# Patient Record
Sex: MALE | Race: WHITE | NOT HISPANIC OR LATINO | Employment: OTHER | URBAN - METROPOLITAN AREA
[De-identification: names, ages, dates, MRNs, and addresses within clinical notes are randomized per-mention and may not be internally consistent; named-entity substitution may affect disease eponyms.]

---

## 2017-01-27 ENCOUNTER — ALLSCRIPTS OFFICE VISIT (OUTPATIENT)
Dept: OTHER | Facility: OTHER | Age: 72
End: 2017-01-27

## 2017-02-22 ENCOUNTER — ALLSCRIPTS OFFICE VISIT (OUTPATIENT)
Dept: OTHER | Facility: OTHER | Age: 72
End: 2017-02-22

## 2017-03-02 ENCOUNTER — HOSPITAL ENCOUNTER (OUTPATIENT)
Dept: RADIOLOGY | Facility: CLINIC | Age: 72
Discharge: HOME | End: 2017-03-02
Payer: MEDICARE

## 2017-03-02 ENCOUNTER — ALLSCRIPTS OFFICE VISIT (OUTPATIENT)
Dept: OTHER | Facility: OTHER | Age: 72
End: 2017-03-02

## 2017-03-02 DIAGNOSIS — M25.562 PAIN IN LEFT KNEE: ICD-10-CM

## 2017-03-02 DIAGNOSIS — M17.10 OSTEOARTHRITIS OF KNEE: ICD-10-CM

## 2017-03-02 PROCEDURE — 73564 X-RAY EXAM KNEE 4 OR MORE: CPT

## 2017-03-02 PROCEDURE — 73560 X-RAY EXAM OF KNEE 1 OR 2: CPT

## 2017-03-06 ENCOUNTER — APPOINTMENT (OUTPATIENT)
Dept: PHYSICAL THERAPY | Facility: CLINIC | Age: 72
End: 2017-03-06
Payer: MEDICARE

## 2017-03-06 DIAGNOSIS — M17.10 OSTEOARTHRITIS OF KNEE: ICD-10-CM

## 2017-03-06 PROCEDURE — G8979 MOBILITY GOAL STATUS: HCPCS

## 2017-03-06 PROCEDURE — G8978 MOBILITY CURRENT STATUS: HCPCS

## 2017-03-06 PROCEDURE — 97014 ELECTRIC STIMULATION THERAPY: CPT

## 2017-03-06 PROCEDURE — 97162 PT EVAL MOD COMPLEX 30 MIN: CPT

## 2017-03-07 ENCOUNTER — GENERIC CONVERSION - ENCOUNTER (OUTPATIENT)
Dept: OTHER | Facility: OTHER | Age: 72
End: 2017-03-07

## 2017-03-08 ENCOUNTER — APPOINTMENT (OUTPATIENT)
Dept: PHYSICAL THERAPY | Facility: CLINIC | Age: 72
End: 2017-03-08
Payer: MEDICARE

## 2017-03-08 PROCEDURE — 97150 GROUP THERAPEUTIC PROCEDURES: CPT

## 2017-03-08 PROCEDURE — 97140 MANUAL THERAPY 1/> REGIONS: CPT

## 2017-03-08 PROCEDURE — 97014 ELECTRIC STIMULATION THERAPY: CPT

## 2017-03-13 ENCOUNTER — APPOINTMENT (OUTPATIENT)
Dept: PHYSICAL THERAPY | Facility: CLINIC | Age: 72
End: 2017-03-13
Payer: MEDICARE

## 2017-03-13 PROCEDURE — 97110 THERAPEUTIC EXERCISES: CPT

## 2017-03-13 PROCEDURE — 97140 MANUAL THERAPY 1/> REGIONS: CPT

## 2017-03-13 PROCEDURE — 97014 ELECTRIC STIMULATION THERAPY: CPT

## 2017-03-15 ENCOUNTER — APPOINTMENT (OUTPATIENT)
Dept: PHYSICAL THERAPY | Facility: CLINIC | Age: 72
End: 2017-03-15
Payer: MEDICARE

## 2017-03-20 ENCOUNTER — APPOINTMENT (OUTPATIENT)
Dept: PHYSICAL THERAPY | Facility: CLINIC | Age: 72
End: 2017-03-20
Payer: MEDICARE

## 2017-03-20 PROCEDURE — 97014 ELECTRIC STIMULATION THERAPY: CPT

## 2017-03-20 PROCEDURE — 97140 MANUAL THERAPY 1/> REGIONS: CPT

## 2017-03-20 PROCEDURE — 97110 THERAPEUTIC EXERCISES: CPT

## 2017-03-22 ENCOUNTER — APPOINTMENT (OUTPATIENT)
Dept: PHYSICAL THERAPY | Facility: CLINIC | Age: 72
End: 2017-03-22
Payer: MEDICARE

## 2017-03-22 PROCEDURE — 97140 MANUAL THERAPY 1/> REGIONS: CPT

## 2017-03-22 PROCEDURE — 97110 THERAPEUTIC EXERCISES: CPT

## 2017-03-22 PROCEDURE — 97014 ELECTRIC STIMULATION THERAPY: CPT

## 2017-03-23 ENCOUNTER — APPOINTMENT (OUTPATIENT)
Dept: PHYSICAL THERAPY | Facility: CLINIC | Age: 72
End: 2017-03-23
Payer: MEDICARE

## 2017-03-23 PROCEDURE — 97140 MANUAL THERAPY 1/> REGIONS: CPT

## 2017-03-23 PROCEDURE — 97110 THERAPEUTIC EXERCISES: CPT

## 2017-03-23 PROCEDURE — 97014 ELECTRIC STIMULATION THERAPY: CPT

## 2017-03-27 ENCOUNTER — APPOINTMENT (OUTPATIENT)
Dept: PHYSICAL THERAPY | Facility: CLINIC | Age: 72
End: 2017-03-27
Payer: MEDICARE

## 2017-03-27 PROCEDURE — 97014 ELECTRIC STIMULATION THERAPY: CPT

## 2017-03-27 PROCEDURE — 97110 THERAPEUTIC EXERCISES: CPT

## 2017-03-27 PROCEDURE — 97140 MANUAL THERAPY 1/> REGIONS: CPT

## 2017-03-30 ENCOUNTER — APPOINTMENT (OUTPATIENT)
Dept: PHYSICAL THERAPY | Facility: CLINIC | Age: 72
End: 2017-03-30
Payer: MEDICARE

## 2017-03-30 PROCEDURE — 97140 MANUAL THERAPY 1/> REGIONS: CPT

## 2017-03-30 PROCEDURE — 97014 ELECTRIC STIMULATION THERAPY: CPT

## 2017-03-30 PROCEDURE — 97110 THERAPEUTIC EXERCISES: CPT

## 2017-04-03 ENCOUNTER — APPOINTMENT (OUTPATIENT)
Dept: PHYSICAL THERAPY | Facility: CLINIC | Age: 72
End: 2017-04-03
Payer: MEDICARE

## 2017-04-03 PROCEDURE — 97014 ELECTRIC STIMULATION THERAPY: CPT

## 2017-04-03 PROCEDURE — 97110 THERAPEUTIC EXERCISES: CPT

## 2017-04-03 PROCEDURE — 97140 MANUAL THERAPY 1/> REGIONS: CPT

## 2017-04-06 ENCOUNTER — APPOINTMENT (OUTPATIENT)
Dept: PHYSICAL THERAPY | Facility: CLINIC | Age: 72
End: 2017-04-06
Payer: MEDICARE

## 2017-04-06 PROCEDURE — 97110 THERAPEUTIC EXERCISES: CPT

## 2017-04-06 PROCEDURE — 97014 ELECTRIC STIMULATION THERAPY: CPT

## 2017-04-06 PROCEDURE — G8978 MOBILITY CURRENT STATUS: HCPCS

## 2017-04-06 PROCEDURE — G8979 MOBILITY GOAL STATUS: HCPCS

## 2017-04-06 PROCEDURE — 97140 MANUAL THERAPY 1/> REGIONS: CPT

## 2017-04-07 ENCOUNTER — GENERIC CONVERSION - ENCOUNTER (OUTPATIENT)
Dept: OTHER | Facility: OTHER | Age: 72
End: 2017-04-07

## 2017-04-10 ENCOUNTER — APPOINTMENT (OUTPATIENT)
Dept: PHYSICAL THERAPY | Facility: CLINIC | Age: 72
End: 2017-04-10
Payer: MEDICARE

## 2017-04-10 PROCEDURE — 97014 ELECTRIC STIMULATION THERAPY: CPT

## 2017-04-10 PROCEDURE — 97110 THERAPEUTIC EXERCISES: CPT

## 2017-04-10 PROCEDURE — 97140 MANUAL THERAPY 1/> REGIONS: CPT

## 2017-04-12 ENCOUNTER — GENERIC CONVERSION - ENCOUNTER (OUTPATIENT)
Dept: OTHER | Facility: OTHER | Age: 72
End: 2017-04-12

## 2017-04-13 ENCOUNTER — APPOINTMENT (OUTPATIENT)
Dept: PHYSICAL THERAPY | Facility: CLINIC | Age: 72
End: 2017-04-13
Payer: MEDICARE

## 2017-04-13 PROCEDURE — 97110 THERAPEUTIC EXERCISES: CPT

## 2017-04-13 PROCEDURE — 97014 ELECTRIC STIMULATION THERAPY: CPT

## 2017-04-13 PROCEDURE — 97140 MANUAL THERAPY 1/> REGIONS: CPT

## 2017-04-24 ENCOUNTER — ALLSCRIPTS OFFICE VISIT (OUTPATIENT)
Dept: OTHER | Facility: OTHER | Age: 72
End: 2017-04-24

## 2017-05-24 LAB
A/G RATIO (HISTORICAL): 1.3 (CALC) (ref 1–2.5)
ALBUMIN SERPL BCP-MCNC: 4.1 G/DL (ref 3.6–5.1)
ALP SERPL-CCNC: 56 U/L (ref 40–115)
ALT SERPL W P-5'-P-CCNC: 18 U/L (ref 9–46)
AST SERPL W P-5'-P-CCNC: 17 U/L (ref 10–35)
BILIRUB SERPL-MCNC: 0.9 MG/DL (ref 0.2–1.2)
BUN SERPL-MCNC: 21 MG/DL (ref 7–25)
BUN/CREA RATIO (HISTORICAL): ABNORMAL (CALC) (ref 6–22)
CALCIUM (ADJUSTED FOR ALBUMIN) (HISTORICAL): 9.2 MG/DL (CALC) (ref 8.6–10.2)
CALCIUM SERPL-MCNC: 9 MG/DL (ref 8.6–10.3)
CHLORIDE SERPL-SCNC: 98 MMOL/L (ref 98–110)
CHOLEST SERPL-MCNC: 186 MG/DL (ref 125–200)
CHOLEST/HDLC SERPL: 5.2 (CALC)
CO2 SERPL-SCNC: 36 MMOL/L (ref 20–31)
CREAT SERPL-MCNC: 0.94 MG/DL (ref 0.7–1.18)
EGFR AFRICAN AMERICAN (HISTORICAL): 94 ML/MIN/1.73M2
EGFR-AMERICAN CALC (HISTORICAL): 81 ML/MIN/1.73M2
GAMMA GLOBULIN (HISTORICAL): 3.2 G/DL (CALC) (ref 1.9–3.7)
GLUCOSE (HISTORICAL): 85 MG/DL (ref 65–99)
HDLC SERPL-MCNC: 36 MG/DL
LDL CHOLESTEROL (HISTORICAL): 119 MG/DL (CALC)
NON-HDL-CHOL (CHOL-HDL) (HISTORICAL): 150 MG/DL (CALC)
POTASSIUM SERPL-SCNC: 4.2 MMOL/L (ref 3.5–5.3)
PROSTATE SPECIFIC ANTIGEN FREE (HISTORICAL): 1.1 NG/ML
PROSTATE SPECIFIC ANTIGEN PERCENT FREE (HISTORICAL): 23 % (CALC)
PROSTATE SPECIFIC ANTIGEN TOTAL (HISTORICAL): 4.7 NG/ML
SODIUM SERPL-SCNC: 140 MMOL/L (ref 135–146)
TOTAL PROTEIN (HISTORICAL): 7.3 G/DL (ref 6.1–8.1)
TRIGL SERPL-MCNC: 153 MG/DL

## 2017-06-08 ENCOUNTER — ALLSCRIPTS OFFICE VISIT (OUTPATIENT)
Dept: OTHER | Facility: OTHER | Age: 72
End: 2017-06-08

## 2017-06-20 ENCOUNTER — ALLSCRIPTS OFFICE VISIT (OUTPATIENT)
Dept: OTHER | Facility: OTHER | Age: 72
End: 2017-06-20

## 2017-06-26 ENCOUNTER — ALLSCRIPTS OFFICE VISIT (OUTPATIENT)
Dept: OTHER | Facility: OTHER | Age: 72
End: 2017-06-26

## 2017-06-29 ENCOUNTER — ALLSCRIPTS OFFICE VISIT (OUTPATIENT)
Dept: OTHER | Facility: OTHER | Age: 72
End: 2017-06-29

## 2017-07-10 DIAGNOSIS — J18.1 LOBAR PNEUMONIA (HCC): ICD-10-CM

## 2017-07-10 DIAGNOSIS — D72.829 ELEVATED WHITE BLOOD CELL COUNT: ICD-10-CM

## 2017-09-27 ENCOUNTER — LAB CONVERSION - ENCOUNTER (OUTPATIENT)
Dept: OTHER | Facility: OTHER | Age: 72
End: 2017-09-27

## 2017-09-27 LAB
A/G RATIO (HISTORICAL): 1.3 (CALC) (ref 1–2.5)
ALBUMIN SERPL BCP-MCNC: 3.9 G/DL (ref 3.6–5.1)
ALP SERPL-CCNC: 61 U/L (ref 40–115)
ALT SERPL W P-5'-P-CCNC: 15 U/L (ref 9–46)
AST SERPL W P-5'-P-CCNC: 20 U/L (ref 10–35)
BASOPHILS # BLD AUTO: 0.9 %
BASOPHILS # BLD AUTO: 86 CELLS/UL (ref 0–200)
BILIRUB SERPL-MCNC: 0.6 MG/DL (ref 0.2–1.2)
BUN SERPL-MCNC: 20 MG/DL (ref 7–25)
BUN/CREA RATIO (HISTORICAL): ABNORMAL (CALC) (ref 6–22)
CALCIUM SERPL-MCNC: 9.1 MG/DL (ref 8.6–10.3)
CHLORIDE SERPL-SCNC: 98 MMOL/L (ref 98–110)
CHOLEST SERPL-MCNC: 172 MG/DL
CHOLEST/HDLC SERPL: 5.4 (CALC)
CO2 SERPL-SCNC: 33 MMOL/L (ref 20–31)
CREAT SERPL-MCNC: 0.87 MG/DL (ref 0.7–1.18)
DEPRECATED RDW RBC AUTO: 13.2 % (ref 11–15)
EGFR AFRICAN AMERICAN (HISTORICAL): 100 ML/MIN/1.73M2
EGFR-AMERICAN CALC (HISTORICAL): 86 ML/MIN/1.73M2
EOSINOPHIL # BLD AUTO: 1.5 %
EOSINOPHIL # BLD AUTO: 144 CELLS/UL (ref 15–500)
GAMMA GLOBULIN (HISTORICAL): 3 G/DL (CALC) (ref 1.9–3.7)
GLUCOSE (HISTORICAL): 89 MG/DL (ref 65–99)
HCT VFR BLD AUTO: 47.6 % (ref 38.5–50)
HDLC SERPL-MCNC: 32 MG/DL
HGB BLD-MCNC: 15.7 G/DL (ref 13.2–17.1)
LDL CHOLESTEROL (HISTORICAL): 112 MG/DL (CALC)
LYMPHOCYTES # BLD AUTO: 2160 CELLS/UL (ref 850–3900)
LYMPHOCYTES # BLD AUTO: 22.5 %
MCH RBC QN AUTO: 32.1 PG (ref 27–33)
MCHC RBC AUTO-ENTMCNC: 33 G/DL (ref 32–36)
MCV RBC AUTO: 97.3 FL (ref 80–100)
MONOCYTES # BLD AUTO: 960 CELLS/UL (ref 200–950)
MONOCYTES (HISTORICAL): 10 %
NEUTROPHILS # BLD AUTO: 6250 CELLS/UL (ref 1500–7800)
NEUTROPHILS # BLD AUTO: 65.1 %
NON-HDL-CHOL (CHOL-HDL) (HISTORICAL): 140 MG/DL (CALC)
PLATELET # BLD AUTO: 270 THOUSAND/UL (ref 140–400)
PMV BLD AUTO: 11.3 FL (ref 7.5–12.5)
POTASSIUM SERPL-SCNC: 4.3 MMOL/L (ref 3.5–5.3)
RBC # BLD AUTO: 4.89 MILLION/UL (ref 4.2–5.8)
SODIUM SERPL-SCNC: 141 MMOL/L (ref 135–146)
TOTAL PROTEIN (HISTORICAL): 6.9 G/DL (ref 6.1–8.1)
TRIGL SERPL-MCNC: 157 MG/DL
TSH SERPL DL<=0.05 MIU/L-ACNC: 1.47 MIU/L (ref 0.4–4.5)
WBC # BLD AUTO: 9.6 THOUSAND/UL (ref 3.8–10.8)

## 2017-09-29 ENCOUNTER — GENERIC CONVERSION - ENCOUNTER (OUTPATIENT)
Dept: OTHER | Facility: OTHER | Age: 72
End: 2017-09-29

## 2017-10-30 ENCOUNTER — ALLSCRIPTS OFFICE VISIT (OUTPATIENT)
Dept: OTHER | Facility: OTHER | Age: 72
End: 2017-10-30

## 2017-10-31 NOTE — PROGRESS NOTES
Assessment  1  COPD, moderate (496) (J44 9)   2  Benign essential hypertension (401 1) (I10)   3  Anxiety (300 00) (F41 9)   4  Mixed hyperlipidemia (272 2) (E78 2)   5  CAD (coronary artery disease) (414 00) (I25 10)   6  Need for vaccination (V05 9) (Z23)    Plan  Anxiety    · PARoxetine HCl - 20 MG Oral Tablet (Paxil); TAKE 1 TABLET DAILY  Arthritis    · Meloxicam 7 5 MG Oral Tablet (Mobic); TAKE 1 TABLET TWICE DAILY WITH  FOOD  Atrial fibrillation, COPD, moderate, Elevated PSA, Mixed hyperlipidemia    · (1) PSA, DIAGNOSTIC (FOLLOW-UP); Status:Hold For - Exact Date; Requested for:After  [id number]; Atrial fibrillation, COPD, moderate, Mixed hyperlipidemia    · (1) CBC/PLT/DIFF; Status:Hold For - Exact Date; Requested for:After [id number];    · (1) COMPREHENSIVE METABOLIC PANEL; Status:Hold For - Exact Date; Requested  for:After [id number];    · (1) LIPID PANEL FASTING W DIRECT LDL REFLEX; Status:Hold For - Exact Date; Requested for:After [id number]; Benign essential hypertension    · Triamterene-HCTZ 37 5-25 MG Oral Capsule; TAKE 1 CAPSULE DAILY  CAD (coronary artery disease)    · Furosemide 20 MG Oral Tablet (Lasix); Take 2 tablets by mouth  daily  Esophagitis, reflux    · Famotidine 40 MG Oral Tablet; Take 1 tablet by mouth  twice a day  Mixed hyperlipidemia    · Pravastatin Sodium 80 MG Oral Tablet; TAKE 1 TABLET DAILY  Need for vaccination    · Fluzone High-Dose 0 5 ML Intramuscular Suspension Prefilled Syringe;  INJECT 0 5  ML Intramuscular; To Be Done: 59ZPF1279    Discussion/Summary    --COPD: stable on current meds: BREO and perforomist  cont prn O1edjqg well on paxil 20chol 172/112  doing well on pravastatin 80 mg qd  shot todaymos, FBW prior  Possible side effects of new medications were reviewed with the patient/guardian today  The treatment plan was reviewed with the patient/guardian   The patient/guardian understands and agrees with the treatment plan      Chief Complaint  Patient presents for 6 month follow up and blood work review  Patient is here today for follow up of chronic conditions described in HPI  History of Present Illness  recheck chronic medical probs  overall feels well  doing well on inhalers, breo and perforomist doing well on anxiety med, paxil  no longer uses cpap  doing well on pravastatin for chol   Review of Systems    Constitutional: No fever or chills, feels well, no tiredness, no recent weight gain or weight loss  Cardiovascular: No complaints of slow heart rate, no fast heart rate, no chest pain, no palpitations, no leg claudication, no lower extremity  Respiratory: No complaints of shortness of breath, no wheezing, no cough, no SOB on exertion, no orthopnea or PND  Gastrointestinal: No complaints of abdominal pain, no constipation, no nausea or vomiting, no diarrhea or bloody stools  Genitourinary: No complaints of dysuria, no incontinence, no hesitancy, no nocturia, no genital lesion, no testicular pain  Active Problems  1  Anxiety (300 00) (F41 9)   2  Arthritis (716 90) (M19 90)   3  Atrial fibrillation (427 31) (I48 91)   4  Benign essential hypertension (401 1) (I10)   5  CAD (coronary artery disease) (414 00) (I25 10)   6  Chronic pain of right elbow (192 77,595 30) (M25 521,G89 29)   7  COPD, moderate (496) (J44 9)   8  Diarrhea (787 91) (R19 7)   9  Elevated PSA (790 93) (R97 20)   10  Esophagitis, reflux (530 11) (K21 0)   11  Extrinsic asthma (493 00) (J45 909)   12  Gait disturbance (781 2) (R26 9)   13  Hypoxia (799 02) (R09 02)   14  Left knee pain (719 46) (M25 562)   15  Leukocytosis (288 60) (D72 829)   16  Localized osteoarthritis of right knee (715 36) (M17 11)   17  Lymphedema (457 1) (I89 0)   18  Mixed hyperlipidemia (272 2) (E78 2)   19  Obstructive sleep apnea (327 23) (G47 33)   20  Venous insufficiency (459 81) (I87 2)   21  Vitamin D deficiency (268 9) (E55 9)    Past Medical History  1   History of Abrasion of face, initial encounter (910 0) (S00 81XA)   2  History of Acute bronchitis (466 0) (J20 9)   3  Acute upper respiratory infection (465 9) (J06 9)   4  History of Cellulitis (682 9) (L03 90)   5  History of Cellulitis (682 9) (L03 90)   6  History of Cellulitis (682 9) (L03 90)   7  History of Cellulitis of extremity (L03 119)   8  History of Contusion of face (920) (S00 83XA)   9  History of Depression screening (V79 0) (Z13 89)   10  History of Flu vaccine need (V04 81) (Z23)   11  History of acute bacterial sinusitis (V12 69) (Z87 09)   12  History of acute bronchitis (V12 69) (Z87 09)   13  History of chronic bronchitis (V12 69) (Z87 09)   14  History of chronic obstructive lung disease (V12 69) (Z87 09)   15  History of edema (V13 89) (Z87 898)   16  History of influenza (V12 09) (Z87 09)   17  History of leukocytosis (V12 3) (Z86 2)   18  History of methicillin resistant Staphylococcus aureus infection (V12 04) (Z86 14)   19  History of pneumonia (V12 61) (Z87 01)   20  History of Knee pain (719 46) (M25 569)   21  History of LLL pneumonia (486) (J18 1)   22  History of Need for prophylactic vaccination and inoculation against influenza (V04 81)    (Z23)   23  History of Need for vaccination with 13-polyvalent pneumococcal conjugate vaccine    (V03 82) (Z23)   24  History of Sepsis due to Escherichia coli (155 28,055 62) (A41 51)    The active problems and past medical history were reviewed and updated today  Surgical History  1  History of Foot Surgery Left   2  History of Surgery Of The Spleen    Family History  Mother    1  Family history of CAD (coronary artery disease)  Father    2  Family history of Type 2 diabetes mellitus    Social History   · Former smoker (B62 84) (B34 263)   · No alcohol use  The social history was reviewed and updated today  The social history was reviewed and is unchanged  Current Meds   1  Aspir-81 81 MG Oral Tablet Delayed Release; TAKE 1 TABLET DAILY;    Therapy: (Recorded:15Mar2014) to Recorded   2  Breo Ellipta 100-25 MCG/INH Inhalation Aerosol Powder Breath Activated; INHALE 1   PUFFS Daily rinse mouth after using; Therapy: 97GRR2259 to (Evaluate:06New5113); Last Rx:29Jun2017 Ordered   3  Dicyclomine HCl - 20 MG Oral Tablet; TAKE 1 TABLET EVERY 6 HOURS AS NEEDED; Therapy: 50KTU6995 to (Last Drea Fabiana)  Requested for: 20Jun2017; Status: ACTIVE   - Transmit to Pharmacy - Awaiting Verification Ordered   4  Famotidine 40 MG Oral Tablet; Take 1 tablet by mouth  twice a day; Therapy: 99PAK8691 to (Evaluate:52Nyp6548)  Requested for: 43Zvm6382; Last   Rx:10Sep2017 Ordered   5  Furosemide 20 MG Oral Tablet; Take 2 tablets by mouth  daily; Therapy: 71PVQ7462 to (GOEQKYLE:83EMA8451)  Requested for: 97EZK5567; Last   Rx:27Jan2017 Ordered   6  Furosemide 20 MG Oral Tablet; TAKE 2 TABLETS DAILY; Therapy: 11TJS3877 to (Brooklyn Reason)  Requested for: 65RKL0593; Last   Rx:85Wpw0001 Ordered   7  Glucosamine 500 MG Oral Capsule; Therapy: 61Kqz9293 to Recorded   8  Meloxicam 7 5 MG Oral Tablet; TAKE 1 TABLET TWICE DAILY WITH FOOD; Therapy: 40LUC3047 to (RCRRZBMB:34VYF9698)  Requested for: 80QIH0773; Last   Rx:27Jan2017 Ordered   9  MiraLax Oral Powder; Therapy: 22Smj6854 to Recorded   10  Nebulizer Air Tube/Plugs Miscellaneous; USE AS DIRECTED; Therapy: 63DDP9493 to (Last Rx:30Jun2017) Ordered   11  Nebulizer/Adult Mask KIT; USE AS DIRECTED; Therapy: 23HPQ8294 to (Last Rx:30Jun2017) Ordered   12  PARoxetine HCl - 20 MG Oral Tablet; TAKE 1 TABLET DAILY; Therapy: 32LAC7997 to (YIJVOKZB:79XER7741)  Requested for: 24JVT9435; Last    Rx:27Jan2017 Ordered   13  Perforomist 20 MCG/2ML Inhalation Nebulization Solution; USE 1 VIAL IN NEBULIZER    EVERY 12 HOURS; Therapy: 07TSY0804 to (Brooklyn Reason)  Requested for: 90DNR9846; Last    Rx:88Anu1260 Ordered   14  Pravastatin Sodium 80 MG Oral Tablet; TAKE 1 TABLET DAILY;     Therapy: 81TNT7963 to (YPCWZYNT:50FPH6670)  Requested for: 79QHD6734; Last    FB:88ESS8845 Ordered   15  Triamterene-HCTZ 37 5-25 MG Oral Capsule; TAKE 1 CAPSULE DAILY; Therapy: 45Sek8031 to [phone])  Requested for: 40ROM7053; Last    Rx:85Ize2019 Ordered    The medication list was reviewed and updated today  Allergies  1  No Known Drug Allergies    Vitals  Vital Signs    Recorded: 67MTD3860 02:27PM   Temperature 98 4 F, Tympanic   Heart Rate 83   Pulse Quality Normal   Respiration Quality Normal   Respiration 16   Systolic 781, LUE, Sitting   Diastolic 80, LUE, Sitting   Weight 268 lb 9 oz   BMI Calculated 42 06   BSA Calculated 2 29   Height Unobtainable No   O2 Saturation 92   Pain Scale 0     Physical Exam    Constitutional   General appearance: No acute distress, well appearing and well nourished  Pulmonary   Respiratory effort: No increased work of breathing or signs of respiratory distress  Auscultation of lungs: Clear to auscultation, equal breath sounds bilaterally, no wheezes, no rales, no rhonci  Cardiovascular   Palpation of heart: Normal PMI, no thrills  Auscultation of heart: Normal rate and rhythm, normal S1 and S2, without murmurs  Examination of extremities for edema and/or varicosities: Normal     Carotid pulses: Normal     Lymphatic   Palpation of lymph nodes in neck: No lymphadenopathy  Psychiatric   Orientation to person, place and time: Normal     Mood and affect: Normal          Future Appointments    Date/Time Provider Specialty Site   12/18/2017 09:15 AM Alok Li DO Orthopedic Surgery [address]     Signatures   Electronically signed by :  Je Abdalla DO; Oct 30 2017  2:56PM EST                       (Author)

## 2017-11-08 ENCOUNTER — GENERIC CONVERSION - ENCOUNTER (OUTPATIENT)
Dept: OTHER | Facility: OTHER | Age: 72
End: 2017-11-08

## 2017-12-18 ENCOUNTER — GENERIC CONVERSION - ENCOUNTER (OUTPATIENT)
Dept: OTHER | Facility: OTHER | Age: 72
End: 2017-12-18

## 2017-12-19 ENCOUNTER — ALLSCRIPTS OFFICE VISIT (OUTPATIENT)
Dept: OTHER | Facility: OTHER | Age: 72
End: 2017-12-19

## 2017-12-20 NOTE — PROGRESS NOTES
Assessment  1  Acute bronchitis (466 0) (J20 9)    Plan  Acute bronchitis    · LevoFLOXacin 500 MG Oral Tablet; Take 1 tablet daily    Discussion/Summary    --Bronchitis: rx levaquin 500 qd x 10 days  drink plenty of fluids  call further probs  Possible side effects of new medications were reviewed with the patient/guardian today  The treatment plan was reviewed with the patient/guardian  The patient/guardian understands and agrees with the treatment plan      Chief Complaint  1  Cough   2  Sore Throat  pt complains of a sore throat/fever/cough      History of Present Illness  HPI: 2 day hx of cough, congestion, fever  Review of Systems   Constitutional: as noted in HPI   ENT: as noted in HPI  Cardiovascular: no complaints of slow or fast heart rate, no chest pain, no palpitations, no leg claudication or lower extremity edema  Respiratory: as noted in HPI  Active Problems  1  Anxiety (300 00) (F41 9)   2  Arthritis (716 90) (M19 90)   3  Atrial fibrillation (427 31) (I48 91)   4  Benign essential hypertension (401 1) (I10)   5  CAD (coronary artery disease) (414 00) (I25 10)   6  Chronic pain of right elbow (679 96,618 69) (M25 521,G89 29)   7  COPD, moderate (496) (J44 9)   8  Diarrhea (787 91) (R19 7)   9  Elevated PSA (790 93) (R97 20)   10  Esophagitis, reflux (530 11) (K21 0)   11  Extrinsic asthma (493 00) (J45 909)   12  Gait disturbance (781 2) (R26 9)   13  Hypoxia (799 02) (R09 02)   14  Left knee pain (719 46) (M25 562)   15  Leukocytosis (288 60) (D72 829)   16  Localized osteoarthritis of right knee (715 36) (M17 11)   17  Lymphedema (457 1) (I89 0)   18  Mixed hyperlipidemia (272 2) (E78 2)   19  Need for vaccination (V05 9) (Z23)   20  Obstructive sleep apnea (327 23) (G47 33)   21  Venous insufficiency (459 81) (I87 2)   22  Vitamin D deficiency (268 9) (E55 9)    Past Medical History  1  History of Abrasion of face, initial encounter (910 0) (S00 81XA)   2   History of Acute bronchitis (466 0) (J20 9)   3  Acute upper respiratory infection (465 9) (J06 9)   4  History of Cellulitis (682 9) (L03 90)   5  History of Cellulitis (682 9) (L03 90)   6  History of Cellulitis (682 9) (L03 90)   7  History of Cellulitis of extremity (L03 119)   8  History of Contusion of face (920) (S00 83XA)   9  History of Depression screening (V79 0) (Z13 89)   10  History of Flu vaccine need (V04 81) (Z23)   11  History of acute bacterial sinusitis (V12 69) (Z87 09)   12  History of acute bronchitis (V12 69) (Z87 09)   13  History of chronic bronchitis (V12 69) (Z87 09)   14  History of chronic obstructive lung disease (V12 69) (Z87 09)   15  History of edema (V13 89) (Z87 898)   16  History of influenza (V12 09) (Z87 09)   17  History of leukocytosis (V12 3) (Z86 2)   18  History of methicillin resistant Staphylococcus aureus infection (V12 04) (Z86 14)   19  History of pneumonia (V12 61) (Z87 01)   20  History of Knee pain (719 46) (M25 569)   21  History of LLL pneumonia (486) (J18 1)   22  History of Need for prophylactic vaccination and inoculation against influenza (V04 81)  (Z23)   23  History of Need for vaccination with 13-polyvalent pneumococcal conjugate vaccine  (V03 82) (Z23)   24  History of Sepsis due to Escherichia coli (038 42,995 91) (A41 51)  Active Problems And Past Medical History Reviewed: The active problems and past medical history were reviewed and updated today  Family History  Mother    1  Family history of CAD (coronary artery disease)  Father    2  Family history of Type 2 diabetes mellitus    Social History   · Former smoker (Q64 00) (T75 022)   · No alcohol use  The social history was reviewed and updated today  The social history was reviewed and is unchanged  Surgical History  1  History of Foot Surgery Left   2  History of Surgery Of The Spleen    Current Meds   1  Aspir-81 81 MG Oral Tablet Delayed Release; TAKE 1 TABLET DAILY;  Therapy: (Recorded:15Mar2014) to Recorded 2  Breo Ellipta 100-25 MCG/INH Inhalation Aerosol Powder Breath Activated; INHALE 1 PUFFS Daily rinse mouth after using; Therapy: 50VMU3239 to (Evaluate:93Kgv6855); Last Rx:29Jun2017 Ordered   3  Dicyclomine HCl - 20 MG Oral Tablet; TAKE 1 TABLET EVERY 6 HOURS AS NEEDED; Therapy: 80RTG2687 to (Last Steven Precise)  Requested for: 20Jun2017; Status: ACTIVE - Transmit to Pharmacy - Awaiting Verification Ordered   4  Famotidine 40 MG Oral Tablet; Take 1 tablet by mouth  twice a day; Therapy: 90VKY9832 to (Evaluate:18Mar2018)  Requested for: 43JQH2568; Last Rx:77Tkf8420 Ordered   5  Furosemide 20 MG Oral Tablet; Take 2 tablets by mouth  daily; Therapy: 60GOZ4146 to (0480 66 01 75)  Requested for: 41ZIB5888; Last Rx:30Oct2017 Ordered   6  Furosemide 20 MG Oral Tablet; TAKE 2 TABLETS DAILY; Therapy: 71DYE9048 to (Natali Adams)  Requested for: 05HLU7104; Last Rx:77Qpn5590 Ordered   7  Glucosamine 500 MG Oral Capsule; Therapy: 85Vkf4682 to Recorded   8  Meloxicam 7 5 MG Oral Tablet; TAKE 1 TABLET TWICE DAILY WITH FOOD; Therapy: 45TUM1570 to (0480 66 01 75)  Requested for: 46GLY5352; Last Rx:30Oct2017 Ordered   9  MiraLax Oral Powder; Therapy: 94Xgl7811 to Recorded   10  Nebulizer Air Tube/Plugs Miscellaneous; USE AS DIRECTED; Therapy: 52BCN0983 to (Last Rx:30Jun2017) Ordered   11  Nebulizer/Adult Mask KIT; USE AS DIRECTED; Therapy: 71BDT6589 to (Last Rx:30Jun2017) Ordered   12  PARoxetine HCl - 20 MG Oral Tablet; TAKE 1 TABLET DAILY; Therapy: 03ZRI7954 to (0480 66 01 75)  Requested for: 75XSV1894; Last  Rx:30Oct2017 Ordered   13  Perforomist 20 MCG/2ML Inhalation Nebulization Solution; USE 1 VIAL IN NEBULIZER  EVERY 12 HOURS; Therapy: 86DYA3412 to (Mohsen Pickett)  Requested for: 48AZR6767; Last  Rx:89Ult6418 Ordered   14  Pravastatin Sodium 80 MG Oral Tablet; TAKE 1 TABLET DAILY; Therapy: 28VGK0291 to (0480 66 01 75)  Requested for: 57NLD5072; Last  Rx:30Oct2017 Ordered   15  Triamterene-HCTZ 37 5-25 MG Oral Capsule; TAKE 1 CAPSULE DAILY; Therapy: 19Kff1643 to 026 835 27 54)  Requested for: 97YEB7176; Last  Rx:30Oct2017 Ordered    The medication list was reviewed and updated today  Allergies  1  No Known Drug Allergies    Vitals   Recorded: 50JQF4533 06:01PM   Temperature 99 9 F, Tympanic   Heart Rate 87   Pulse Quality Normal   Respiration Quality Normal   Respiration 17   Systolic 365, LUE, Sitting   Diastolic 60, LUE, Sitting   Height 5 ft 8 3 in   Weight 275 lb 8 oz   BMI Calculated 41 52   BSA Calculated 2 35   O2 Saturation 99   Pain Scale 0     Physical Exam   Constitutional  General appearance: No acute distress, well appearing and well nourished  Pulmonary  Respiratory effort: No increased work of breathing or signs of respiratory distress  Auscultation of lungs: Clear to auscultation, equal breath sounds bilaterally, no wheezes, no rales, no rhonci  Future Appointments    Date/Time Provider Specialty Site   03/19/2018 09:15 AM Tierney Crouch DO Orthopedic Surgery [facility] [address] [place] [address] [address]     Signatures   Electronically signed by :  Gianluca Abreu DO; Dec 19 2017  6:21PM EST                       (Author)

## 2017-12-24 ENCOUNTER — TRANSCRIBE ORDERS (OUTPATIENT)
Dept: URGENT CARE | Facility: MEDICAL CENTER | Age: 72
End: 2017-12-24

## 2017-12-24 ENCOUNTER — GENERIC CONVERSION - ENCOUNTER (OUTPATIENT)
Dept: OTHER | Facility: OTHER | Age: 72
End: 2017-12-24

## 2017-12-24 ENCOUNTER — OFFICE VISIT (OUTPATIENT)
Dept: URGENT CARE | Facility: MEDICAL CENTER | Age: 72
End: 2017-12-24
Payer: MEDICARE

## 2017-12-24 ENCOUNTER — APPOINTMENT (OUTPATIENT)
Dept: LAB | Facility: HOSPITAL | Age: 72
End: 2017-12-24
Payer: MEDICARE

## 2017-12-24 ENCOUNTER — APPOINTMENT (OUTPATIENT)
Dept: RADIOLOGY | Facility: MEDICAL CENTER | Age: 72
End: 2017-12-24
Payer: MEDICARE

## 2017-12-24 DIAGNOSIS — R06.02 SOB (SHORTNESS OF BREATH): ICD-10-CM

## 2017-12-24 DIAGNOSIS — J02.9 ACUTE PHARYNGITIS: ICD-10-CM

## 2017-12-24 DIAGNOSIS — R06.02 SOB (SHORTNESS OF BREATH): Primary | ICD-10-CM

## 2017-12-24 LAB — S PYO AG THROAT QL: NEGATIVE

## 2017-12-24 PROCEDURE — 87070 CULTURE OTHR SPECIMN AEROBIC: CPT

## 2017-12-24 PROCEDURE — G0463 HOSPITAL OUTPT CLINIC VISIT: HCPCS

## 2017-12-24 PROCEDURE — 99213 OFFICE O/P EST LOW 20 MIN: CPT

## 2017-12-24 PROCEDURE — 71020 HB CHEST X-RAY 2VW FRONTAL&LATL: CPT

## 2017-12-24 PROCEDURE — 87430 STREP A AG IA: CPT

## 2017-12-25 NOTE — PROGRESS NOTES
Plan   Sore throat    · (1) THROAT CULTURE (CULTURE, UPPER RESPIRATORY); Status:Active -    Retrospective By Protocol Authorization; Requested for:[id number];    · Rapid StrepA- POC; Source:Throat; Status:Resulted - Requires Verification,Retrospective    By Protocol Authorization;   Done: [id number] 04:30PM    Discussion/Summary   Discussion Summary:    Pulse oximetry on room air is 86% with a heart rate of 88  Chest x-ray reveals fluffy infiltrates bilaterally  Findings are consistent with bilateral pneumonia  However, patient is not in respiratory distress  Patient referred to [facility] emergency room for further evaluation  Patient transported by private vehicle  Medication Side Effects Reviewed: Possible side effects of new medications were reviewed with the patient/guardian today  Understands and agrees with treatment plan: The treatment plan was reviewed with the patient/guardian  The patient/guardian understands and agrees with the treatment plan    Counseling Documentation With Imm: The patient was counseled regarding  Chief Complaint   1  Cough   2  Sore Throat  Chief Complaint Free Text Note Form: Wife states pt with cough and sore throat  Seen by PCP 12/19, placed on levoquin  Wife states pt not improved  Instructed by PCP on call today to go to urgent care  Pt very poor historian  Wife unsure if pt has fever   History of Present Illness   HPI: Patient complaining of cough and shortness of breath for the past week  Recently seen by primary care provider 5 days ago on placed on Levaquin  However, symptoms do not seem to be improving  Patient's wife called the office to talk to primary care provider who directed the patient to come to urgent care to be assessed  He is having some difficulty breathing current using albuterol inhaler twice a day with minimal improvement  Not currently using oxygen as instructed      Hospital Based Practices Required Assessment:      Pain Assessment      the patient states they have pain  The pain is located in the throat  Review of Systems   Focused-Male:      Constitutional: no fever or chills, feels well, no tiredness, no recent weight loss or weight gain  Cardiovascular: no complaints of slow or fast heart rate, no chest pain, no palpitations, no leg claudication or lower extremity edema  Respiratory: shortness of breath-- and-- cough  Active Problems   1  Acute bronchitis (466 0) (J20 9)   2  Anxiety (300 00) (F41 9)   3  Arthritis (716 90) (M19 90)   4  Atrial fibrillation (427 31) (I48 91)   5  Benign essential hypertension (401 1) (I10)   6  CAD (coronary artery disease) (414 00) (I25 10)   7  Chronic pain of right elbow (316 69,843 82) (M25 521,G89 29)   8  COPD, moderate (496) (J44 9)   9  Diarrhea (787 91) (R19 7)   10  Elevated PSA (790 93) (R97 20)   11  Esophagitis, reflux (530 11) (K21 0)   12  Extrinsic asthma (493 00) (J45 909)   13  Gait disturbance (781 2) (R26 9)   14  Hypoxia (799 02) (R09 02)   15  Left knee pain (719 46) (M25 562)   16  Leukocytosis (288 60) (D72 829)   17  Localized osteoarthritis of right knee (715 36) (M17 11)   18  Lymphedema (457 1) (I89 0)   19  Mixed hyperlipidemia (272 2) (E78 2)   20  Need for vaccination (V05 9) (Z23)   21  Obstructive sleep apnea (327 23) (G47 33)   22  Venous insufficiency (459 81) (I87 2)   23  Vitamin D deficiency (268 9) (E55 9)    Past Medical History   1  History of Abrasion of face, initial encounter (910 0) (S00 81XA)   2  History of Acute bronchitis (466 0) (J20 9)   3  Acute upper respiratory infection (465 9) (J06 9)   4  History of Cellulitis (682 9) (L03 90)   5  History of Cellulitis (682 9) (L03 90)   6  History of Cellulitis (682 9) (L03 90)   7  History of Cellulitis of extremity (L03 119)   8  History of Contusion of face (920) (S00 83XA)   9  History of Depression screening (V79 0) (Z13 89)   10  History of Flu vaccine need (V04 81) (Z23)   11  History of acute bacterial sinusitis (V12 69) (Z87 09)   12  History of acute bronchitis (V12 69) (Z87 09)   13  History of chronic bronchitis (V12 69) (Z87 09)   14  History of chronic obstructive lung disease (V12 69) (Z87 09)   15  History of edema (V13 89) (Z87 898)   16  History of influenza (V12 09) (Z87 09)   17  History of leukocytosis (V12 3) (Z86 2)   18  History of methicillin resistant Staphylococcus aureus infection (V12 04) (Z86 14)   19  History of pneumonia (V12 61) (Z87 01)   20  History of Knee pain (719 46) (M25 569)   21  History of LLL pneumonia (486) (J18 1)   22  History of Need for prophylactic vaccination and inoculation against influenza (V04 81)      (Z23)   23  History of Need for vaccination with 13-polyvalent pneumococcal conjugate vaccine      (V03 82) (Z23)   24  History of Sepsis due to Escherichia coli (038 42,310 91) (A41 51)  Active Problems And Past Medical History Reviewed: The active problems and past medical history were reviewed and updated today  Family History   Mother    1  Family history of CAD (coronary artery disease)  Father    2  Family history of Type 2 diabetes mellitus    Social History    · Former smoker (K60 60) (U33 177)   · No alcohol use    Surgical History   1  History of Foot Surgery Left   2  History of Surgery Of The Spleen    Current Meds    1  Aspir-81 81 MG Oral Tablet Delayed Release; TAKE 1 TABLET DAILY; Therapy: (Recorded:15Mar2014) to Recorded   2  Breo Ellipta 100-25 MCG/INH Inhalation Aerosol Powder Breath Activated; INHALE 1     PUFFS Daily rinse mouth after using; Therapy: 60ZAB2192 to (Evaluate:33Uug0317); Last Rx:29Jun2017 Ordered   3  Dicyclomine HCl - 20 MG Oral Tablet; TAKE 1 TABLET EVERY 6 HOURS AS NEEDED; Therapy: 93FSU1875 to (Last Damian Binet)  Requested for: 20Jun2017; Status: ACTIVE     - Transmit to Pharmacy - Awaiting Verification Ordered   4  Famotidine 40 MG Oral Tablet;  Take 1 tablet by mouth  twice a day; Therapy: 92NZN2627 to (Evaluate:18Mar2018)  Requested for: 21BWZ2447; Last     Rx:31Qbf8384 Ordered   5  Furosemide 20 MG Oral Tablet; Take 2 tablets by mouth  daily; Therapy: 78VHM8939 to (96 492821)  Requested for: 83FUA0115; Last     Rx:30Oct2017 Ordered   6  Furosemide 20 MG Oral Tablet; TAKE 2 TABLETS DAILY; Therapy: 69PED2414 to (Whitney Garcia)  Requested for: 91PAX7684; Last     Rx:64Tdi6186 Ordered   7  Glucosamine 500 MG Oral Capsule; Therapy: 70Fle2559 to Recorded   8  LevoFLOXacin 500 MG Oral Tablet; Take 1 tablet daily; Therapy: 80ECE1147 to (Last Rx:52Onx9002)  Requested for: 17EKM4648; Status:     ACTIVE - Transmit to [facility] Ordered   9  Meloxicam 7 5 MG Oral Tablet; TAKE 1 TABLET TWICE DAILY WITH FOOD; Therapy: 87JHS5743 to (96 734908)  Requested for: 10KLN3744; Last     Rx:30Oct2017 Ordered   10  MiraLax Oral Powder; Therapy: 46Xzq8736 to Recorded   11  Nebulizer Air Tube/Plugs Miscellaneous; USE AS DIRECTED; Therapy: 54HJY8453 to (Last Rx:30Jun2017) Ordered   12  Nebulizer/Adult Mask KIT; USE AS DIRECTED; Therapy: 56MDJ1489 to (Last Rx:30Jun2017) Ordered   13  PARoxetine HCl - 20 MG Oral Tablet; TAKE 1 TABLET DAILY; Therapy: 95CGO6001 to (96 266874)  Requested for: 98LMN6567; Last      Rx:30Oct2017 Ordered   14  Perforomist 20 MCG/2ML Inhalation Nebulization Solution; USE 1 VIAL IN NEBULIZER      EVERY 12 HOURS; Therapy: 11ASB4660 to ([facility])  Requested for: 91XML5070; Last      Rx:26Mcy4061 Ordered   15  Pravastatin Sodium 80 MG Oral Tablet; TAKE 1 TABLET DAILY; Therapy: 47JMB6259 to (96 808263)  Requested for: 94FCU1846; Last      Rx:30Oct2017 Ordered   16  Triamterene-HCTZ 37 5-25 MG Oral Capsule; TAKE 1 CAPSULE DAILY; Therapy: 78Kla4778 to 96 551869)  Requested for: 40XRO1799; Last      Rx:30Oct2017 Ordered  Medication List Reviewed:     The medication list was reviewed and updated today  Allergies   1  No Known Drug Allergies    Vitals   Signs   Recorded: 52Xxe8216 04:21PM   Temperature: 97 4 F  Heart Rate: 88  Respiration: 20  Systolic: 798  Diastolic: 80  O2 Saturation: 92    Physical Exam        Constitutional      General appearance: Abnormal  -- In mild respiratory distress  Pulmonary      Respiratory effort: Abnormal  -- Tachypneic  Auscultation of lungs: Abnormal  -- Decreased air entry with mild expiratory wheeze  Cardiovascular      Auscultation of heart: Normal rate and rhythm, normal S1 and S2, without murmurs  Examination of extremities for edema and/or varicosities: Normal        Results/Data   Rapid StrepA- POC 42VOJ8332 04:30PM [place]      Test Name Result Flag Reference   Rapid Strep Negative                    Diagnostic Studies Reviewed: I personally reviewed the films/images/results in the office today  My interpretation follows  X-ray Review Chest x-ray reveals bilateral lower infiltrate        Future Appointments      Date/Time Provider Specialty Site   03/19/2018 09:15 AM Patricia Howard DO Orthopedic Surgery [facility] [address] [place] [address]     Signatures    Electronically signed by : HERMAN Reynoso ; Dec 24 2017  4:49PM EST                       (Author)

## 2017-12-26 LAB — BACTERIA THROAT CULT: NORMAL

## 2018-01-09 NOTE — RESULT NOTES
Verified Results  * XR CHEST PA & LATERAL 96FEQ7347 04:47PM Brandt Manuel Order Number: [id number]     Test Name Result Flag Reference   XR CHEST PA & LATERAL (Report)     CHEST      INDICATION: Pneumonia  Follow-up exam      COMPARISON: December 27, 2014     VIEWS: Frontal and lateral projections; 2 images     FINDINGS:        The heart mediastinum are stable  Heart remains mildly enlarged  There is linear density within the left lung base which may reflect atelectasis or scarring  Right lung is clear  Pulmonary vascularity unremarkable  No evidence of pleural effusion  Diffuse degenerative changes are seen within the thoracic spine  There is mild loss of vertebral body height seen at the T6-T9 vertebral bodies similar to prior study         IMPRESSION:     Linear density right lung base may reflect atelectasis or scarring       Workstation performed: [id number]     Signed by:   Franklyn Mcmillan MD   11/3/16

## 2018-01-12 VITALS
WEIGHT: 270.25 LBS | SYSTOLIC BLOOD PRESSURE: 112 MMHG | HEART RATE: 97 BPM | BODY MASS INDEX: 42.42 KG/M2 | DIASTOLIC BLOOD PRESSURE: 71 MMHG | HEIGHT: 67 IN

## 2018-01-12 VITALS
WEIGHT: 268.44 LBS | RESPIRATION RATE: 17 BRPM | SYSTOLIC BLOOD PRESSURE: 130 MMHG | BODY MASS INDEX: 42.13 KG/M2 | HEIGHT: 67 IN | OXYGEN SATURATION: 98 % | TEMPERATURE: 98.9 F | HEART RATE: 88 BPM | DIASTOLIC BLOOD PRESSURE: 70 MMHG

## 2018-01-12 NOTE — RESULT NOTES
Verified Results  * XR ELBOW 3+ VIEW RIGHT 97Jpv1465 04:19PM Gilberto Pena Order Number: [id number]     Test Name Result Flag Reference   XR ELBOW 3+ VW RIGHT (Report)     RIGHT ELBOW     INDICATION: Right elbow pain and swelling status post fall 2 months ago  COMPARISON: None     VIEWS: 4; 4 images     FINDINGS:     There is no acute fracture or dislocation  There is no joint effusion  There are mild degenerative changes throughout the elbow  No lytic or blastic lesions are seen  There is prominent soft tissue swelling in the posterior elbow overlying the olecranon, suggestive of bursitis  IMPRESSION:     No acute osseous abnormality  Posterior soft tissue swelling suggestive of olecranon bursitis         Workstation performed: [id number]     Signed by:   Elian Albright MD   9/19/16       Plan  Cellulitis    · Clindamycin HCl - 300 MG Oral Capsule; TAKE 1 CAPSULE 3 times daily

## 2018-01-13 VITALS
HEIGHT: 67 IN | SYSTOLIC BLOOD PRESSURE: 129 MMHG | WEIGHT: 266 LBS | BODY MASS INDEX: 41.75 KG/M2 | HEART RATE: 86 BPM | DIASTOLIC BLOOD PRESSURE: 83 MMHG

## 2018-01-13 VITALS
TEMPERATURE: 98.4 F | DIASTOLIC BLOOD PRESSURE: 80 MMHG | OXYGEN SATURATION: 92 % | BODY MASS INDEX: 42.06 KG/M2 | RESPIRATION RATE: 16 BRPM | SYSTOLIC BLOOD PRESSURE: 124 MMHG | HEART RATE: 83 BPM | WEIGHT: 268.56 LBS

## 2018-01-13 VITALS
HEART RATE: 94 BPM | WEIGHT: 270 LBS | DIASTOLIC BLOOD PRESSURE: 60 MMHG | SYSTOLIC BLOOD PRESSURE: 104 MMHG | TEMPERATURE: 98.4 F | OXYGEN SATURATION: 92 % | BODY MASS INDEX: 42.38 KG/M2 | HEIGHT: 67 IN

## 2018-01-14 VITALS
TEMPERATURE: 98.7 F | HEIGHT: 67 IN | BODY MASS INDEX: 41.99 KG/M2 | SYSTOLIC BLOOD PRESSURE: 110 MMHG | OXYGEN SATURATION: 93 % | WEIGHT: 267.5 LBS | HEART RATE: 85 BPM | RESPIRATION RATE: 16 BRPM | DIASTOLIC BLOOD PRESSURE: 60 MMHG

## 2018-01-14 VITALS
SYSTOLIC BLOOD PRESSURE: 112 MMHG | WEIGHT: 266.06 LBS | TEMPERATURE: 96.9 F | RESPIRATION RATE: 16 BRPM | HEART RATE: 88 BPM | DIASTOLIC BLOOD PRESSURE: 82 MMHG | OXYGEN SATURATION: 88 % | HEIGHT: 68 IN | BODY MASS INDEX: 40.32 KG/M2

## 2018-01-14 VITALS
DIASTOLIC BLOOD PRESSURE: 70 MMHG | SYSTOLIC BLOOD PRESSURE: 114 MMHG | HEIGHT: 67 IN | WEIGHT: 271.13 LBS | BODY MASS INDEX: 42.55 KG/M2 | HEART RATE: 91 BPM

## 2018-01-15 VITALS
HEIGHT: 68 IN | OXYGEN SATURATION: 92 % | HEART RATE: 92 BPM | WEIGHT: 268.06 LBS | BODY MASS INDEX: 40.63 KG/M2 | RESPIRATION RATE: 18 BRPM | TEMPERATURE: 97.6 F | SYSTOLIC BLOOD PRESSURE: 110 MMHG | DIASTOLIC BLOOD PRESSURE: 80 MMHG

## 2018-01-16 NOTE — RESULT NOTES
Verified Results  (Q) CBC (H/H, RBC, INDICES, WBC, PLT) 19Apr2016 09:43AM Rollene Doing     Test Name Result Flag Reference   WHITE BLOOD CELL COUNT 9 6 Thousand/uL  3 8-10 8   RED BLOOD CELL COUNT 4 74 Million/uL  4 20-5 80   HEMOGLOBIN 14 9 g/dL  13 2-17 1   HEMATOCRIT 47 5 %  38 5-50 0    1 fL H 80 0-100 0   MCH 31 4 pg  27 0-33 0   MCHC 31 4 g/dL L 32 0-36 0   RDW 14 8 %  11 0-15 0   PLATELET COUNT 986 Thousand/uL  140-400   MPV 9 8 fL  7 5-11 5     (Q) COMPREHENSIVE METABOLIC PNL W/ADJUSTED CALCIUM 19Apr2016 09:43AM Rollene Doing     Test Name Result Flag Reference   GLUCOSE 93 mg/dL  65-99   Fasting reference interval   UREA NITROGEN (BUN) 15 mg/dL  7-25   CREATININE 0 83 mg/dL  0 70-1 18   For patients >52years of age, the reference limit  for Creatinine is approximately 13% higher for people  identified as -American  eGFR NON-AFR  AMERICAN 89 mL/min/1 73m2  > OR = 60   eGFR AFRICAN AMERICAN 103 mL/min/1 73m2  > OR = 60   BUN/CREATININE RATIO   8-47   NOT APPLICABLE (calc)   SODIUM 140 mmol/L  135-146   POTASSIUM 4 1 mmol/L  3 5-5 3   CHLORIDE 98 mmol/L     CARBON DIOXIDE 35 mmol/L H 19-30   CALCIUM 8 8 mg/dL  8 6-10 3   CALCIUM (ADJUSTED FOR$ALBUMIN) 9 1 mg/dL (calc)  8 6-10 2   PROTEIN, TOTAL 6 8 g/dL  6 1-8 1   ALBUMIN 4 0 g/dL  3 6-5 1   GLOBULIN 2 8 g/dL (calc)  1 9-3 7   ALBUMIN/GLOBULIN RATIO 1 4 (calc)  1 0-2 5   BILIRUBIN, TOTAL 0 5 mg/dL  0 2-1 2   ALKALINE PHOSPHATASE 46 U/L     AST 15 U/L  10-35   ALT 15 U/L  9-46     (Q) LIPID PANEL WITH DIRECT LDL 19Apr2016 09:43AM Rollene Doing     Test Name Result Flag Reference   CHOLESTEROL, TOTAL 159 mg/dL  125-200   HDL CHOLESTEROL 33 mg/dL L > OR = 40   TRIGLICERIDES 707 mg/dL  <150   DIRECT  mg/dL  <130   Desirable range <100 mg/dL for patients with CHD or  diabetes and <70 mg/dL for diabetic patients with  known heart disease     CHOL/HDLC RATIO 4 8 (calc)  < OR = 5 0   NON HDL CHOLESTEROL 126 mg/dL (calc)     Target for non-HDL cholesterol is 30 mg/dL higher than   LDL cholesterol target  (Q) PSA, TOTAL WITH REFLEX TO PSA, FREE 19Apr2016 09:43AM Casper Noble   REPORT COMMENT:  FASTING:YES     Test Name Result Flag Reference   TOTAL PSA 3 7 ng/mL  < OR = 4 0   This test was performed using the Silvestre Mosca  immunoassay method  Values obtained with other assay  methods cannot be used interchangeably  PSA levels,  regardless of value, should not be interpreted as  absolute evidence of the presence or absence of disease

## 2018-01-18 NOTE — MISCELLANEOUS
Assessment    1  Arthritis (716 90) (M19 90)   2  Benign essential hypertension (401 1) (I10)   3  History of Cellulitis (682 9) (L03 90)   4  COPD, moderate (496) (J44 9)   5  Mixed hyperlipidemia (272 2) (E78 2)   6  Sepsis due to Escherichia coli (038 42,995 91) (A41 51)   7  Cellulitis (682 9) (L03 90)   8  LLL pneumonia (486) (J18 9)    Plan  Anxiety    · PARoxetine HCl - 20 MG Oral Tablet (Paxil); TAKE 1 TABLET DAILY   Rx By: Maria Antonia Veras; Dispense: 90 Days ; #:90 Tablet; Refill: 3; For: Anxiety; KAI = N; Verified Transmission to [address] [place] [address]; Last Updated By: System, [employer]; 10/20/2016 1:56:00 PM  Arthritis    · Meloxicam 7 5 MG Oral Tablet (Mobic); TAKE 1 TABLET TWICE DAILY WITH  FOOD   Rx By: Maria Antonia Veras; Dispense: 90 Days ; #:180 Tablet; Refill: 3; For: Arthritis; KAI = N; Verified Transmission to [address] [place] [address]; Last Updated By: System, [employer]; 10/20/2016 1:55:59 PM  Arthritis, Benign essential hypertension, PMH: Cellulitis, COPD, moderate, Mixed  hyperlipidemia    · (Q) CBC (H/H, RBC, INDICES, WBC, PLT); Status:Active; Requested AIZ:[id number];    Perform:[facility]; QYV:[id number]; Ordered; For:Arthritis, Benign essential hypertension, PMH: Cellulitis, COPD, moderate, Mixed hyperlipidemia; Ordered By:Pravin Lemon;   · (Q) COMPREHENSIVE METABOLIC PNL W/ADJUSTED CALCIUM; Status:Active; Requested VSX:[id number];    Perform:[facility]; NAD:[id number]; Ordered; For:Arthritis, Benign essential hypertension, PMH: Cellulitis, COPD, moderate, Mixed hyperlipidemia; Ordered By:Pravin Lemon;   · (Q) LIPID PANEL WITH REFLEX TO DIRECT LDL; Status:Active; Requested  JDD:[id number];    Perform:[facility]; YONATAN:86GNF6048; Ordered; For:Arthritis, Benign essential hypertension, PMH: Cellulitis, COPD, moderate, Mixed hyperlipidemia; Ordered By:Pravin Lemon;   · (Q) TSH, 3RD GENERATION W/REFLEX TO FT4; Status:Active; Requested  GQT:88VLG7561;    Perform:[facility]; HRV:35GZE3277; Ordered;   For:Arthritis, Benign essential hypertension, PMH: Cellulitis, COPD, moderate, Mixed hyperlipidemia; Ordered By:Pravin Lemon;  Benign essential hypertension    · Triamterene-HCTZ 37 5-25 MG Oral Capsule; TAKE 1 CAPSULE DAILY   Rx By: Emmy Mart; Dispense: 90 Days ; #:90 Capsule; Refill: 3; For: Benign essential hypertension; KAI = Y; Verified Transmission to [address]; Last Updated By: System, [employer]; 10/20/2016 1:56:10 PM  Coronary artery disease    · Furosemide 20 MG Oral Tablet (Lasix); Take 2 tablets by mouth  daily   Rx By: Emmy Mart; Dispense: 90 Days ; #:180 Tablet; Refill: 3; For: Coronary artery disease; KAI = N; Verified Transmission to [address]; Last Updated By: System, [employer]Script[employer]; 10/20/2016 1:56:02 PM  Esophagitis, reflux    · Famotidine 40 MG Oral Tablet; Take 1 tablet by mouth  twice a day   Rx By: Emmy Mart; Dispense: 90 Days ; #:180 Tablet; Refill: 3; For: Esophagitis, reflux; KAI = N; Verified Transmission to [address]; Last Updated By: System, [employer]; 10/20/2016 1:56:01 PM  Health Maintenance    · Fluzone High-Dose 0 5 ML Intramuscular Suspension Prefilled Syringe;  INJECT 0 5  ML Intramuscular; To Be Done: 30NXJ4556   For: Health Maintenance; Ordered By:Zuly Lemon; Effective Date:2016  LLL pneumonia    · * XR CHEST PA & LATERAL; Status:Active; Requested for:2016;    Perform:[employer] [facility]; [id number];QMLLIPS; For:LLL pneumonia; Ordered By:Zuly Lemon;  Mixed hyperlipidemia    · Pravastatin Sodium 80 MG Oral Tablet; TAKE 1 TABLET DAILY   Rx By: Emmy Mart; Dispense: 90 Days ; #:90 Tablet; Refill: 3; For: Mixed hyperlipidemia; KAI = N; Verified Transmission to [address]; Last Updated By: System, [employer]; 10/20/2016 1:55:58 PM    Discussion/Summary  Discussion Summary:    This is a transition of care visit for patient who was admitted to [facility] from  to  due to sepsis caused by left lower lobe pneumonia and peripheral cellulitis  On October 9, patient fell in his home after feeling weak and dizzy  Upon arrival to emergency room, his white blood cell count was 23 3 thousand  Initial chest x-ray was negative, but CAT scan of the lungs confirmed presence of left lower lobe pneumonia  Venous duplex scan was negative  Patient was stabilized and given intravenous antibiotics  Patient was discharged on oral Levaquin  He is currently feeling much better  Denies any chest pain shortness of breath  Or fevers  Upon examination, patient's pulse oximetry is 91% on room air  His lungs are clear  Right elbow and left lower extremity still mildly erythematous, but no warmth or tenderness present  Patient has appointment with podiatrist (Dr Myra Matos) tomorrow  Recommend repeating chest x-ray in 2 weeks to check for resolution of pneumonia  Continue home oxygen prn  Continue inhalers, clubbing Breo  Call with x-ray results  Flu shot today  4 months (AWV and f/u), fasting blood work prior  History of Present Illness  TCM Communication [facility]: The patient is being contacted for follow-up after hospitalization and EDISON visit scheduled for 10/20/2016 with Dr Raymond Gar  He was hospitalized at [facility]  The date of admission: 10/09/2016, date of discharge: 10/13/2016  Diagnosis: 1  Sepsis secondary to left lobe pneumonia 2  COPD without exacerbation 3  Leukocytosis  He was discharged to home  Medications were not reviewed today  He scheduled a follow up appointment  The patient is currently asymptomatic  Referrals Needed:  None  Communication performed and completed by Con Thomason   HPI: This is a transition of care visit for patient who was admitted to [facility] from October 9 to October 13 due to sepsis caused by left lower lobe pneumonia and peripheral cellulitis  On October 9, patient fell in his home after feeling weak and dizzy   Upon arrival to emergency room, his white blood cell count was 23 3 thousand  Initial chest x-ray was negative, but CAT scan of the lungs confirmed presence of left lower lobe pneumonia  Venous duplex scan was negative  Patient was stabilized and given intravenous antibiotics  Patient was discharged on oral Levaquin  He is currently feeling much better  Denies any chest pain shortness of breath  Or fevers      Review of Systems  Complete-Male:   Constitutional: No fever or chills, feels well, no tiredness, no recent weight gain or weight loss  Cardiovascular: No complaints of slow heart rate, no fast heart rate, no chest pain, no palpitations, no leg claudication, no lower extremity  Respiratory: No complaints of shortness of breath, no wheezing, no cough, no SOB on exertion, no orthopnea or PND  Gastrointestinal: No complaints of abdominal pain, no constipation, no nausea or vomiting, no diarrhea or bloody stools  Genitourinary: No complaints of dysuria, no incontinence, no hesitancy, no nocturia, no genital lesion, no testicular pain  Active Problems    1  Anxiety (300 00) (F41 9)   2  Arthritis (716 90) (M19 90)   3  Atrial fibrillation (427 31) (I48 91)   4  Benign essential hypertension (401 1) (I10)   5  Chronic pain of right elbow (405 76,248 46) (M25 521,G89 29)   6  COPD, moderate (496) (J44 9)   7  Coronary artery disease (414 00) (I25 10)   8  Dyspnea (786 09) (R06 00)   9  Esophagitis, reflux (530 11) (K21 0)   10  Extrinsic asthma (493 00) (J45 909)   11  Gait disturbance (781 2) (R26 9)   12  Hypoxia (799 02) (R09 02)   13  Lymphedema (457 1) (I89 0)   14  Mixed hyperlipidemia (272 2) (E78 2)   15  Obstructive lung disease (generalized) (496) (J44 9)   16  Obstructive sleep apnea (327 23) (G47 33)   17  Pneumonia (486) (J18 9)   18  Venous insufficiency (459 81) (I87 2)   19  Vitamin D deficiency (268 9) (E55 9)    Past Medical History    1  History of Acute bronchitis (466 0) (J20 9)   2   Acute upper respiratory infection (465 9) (J06 9)   3  History of Cellulitis (682 9) (L03 90)   4  History of Cellulitis (682 9) (L03 90)   5  History of Cellulitis of extremity (L03 119)   6  History of acute bronchitis (V12 69) (Z87 09)   7  History of chronic bronchitis (V12 69) (Z87 09)   8  History of chronic obstructive lung disease (V12 69) (Z87 09)   9  History of edema (V13 89) (Z87 898)   10  History of influenza (V12 09) (Z87 09)   11  History of leukocytosis (V12 3) (Z86 2)   12  History of methicillin resistant Staphylococcus aureus infection (V12 04) (Z86 14)   13  History of Knee pain (719 46) (M25 569)   14  History of Need for prophylactic vaccination and inoculation against influenza (V04 81)    (Z23)   15  History of Need for vaccination with 13-polyvalent pneumococcal conjugate vaccine    (V03 82) (Z23)    Surgical History  Surgical History Reviewed: The surgical history was reviewed and updated today  Family History  Mother    1  Family history of CAD (coronary artery disease)  Father    2  Family history of Type 2 diabetes mellitus    Social History    · Former smoker (O99 47) (F49 194)   · No alcohol use  Social History Reviewed: The social history was reviewed and updated today  The social history was reviewed and is unchanged  Current Meds   1  Amoxicillin-Pot Clavulanate ER 1000-62 5 MG Oral Tablet Extended Release 12 Hour;   TAKE 2 TABLETS EVERY 12 HOURS; Therapy: 10IYD6557 to (Evaluate:29Yde7637); Last Rx:67Yhj5531 Ordered   2  Aspir-81 81 MG Oral Tablet Delayed Release; TAKE 1 TABLET DAILY; Therapy: (Recorded:15Mar2014) to Recorded   3  Breo Ellipta 100-25 MCG/INH Inhalation Aerosol Powder Breath Activated; INHALE 1   PUFFS Daily rinse mouth after using; Therapy: 38RGW3338 to (Evaluate:19Oct2015); Last Rx:66Ank3945 Ordered   4  Cephalexin 500 MG Oral Capsule; 2 po bid10; Therapy: 39BRJ7177 to (Last Rx:34Rcw8619) Ordered   5   Cephalexin 500 MG Oral Capsule; TAKE 1 CAPSULE 3 TIMES DAILY UNTIL GONE;   Therapy: 51KVG4821 to (Evaluate:93Tpo1442)  Requested for: 09Bgm5938; Last   Rx:84Cml4154 Ordered   6  Clindamycin HCl - 300 MG Oral Capsule; TAKE 1 CAPSULE 3 times daily; Therapy: 90UZD0798 to (Evaluate:03Hta2050)  Requested for: 99Ylk0893; Last   Rx:87Xqp0458; Status: ACTIVE - Transmit to Pharmacy - Awaiting Verification   Ordered   7  Famotidine 40 MG Oral Tablet; Take 1 tablet by mouth  twice a day; Therapy: 88ABI7197 to (Dario Dove)  Requested for: 97ZTW6789; Last   Rx:50Xvv6909 Ordered   8  Furosemide 20 MG Oral Tablet; Take 2 tablets by mouth  daily; Therapy: 25XFG4259 to (Evaluate:12Ehp8218)  Requested for: 73LNK2698; Last   Rx:06Jan2016 Ordered   9  Furosemide 20 MG Oral Tablet; TAKE 2 TABLETS DAILY; Therapy: 19DPC7639 to (Samina Aaron)  Requested for: 40XLW3854; Last   Rx:09May2016 Ordered   10  Meloxicam 7 5 MG Oral Tablet; TAKE 1 TABLET TWICE DAILY WITH FOOD; Therapy: 90YGD4965 to (Dario Dove)  Requested for: 26ANF9041; Last    Rx:56Vsx7592 Ordered   11  PARoxetine HCl - 20 MG Oral Tablet; TAKE 1 TABLET DAILY; Therapy: 66FEK7576 to (Dario Dove)  Requested for: 13LEC2420; Last    Rx:20Tnq5301 Ordered   12  Perforomist 20 MCG/2ML Inhalation Nebulization Solution; USE ONE VIAL VIA    NEBULIZER EVERY 12 HOURS; Therapy: 10VYO4071 to (Evaluate:14Enc9328)  Requested for: 81WXD7943; Last    Rx:91Npu1238 Ordered   13  Pravastatin Sodium 80 MG Oral Tablet; TAKE 1 TABLET DAILY; Therapy: 41GPS4268 to (Dario Dove)  Requested for: 20ZCU4466; Last    Rx:35Ttd6267 Ordered   14  Triamterene-HCTZ 37 5-25 MG Oral Capsule; TAKE 1 CAPSULE DAILY; Therapy: 75Vdf8127 to (Dario Dove)  Requested for: 87LZT3320; Last    RZ:40JHT7353 Ordered    Allergies    1   No Known Drug Allergies    Vitals  Signs   Recorded: 07LBU0549 15:19KF   Systolic: 941  Diastolic: 62  Heart Rate: 84  Respiration: 16  Temperature: 97 1 F, Tympanic  O2 Saturation: 91  Height: 5 ft 9 in  Weight: 263 lb 3 04 oz  BMI Calculated: 38 87  BSA Calculated: 2 32    Physical Exam    Constitutional   General appearance: No acute distress, well appearing and well nourished  Pulmonary   Respiratory effort: No increased work of breathing or signs of respiratory distress  Auscultation of lungs: Clear to auscultation, equal breath sounds bilaterally, no wheezes, no rales, no rhonci  Cardiovascular   Palpation of heart: Normal PMI, no thrills  Auscultation of heart: Normal rate and rhythm, normal S1 and S2, without murmurs  Examination of extremities for edema and/or varicosities: Normal     Carotid pulses: Normal     Lymphatic   Palpation of lymph nodes in neck: No lymphadenopathy  Psychiatric   Orientation to person, place and time: Normal     Mood and affect: Normal          Signatures   Electronically signed by :  Luis German DO; Oct 20 2016  2:08PM EST                       (Author)

## 2018-01-22 VITALS
HEART RATE: 83 BPM | BODY MASS INDEX: 41.91 KG/M2 | DIASTOLIC BLOOD PRESSURE: 81 MMHG | HEIGHT: 67 IN | WEIGHT: 267 LBS | SYSTOLIC BLOOD PRESSURE: 119 MMHG

## 2018-01-23 VITALS
DIASTOLIC BLOOD PRESSURE: 60 MMHG | HEIGHT: 68 IN | SYSTOLIC BLOOD PRESSURE: 110 MMHG | WEIGHT: 275.5 LBS | HEART RATE: 87 BPM | TEMPERATURE: 99.9 F | BODY MASS INDEX: 41.75 KG/M2 | RESPIRATION RATE: 17 BRPM | OXYGEN SATURATION: 99 %

## 2018-01-23 VITALS
HEART RATE: 88 BPM | RESPIRATION RATE: 20 BRPM | TEMPERATURE: 97.4 F | SYSTOLIC BLOOD PRESSURE: 138 MMHG | OXYGEN SATURATION: 92 % | DIASTOLIC BLOOD PRESSURE: 80 MMHG

## 2018-01-23 NOTE — MISCELLANEOUS
Message  Message Free Text Note Form: I spoke with pt's wife Pedro Stark on 12/24/17  Donal Horne was seen by Dr Harika Evans on 12/19/17 for bronchitis and prescribed Levaquin  She stated that he is afebrile but that he is "just not feeling good and he doesn't know where he is at right " I asked her to clarify and it appears the pt is a bit confused and disoriented/mental status change  She was wondering if it could have to do with the oral abx he was prescribed  I rec  that she take him to urgent care for prompt eval at [facility] and she was agreeable  Signatures   Electronically signed by :  Iwona Cottrell, [facility]; Jan 4 2018 10:18AM EST                       (Author)

## 2018-01-23 NOTE — MISCELLANEOUS
Assessment   1  Cellulitis (682 9) (L03 90)  2  Pneumonia (5) (J18 9)    Plan  Benign essential hypertension, COPD, moderate, Mixed hyperlipidemia, Obstructive  sleep apnea, Pneumonia, Vitamin D deficiency    · (1) CBC/PLT/DIFF; Status:Active; Requested for:11Pgn5128;   Perform:[facility]; Due:16Uql1193;Ordered; For:Benign essential hypertension, COPD,   moderate, Mixed hyperlipidemia, Obstructive sleep apnea, Pneumonia,   Vitamin D deficiency; Ordered By:Pravin Lemon;   · (1) COMPREHENSIVE METABOLIC PANEL; Status:Active; Requested for:73Gde9823;   Perform:[facility]; Due:16Flt0780;Ordered; For:Benign essential hypertension, COPD,   moderate, Mixed hyperlipidemia, Obstructive sleep apnea, Pneumonia,   Vitamin D deficiency; Ordered By:Pravin Lemon;   · (1) LIPID PANEL, FASTING; Status:Active; Requested for:27Ire5136;   Perform:[facility]; Due:52Rzf0767;Ordered; For:Benign essential hypertension, COPD,   moderate, Mixed hyperlipidemia, Obstructive sleep apnea, Pneumonia,   Vitamin D deficiency; Ordered By:Pravin Lemon;   · (1) TSH WITH FT4 REFLEX; Status:Active; Requested for:07Igm8079;   Perform:[facility]; Due:69Tej1058;Ordered; For:Benign essential hypertension, COPD,   moderate, Mixed hyperlipidemia, Obstructive sleep apnea, Pneumonia,   Vitamin D deficiency; Ordered By:Henrique Lemon Doctor;  Extrinsic asthma    · Renew: Perforomist 20 MCG/2ML Inhalation Nebulization Solution; USE ONE VIAL VIA  NEBULIZER EVERY 12 HOURS  Rx By: Elzbieta Pelayo; Dispense: 30 Days ; #:1 X 2 ML Plas Cont (60 Plas Conts); Refill:   10; For: Extrinsic asthma; KAI = N; Print Rx    Discussion/Summary  Discussion Summary:   EDISON visit from recent hospitalization to [facility] 4/29-5/1 for LLE cellulits and pneumonia  hospital records reviewed  pt currently feels well  no further leg pain or SOB/cough  --Pneumonia: finished w/ abx  lungs clear     --LLE cellulitis: resolved  --COPD: stable on current meds (breo and perforomist) and prn O2  (at 2 liters/min)  --Liipids: chol 159/108  doing well on pravastatin 80  --Anxiety: doing well on paxil 20 mg qd    4 mos, f/u and AWV, FBW prior  Medication SE Review and Pt Understands Tx: Possible side effects of new medications were reviewed with the patient/guardian today  The treatment plan was reviewed with the patient/guardian  The patient/guardian understands and agrees with the treatment plan      History of Present Illness  TCM Communication [facility]: The patient is being contacted for follow-up after hospitalization and EDISON visit scheduled for 05/09/2016 with Dr Merline Roller  He was hospitalized at [facility]  The date of admission: 04/29/20161 , date of discharge: 05/01/2016  Diagnosis:  1 1  Cellulitis 2  Pneumonia2  1   He was discharged to home  Medications were not reviewed today  He scheduled a follow up appointment  The patient is currently asymptomatic  Referrals Needed:  None  Communication performed and completed by Ana Paula Mayberry   HPI: EDISON visit from recent hospitalization to [facility] 4/29-5/1 for LLE cellulits and pneumonia  pt currently feels well  no complaints  no side effects w/ current meds  no interval events  1 Amended By: Toshia Gillis; May 10 2016 9:06 AM EST   2 Amended By: Toshia Gillis; May 10 2016 9:06 AM EST    Review of Systems  Complete-Male:   Constitutional: No fever or chills, feels well, no tiredness, no recent weight gain or weight loss  Cardiovascular: No complaints of slow heart rate, no fast heart rate, no chest pain, no palpitations, no leg claudication, no lower extremity  Respiratory: No complaints of shortness of breath, no wheezing, no cough, no SOB on exertion, no orthopnea or PND  Gastrointestinal: No complaints of abdominal pain, no constipation, no nausea or vomiting, no diarrhea or bloody stools  Genitourinary: No complaints of dysuria, no incontinence, no hesitancy, no nocturia, no genital lesion, no testicular pain  Active Problems   1   Anxiety (300 00) (F41 9)  2  Arthritis (716 90) (M19 90)  3  Atrial fibrillation (427 31) (I48 91)  4  Benign essential hypertension (401 1) (I10)  5  Chronic obstructive airway disease (496) (J44 9)  6  COPD, moderate (496) (J44 9)  7  Coronary artery disease (414 00) (I25 10)  8  Esophagitis, reflux (530 11) (K21 0)  9  Extrinsic asthma (493 00) (J45 909)  10  Gait disturbance (781 2) (R26 9)  11  Hypoxia (799 02) (R09 02)  12  Lymphedema (457 1) (I89 0)  13  Mixed hyperlipidemia (272 2) (E78 2)  14  Need for prophylactic vaccination and inoculation against influenza (V04 81) (Z23)  15  Obstructive lung disease (generalized) (496) (J44 9)  16  Obstructive sleep apnea (327 23) (G47 33)  17  Venous insufficiency (459 81) (I87 2)  18  Vitamin D deficiency (268 9) (E55 9)    Past Medical History   1  History of Acute bronchitis (466 0) (J20 9)  2  Acute upper respiratory infection (465 9) (J06 9)  3  History of Cellulitis of extremity (L03 119)  4  History of acute bronchitis (V12 69) (Z87 09)  5  History of chronic bronchitis (V12 69) (Z87 09)  6  History of edema (V13 89) (Z87 898)  7  History of influenza (V12 09) (Z87 09)  8  History of leukocytosis (V12 3) (Z86 2)  9  History of methicillin resistant Staphylococcus aureus infection (V12 04) (Z86 14)  10  History of Knee pain (719 46) (M25 569)  11  History of Need for vaccination with 13-polyvalent pneumococcal conjugate vaccine    (V03 82) (Z23)    Surgical History  Surgical History Reviewed: The surgical history was reviewed and updated today  Family History  Mother   1  Family history of CAD (coronary artery disease)  Father   2  Family history of Type 2 diabetes mellitus  Family History Reviewed: The family history was reviewed and updated today  Social History    · Former smoker (M04 88) (U94 505)   · No alcohol use  Social History Reviewed: The social history was reviewed and updated today  The social history was reviewed and is unchanged        Current Meds  1  Aspir-81 81 MG Oral Tablet Delayed Release; TAKE 1 TABLET DAILY; Therapy: (Recorded:15Mar2014) to Recorded  2  Breo Ellipta 100-25 MCG/INH Inhalation Aerosol Powder Breath Activated; INHALE 1   PUFFS Daily rinse mouth after using; Therapy: 32HVD6219 to (Evaluate:19Oct2015); Last Rx:91Glb9219 Ordered  3  Famotidine 40 MG Oral Tablet; Take 1 tablet by mouth  twice a day; Therapy: 41QIL4051 to (Evaluate:42Xwb9006)  Requested for: 19SIQ8121; Last   Rx:06Jan2016 Ordered  4  Famotidine 40 MG Oral Tablet; TAKE 1 TABLET TWICE DAILY; Therapy: 85KUF9164 to (Evaluate:23Kbc1824)  Requested for: 69EJB1800; Last   Rx:06Jan2016 Ordered  5  Furosemide 20 MG Oral Tablet; Take 2 tablets by mouth  daily; Therapy: 93QQD5865 to (Evaluate:07Wqi5834)  Requested for: 88LIY2946; Last   Rx:06Jan2016 Ordered  6  Furosemide 20 MG Oral Tablet; TAKE 2 TABLETS DAILY; Therapy: 64NLA9246 to (Evaluate:57Exs7043)  Requested for: 73BMD3972; Last   Rx:06Jan2016 Ordered  7  Meloxicam 7 5 MG Oral Tablet; TAKE 1 TABLET TWICE DAILY WITH FOOD; Therapy: 62WID1080 to (Evaluate:20Mar2016)  Requested for: 69QQI5795; Last   Rx:26Mar2015 Ordered  8  PARoxetine HCl - 20 MG Oral Tablet; TAKE 1 TABLET DAILY; Therapy: 50FDJ2126 to (Evaluate:20Mar2016)  Requested for: 85ROK9162; Last   Rx:26Mar2015 Ordered  9  Perforomist 20 MCG/2ML Inhalation Nebulization Solution; USE ONE VIAL VIA   NEBULIZER EVERY 12 HOURS; Therapy: 65IHF9757 to (Evaluate:70Fyd0658)  Requested for: 04Jgt8214; Last   Rx:29Qwu8231 Ordered  10  Pravastatin Sodium 80 MG Oral Tablet; TAKE 1 TABLET DAILY; Therapy: 94SWY1697 to (Evaluate:20Mar2016)  Requested for: 55FQS2219; Last    Rx:26Mar2015 Ordered  11  Triamterene-HCTZ 37 5-25 MG Oral Capsule; TAKE 1 CAPSULE DAILY; Therapy: 92Vud9664 to (Evaluate:20Mar2016)  Requested for: 93KSM5003; Last    Rx:26Mar2015 Ordered  Medication List Reviewed: The medication list was reviewed and updated today  Allergies   1   No Known Drug Allergies    Vitals  Signs [Data Includes: Current Encounter]   Recorded: 56IUL0719 01:07PM   Temperature: 98 6 F, Tympanic  Heart Rate: 89  Pulse Quality: Norm  Respiration: 17  Respiration Quality: Norm  Systolic: 723, LUE, Sitting  Diastolic: 84, LUE, Sitting  Height: 5 ft 9 in  Weight: 277 lb 4 00 oz  BMI Calculated: 40 94  BSA Calculated: 2 37  O2 Saturation: 100    Physical Exam    Constitutional   General appearance: No acute distress, well appearing and well nourished  Pulmonary   Respiratory effort: No increased work of breathing or signs of respiratory distress  Auscultation of lungs: Clear to auscultation, equal breath sounds bilaterally, no wheezes, no rales, no rhonci  Cardiovascular   Palpation of heart: Normal PMI, no thrills  Auscultation of heart: Normal rate and rhythm, normal S1 and S2, without murmurs  Examination of extremities for edema and/or varicosities: Normal     Carotid pulses: Normal     Lymphatic   Palpation of lymph nodes in neck: No lymphadenopathy  Psychiatric   Orientation to person, place and time: Normal     Mood and affect: Normal          Signatures   Electronically signed by : Maggie Le DO; May  9 2016  1:41PM EST                       (Author)    Electronically signed by :  Maggie Le DO; May 10 2016  8:32PM EST                       (Author)

## 2018-01-24 ENCOUNTER — TELEPHONE (OUTPATIENT)
Dept: FAMILY MEDICINE CLINIC | Facility: CLINIC | Age: 73
End: 2018-01-24

## 2018-01-24 VITALS
WEIGHT: 273.5 LBS | HEART RATE: 93 BPM | DIASTOLIC BLOOD PRESSURE: 76 MMHG | HEIGHT: 67 IN | BODY MASS INDEX: 42.93 KG/M2 | SYSTOLIC BLOOD PRESSURE: 115 MMHG

## 2018-01-30 NOTE — TELEPHONE ENCOUNTER
A user error has taken place: encounter opened in error, closed for administrative reasons, charting done on wrong patient and has been corrected

## 2018-01-31 PROBLEM — R97.20 ELEVATED PSA: Status: ACTIVE | Noted: 2017-10-28

## 2018-01-31 PROBLEM — R06.00 DYSPNEA: Status: ACTIVE | Noted: 2017-12-24

## 2018-01-31 PROBLEM — D72.829 LEUKOCYTOSIS: Status: ACTIVE | Noted: 2017-06-20

## 2018-01-31 PROBLEM — M17.11 LOCALIZED OSTEOARTHRITIS OF RIGHT KNEE: Status: ACTIVE | Noted: 2017-03-02

## 2018-02-03 ENCOUNTER — OFFICE VISIT (OUTPATIENT)
Dept: FAMILY MEDICINE CLINIC | Facility: CLINIC | Age: 73
End: 2018-02-03
Payer: MEDICARE

## 2018-02-03 VITALS
TEMPERATURE: 97.3 F | SYSTOLIC BLOOD PRESSURE: 118 MMHG | WEIGHT: 265.7 LBS | OXYGEN SATURATION: 90 % | DIASTOLIC BLOOD PRESSURE: 78 MMHG | HEART RATE: 100 BPM | BODY MASS INDEX: 40.05 KG/M2

## 2018-02-03 DIAGNOSIS — R09.02 HYPOXIA: ICD-10-CM

## 2018-02-03 DIAGNOSIS — J44.9 COPD, MODERATE (HCC): ICD-10-CM

## 2018-02-03 DIAGNOSIS — J96.01 ACUTE RESPIRATORY FAILURE WITH HYPOXIA (HCC): ICD-10-CM

## 2018-02-03 DIAGNOSIS — J18.9 COMMUNITY ACQUIRED PNEUMONIA OF LEFT LOWER LOBE OF LUNG: Primary | ICD-10-CM

## 2018-02-03 DIAGNOSIS — G47.33 OBSTRUCTIVE SLEEP APNEA: ICD-10-CM

## 2018-02-03 PROCEDURE — 99496 TRANSJ CARE MGMT HIGH F2F 7D: CPT | Performed by: FAMILY MEDICINE

## 2018-02-03 RX ORDER — POLYETHYLENE GLYCOL 3350 17 G/17G
POWDER, FOR SOLUTION ORAL
COMMUNITY
Start: 2017-02-22 | End: 2018-04-09

## 2018-02-03 RX ORDER — DICYCLOMINE HCL 20 MG
1 TABLET ORAL EVERY 6 HOURS PRN
COMMUNITY
Start: 2017-06-20 | End: 2018-03-08

## 2018-02-03 RX ORDER — FAMOTIDINE 40 MG/1
1 TABLET, FILM COATED ORAL 2 TIMES DAILY
COMMUNITY
Start: 2016-01-06 | End: 2018-03-07 | Stop reason: SDUPTHER

## 2018-02-03 RX ORDER — MELOXICAM 7.5 MG/1
1 TABLET ORAL 2 TIMES DAILY
COMMUNITY
Start: 2012-03-09 | End: 2018-09-28 | Stop reason: SDUPTHER

## 2018-02-03 RX ORDER — PRAVASTATIN SODIUM 80 MG/1
1 TABLET ORAL DAILY
COMMUNITY
Start: 2014-05-07 | End: 2018-09-28 | Stop reason: SDUPTHER

## 2018-02-03 RX ORDER — GLUCOSAMINE SULFATE 500 MG
500 CAPSULE ORAL DAILY
Status: ON HOLD | COMMUNITY
Start: 2017-02-22 | End: 2021-11-22 | Stop reason: CLARIF

## 2018-02-03 RX ORDER — PAROXETINE HYDROCHLORIDE 20 MG/1
1 TABLET, FILM COATED ORAL DAILY
COMMUNITY
Start: 2012-03-09 | End: 2018-09-28 | Stop reason: SDUPTHER

## 2018-02-03 RX ORDER — LEVOFLOXACIN 500 MG/1
1 TABLET, FILM COATED ORAL DAILY
COMMUNITY
Start: 2017-12-19 | End: 2018-03-29

## 2018-02-03 RX ORDER — FLUTICASONE FUROATE AND VILANTEROL 100; 25 UG/1; UG/1
1 POWDER RESPIRATORY (INHALATION) DAILY
COMMUNITY
Start: 2014-03-15 | End: 2019-10-28

## 2018-02-03 RX ORDER — FORMOTEROL FUMARATE 20 UG/2ML
SOLUTION RESPIRATORY (INHALATION) EVERY 12 HOURS
COMMUNITY
Start: 2014-03-03 | End: 2018-03-08

## 2018-02-03 RX ORDER — FUROSEMIDE 20 MG/1
2 TABLET ORAL DAILY
COMMUNITY
Start: 2014-01-21 | End: 2018-09-28 | Stop reason: SDUPTHER

## 2018-02-03 RX ORDER — TRIAMTERENE AND HYDROCHLOROTHIAZIDE 37.5; 25 MG/1; MG/1
1 CAPSULE ORAL DAILY
COMMUNITY
Start: 2010-12-14 | End: 2018-09-28 | Stop reason: SDUPTHER

## 2018-02-03 RX ORDER — NEBULIZER ACCESSORIES
EACH MISCELLANEOUS
COMMUNITY
Start: 2017-06-30 | End: 2021-12-20

## 2018-02-03 RX ORDER — ASPIRIN 81 MG/1
1 TABLET ORAL DAILY
Status: ON HOLD | COMMUNITY
End: 2021-11-22 | Stop reason: CLARIF

## 2018-02-03 NOTE — PROGRESS NOTES
Assessment/Plan:    Community acquired pneumonia of left lower lobe of lung [facility])  Patient presents as a transition of care visit and from recent admission to [facility] for left lower lobe pneumonia, respiratory failure, and abdominal pain  Patient had extensive workup during admission including CT scans of chest and abdomen  No identifiable reason was found for his right upper quadrant abdominal pain, which has since resolved  Left lower lobe pneumonia improved on antibiotics  Hypoxia improved with oxygen support  Patient was discharged in stable condition  Patient currently denies any shortness of breath or abdominal pain  His pulse ox today on room air is 90  His examination is essentially negative  I strongly encouraged Charm Lefort to bring his oxygen with him whenever he leaves the house  He has a history of rapidly desaturating with exertion  I reviewed his [employer] oxygen ordered today with him  They will mail it back to receive new equipment  All medications reviewed today  Recommend recheck in 4 months with labs  Diagnoses and all orders for this visit:    Community acquired pneumonia of left lower lobe of lung ([address] )    Acute respiratory failure with hypoxia ([address] )    Obstructive sleep apnea    Hypoxia    COPD, moderate (HCC)          Subjective:      Patient ID: Eddi Arauz is a 67 y o  male  Patient presents as a transition of care visit and from recent admission to [facility] for left lower lobe pneumonia, respiratory failure, and abdominal pain  Patient had extensive workup during admission including CT scans of chest and abdomen  No identifiable reason was found for his right upper quadrant abdominal pain, which has since resolved  Left lower lobe pneumonia improved on antibiotics  Hypoxia improved with oxygen support  Patient was discharged in stable condition          The following portions of the patient's history were reviewed and updated as appropriate: allergies, current medications, past family history, past medical history, past social history, past surgical history and problem list     Review of Systems   Respiratory: Negative  Cardiovascular: Negative  Gastrointestinal: Negative  Genitourinary: Negative  Objective:     Physical Exam   Cardiovascular: Normal rate, regular rhythm, normal heart sounds and intact distal pulses  Carotids: no bruits  Ext: no edema   Pulmonary/Chest: Effort normal  No respiratory distress  He has no wheezes  He has no rales  Psychiatric: He has a normal mood and affect   His behavior is normal  Thought content normal

## 2018-02-03 NOTE — ASSESSMENT & PLAN NOTE
Patient presents as a transition of care visit and from recent admission to [facility] for left lower lobe pneumonia, respiratory failure, and abdominal pain  Patient had extensive workup during admission including CT scans of chest and abdomen  No identifiable reason was found for his right upper quadrant abdominal pain, which has since resolved  Left lower lobe pneumonia improved on antibiotics  Hypoxia improved with oxygen support  Patient was discharged in stable condition  Patient currently denies any shortness of breath or abdominal pain  His pulse ox today on room air is 90  His examination is essentially negative  I strongly encouraged Paul Juanpablo to bring his oxygen with him whenever he leaves the house  He has a history of rapidly desaturating with exertion  I reviewed his [employer] oxygen ordered today with him  They will mail it back to receive new equipment  All medications reviewed today  Recommend recheck in 4 months with labs

## 2018-03-05 ENCOUNTER — APPOINTMENT (OUTPATIENT)
Dept: RADIOLOGY | Facility: MEDICAL CENTER | Age: 73
End: 2018-03-05
Payer: MEDICARE

## 2018-03-05 ENCOUNTER — TRANSCRIBE ORDERS (OUTPATIENT)
Dept: FAMILY MEDICINE CLINIC | Facility: CLINIC | Age: 73
End: 2018-03-05

## 2018-03-05 ENCOUNTER — TRANSCRIBE ORDERS (OUTPATIENT)
Dept: ADMINISTRATIVE | Facility: HOSPITAL | Age: 73
End: 2018-03-05

## 2018-03-05 ENCOUNTER — TELEPHONE (OUTPATIENT)
Dept: FAMILY MEDICINE CLINIC | Facility: CLINIC | Age: 73
End: 2018-03-05
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
## 2018-03-05 DIAGNOSIS — M79.641 RIGHT HAND PAIN: ICD-10-CM

## 2018-03-05 PROCEDURE — 73130 X-RAY EXAM OF HAND: CPT

## 2018-03-05 NOTE — TELEPHONE ENCOUNTER
Received call from [address] at [address] end pt was there to get xray of hand no rders placed spoke with dr Audie Lama oked to put orders in for right hand xray    Sixto Pereira notified

## 2018-03-05 NOTE — TELEPHONE ENCOUNTER
Sixto Pereira called from [facility] stating that [place]unch fell on wednesday And he hurt his R/hand , she said that is swollen an painful to the touch, she would like to know if you can put orders in for XR or if he needs to coming for a appointment, please advice  Please notified  Sixto Pereira at [phone] to let her know doctor Raymond carrillo

## 2018-03-07 DIAGNOSIS — K21.9 GASTROESOPHAGEAL REFLUX DISEASE WITHOUT ESOPHAGITIS: Primary | ICD-10-CM

## 2018-03-07 RX ORDER — FAMOTIDINE 40 MG/1
TABLET, FILM COATED ORAL
Qty: 180 TABLET | Refills: 3 | Status: SHIPPED | OUTPATIENT
Start: 2018-03-07 | End: 2018-03-08 | Stop reason: SDUPTHER

## 2018-03-08 ENCOUNTER — OFFICE VISIT (OUTPATIENT)
Dept: FAMILY MEDICINE CLINIC | Facility: CLINIC | Age: 73
End: 2018-03-08
Payer: MEDICARE

## 2018-03-08 VITALS
HEIGHT: 68 IN | TEMPERATURE: 99 F | WEIGHT: 270.6 LBS | DIASTOLIC BLOOD PRESSURE: 88 MMHG | BODY MASS INDEX: 41.01 KG/M2 | HEART RATE: 100 BPM | SYSTOLIC BLOOD PRESSURE: 122 MMHG | RESPIRATION RATE: 16 BRPM | OXYGEN SATURATION: 90 %

## 2018-03-08 DIAGNOSIS — S60.221A CONTUSION OF RIGHT HAND, INITIAL ENCOUNTER: Primary | ICD-10-CM

## 2018-03-08 DIAGNOSIS — K21.9 GASTROESOPHAGEAL REFLUX DISEASE WITHOUT ESOPHAGITIS: ICD-10-CM

## 2018-03-08 DIAGNOSIS — J18.9 COMMUNITY ACQUIRED PNEUMONIA OF LEFT LOWER LOBE OF LUNG: ICD-10-CM

## 2018-03-08 DIAGNOSIS — J44.9 COPD, MODERATE (HCC): ICD-10-CM

## 2018-03-08 PROCEDURE — 99214 OFFICE O/P EST MOD 30 MIN: CPT | Performed by: FAMILY MEDICINE

## 2018-03-08 RX ORDER — MELOXICAM 7.5 MG/1
7.5 TABLET ORAL
COMMUNITY
End: 2018-03-08 | Stop reason: ALTCHOICE

## 2018-03-08 RX ORDER — FAMOTIDINE 40 MG/1
40 TABLET, FILM COATED ORAL 2 TIMES DAILY
Qty: 180 TABLET | Refills: 0 | Status: SHIPPED | OUTPATIENT
Start: 2018-03-08 | End: 2019-02-26 | Stop reason: SDUPTHER

## 2018-03-08 NOTE — ASSESSMENT & PLAN NOTE
Pt fell on 2/22 (he tripped on a pine cone) landing on his R hand  xrays on 3/5 were neg for fx  Upon exam today, he exhibits mildly reduced ROM w/ mild swelling  I advised him to cont OT and sponge ball exercises

## 2018-03-08 NOTE — ASSESSMENT & PLAN NOTE
S/p recent hospitalization  Currently doing well  I advised Maxime Velarde to use his O2, more, especially when leaving the house

## 2018-03-08 NOTE — PROGRESS NOTES
Assessment/Plan:    Contusion of right hand  Pt fell on 2/22 (he tripped on a pine cone) landing on his R hand  xrays on 3/5 were neg for fx  Upon exam today, he exhibits mildly reduced ROM w/ mild swelling  I advised him to cont OT and sponge ball exercises  Community acquired pneumonia of left lower lobe of lung ([address] )  S/p recent hospitalization  Currently doing well  I advised Paul Geronimo to use his O2, more, especially when leaving the house  COPD, moderate ([address] )  Doing well on BREO and perforomist  Cont home O2  Diagnoses and all orders for this visit:    Contusion of right hand, initial encounter    Gastroesophageal reflux disease without esophagitis  -     famotidine (PEPCID) 40 MG tablet; Take 1 tablet (40 mg total) by mouth 2 (two) times a day    Community acquired pneumonia of left lower lobe of lung (HCC)    COPD, moderate ([address] )    Other orders  -     Discontinue: meloxicam (MOBIC) 7 5 mg tablet; Take 7 5 mg by mouth      recheck 4 mos (most likely will need labs)    Subjective:      Patient ID: Isma George is a 67 y o  male  Pt fell on 2/22, tripping on a pine cone and landed on his right hand  Pt also was admitted recently due to pneumonia  Doing well on inhalers  The following portions of the patient's history were reviewed and updated as appropriate: allergies, current medications, past family history, past medical history, past social history, past surgical history and problem list     Review of Systems   Respiratory: Negative  Cardiovascular: Negative  Gastrointestinal: Negative  Genitourinary: Negative            Objective:      /88 (BP Location: Left arm, Patient Position: Sitting, Cuff Size: Standard)   Pulse 100   Temp 99 °F (37 2 °C) (Tympanic)   Resp 16   Ht 5' 8" (1 727 m)   Wt 123 kg (270 lb 9 6 oz)   SpO2 90%   BMI 41 14 kg/m²          Physical Exam   Musculoskeletal:   R hand pain/swelling

## 2018-03-19 ENCOUNTER — OFFICE VISIT (OUTPATIENT)
Dept: OBGYN CLINIC | Facility: MEDICAL CENTER | Age: 73
End: 2018-03-19
Payer: MEDICARE

## 2018-03-19 ENCOUNTER — APPOINTMENT (OUTPATIENT)
Dept: RADIOLOGY | Facility: CLINIC | Age: 73
End: 2018-03-19
Payer: MEDICARE

## 2018-03-19 VITALS
SYSTOLIC BLOOD PRESSURE: 138 MMHG | HEART RATE: 71 BPM | WEIGHT: 275 LBS | HEIGHT: 72 IN | BODY MASS INDEX: 37.25 KG/M2 | DIASTOLIC BLOOD PRESSURE: 82 MMHG

## 2018-03-19 DIAGNOSIS — M17.11 PRIMARY OSTEOARTHRITIS OF RIGHT KNEE: Primary | ICD-10-CM

## 2018-03-19 DIAGNOSIS — M17.11 PRIMARY OSTEOARTHRITIS OF RIGHT KNEE: ICD-10-CM

## 2018-03-19 PROCEDURE — 73564 X-RAY EXAM KNEE 4 OR MORE: CPT

## 2018-03-19 PROCEDURE — 99213 OFFICE O/P EST LOW 20 MIN: CPT | Performed by: ORTHOPAEDIC SURGERY

## 2018-03-19 PROCEDURE — 20610 DRAIN/INJ JOINT/BURSA W/O US: CPT | Performed by: ORTHOPAEDIC SURGERY

## 2018-03-19 RX ORDER — LIDOCAINE HYDROCHLORIDE 10 MG/ML
3 INJECTION, SOLUTION INFILTRATION; PERINEURAL
Status: COMPLETED | OUTPATIENT
Start: 2018-03-19 | End: 2018-03-19

## 2018-03-19 RX ORDER — METHYLPREDNISOLONE ACETATE 40 MG/ML
2 INJECTION, SUSPENSION INTRA-ARTICULAR; INTRALESIONAL; INTRAMUSCULAR; SOFT TISSUE
Status: COMPLETED | OUTPATIENT
Start: 2018-03-19 | End: 2018-03-19

## 2018-03-19 RX ADMIN — LIDOCAINE HYDROCHLORIDE 3 ML: 10 INJECTION, SOLUTION INFILTRATION; PERINEURAL at 09:49

## 2018-03-19 RX ADMIN — METHYLPREDNISOLONE ACETATE 2 ML: 40 INJECTION, SUSPENSION INTRA-ARTICULAR; INTRALESIONAL; INTRAMUSCULAR; SOFT TISSUE at 09:49

## 2018-03-19 NOTE — PROGRESS NOTES
Assessment/Plan:  1  Primary osteoarthritis of right knee      Orders Placed This Encounter   Procedures    Large joint arthrocentesis    XR knee 4+ vw right injury     Continue home exercises  Tylenol prn pain  Knee brace for comfort  Received steroid injection today  Patient knows to ice and avoid strenuous activity for 1-2 days if needed  Return in about 3 months (around 6/19/2018)  for repeat steroid injection if needed      Subjective   Chief Complaint:   Chief Complaint   Patient presents with    Right Knee - Follow-up       Spike Rivas is a 67 y o  male who presents for follow up for follow-up right knee osteoarthritis  Since his last visit he had a fall  He fell forward onto his knee about a week and a half ago  He has been able to weight bear on his R leg  He has been doing physical therapy at home and is now doing home exercises  He feels that his pain at this time is back at baseline  He has intermittent pain  He also notes some intermittent instability  He has a knee brace and wears a for comfort at times  He has not been taking Tylenol  Review of Systems  ROS:    See HPI for musculoskeletal review     All other systems reviewed are negative     History:  Past Medical History:   Diagnosis Date    Chronic bronchitis (HCC)     RESOLVED: 3/26/15    Chronic obstructive lung disease (HCC)     RESOLVED: 9/7/16    Edema     RESOLVED: 9/21/15    Leukocytosis     RESOLVED: 9/21/15    LLL pneumonia (HCC)     RESOLVED: 6/20/17    Methicillin resistant Staphylococcus aureus infection     RESOLVED: 9/21/15    Pneumonia     RESOLVED: 6/20/17     Past Surgical History:   Procedure Laterality Date    FOOT SURGERY Left     SPLENECTOMY, TOTAL      SURGERY OF THE SPLEEN     Social History   History   Alcohol Use No     History   Drug Use No     History   Smoking Status    Former Smoker   Smokeless Tobacco    Never Used     Family History:   Family History   Problem Relation Age of Onset    Coronary artery disease Mother     Diabetes type II Father      MELLITUS       Meds/Allergies     (Not in a hospital admission)  No Known Allergies       Objective     /82   Pulse 71   Ht 6' (1 829 m)   Wt 125 kg (275 lb)   BMI 37 30 kg/m²      PE:  AAOx 3  WDWN  Hearing intact, no drainage from eyes  no audible wheezing  no abdominal distension  LE compartments soft, skin intact    Ortho Exam:  right Knee:   No erythema  no swelling  no effusion  no warmth  Small superficial abrasion over anteromedial aspect of knee  AROM: full  Stable to varus/valgus stress    Imaging Studies: I have personally reviewed pertinent films in PACS  XR R knee:  Severe DJD, no acute osseous abnormality    Large joint arthrocentesis  Date/Time: 3/19/2018 9:49 AM  Consent given by: patient  Site marked: site marked  Timeout: Immediately prior to procedure a time out was called to verify the correct patient, procedure, equipment, support staff and site/side marked as required   Supporting Documentation  Indications: pain   Procedure Details  Location: knee - R knee  Preparation: Patient was prepped and draped in the usual sterile fashion  Needle size: 22 G  Ultrasound guidance: no  Approach: anterolateral  Medications administered: 3 mL lidocaine 1 %; 2 mL methylPREDNISolone acetate 40 mg/mL    Patient tolerance: patient tolerated the procedure well with no immediate complications  Dressing:  Sterile dressing applied

## 2018-03-29 ENCOUNTER — OFFICE VISIT (OUTPATIENT)
Dept: FAMILY MEDICINE CLINIC | Facility: CLINIC | Age: 73
End: 2018-03-29
Payer: MEDICARE

## 2018-03-29 ENCOUNTER — APPOINTMENT (OUTPATIENT)
Dept: RADIOLOGY | Facility: MEDICAL CENTER | Age: 73
End: 2018-03-29
Payer: MEDICARE

## 2018-03-29 VITALS
BODY MASS INDEX: 36.98 KG/M2 | DIASTOLIC BLOOD PRESSURE: 88 MMHG | HEART RATE: 114 BPM | TEMPERATURE: 98.9 F | OXYGEN SATURATION: 90 % | WEIGHT: 272.7 LBS | SYSTOLIC BLOOD PRESSURE: 130 MMHG

## 2018-03-29 DIAGNOSIS — J40 BRONCHITIS: Primary | ICD-10-CM

## 2018-03-29 DIAGNOSIS — J40 BRONCHITIS: ICD-10-CM

## 2018-03-29 PROCEDURE — 99213 OFFICE O/P EST LOW 20 MIN: CPT | Performed by: FAMILY MEDICINE

## 2018-03-29 PROCEDURE — 71046 X-RAY EXAM CHEST 2 VIEWS: CPT

## 2018-03-29 RX ORDER — LEVOFLOXACIN 500 MG/1
500 TABLET, FILM COATED ORAL EVERY 24 HOURS
Qty: 10 TABLET | Refills: 0 | Status: SHIPPED | OUTPATIENT
Start: 2018-03-29 | End: 2018-04-08

## 2018-03-29 NOTE — PROGRESS NOTES
Assessment/Plan:    No problem-specific Assessment & Plan notes found for this encounter  Diagnoses and all orders for this visit:    Bronchitis  Comments:  rx levaquin 500 qd x 10 days  will also check stat CXR  if infiltrate, will admit  Orders:  -     XR chest pa & lateral; Future  -     levofloxacin (LEVAQUIN) 500 mg tablet; Take 1 tablet (500 mg total) by mouth every 24 hours for 10 days      ADDENDUM:  CHEST X-RAY RESULTS SHOWED LEFT LOWER LOBE INFILTRATE  WILL SEND TO EMERGENCY ROOM (PATIENT WANTS TO GO TO [place] [id number])    Subjective:   Chief Complaint   Patient presents with    URI      Patient ID: Cadence Malcolm is a 67 y o  male  Pt has been having increase cough x a few days  tmax 100  The following portions of the patient's history were reviewed and updated as appropriate: allergies, current medications, past family history, past medical history, past social history, past surgical history and problem list     Review of Systems   Constitutional: Negative for chills and fever  HENT: Positive for congestion and postnasal drip  Respiratory: Positive for cough  Negative for shortness of breath  Cardiovascular: Negative  Objective:      /88 (BP Location: Left arm, Patient Position: Sitting)   Pulse (!) 114   Temp 98 9 °F (37 2 °C) (Tympanic)   Wt 124 kg (272 lb 11 2 oz)   SpO2 90%   BMI 36 98 kg/m²          Physical Exam   Constitutional: He appears well-developed and well-nourished  HENT:   Turbinates inflamed   Neck: Normal range of motion  Neck supple     Pulmonary/Chest: Effort normal and breath sounds normal

## 2018-04-02 ENCOUNTER — TELEPHONE (OUTPATIENT)
Dept: FAMILY MEDICINE CLINIC | Facility: CLINIC | Age: 73
End: 2018-04-02

## 2018-04-06 ENCOUNTER — TRANSITIONAL CARE MANAGEMENT (OUTPATIENT)
Dept: FAMILY MEDICINE CLINIC | Facility: CLINIC | Age: 73
End: 2018-04-06

## 2018-04-09 ENCOUNTER — OFFICE VISIT (OUTPATIENT)
Dept: FAMILY MEDICINE CLINIC | Facility: CLINIC | Age: 73
End: 2018-04-09
Payer: MEDICARE

## 2018-04-09 VITALS
RESPIRATION RATE: 16 BRPM | HEIGHT: 72 IN | DIASTOLIC BLOOD PRESSURE: 70 MMHG | HEART RATE: 91 BPM | OXYGEN SATURATION: 92 % | BODY MASS INDEX: 36.89 KG/M2 | WEIGHT: 272.38 LBS | TEMPERATURE: 97.5 F | SYSTOLIC BLOOD PRESSURE: 126 MMHG

## 2018-04-09 DIAGNOSIS — R13.19 OTHER DYSPHAGIA: ICD-10-CM

## 2018-04-09 DIAGNOSIS — J44.9 COPD, MODERATE (HCC): ICD-10-CM

## 2018-04-09 DIAGNOSIS — R13.10 DYSPHAGIA, UNSPECIFIED TYPE: ICD-10-CM

## 2018-04-09 DIAGNOSIS — J18.9 COMMUNITY ACQUIRED PNEUMONIA OF LEFT LOWER LOBE OF LUNG: Primary | ICD-10-CM

## 2018-04-09 PROCEDURE — 99495 TRANSJ CARE MGMT MOD F2F 14D: CPT | Performed by: FAMILY MEDICINE

## 2018-04-09 NOTE — ASSESSMENT & PLAN NOTE
This is a transition of care visit for patient who was admitted to [facility] from March 29th to March 31st due to left lower lobe pneumonia  Patient also had leukocytosis of 16 9  Patient was started on IV Solu-Medrol and IV Levaquin  Blood cultures were negative  Repeat chest x-ray was negative  Patient was discharged on Levaquin for 5 days  He was instructed to follow up with PCP  Overall he is feeling much better  He does occasionally admits to problems swallowing food or liquids  Examination today showed pulse ox of 92% on room air  Lung fields were essentially clear     Will check esophagram due to recent dysphagia    Will call with results

## 2018-04-09 NOTE — ASSESSMENT & PLAN NOTE
Pt having some issues w/ swallowing lately  Also, may have some relationship w/ recurrent pneumonia  Will send for barium swallow

## 2018-04-09 NOTE — PROGRESS NOTES
Assessment/Plan:    Other dysphagia  Pt having some issues w/ swallowing lately  Also, may have some relationship w/ recurrent pneumonia  Will send for barium swallow  Community acquired pneumonia of left lower lobe of lung [facility])  This is a transition of care visit for patient who was admitted to [facility] from March 29th to March 31st due to left lower lobe pneumonia  Patient also had leukocytosis of 16 9  Patient was started on IV Solu-Medrol and IV Levaquin  Blood cultures were negative  Repeat chest x-ray was negative  Patient was discharged on Levaquin for 5 days  He was instructed to follow up with PCP  Overall he is feeling much better  He does occasionally admits to problems swallowing food or liquids  Examination today showed pulse ox of 92% on room air  Lung fields were essentially clear     Will check esophagram due to recent dysphagia  Will call with results    COPD, moderate ([address] )  Patient 92% on room air today patient was advised to always carry his oxygen with him  Diagnoses and all orders for this visit:    Community acquired pneumonia of left lower lobe of lung ([address] )    COPD, moderate ([address] )    Dysphagia, unspecified type  -     FL barium swallow; Future    Other dysphagia      recheck 3 months  Patient may need labs at that time     Subjective:   Date and time hospital follow up call was made:  4/2/2018  3:03 PM  Hospital care reviewed:  Records reviewed  Patient was hopsitalized at:  [facility]  Date of admission:  3/28/18  Date of discharge:  3/31/18  Diagnosis:  Pneumonia due to infectious organism, unspecified laterality, unspecified part of lung (Primary Dx); Exercise hypoxemia;Essential hypertension;Mixed hyperlipidemia;Paroxysmal atrial fibrillation ([address] ); Coronary artery disease involving native heart with angina pectoris, unspecified vessel or lesion type [facility])  Were the patients medicaitons reviewed and updated:  Yes  Current symptoms: None  Post hospital issues:  None  Should patient be enrolled in anticoag monitoring?:  No  Scheduled for follow up?:  Yes  Did you obtain your prescribed medications:  Yes  Do you need help managing your perscriptions or medications:  No  Is transportation to your appointments needed:  No  I have advised the patient to call PCP with any new or worsening symptoms (please type in name along with any credentials):  Clay Dobbs        Patient ID: Melany Kim is a 67 y o  male  This is a transition of care visit for patient who was admitted to [facility] from March 29th to March 31st due to left lower lobe pneumonia  Patient also had leukocytosis of 16 9  Patient was started on IV Solu-Medrol and IV Levaquin  Blood cultures were negative  Repeat chest x-ray was negative  Patient was discharged on Levaquin for 5 days  He was instructed to follow up with PCP  Overall he is feeling much better  He does occasionally admits to problems swallowing food or liquids  The following portions of the patient's history were reviewed and updated as appropriate: allergies, current medications, past family history, past medical history, past social history, past surgical history and problem list     Review of Systems   Respiratory: Negative  Cardiovascular: Negative  Gastrointestinal: Negative  Genitourinary: Negative  Objective:      /70 (BP Location: Left arm, Patient Position: Sitting, Cuff Size: Adult)   Pulse 91   Temp 97 5 °F (36 4 °C) (Tympanic)   Resp 16   Ht 6' (1 829 m)   Wt 124 kg (272 lb 6 oz)   SpO2 92%   BMI 36 94 kg/m²          Physical Exam   Cardiovascular: Normal rate, regular rhythm, normal heart sounds and intact distal pulses  Carotids: no bruits  Ext: no edema   Pulmonary/Chest: Effort normal  No respiratory distress  He has no wheezes  He has no rales  Psychiatric: He has a normal mood and affect   His behavior is normal  Thought content normal

## 2018-04-11 ENCOUNTER — OFFICE VISIT (OUTPATIENT)
Dept: FAMILY MEDICINE CLINIC | Facility: CLINIC | Age: 73
End: 2018-04-11
Payer: COMMERCIAL

## 2018-04-11 VITALS
HEART RATE: 74 BPM | RESPIRATION RATE: 15 BRPM | DIASTOLIC BLOOD PRESSURE: 76 MMHG | WEIGHT: 268.44 LBS | SYSTOLIC BLOOD PRESSURE: 128 MMHG | OXYGEN SATURATION: 97 % | HEIGHT: 72 IN | TEMPERATURE: 97.6 F | BODY MASS INDEX: 36.36 KG/M2

## 2018-04-11 DIAGNOSIS — S29.019A ACUTE THORACIC MYOFASCIAL STRAIN, INITIAL ENCOUNTER: Primary | ICD-10-CM

## 2018-04-11 PROCEDURE — 99213 OFFICE O/P EST LOW 20 MIN: CPT | Performed by: FAMILY MEDICINE
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
## 2018-04-16 ENCOUNTER — TELEPHONE (OUTPATIENT)
Dept: FAMILY MEDICINE CLINIC | Facility: CLINIC | Age: 73
End: 2018-04-16

## 2018-04-16 NOTE — TELEPHONE ENCOUNTER
Noé Burger from [id number]  [phone] ext [phone] needs a new scripts for Oxygen concentrator and portable oxygen concentrator

## 2018-04-19 ENCOUNTER — TELEPHONE (OUTPATIENT)
Dept: FAMILY MEDICINE CLINIC | Facility: CLINIC | Age: 73
End: 2018-04-19

## 2018-04-19 NOTE — TELEPHONE ENCOUNTER
Tracey Ley called from [facility] called to inform us that she is going to be faxing orders that need to be signed by Aym Wang I informed Tracey Ley that Amy Wang is out of the office today and tomorrow and he will return on Monday 4/23/18  Once fax comes over from the back line fax the orders will be put out for Dr Lemon

## 2018-05-07 ENCOUNTER — TRANSCRIBE ORDERS (OUTPATIENT)
Dept: FAMILY MEDICINE CLINIC | Facility: CLINIC | Age: 73
End: 2018-05-07

## 2018-05-07 ENCOUNTER — HOSPITAL ENCOUNTER (OUTPATIENT)
Dept: RADIOLOGY | Facility: HOSPITAL | Age: 73
Discharge: HOME | End: 2018-05-07
Payer: MEDICARE

## 2018-05-07 DIAGNOSIS — R13.19 ESOPHAGEAL DYSPHAGIA: Primary | ICD-10-CM

## 2018-05-07 DIAGNOSIS — R13.10 DYSPHAGIA, UNSPECIFIED TYPE: ICD-10-CM

## 2018-05-07 PROCEDURE — 74220 X-RAY XM ESOPHAGUS 1CNTRST: CPT

## 2018-05-08 ENCOUNTER — TELEPHONE (OUTPATIENT)
Dept: FAMILY MEDICINE CLINIC | Facility: CLINIC | Age: 73
End: 2018-05-08

## 2018-05-08 NOTE — TELEPHONE ENCOUNTER
Please reiterated patient that speech pathology is different than physical therapy  If he continues to refuse this evaluation, he is putting himself at high risk for aspiration/aspiration pneumonia/significant morbid  If he will not see this specialist, please have him follow up for appointment to discuss further    Thank you

## 2018-05-08 NOTE — TELEPHONE ENCOUNTER
Patient's wife called back and spoke with Stephani Rosado  Patient refuses to go to any therapies since he once had a physical therapist make fun of the way he walks and he talks     ===View-only below this line===    ----- Message -----  From: America Carter  Sent: 5/8/2018   1:55 PM  To: [address] with details of message below  I asked him to call the office to verify he received my message and is okay with going to speech therapy on Friday morning     ----- Message -----  From: Sanford [address] DO  Sent: 5/7/2018   5:20 PM  To: America Carter    Can you please call patient, informed him that his recent barium swallow was inconclusive however the radiologist believes that his symptoms are most likely secondary to poor swallowing reflex  It is recommended that he have a evaluation by speech pathology  Thank yo  ----- Message -----  From: America Carter  Sent: 5/7/2018  10:36 AM  To: Sanford Way, DO    Do patient know the results of his test? He is scheduled for Friday at 8:00am with speech  I was not sure if I should let him know about the appointment?    ----- Message -----  From: Sanford Way DO  Sent: 5/7/2018   9:34 AM  To: Scott Marquez with Radiology, patient was scheduled for a barium swallow study however he did have an aspiration event  He was sent home however recommendations per Radiology were for further evaluation by speech pathology  Please help patient is scheduled this evaluation    Thank you

## 2018-05-17 NOTE — TELEPHONE ENCOUNTER
Finally spoke with Michael Shields again in regards to Hussain's test results  He adamantly declines seeing a speech therapist  He will consider scheduling an appointment in the near future to discuss this with Dr Nguyen Currie

## 2018-05-21 ENCOUNTER — TELEPHONE (OUTPATIENT)
Dept: FAMILY MEDICINE CLINIC | Facility: CLINIC | Age: 73
End: 2018-05-21

## 2018-05-21 NOTE — TELEPHONE ENCOUNTER
Brianda Brown from [employer] faxed over some forms that need to be filled out so they can file insurance for the patient I placed them in your folder  Thanks if you have any questions you can call Brianda Brown at [phone]  There is 5 pages

## 2018-05-24 ENCOUNTER — TELEPHONE (OUTPATIENT)
Dept: FAMILY MEDICINE CLINIC | Facility: CLINIC | Age: 73
End: 2018-05-24

## 2018-05-24 NOTE — TELEPHONE ENCOUNTER
Dameon from [facility] called left msg on front line that pt is jyajay discharged from home care next week sometime phone cut out any questions pls call her at the [facility]

## 2018-05-26 DIAGNOSIS — J45.909 EXTRINSIC ASTHMA, UNSPECIFIED ASTHMA SEVERITY, UNSPECIFIED WHETHER COMPLICATED, UNSPECIFIED WHETHER PERSISTENT: Primary | ICD-10-CM

## 2018-05-26 RX ORDER — BUDESONIDE AND FORMOTEROL FUMARATE DIHYDRATE 80; 4.5 UG/1; UG/1
2 AEROSOL RESPIRATORY (INHALATION) 2 TIMES DAILY
Qty: 1 INHALER | Refills: 3 | Status: SHIPPED | OUTPATIENT
Start: 2018-05-26 | End: 2018-05-26 | Stop reason: SDUPTHER

## 2018-05-26 RX ORDER — BUDESONIDE AND FORMOTEROL FUMARATE DIHYDRATE 80; 4.5 UG/1; UG/1
2 AEROSOL RESPIRATORY (INHALATION) 2 TIMES DAILY
Qty: 1 INHALER | Refills: 0 | Status: SHIPPED | OUTPATIENT
Start: 2018-05-26 | End: 2018-08-23 | Stop reason: ALTCHOICE

## 2018-05-30 DIAGNOSIS — J44.9 CHRONIC OBSTRUCTIVE PULMONARY DISEASE, UNSPECIFIED COPD TYPE (HCC): Primary | ICD-10-CM

## 2018-05-30 RX ORDER — FORMOTEROL FUMARATE 20 UG/2ML
20 SOLUTION RESPIRATORY (INHALATION)
Qty: 360 ML | Refills: 2 | Status: SHIPPED | OUTPATIENT
Start: 2018-05-30 | End: 2018-08-23 | Stop reason: ALTCHOICE

## 2018-06-01 DIAGNOSIS — J44.9 CHRONIC OBSTRUCTIVE PULMONARY DISEASE, UNSPECIFIED COPD TYPE (HCC): ICD-10-CM

## 2018-06-01 RX ORDER — FORMOTEROL FUMARATE 20 UG/2ML
20 SOLUTION RESPIRATORY (INHALATION)
Qty: 360 ML | Refills: 0 | Status: CANCELLED | OUTPATIENT
Start: 2018-06-01

## 2018-06-12 ENCOUNTER — TRANSITIONAL CARE MANAGEMENT (OUTPATIENT)
Dept: FAMILY MEDICINE CLINIC | Facility: CLINIC | Age: 73
End: 2018-06-12

## 2018-06-13 RX ORDER — GUAIFENESIN 600 MG
600 TABLET, EXTENDED RELEASE 12 HR ORAL
COMMUNITY
Start: 2018-06-11 | End: 2018-06-18

## 2018-06-19 ENCOUNTER — OFFICE VISIT (OUTPATIENT)
Dept: FAMILY MEDICINE CLINIC | Facility: CLINIC | Age: 73
End: 2018-06-19
Payer: MEDICARE

## 2018-06-19 VITALS
TEMPERATURE: 98.5 F | HEART RATE: 69 BPM | HEIGHT: 72 IN | SYSTOLIC BLOOD PRESSURE: 128 MMHG | OXYGEN SATURATION: 92 % | DIASTOLIC BLOOD PRESSURE: 80 MMHG | WEIGHT: 272 LBS | RESPIRATION RATE: 18 BRPM | BODY MASS INDEX: 36.84 KG/M2

## 2018-06-19 DIAGNOSIS — R73.9 ELEVATED BLOOD SUGAR: ICD-10-CM

## 2018-06-19 DIAGNOSIS — T17.908A ASPIRATION INTO RESPIRATORY TRACT, INITIAL ENCOUNTER: ICD-10-CM

## 2018-06-19 DIAGNOSIS — H25.9 AGE-RELATED CATARACT OF BOTH EYES, UNSPECIFIED AGE-RELATED CATARACT TYPE: ICD-10-CM

## 2018-06-19 DIAGNOSIS — E78.2 MIXED HYPERLIPIDEMIA: ICD-10-CM

## 2018-06-19 DIAGNOSIS — F41.9 ANXIETY: ICD-10-CM

## 2018-06-19 DIAGNOSIS — J44.9 COPD, MODERATE (HCC): Primary | ICD-10-CM

## 2018-06-19 PROCEDURE — 99495 TRANSJ CARE MGMT MOD F2F 14D: CPT | Performed by: FAMILY MEDICINE

## 2018-06-19 NOTE — ASSESSMENT & PLAN NOTE
Stable on current meds; Breo Ellipta , Perforomist, and O2  Resting pulse ox today was 92 % on RA  After exercise, pulse ox dropped to 86%  After 3 liters O2 was administered, pulse ox returned to 95%

## 2018-06-19 NOTE — LETTER
June 19, 2018     Sean [address]     Patient: Anthony Maldonado   YOB: 1945   Date of Visit: 6/19/2018       Dear Dr Fermin Herring: Thank you for referring Nelida Monae to me for evaluation  Below are my notes for this consultation  If you have questions, please do not hesitate to call me  I look forward to following your patient along with you  Sincerely,        Nathalia Dan DO        CC: No Recipients  Nathalia Dan DO  6/19/2018  3:58 PM  Sign at close encounter  Assessment/Plan:    Anxiety   Stable on Paxil 20 mg daily    Mixed hyperlipidemia   Doing well on pravastatin 80 mg daily    Aspiration into respiratory tract   This is a transition of care management visit for patient was admitted to [place] [address] from June 7th to June 11th due to fever  CT scan of the chest did not reveal any pneumonia  Remainder of workup was essentially negative  Patient was diagnosed with aspiration and advised to eat thickened liquids  And all we seated in a 90 degree angle to avoid aspiration  He is currently feeling better  Doing well on all current medications without side effects      COPD, moderate (HCC)  Stable on current meds; Breo Ellipta , Perforomist, and O2  Resting pulse ox today was 92 % on RA  After exercise, pulse ox dropped to 86%  After 3 liters O2 was administered, pulse ox returned to 95%  Age-related cataract of both eyes   PATIENT IS MEDICALLY CLEARED FOR UPCOMING CATARACT SURGERIES ( 1ST ON July 17TH, 2ND ON July 31ST ) BY DR DICK CROWE  Diagnoses and all orders for this visit:    COPD, moderate ([address] )  -     CBC and differential; Future    Aspiration into respiratory tract, initial encounter    Mixed hyperlipidemia  -     Comprehensive metabolic panel; Future  -     Lipid Panel with Direct LDL reflex; Future    Anxiety  -     TSH, 3rd generation with Free T4 reflex;  Future    Age-related cataract of both eyes, unspecified age-related cataract type    Elevated blood sugar  -     Hemoglobin A1C; Future       RECHECK REGULAR APPOINTMENT IN AUGUST, FASTING BLOOD WORK AT [facility]    Subjective:   Date and time hospital follow up call was made:  6/12/2018  2:44 PM  Hospital care reviewed:  Records reviewed  Patient was hopsitalized at:  [facility]  Date of admission:  6/7/18  Date of discharge:  6/11/18  Diagnosis:  Fever, unspecified fever cause   Disposition:  Home  Were the patients medicaitons reviewed and updated:  Yes  Current symptoms:  None  Post hospital issues:  None  Should patient be enrolled in anticoag monitoring?:  No  Scheduled for follow up?:  Yes  Did you obtain your prescribed medications:  Yes  Do you need help managing your perscriptions or medications:  No  Is transportation to your appointments needed:  No  I have advised the patient to call PCP with any new or worsening symptoms (please type in name along with any credentials):  Terry Baldwin MA   Comments:  Pt has an appointment scheduled for a TCM in our office  on 6/19/2018          Patient ID: Riley Early is a 68 y o  male  This is a transition of care management visit for patient was admitted to [facility] from June 7th to June 11th due to fever  CT scan of the chest did not reveal any pneumonia  Remainder of workup was essentially negative  Patient was diagnosed with aspiration and advised to eat thickened liquids  And all we seated in a 90 degree angle to avoid aspiration  He is currently feeling better  Doing well on all current medications without side effects        The following portions of the patient's history were reviewed and updated as appropriate: allergies, current medications, past family history, past medical history, past social history, past surgical history and problem list     Review of Systems   Respiratory: Negative  Cardiovascular: Negative      Gastrointestinal: Negative  Genitourinary: Negative  Objective:      /80 (BP Location: Left arm, Patient Position: Sitting)   Pulse 69   Temp 98 5 °F (36 9 °C) (Tympanic)   Resp 18   Ht 6' (1 829 m)   Wt 123 kg (272 lb)   SpO2 92%   BMI 36 89 kg/m²           Physical Exam   Cardiovascular: Normal rate, regular rhythm, normal heart sounds and intact distal pulses  Carotids: no bruits  Ext: no edema   Pulmonary/Chest: Effort normal  No respiratory distress  He has no wheezes  He has no rales  Psychiatric: He has a normal mood and affect   His behavior is normal  Thought content normal

## 2018-06-19 NOTE — ASSESSMENT & PLAN NOTE
PATIENT IS MEDICALLY CLEARED FOR UPCOMING CATARACT SURGERIES ( 1ST ON July 17TH, 2ND ON July 31ST ) BY DR DICK CROWE

## 2018-06-19 NOTE — PROGRESS NOTES
Assessment/Plan:    Anxiety   Stable on Paxil 20 mg daily    Mixed hyperlipidemia   Doing well on pravastatin 80 mg daily    Aspiration into respiratory tract   This is a transition of care management visit for patient was admitted to [facility] from June 7th to June 11th due to fever  CT scan of the chest did not reveal any pneumonia  Remainder of workup was essentially negative  Patient was diagnosed with aspiration and advised to eat thickened liquids  And all we seated in a 90 degree angle to avoid aspiration  He is currently feeling better  Doing well on all current medications without side effects      COPD, moderate (HCC)  Stable on current meds; Alan Ramirez , Perforomist, and O2  Resting pulse ox today was 92 % on RA  After exercise, pulse ox dropped to 86%  After 3 liters O2 was administered, pulse ox returned to 95%  Age-related cataract of both eyes   PATIENT IS MEDICALLY CLEARED FOR UPCOMING CATARACT SURGERIES ( 1ST ON July 17TH, 2ND ON July 31ST ) BY DR DICK CROWE  Diagnoses and all orders for this visit:    COPD, moderate ([address] )  -     CBC and differential; Future    Aspiration into respiratory tract, initial encounter    Mixed hyperlipidemia  -     Comprehensive metabolic panel; Future  -     Lipid Panel with Direct LDL reflex; Future    Anxiety  -     TSH, 3rd generation with Free T4 reflex;  Future    Age-related cataract of both eyes, unspecified age-related cataract type    Elevated blood sugar  -     Hemoglobin A1C; Future       RECHECK REGULAR APPOINTMENT IN AUGUST, FASTING BLOOD WORK AT [facility]    Subjective:   Date and time hospital follow up call was made:  6/12/2018  2:44 PM  Hospital care reviewed:  Records reviewed  Patient was hopsitalized at:  [facility]  Date of admission:  6/7/18  Date of discharge:  6/11/18  Diagnosis:  Fever, unspecified fever cause   Disposition:  Home  Were the patients medicaitons reviewed and updated:  Yes  Current symptoms:  None  Post hospital issues:  None  Should patient be enrolled in anticoag monitoring?:  No  Scheduled for follow up?:  Yes  Did you obtain your prescribed medications:  Yes  Do you need help managing your perscriptions or medications:  No  Is transportation to your appointments needed:  No  I have advised the patient to call PCP with any new or worsening symptoms (please type in name along with any credentials):  Gabbi Davies MA   Comments:  Pt has an appointment scheduled for a TCM in our office  on 6/19/2018          Patient ID: Kyree Rivera is a 68 y o  male  This is a transition of care management visit for patient was admitted to [facility] from June 7th to June 11th due to fever  CT scan of the chest did not reveal any pneumonia  Remainder of workup was essentially negative  Patient was diagnosed with aspiration and advised to eat thickened liquids  And all we seated in a 90 degree angle to avoid aspiration  He is currently feeling better  Doing well on all current medications without side effects        The following portions of the patient's history were reviewed and updated as appropriate: allergies, current medications, past family history, past medical history, past social history, past surgical history and problem list     Review of Systems   Respiratory: Negative  Cardiovascular: Negative  Gastrointestinal: Negative  Genitourinary: Negative  Objective:      /80 (BP Location: Left arm, Patient Position: Sitting)   Pulse 69   Temp 98 5 °F (36 9 °C) (Tympanic)   Resp 18   Ht 6' (1 829 m)   Wt 123 kg (272 lb)   SpO2 92%   BMI 36 89 kg/m²          Physical Exam   Cardiovascular: Normal rate, regular rhythm, normal heart sounds and intact distal pulses  Carotids: no bruits  Ext: no edema   Pulmonary/Chest: Effort normal  No respiratory distress  He has no wheezes  He has no rales  Psychiatric: He has a normal mood and affect   His behavior is normal  Thought content normal

## 2018-06-20 ENCOUNTER — TELEPHONE (OUTPATIENT)
Dept: FAMILY MEDICINE CLINIC | Facility: CLINIC | Age: 73
End: 2018-06-20

## 2018-06-20 ENCOUNTER — OFFICE VISIT (OUTPATIENT)
Dept: OBGYN CLINIC | Facility: MEDICAL CENTER | Age: 73
End: 2018-06-20
Payer: MEDICARE

## 2018-06-20 VITALS
DIASTOLIC BLOOD PRESSURE: 71 MMHG | HEIGHT: 72 IN | BODY MASS INDEX: 36.73 KG/M2 | HEART RATE: 108 BPM | SYSTOLIC BLOOD PRESSURE: 109 MMHG | WEIGHT: 271.17 LBS

## 2018-06-20 DIAGNOSIS — M17.11 PRIMARY OSTEOARTHRITIS OF RIGHT KNEE: Primary | ICD-10-CM

## 2018-06-20 PROCEDURE — 20610 DRAIN/INJ JOINT/BURSA W/O US: CPT | Performed by: PHYSICIAN ASSISTANT

## 2018-06-20 PROCEDURE — 99213 OFFICE O/P EST LOW 20 MIN: CPT | Performed by: ORTHOPAEDIC SURGERY

## 2018-06-20 RX ORDER — METHYLPREDNISOLONE ACETATE 40 MG/ML
2 INJECTION, SUSPENSION INTRA-ARTICULAR; INTRALESIONAL; INTRAMUSCULAR; SOFT TISSUE
Status: COMPLETED | OUTPATIENT
Start: 2018-06-20 | End: 2018-06-20

## 2018-06-20 RX ORDER — BUPIVACAINE HYDROCHLORIDE 2.5 MG/ML
2 INJECTION, SOLUTION INFILTRATION; PERINEURAL
Status: COMPLETED | OUTPATIENT
Start: 2018-06-20 | End: 2018-06-20

## 2018-06-20 RX ADMIN — METHYLPREDNISOLONE ACETATE 2 ML: 40 INJECTION, SUSPENSION INTRA-ARTICULAR; INTRALESIONAL; INTRAMUSCULAR; SOFT TISSUE at 10:15

## 2018-06-20 RX ADMIN — BUPIVACAINE HYDROCHLORIDE 2 ML: 2.5 INJECTION, SOLUTION INFILTRATION; PERINEURAL at 10:15

## 2018-06-20 NOTE — TELEPHONE ENCOUNTER
I called and spoke to pt's spouse Juju Seymour  Pt is scheduled for a pre op clearance  on 7/9/18 for both eyes 1st eye is 7/17 and 2nd is 7/31/2018

## 2018-06-20 NOTE — TELEPHONE ENCOUNTER
Please call patient to schedule an appointment  [facility] will not except his clearance form from Dr Martinez Done  He needs an appointment scheduled after 7/1/2018

## 2018-06-20 NOTE — PROGRESS NOTES
Assessment/Plan:  1  Primary osteoarthritis of right knee      Orders Placed This Encounter   Procedures    Large joint arthrocentesis     -patient received a right knee steroid injection today  He should avoid strenuous activities rest and ice his knee for 1-2 days  -he will continue with home exercises  -continue with Tylenol as needed for pain control  -continue with the knee brace as needed for comfort  Return in about 3 months (around 9/20/2018)  When eligible for another steroid injection      Subjective   Chief Complaint:   Chief Complaint   Patient presents with    Right Knee - Follow-up       Lyn Duckworth is a 68 y o  male who presents for follow up for the right knee pain today  He had a right knee steroid injection on 3/19/2018  He states it helped about a month  He is having pain in his lateral knee  His knee pain does not radiate  His knee does feel unstable at times  He does walk with a walker at baseline  He does have a knee brace which seems to help  He has been doing home exercises for his knee  He currently is not taking anything for pain  Review of Systems  ROS:    See HPI for musculoskeletal review     All other systems reviewed are negative     History:  Past Medical History:   Diagnosis Date    Chronic bronchitis (HCC)     RESOLVED: 3/26/15    Chronic obstructive lung disease (HCC)     RESOLVED: 9/7/16    Edema     RESOLVED: 9/21/15    Leukocytosis     RESOLVED: 9/21/15    LLL pneumonia (HCC)     RESOLVED: 6/20/17    Methicillin resistant Staphylococcus aureus infection     RESOLVED: 9/21/15    Pneumonia     RESOLVED: 6/20/17     Past Surgical History:   Procedure Laterality Date    FOOT SURGERY Left     SPLENECTOMY, TOTAL      SURGERY OF THE SPLEEN     Social History   History   Alcohol Use No     History   Drug Use No     History   Smoking Status    Former Smoker   Smokeless Tobacco    Never Used     Family History:   Family History   Problem Relation Age of Onset  Coronary artery disease Mother     Diabetes type II Father         MELLITUS       Meds/Allergies     (Not in a hospital admission)  No Known Allergies       Objective     /71   Pulse (!) 108   Ht 6' 0 01" (1 829 m)   Wt 123 kg (271 lb 2 7 oz)   BMI 36 77 kg/m²      PE:  AAOx 3  WDWN  Hearing intact, no drainage from eyes  no audible wheezing  no abdominal distension  LE compartments soft, skin intact    Ortho Exam:  right Knee:   No erythema  no swelling  no effusion  no warmth  Small scab on lateral knee, no erythema   AROM: full  Stable to varus/valgus stress  Large joint arthrocentesis  Date/Time: 6/20/2018 10:15 AM  Consent given by: patient  Site marked: site marked  Timeout: Immediately prior to procedure a time out was called to verify the correct patient, procedure, equipment, support staff and site/side marked as required   Supporting Documentation  Indications: pain   Procedure Details  Location: knee - R knee  Needle size: 22 G  Approach: anterolateral  Medications administered: 2 mL methylPREDNISolone acetate 40 mg/mL; 2 mL bupivacaine 0 25 %    Patient tolerance: patient tolerated the procedure well with no immediate complications  Dressing:  Sterile dressing applied

## 2018-07-06 PROBLEM — H26.9 CATARACT OF BOTH EYES: Status: ACTIVE | Noted: 2018-07-06

## 2018-07-06 PROBLEM — F41.8 DEPRESSION WITH ANXIETY: Status: ACTIVE | Noted: 2018-07-06

## 2018-07-09 ENCOUNTER — OFFICE VISIT (OUTPATIENT)
Dept: FAMILY MEDICINE CLINIC | Facility: CLINIC | Age: 73
End: 2018-07-09
Payer: MEDICARE

## 2018-07-09 VITALS
DIASTOLIC BLOOD PRESSURE: 80 MMHG | SYSTOLIC BLOOD PRESSURE: 120 MMHG | WEIGHT: 267.3 LBS | HEIGHT: 72 IN | OXYGEN SATURATION: 91 % | BODY MASS INDEX: 36.21 KG/M2 | TEMPERATURE: 97 F | RESPIRATION RATE: 18 BRPM | HEART RATE: 59 BPM

## 2018-07-09 DIAGNOSIS — J44.9 COPD, MODERATE (HCC): ICD-10-CM

## 2018-07-09 DIAGNOSIS — Z01.818 PREOP EXAMINATION: Primary | ICD-10-CM

## 2018-07-09 DIAGNOSIS — I48.0 PAROXYSMAL A-FIB (HCC): ICD-10-CM

## 2018-07-09 DIAGNOSIS — F41.9 ANXIETY: ICD-10-CM

## 2018-07-09 DIAGNOSIS — F41.8 DEPRESSION WITH ANXIETY: ICD-10-CM

## 2018-07-09 DIAGNOSIS — H26.9 CATARACT OF BOTH EYES, UNSPECIFIED CATARACT TYPE: ICD-10-CM

## 2018-07-09 DIAGNOSIS — I10 BENIGN ESSENTIAL HYPERTENSION: ICD-10-CM

## 2018-07-09 DIAGNOSIS — E78.2 MIXED HYPERLIPIDEMIA: ICD-10-CM

## 2018-07-09 PROCEDURE — 99213 OFFICE O/P EST LOW 20 MIN: CPT | Performed by: FAMILY MEDICINE

## 2018-07-09 NOTE — PROGRESS NOTES
Assessment/Plan:    Cataract of both eyes  Patient presents for preop clearance for upcoming bilateral cataracts surgeries ( July 17 and July 31st) by Dr Adiel Bermudez  No complaints today  Patient is medically cleared for for upcoming surgeries       Diagnoses and all orders for this visit:    Preop examination    Cataract of both eyes, unspecified cataract type    COPD, moderate ([address] )    Anxiety    Mixed hyperlipidemia    Depression with anxiety    Benign essential hypertension    Paroxysmal A-fib ([address] )          Subjective:      Patient ID: Lis Hopkins is a 68 y o  male  Patient presents for preop clearance for upcoming bilateral cataracts surgeries ( July 17 and July 31st) by Dr Adiel Bermudez  No complaints today        The following portions of the patient's history were reviewed and updated as appropriate: allergies, current medications, past family history, past medical history, past social history, past surgical history and problem list     Review of Systems   Respiratory: Negative  Cardiovascular: Negative  Gastrointestinal: Negative  Genitourinary: Negative  Objective:      /80 (BP Location: Left arm, Patient Position: Sitting, Cuff Size: Large)   Pulse 59   Temp (!) 97 °F (36 1 °C) (Tympanic)   Resp 18   Ht 6' (1 829 m)   Wt 121 kg (267 lb 4 8 oz)   SpO2 91%   BMI 36 25 kg/m²          Physical Exam   Cardiovascular: Normal rate, regular rhythm, normal heart sounds and intact distal pulses  Carotids: no bruits  Ext: no edema   Pulmonary/Chest: Effort normal  No respiratory distress  He has no wheezes  He has no rales  Psychiatric: He has a normal mood and affect   His behavior is normal  Thought content normal

## 2018-07-09 NOTE — ASSESSMENT & PLAN NOTE
Patient presents for preop clearance for upcoming bilateral cataracts surgeries ( July 17 and July 31st) by Dr Ana Meredith  No complaints today    Patient is medically cleared for for upcoming surgeries

## 2018-08-09 ENCOUNTER — TELEPHONE (OUTPATIENT)
Dept: OBGYN CLINIC | Facility: HOSPITAL | Age: 73
End: 2018-08-09

## 2018-08-09 NOTE — TELEPHONE ENCOUNTER
Breonna Noriega, patient's wife is calling   [phone]  Monalisa won't be home tomorrow so could we call her back on Monday  Patient's wife is calling stating that the patient keeps telling her that his right knee is giving out  Breonna Noriega doesn't know if the patient is saying this for attention but she is getting tired of it  She's very tired of hearing him complain  Breonna Noriega is stating that patient claims that he cannot even walk from the handicap spot to the place where the scooter carts are located at the grocery store  Breonna Noriega wants to know if patient should be seen or is there nothing we can do for him until it's time for another injection  Please advise

## 2018-08-20 NOTE — TELEPHONE ENCOUNTER
Called and left a v/m for pt's wife, Jurgen Reddy so that we can discuss bringing  back in if need be       # [phone]

## 2018-08-20 NOTE — TELEPHONE ENCOUNTER
Wife called requesting to talk to helena I advised helena called to reschedule patient for sooner appnt I offered sooner appnt but she prefers to talk to helena she will call back in the morning on 8/21/2018

## 2018-08-21 NOTE — TELEPHONE ENCOUNTER
Just FYI: task can be doned    I called pt back and spoke with Silviochelsi Janel, pt's wife  She has agreed to bring pt back sooner on 9/7/18  I attempted to offer a much sooner appt than this of course, but pt has many appts and procedures to be done the remaining month in August  Pt's wife was content with 9/7/18 appt to discuss pt's instability issues  Will keep 9/24/18 appt for now as well       # [phone]

## 2018-08-23 ENCOUNTER — OFFICE VISIT (OUTPATIENT)
Dept: FAMILY MEDICINE CLINIC | Facility: CLINIC | Age: 73
End: 2018-08-23
Payer: MEDICARE

## 2018-08-23 VITALS
BODY MASS INDEX: 36.54 KG/M2 | SYSTOLIC BLOOD PRESSURE: 144 MMHG | OXYGEN SATURATION: 90 % | HEART RATE: 84 BPM | TEMPERATURE: 98.7 F | DIASTOLIC BLOOD PRESSURE: 80 MMHG | WEIGHT: 269.4 LBS

## 2018-08-23 DIAGNOSIS — J44.9 COPD, MODERATE (HCC): ICD-10-CM

## 2018-08-23 DIAGNOSIS — I48.0 PAROXYSMAL A-FIB (HCC): ICD-10-CM

## 2018-08-23 DIAGNOSIS — T17.908A ASPIRATION INTO RESPIRATORY TRACT, INITIAL ENCOUNTER: Primary | ICD-10-CM

## 2018-08-23 DIAGNOSIS — E78.2 MIXED HYPERLIPIDEMIA: ICD-10-CM

## 2018-08-23 DIAGNOSIS — F41.9 ANXIETY: ICD-10-CM

## 2018-08-23 DIAGNOSIS — M19.90 ARTHRITIS: ICD-10-CM

## 2018-08-23 PROCEDURE — 99214 OFFICE O/P EST MOD 30 MIN: CPT | Performed by: FAMILY MEDICINE

## 2018-08-23 RX ORDER — KETOROLAC TROMETHAMINE 5 MG/ML
SOLUTION OPHTHALMIC
COMMUNITY
Start: 2018-07-23 | End: 2019-01-15 | Stop reason: ALTCHOICE

## 2018-08-23 NOTE — ASSESSMENT & PLAN NOTE
Stable on performist, and O2  I again advised Chitra Amaya to bring his O2 with him when he travels

## 2018-08-23 NOTE — PROGRESS NOTES
Assessment/Plan:    Arthritis  Still w/ L shoulder and R knee pain  Continue regular f/u w/ ortho (dr Blayne Ingram)  Anxiety  Doing well on paxil 20 mg qd  Aspiration into respiratory tract  No recent problems w/ aspiration  He has been taking precautions, including making sure he is fully upright while eating  COPD, moderate ([address] )  Stable on performist, and O2  I again advised Jez Sawyer to bring his O2 with him when he travels  Mixed hyperlipidemia  Chol 169/100  Doing reasonably well on pravastatin 80 qd  Paroxysmal A-fib (HCC)  Stable, rate controlled, and on asa  Continue regular f/u w/ cardiologist (dr Christiano Landrum)  Diagnoses and all orders for this visit:    Aspiration into respiratory tract, initial encounter    Anxiety    Mixed hyperlipidemia    COPD, moderate (HCC)    Arthritis    Paroxysmal A-fib (HCC)      3 MOS, awv, LABS Q 6 (at [facility])    Subjective:      Patient ID: Lis Hopkins is a 68 y o  male  Recheck chronic medical probs  Overall doing well, still using home o2  Doing well on reg meds  Pravastatin for chol, paxil for anxiety  Stable on nebs  Pt had labs on 7/24        The following portions of the patient's history were reviewed and updated as appropriate: allergies, current medications, past family history, past medical history, past social history, past surgical history and problem list     Review of Systems   Respiratory: Negative  Cardiovascular: Negative  Gastrointestinal: Negative  Genitourinary: Negative  Objective:      /80 (BP Location: Left arm, Patient Position: Sitting)   Pulse 84   Temp 98 7 °F (37 1 °C) (Tympanic)   Wt 122 kg (269 lb 6 4 oz)   SpO2 90%   BMI 36 54 kg/m²          Physical Exam   Cardiovascular: Normal rate, regular rhythm, normal heart sounds and intact distal pulses  Carotids: no bruits  Ext: no edema   Pulmonary/Chest: Effort normal  No respiratory distress  He has no wheezes  He has no rales     Psychiatric: He has a normal mood and affect   His behavior is normal  Thought content normal

## 2018-08-23 NOTE — ASSESSMENT & PLAN NOTE
No recent problems w/ aspiration  He has been taking precautions, including making sure he is fully upright while eating

## 2018-09-07 ENCOUNTER — APPOINTMENT (OUTPATIENT)
Dept: RADIOLOGY | Facility: CLINIC | Age: 73
End: 2018-09-07
Payer: MEDICARE

## 2018-09-07 ENCOUNTER — TELEPHONE (OUTPATIENT)
Dept: OBGYN CLINIC | Facility: HOSPITAL | Age: 73
End: 2018-09-07

## 2018-09-07 ENCOUNTER — OFFICE VISIT (OUTPATIENT)
Dept: OBGYN CLINIC | Facility: MEDICAL CENTER | Age: 73
End: 2018-09-07
Payer: MEDICARE

## 2018-09-07 VITALS
WEIGHT: 273 LBS | HEART RATE: 82 BPM | DIASTOLIC BLOOD PRESSURE: 72 MMHG | SYSTOLIC BLOOD PRESSURE: 106 MMHG | HEIGHT: 73 IN | BODY MASS INDEX: 36.18 KG/M2

## 2018-09-07 DIAGNOSIS — M17.12 PRIMARY OSTEOARTHRITIS OF LEFT KNEE: ICD-10-CM

## 2018-09-07 DIAGNOSIS — M25.562 LEFT KNEE PAIN, UNSPECIFIED CHRONICITY: ICD-10-CM

## 2018-09-07 DIAGNOSIS — M17.11 LOCALIZED OSTEOARTHRITIS OF RIGHT KNEE: Primary | ICD-10-CM

## 2018-09-07 DIAGNOSIS — M25.561 RIGHT KNEE PAIN, UNSPECIFIED CHRONICITY: Primary | ICD-10-CM

## 2018-09-07 DIAGNOSIS — M25.561 RIGHT KNEE PAIN, UNSPECIFIED CHRONICITY: ICD-10-CM

## 2018-09-07 PROCEDURE — 99213 OFFICE O/P EST LOW 20 MIN: CPT | Performed by: PHYSICIAN ASSISTANT

## 2018-09-07 PROCEDURE — 20610 DRAIN/INJ JOINT/BURSA W/O US: CPT | Performed by: PHYSICIAN ASSISTANT

## 2018-09-07 PROCEDURE — 73564 X-RAY EXAM KNEE 4 OR MORE: CPT

## 2018-09-07 RX ORDER — BUPIVACAINE HYDROCHLORIDE 2.5 MG/ML
2 INJECTION, SOLUTION INFILTRATION; PERINEURAL
Status: COMPLETED | OUTPATIENT
Start: 2018-09-07 | End: 2018-09-07

## 2018-09-07 RX ORDER — METHYLPREDNISOLONE ACETATE 40 MG/ML
2 INJECTION, SUSPENSION INTRA-ARTICULAR; INTRALESIONAL; INTRAMUSCULAR; SOFT TISSUE
Status: COMPLETED | OUTPATIENT
Start: 2018-09-07 | End: 2018-09-07

## 2018-09-07 RX ADMIN — METHYLPREDNISOLONE ACETATE 2 ML: 40 INJECTION, SUSPENSION INTRA-ARTICULAR; INTRALESIONAL; INTRAMUSCULAR; SOFT TISSUE at 09:40

## 2018-09-07 RX ADMIN — BUPIVACAINE HYDROCHLORIDE 2 ML: 2.5 INJECTION, SOLUTION INFILTRATION; PERINEURAL at 09:40

## 2018-09-07 NOTE — PROGRESS NOTES
Assessment/Plan:  1  Right knee pain, unspecified chronicity    2  Left knee pain, unspecified chronicity      Orders Placed This Encounter   Procedures    Large joint arthrocentesis    XR knee 4+ vw left injury    XR knee 4+ vw right injury    Ambulatory Referral to  [facility]     -patient received a right knee steroid injection today  He should avoid strenuous activities rest and ice the knee for 1-2 days   -continue with a walker and cane as needed  -continue with Tylenol as needed for pain  -patient will start home physical therapy  Return in about 3 months (around 12/7/2018)  Subjective   Chief Complaint:   Chief Complaint   Patient presents with    Right Knee - Follow-up       Lis Hopkins is a 68 y o  male who presents for follow up for bilateral knee pain today  he has been having frequent falls  His last fall was 1 week ago  He states he has no  increased knee pain since the fall  His knees are very unstable to him  He currently has no pain in his left knee  He only has pain in his right knee  The pain is all over the anterior knee  He has been doing home exercises occasionally  He does take Tylenol which helps  He walks with a walker and started using a cane in the house  The cane has helped him with his falls  Review of Systems  ROS:    See HPI for musculoskeletal review     All other systems reviewed are negative     History:  Past Medical History:   Diagnosis Date    Chronic bronchitis (HCC)     RESOLVED: 3/26/15    Chronic obstructive lung disease ([address] )     RESOLVED: 9/7/16    Edema     RESOLVED: 9/21/15    Leukocytosis     RESOLVED: 9/21/15    LLL pneumonia ([address] )     RESOLVED: 6/20/17    Methicillin resistant Staphylococcus aureus infection     RESOLVED: 9/21/15    Pneumonia     RESOLVED: 6/20/17     Past Surgical History:   Procedure Laterality Date    FOOT SURGERY Left     SPLENECTOMY, TOTAL      SURGERY OF THE SPLEEN     Social History   History   Alcohol Use No     History   Drug Use No     History   Smoking Status    Former Smoker   Smokeless Tobacco    Never Used     Family History:   Family History   Problem Relation Age of Onset    Coronary artery disease Mother     Diabetes type II Father         MELLITUS       Meds/Allergies     (Not in a hospital admission)  No Known Allergies       Objective     /72   Pulse 82   Ht 6' 1" (1 854 m)   Wt 124 kg (273 lb)   BMI 36 02 kg/m²      PE:  AAOx 3  WDWN  Hearing intact, no drainage from eyes  no audible wheezing  no abdominal distension  LE compartments soft, skin intact    Ortho Exam:  right Knee:   No erythema  no swelling  no effusion  no warmth  AROM: 5- 105  Stable to varus/valgus stress  Large joint arthrocentesis  Date/Time: 9/7/2018 9:40 AM  Consent given by: patient  Site marked: site marked  Timeout: Immediately prior to procedure a time out was called to verify the correct patient, procedure, equipment, support staff and site/side marked as required   Supporting Documentation  Indications: pain   Procedure Details  Location: knee - R knee  Needle size: 22 G  Approach: anterolateral  Medications administered: 2 mL methylPREDNISolone acetate 40 mg/mL; 2 mL bupivacaine 0 25 %    Patient tolerance: patient tolerated the procedure well with no immediate complications  Dressing:  Sterile dressing applied      Imaging Studies: I have personally reviewed pertinent films in PACS  X-rays bilateral knees- severe DJD

## 2018-09-07 NOTE — TELEPHONE ENCOUNTER
[address] [facility] called with fax# [phone] you requested, for RX PT/OT

## 2018-09-07 NOTE — TELEPHONE ENCOUNTER
Carina Montero, called stating they need the referral to say home health, PT or OT, and that the patient is having repetitive falls

## 2018-09-27 ENCOUNTER — TELEPHONE (OUTPATIENT)
Dept: OBGYN CLINIC | Facility: HOSPITAL | Age: 73
End: 2018-09-27

## 2018-09-27 NOTE — TELEPHONE ENCOUNTER
[facility]  C/B # [phone]  Dr Miya Maddox wanted to inform that the patient was in the hospital for possible pneumonia and sepsis form 9/21/18 - 9/27/18 and wanted a verbal if she should continue with therapy tomorrow  She asks if you can please reach out to them    Thanks

## 2018-09-28 DIAGNOSIS — E78.2 MIXED HYPERLIPIDEMIA: ICD-10-CM

## 2018-09-28 DIAGNOSIS — I10 ESSENTIAL HYPERTENSION: ICD-10-CM

## 2018-09-28 DIAGNOSIS — M19.90 ARTHRITIS: ICD-10-CM

## 2018-09-28 DIAGNOSIS — R60.0 LOCALIZED EDEMA: ICD-10-CM

## 2018-09-28 DIAGNOSIS — F32.A DEPRESSION, UNSPECIFIED DEPRESSION TYPE: Primary | ICD-10-CM

## 2018-09-28 NOTE — TELEPHONE ENCOUNTER
I called and left a message for Rachna Lockhart with 1 of her coworkers  I let her know it is okay to resume home physical therapy

## 2018-09-30 RX ORDER — TRIAMTERENE AND HYDROCHLOROTHIAZIDE 37.5; 25 MG/1; MG/1
1 CAPSULE ORAL DAILY
Qty: 90 CAPSULE | Refills: 1 | Status: SHIPPED | OUTPATIENT
Start: 2018-09-30 | End: 2019-02-26 | Stop reason: SDUPTHER

## 2018-09-30 RX ORDER — MELOXICAM 7.5 MG/1
TABLET ORAL
Qty: 180 TABLET | Refills: 1 | Status: SHIPPED | OUTPATIENT
Start: 2018-09-30 | End: 2019-02-26 | Stop reason: SDUPTHER

## 2018-09-30 RX ORDER — PRAVASTATIN SODIUM 80 MG/1
TABLET ORAL DAILY
Qty: 90 TABLET | Refills: 1 | Status: SHIPPED | OUTPATIENT
Start: 2018-09-30 | End: 2019-01-15 | Stop reason: ALTCHOICE

## 2018-09-30 RX ORDER — FUROSEMIDE 20 MG/1
TABLET ORAL DAILY
Qty: 180 TABLET | Refills: 1 | Status: SHIPPED | OUTPATIENT
Start: 2018-09-30 | End: 2019-02-26 | Stop reason: SDUPTHER

## 2018-09-30 RX ORDER — PAROXETINE HYDROCHLORIDE 20 MG/1
TABLET, FILM COATED ORAL DAILY
Qty: 90 TABLET | Refills: 1 | Status: SHIPPED | OUTPATIENT
Start: 2018-09-30 | End: 2019-02-26 | Stop reason: SDUPTHER

## 2018-10-02 RX ORDER — ATORVASTATIN CALCIUM 80 MG/1
80 TABLET, FILM COATED ORAL DAILY
COMMUNITY
Start: 2018-09-27 | End: 2019-05-23

## 2018-10-04 ENCOUNTER — OFFICE VISIT (OUTPATIENT)
Dept: FAMILY MEDICINE CLINIC | Facility: CLINIC | Age: 73
End: 2018-10-04
Payer: MEDICARE

## 2018-10-04 ENCOUNTER — TRANSITIONAL CARE MANAGEMENT (OUTPATIENT)
Dept: FAMILY MEDICINE CLINIC | Facility: CLINIC | Age: 73
End: 2018-10-04

## 2018-10-04 VITALS
DIASTOLIC BLOOD PRESSURE: 86 MMHG | WEIGHT: 262.3 LBS | HEART RATE: 101 BPM | OXYGEN SATURATION: 90 % | BODY MASS INDEX: 34.61 KG/M2 | TEMPERATURE: 98.4 F | SYSTOLIC BLOOD PRESSURE: 124 MMHG

## 2018-10-04 DIAGNOSIS — Z23 NEED FOR VACCINATION: ICD-10-CM

## 2018-10-04 DIAGNOSIS — J69.0 ASPIRATION PNEUMONIA OF LEFT LOWER LOBE, UNSPECIFIED ASPIRATION PNEUMONIA TYPE (HCC): Primary | ICD-10-CM

## 2018-10-04 DIAGNOSIS — L89.159 PRESSURE INJURY OF SKIN OF SACRAL REGION, UNSPECIFIED INJURY STAGE: ICD-10-CM

## 2018-10-04 DIAGNOSIS — E78.2 MIXED HYPERLIPIDEMIA: ICD-10-CM

## 2018-10-04 PROBLEM — J18.9 COMMUNITY ACQUIRED PNEUMONIA OF LEFT LOWER LOBE OF LUNG: Status: RESOLVED | Noted: 2018-02-03 | Resolved: 2018-10-04

## 2018-10-04 PROCEDURE — 99496 TRANSJ CARE MGMT HIGH F2F 7D: CPT | Performed by: FAMILY MEDICINE

## 2018-10-04 PROCEDURE — 90662 IIV NO PRSV INCREASED AG IM: CPT | Performed by: FAMILY MEDICINE

## 2018-10-04 PROCEDURE — G0008 ADMIN INFLUENZA VIRUS VAC: HCPCS | Performed by: FAMILY MEDICINE

## 2018-10-04 NOTE — PROGRESS NOTES
Assessment/Plan:    Aspiration pneumonia of left lower lobe [facility])  This is a transition of care management visit for patient was admitted to [facility] from September 21st to September 27th  With chronic hypoxic/hypercapnic respiratory failure  This is determined to be secondary to severe emphysema  Patient has been on chronic O2  He presented to the emergency department with a fever of 102, atrial fibrillation, and left lower lobe opacity noted on chest x-ray  He was started on intravenous antibiotics  During course of examination, patient was also found to have a wound on his left buttock and sacrum  Wound Care was consulted and did not find any deep involvement  On September 27th, patient was discharged home on antibiotics for diagnosis of aspiration pneumonia  Speech therapy was  Consulted and patient was advised to drink only thickened liquids to  Try to prevent further episodes of aspiration  Pt is currently much improved  Cough has resolved  Lungs are clear today  Pulse ox is 90% on RA today  I reinforced that Jasson Resendiz follow aspiration precautions  Pressure injury of skin of sacral region  During admission,  Patient was found to have superficial pressure sore on left gluteal region  This was evaluated by wound care and determined to be superficial    He will be following up with them as outpatient  I advised him to make continue all positional changes as well as using pillows to help redistribute weight  Mixed hyperlipidemia   During hospitalization, pravastatin was changed to atorvastatin 80 mg daily  Patient would like to finish his current supply of pravastatin 81st before switching over to atorvastatin    I asked  Them to clear this with her cardiologist, Dr Evan Maradiaga       Diagnoses and all orders for this visit:    Aspiration pneumonia of left lower lobe, unspecified aspiration pneumonia type ([address] )    Pressure injury of skin of sacral region, unspecified injury stage    Mixed hyperlipidemia    Need for vaccination  -     influenza vaccine, [id number], high-dose, PF 0 5 mL, for patients 65 yr+ (FLUZONE HIGH-DOSE)         Keep next regularly scheduled appt ( 3 months,awv,  Labs Q 6 [facility])  Flu shot today      Subjective:   Date and time hospital follow up call was made:  6/12/2018  2:44 PM  Hospital care reviewed:  Records reviewed  Patient [facility] hopsitalized at:  [facility]  Date of admission:  9/21/18  Date of discharge:  9/27/18  Diagnosis:  Sepsis, due to unspecified ; Pneumonia of left lower lobe due to infectious organism [facility]); Dehydration  Disposition:  Home health services  Were the patients medicaitons reviewed and updated:  Yes  Current symptoms:  None  Post hospital issues:  None  Should patient be enrolled in anticoag monitoring?:  No  Scheduled for follow up?:  Yes  Patients specialists:  Other (comment)  Other specialists Name:  [address] [place] [address] Drive healing team  Referrals needed:  None   Did you obtain your prescribed medications:  Yes  Do you need help managing your perscriptions or medications:  No  Is transportation to your appointments needed:  No  I have advised the patient to call PCP with any new or worsening symptoms (please type in name along with any credentials):  Vadim Alvarado MA   Comments:  Pt is scheduled to see Juliano Ramírez today 10/04/2018        Patient ID: Andrés Whyte is a 68 y o  male  This is a transition of care management visit for patient was admitted to [facility] from September 21st to September 27th  With chronic hypoxic/hypercapnic respiratory failure  This is determined to be secondary to severe emphysema  Patient has been on chronic O2  He presented to the emergency department with a fever of 102, atrial fibrillation, and left lower lobe opacity noted on chest x-ray  He was started on intravenous antibiotics  During course of examination, patient was also found to have a wound on his left buttock and sacrum  Wound Care was consulted and did not find any deep involvement  On September 27th, patient was discharged home on antibiotics for diagnosis of aspiration pneumonia  Speech therapy was  Consulted and patient was advised to drink only thickened liquids to  Try to prevent further episodes of aspiration        The following portions of the patient's history were reviewed and updated as appropriate: allergies, current medications, past family history, past medical history, past social history, past surgical history and problem list     Review of Systems   Respiratory: Positive for shortness of breath  Cardiovascular: Negative  Gastrointestinal: Negative  Genitourinary: Negative  Objective:      /86 (BP Location: Left arm, Patient Position: Sitting)   Pulse 101   Temp 98 4 °F (36 9 °C) (Tympanic)   Wt 119 kg (262 lb 4 8 oz)   SpO2 90%   BMI 34 61 kg/m²          Physical Exam   Cardiovascular: Normal rate, regular rhythm, normal heart sounds and intact distal pulses  Carotids: no bruits  Ext: no edema   Pulmonary/Chest: Effort normal  No respiratory distress  He has no wheezes  He has no rales  Psychiatric: He has a normal mood and affect   His behavior is normal  Thought content normal

## 2018-10-04 NOTE — ASSESSMENT & PLAN NOTE
During admission,  Patient was found to have superficial pressure sore on left gluteal region  This was evaluated by wound care and determined to be superficial    He will be following up with them as outpatient  I advised him to make continue all positional changes as well as using pillows to help redistribute weight

## 2018-10-04 NOTE — ASSESSMENT & PLAN NOTE
This is a transition of care management visit for patient was admitted to [facility] from September 21st to September 27th  With chronic hypoxic/hypercapnic respiratory failure  This is determined to be secondary to severe emphysema  Patient has been on chronic O2  He presented to the emergency department with a fever of 102, atrial fibrillation, and left lower lobe opacity noted on chest x-ray  He was started on intravenous antibiotics  During course of examination, patient was also found to have a wound on his left buttock and sacrum  Wound Care was consulted and did not find any deep involvement  On September 27th, patient was discharged home on antibiotics for diagnosis of aspiration pneumonia  Speech therapy was  Consulted and patient was advised to drink only thickened liquids to  Try to prevent further episodes of aspiration  Pt is currently much improved  Cough has resolved  Lungs are clear today  Pulse ox is 90% on RA today  I reinforced that Bruna Rinne follow aspiration precautions

## 2018-10-04 NOTE — ASSESSMENT & PLAN NOTE
During hospitalization, pravastatin was changed to atorvastatin 80 mg daily  Patient would like to finish his current supply of pravastatin 81st before switching over to atorvastatin    I asked  Them to clear this with her cardiologist, Dr Yousuf Tom

## 2018-11-26 ENCOUNTER — OFFICE VISIT (OUTPATIENT)
Dept: FAMILY MEDICINE CLINIC | Facility: CLINIC | Age: 73
End: 2018-11-26
Payer: MEDICARE

## 2018-11-26 VITALS
WEIGHT: 262 LBS | SYSTOLIC BLOOD PRESSURE: 128 MMHG | HEART RATE: 61 BPM | RESPIRATION RATE: 17 BRPM | TEMPERATURE: 98.2 F | BODY MASS INDEX: 34.72 KG/M2 | DIASTOLIC BLOOD PRESSURE: 82 MMHG | HEIGHT: 73 IN | OXYGEN SATURATION: 84 %

## 2018-11-26 DIAGNOSIS — Z23 NEED FOR VACCINATION: ICD-10-CM

## 2018-11-26 DIAGNOSIS — F41.9 ANXIETY: ICD-10-CM

## 2018-11-26 DIAGNOSIS — Z13.29 SCREENING FOR THYROID DISORDER: ICD-10-CM

## 2018-11-26 DIAGNOSIS — I48.0 PAROXYSMAL A-FIB (HCC): ICD-10-CM

## 2018-11-26 DIAGNOSIS — L03.115 CELLULITIS OF RIGHT LOWER EXTREMITY: ICD-10-CM

## 2018-11-26 DIAGNOSIS — E78.2 MIXED HYPERLIPIDEMIA: ICD-10-CM

## 2018-11-26 DIAGNOSIS — J44.9 COPD, MODERATE (HCC): ICD-10-CM

## 2018-11-26 DIAGNOSIS — Z00.00 ENCOUNTER FOR MEDICARE ANNUAL WELLNESS EXAM: Primary | ICD-10-CM

## 2018-11-26 DIAGNOSIS — M19.90 ARTHRITIS: ICD-10-CM

## 2018-11-26 PROCEDURE — G0439 PPPS, SUBSEQ VISIT: HCPCS | Performed by: FAMILY MEDICINE

## 2018-11-26 PROCEDURE — 99214 OFFICE O/P EST MOD 30 MIN: CPT | Performed by: FAMILY MEDICINE

## 2018-11-26 RX ORDER — CEPHALEXIN 500 MG/1
500 CAPSULE ORAL EVERY 8 HOURS SCHEDULED
Qty: 30 CAPSULE | Refills: 0 | Status: SHIPPED | OUTPATIENT
Start: 2018-11-26 | End: 2018-12-06

## 2018-11-26 NOTE — PROGRESS NOTES
Assessment and Plan:  Problem List Items Addressed This Visit     Anxiety    Arthritis    Paroxysmal A-fib ([address] )    COPD, moderate ([address] )    Mixed hyperlipidemia      Other Visit Diagnoses     Encounter for Medicare annual wellness exam    -  Primary    Need for vaccination        Screening for thyroid disorder            Health Maintenance Due   Topic Date Due    Hepatitis C Screening  1945      MEDICARE WELLNESS VISIT TODAY  PATIENT HAS A LIVING WILL AND HEALTHCARE POWER OF   DEPRESSION SCREEN WAS NEGATIVE  FALL RISK WAS POSITIVE  THIS WAS DRESSED DURING HIS OFFICE VISIT NOTE  PATIENT IS CURRENT WITH AGE APPROPRIATE VACCINATIONS RX GIVEN  [address]     CURRENT WITH COLONOSCOPY AND PSA SCREENING    HPI:  Patient Active Problem List   Diagnosis    Anxiety    Arthritis    Paroxysmal A-fib ([address] )    Benign essential hypertension    CAD (coronary artery disease)    COPD, moderate (HCC)    Dyspnea    Elevated PSA    Esophagitis, reflux    Extrinsic asthma    Gait disturbance    Hypoxia    Leukocytosis    Localized osteoarthritis of right knee    Lymphedema    Mixed hyperlipidemia    Obstructive sleep apnea    Venous insufficiency    Contusion of right hand    Other dysphagia    Aspiration into respiratory tract    Age-related cataract of both eyes    Cataract of both eyes    Depression with anxiety    Aspiration pneumonia of left lower lobe (HCC)    Pressure injury of skin of sacral region     Past Medical History:   Diagnosis Date    Chronic bronchitis ([address] )     RESOLVED: 3/26/15    Chronic obstructive lung disease ([address] )     RESOLVED: 9/7/16    Edema     RESOLVED: 9/21/15    Leukocytosis     RESOLVED: 9/21/15    LLL pneumonia ([address] )     RESOLVED: 6/20/17    Methicillin resistant Staphylococcus aureus infection     RESOLVED: 9/21/15    Pneumonia     RESOLVED: 6/20/17     Past Surgical History:   Procedure Laterality Date    FOOT SURGERY Left     SPLENECTOMY, TOTAL SURGERY OF THE SPLEEN     Family History   Problem Relation Age of Onset    Coronary artery disease Mother     Diabetes type II Father         MELLITUS     History   Smoking Status    Former Smoker   Smokeless Tobacco    Never Used     History   Alcohol Use No      History   Drug Use No         Current Outpatient Prescriptions   Medication Sig Dispense Refill    aspirin (ASPIR-81) 81 mg EC tablet Take 1 tablet by mouth daily      atorvastatin (LIPITOR) 80 mg tablet Take 80 mg by mouth      famotidine (PEPCID) 40 MG tablet Take 1 tablet (40 mg total) by mouth 2 (two) times a day 180 tablet 0    fluticasone furoate-vilanterol (BREO ELLIPTA) Inhale Daily      furosemide (LASIX) 20 mg tablet TAKE 2 TABLETS BY MOUTH  DAILY 180 tablet 1    glucosamine 500 MG CAPS capsule Take 500 mg by mouth daily        ketorolac (ACULAR) 0 5 % ophthalmic solution       meloxicam (MOBIC) 7 5 mg tablet TAKE 1 TABLET BY MOUTH  TWICE A DAY WITH FOOD 180 tablet 1    menthol-zinc oxide (CALMOSEPTINE) 0 44-20 6 % OINT Apply topically      PARoxetine (PAXIL) 20 mg tablet TAKE 1 TABLET BY MOUTH  DAILY 90 tablet 1    pravastatin (PRAVACHOL) 80 mg tablet TAKE 1 TABLET BY MOUTH  DAILY 90 tablet 1    Respiratory Therapy Supplies (NEBULIZER AIR TUBE/PLUGS) MISC by Does not apply route      Respiratory Therapy Supplies (NEBULIZER/ADULT MASK) KIT by Does not apply route      triamterene-hydrochlorothiazide (DYAZIDE) 37 5-25 mg per capsule TAKE 1 CAPSULE BY MOUTH  DAILY 90 capsule 1     No current facility-administered medications for this visit        No Known Allergies  Immunization History   Administered Date(s) Administered    H1N1, All Formulations 04/02/2010    Influenza 10/08/2015, 10/12/2016, 10/20/2016, 10/30/2017, 11/01/2017    Influenza Split High Dose Preservative Free IM 10/30/2014, 10/08/2015, 10/12/2016, 10/20/2016, 10/30/2017    Influenza TIV (IM) 10/16/2013    Influenza, high dose seasonal 0 5 mL 10/04/2018    Pneumococcal Conjugate 13-Valent 03/26/2015    Pneumococcal Polysaccharide PPV23 10/16/2013    Tdap 02/18/2017       Patient Care Team:  Manoj Olea DO as PCP - General  Zhanna Tamez DO    Medicare Screening Tests and Risk Assessments:  Colletta Morgans is here for his Subsequent Wellness visit  Health Risk Assessment:  Patient rates overall health as good  Patient feels that their physical health rating is Same  Eyesight was rated as Same  Hearing was rated as Same  Patient feels that their emotional and mental health rating is Slightly better  Pain experienced by patient in the last 7 days has been Some  Patient's pain rating has been 6/10  Patient states that he has experienced no weight loss or gain in last 6 months  Emotional/Mental Health:  Patient has been feeling nervous/anxious  PHQ-9 Depression Screening:    Frequency of the following problems over the past two weeks:      1  Little interest or pleasure in doing things: 0 - not at all      2  Feeling down, depressed, or hopeless: 0 - not at all  PHQ-2 Score: 0          Broken Bones/Falls: Fall Risk Assessment:    In the past year, patient has experienced: History of falling in past year     Number of falls: greater than 6  Patient feels unsteady standing  Patient is not taking medication that can cause feelings of lightheadedness or tiredness  Patient often has no need to rush to the toilet  Chronic conditions that may contribute to falls arthritis  Bladder/Bowel:  Patient has leaked urine accidently in the last six months  Patient reports no loss of bowel control  Immunizations:  Patient has had a flu vaccination within the last year  Patient has not received a pneumonia shot  Patient has not received a shingles shot  Patient has not received tetanus/diphtheria shot  Home Safety:  Patient does not have trouble with stairs inside or outside of their home     Patient currently reports that there are no safety hazards present in home, working smoke alarms, working carbon monoxide detectors  Preventative Screenings:   prostate cancer screen performed, colon cancer screen completed, cholesterol screen completed, glaucoma eye exam completed,     Nutrition:  Current diet: Regular and Frequent junk food with servings of the following:    Medications:  Patient is currently taking over-the-counter supplements  Patient is able to manage medications  Lifestyle Choices:  Patient reports no tobacco use  Patient has smoked or used tobacco in the past   Patient has stopped his tobacco use  Tobacco use quit date: 01/01/1998  Patient reports no alcohol use  Patient does not drive a vehicle  Patient wears seat belt  Current level of exercise of physical activity described by patient as: very little about 10 min daily   Activities of Daily Living:  Can get out of bed by his or her self, able to dress self, able to make own meals, able to do own shopping, able to bathe self, can do own laundry/housekeeping, can manage own money, pay bills and track expenses    Previous Hospitalizations:  Hospitalization or ED visit in past 12 months  Number of hospitalizations within the last year: more than 4        Advanced Directives:  Patient has decided on a power of   Patient has spoken to designated power of   Patient has completed advanced directive          Preventative Screening/Counseling:      Cardiovascular:      General: Risks and Benefits Discussed          Diabetes:      General: Risks and Benefits Discussed          Colorectal Cancer:      General: Risks and Benefits Discussed          Prostate Cancer:      General: Risks and Benefits Discussed          Osteoporosis:      General: Risks and Benefits Discussed          AAA:      General: Risks and Benefits Discussed          Glaucoma:      General: Risks and Benefits Discussed          HIV:      General: Risks and Benefits Discussed          Hepatitis C: General: Risks and Benefits Discussed        Advanced Directives:   Patient has living will for healthcare, Information on ACP and/or AD provided  End of life assessment reviewed with patient  Immunizations:      Influenza: Risks & Benefits Discussed and Influenza UTD This Year      Pneumococcal: Risks & Benefits Discussed and Lifetime Vaccine Completed      Shingrix: Risks & Benefits Discussed      TD: Risks & Benefits Discussed      Other Preventative Counseling (Non-Medicare):  Nutrition Counseling          No exam data present    Physical Exam:  Review of Systems   Gastrointestinal: Negative for bowel incontinence  Musculoskeletal: Positive for arthritis  Psychiatric/Behavioral: The patient is not nervous/anxious  Vitals:    11/26/18 1312   BP: 128/82   BP Location: Left arm   Patient Position: Sitting   Cuff Size: Large   Pulse: 61   Resp: 17   Temp: 98 2 °F (36 8 °C)   TempSrc: Tympanic   SpO2: (!) 84%   Weight: 119 kg (262 lb)   Height: 6' 1" (1 854 m)   Body mass index is 34 57 kg/m²      Physical Exam

## 2018-11-26 NOTE — PROGRESS NOTES
Assessment/Plan:    Anxiety  Doing well on paxil 20 mg qd    Arthritis  Still w/ ongoing probs w/ knees  Continue f/u w/ ortho as directed (dr Tirso Aragon)  Cellulitis of right lower extremity  S/p fall 1 week ago  Discussed local wound care  Will give rx keflex 500 tid x 10 days  Recheck 1 week  COPD, moderate ([address] )  Stable on perforomist and 02 prn  Mixed hyperlipidemia  Doing well on pravastatin 80    Paroxysmal A-fib (HCC)  Stable, rate controlled, on asa  Cont f/u w/ cardio (dr Shey Longo)       Diagnoses and all orders for this visit:    Encounter for Medicare annual wellness exam    COPD, moderate ([address] )    Mixed hyperlipidemia  -     CBC and differential; Future  -     Comprehensive metabolic panel; Future  -     Lipid Panel with Direct LDL reflex; Future    Paroxysmal A-fib (HCC)    Anxiety    Arthritis    Need for vaccination  -     Zoster Vac Recomb Adjuvanted ([address]) 50 MCG/0 5ML SUSR; Inject 1 Syringe into a muscle once for 1 dose    Screening for thyroid disorder  -     TSH, 3rd generation with Free T4 reflex; Future    Cellulitis of right lower extremity  -     cephalexin (KEFLEX) 500 mg capsule; Take 1 capsule (500 mg total) by mouth every 8 (eight) hours for 10 days      pt had flu shot  rx shingrix    Wound recheck 1 week  3 mos, q6 labs prior (at [facility])    Subjective:      Patient ID: Thurnell Runner is a 68 y o  male  PATIENT PRESENTS FOR RECHECK OF CHRONIC MEDICAL PROBLEMS TODAY  OVERALL DOING WELL  WIFE STATES HE HAS BEEN FALLING RECENTLY, INCLUDING A FALL LAST WEEK  PT CONTUSED HIS R CALF  DOING WELL ON PAXIL FOR ANXIETY  DOING WELL ON INHALER AND O2  DOING WELL ON PRAVASTATIN FOR HYPERLIPIDEMIA  The following portions of the patient's history were reviewed and updated as appropriate: allergies, current medications, past family history, past medical history, past social history, past surgical history and problem list     Review of Systems   Respiratory: Negative  Cardiovascular: Negative  Gastrointestinal: Negative  Genitourinary: Negative  Musculoskeletal: Positive for arthralgias  Objective:      /82 (BP Location: Left arm, Patient Position: Sitting, Cuff Size: Large)   Pulse 61   Temp 98 2 °F (36 8 °C) (Tympanic)   Resp 17   Ht 6' 1" (1 854 m)   Wt 119 kg (262 lb)   SpO2 (!) 84%   BMI 34 57 kg/m²          Physical Exam   Cardiovascular: Normal rate, regular rhythm, normal heart sounds and intact distal pulses  Carotids: no bruits  Ext: no edema   Pulmonary/Chest: Effort normal  No respiratory distress  He has no wheezes  He has no rales  Skin: There is erythema  Superficial abrasion/laceration R ant calf  W/ surrounding erythema  Psychiatric: He has a normal mood and affect   His behavior is normal  Thought content normal

## 2018-11-26 NOTE — ASSESSMENT & PLAN NOTE
S/p fall 1 week ago  Discussed local wound care  Will give rx keflex 500 tid x 10 days  Recheck 1 week

## 2018-12-05 ENCOUNTER — OFFICE VISIT (OUTPATIENT)
Dept: FAMILY MEDICINE CLINIC | Facility: CLINIC | Age: 73
End: 2018-12-05
Payer: MEDICARE

## 2018-12-05 VITALS
WEIGHT: 277.1 LBS | HEART RATE: 82 BPM | TEMPERATURE: 97.8 F | RESPIRATION RATE: 16 BRPM | BODY MASS INDEX: 36.72 KG/M2 | HEIGHT: 73 IN | DIASTOLIC BLOOD PRESSURE: 70 MMHG | SYSTOLIC BLOOD PRESSURE: 122 MMHG

## 2018-12-05 DIAGNOSIS — L03.115 CELLULITIS OF RIGHT LOWER EXTREMITY: Primary | ICD-10-CM

## 2018-12-05 PROCEDURE — 99213 OFFICE O/P EST LOW 20 MIN: CPT | Performed by: FAMILY MEDICINE

## 2018-12-05 NOTE — ASSESSMENT & PLAN NOTE
Overall, improving after 10 day course of Keflex  Recommend continue w/ compressiion stockings and elevation of LE   Recheck 1 month

## 2018-12-05 NOTE — PROGRESS NOTES
Assessment/Plan:    Cellulitis of right lower extremity  Overall, improving after 10 day course of Keflex  Recommend continue w/ compressiion stockings and elevation of LE  Recheck 1 month       Diagnoses and all orders for this visit:    Cellulitis of right lower extremity      recheck leg in 1 month  If stable, then will return to q 3 mos (labs every other)    Subjective:      Patient ID: Spike Rivas is a 68 y o  male  HPI    The following portions of the patient's history were reviewed and updated as appropriate: allergies, current medications, past family history, past medical history, past social history, past surgical history and problem list     Review of Systems   Respiratory: Negative  Cardiovascular: Negative  Gastrointestinal: Negative  Genitourinary: Negative  Objective:      /70 (BP Location: Left arm, Patient Position: Sitting, Cuff Size: Standard)   Pulse 82   Temp 97 8 °F (36 6 °C) (Tympanic)   Resp 16   Ht 6' 1" (1 854 m)   Wt 126 kg (277 lb 1 6 oz)   BMI 36 56 kg/m²          Physical Exam   Skin:   RLE: mild edema/erythema  Non tender  No calf tenderness

## 2019-01-05 ENCOUNTER — OFFICE VISIT (OUTPATIENT)
Dept: FAMILY MEDICINE CLINIC | Facility: CLINIC | Age: 74
End: 2019-01-05
Payer: MEDICARE

## 2019-01-05 VITALS
HEART RATE: 66 BPM | WEIGHT: 271 LBS | HEIGHT: 73 IN | SYSTOLIC BLOOD PRESSURE: 110 MMHG | RESPIRATION RATE: 18 BRPM | BODY MASS INDEX: 35.92 KG/M2 | DIASTOLIC BLOOD PRESSURE: 70 MMHG | OXYGEN SATURATION: 98 % | TEMPERATURE: 98.9 F

## 2019-01-05 DIAGNOSIS — M19.90 ARTHRITIS: ICD-10-CM

## 2019-01-05 DIAGNOSIS — I87.2 VENOUS INSUFFICIENCY: ICD-10-CM

## 2019-01-05 DIAGNOSIS — L03.115 CELLULITIS OF RIGHT LOWER EXTREMITY: Primary | ICD-10-CM

## 2019-01-05 PROCEDURE — 99213 OFFICE O/P EST LOW 20 MIN: CPT | Performed by: FAMILY MEDICINE

## 2019-01-05 NOTE — ASSESSMENT & PLAN NOTE
History of cellulitis right lower extremity  Overall leg is much improved  No evidence of current infection  On examination today, Mild erythema without warmth or tenderness  Trace edema bilaterally  Distal pulses somewhat decreased bilaterally, but intact  Skin is dry  I encouraged Tyson Villagomezs to use more moisturizing cream on his legs  Otherwise he does not require any antibiotics at this time  Also recommend continue compression stockings and elevation as needed

## 2019-01-05 NOTE — PROGRESS NOTES
[facility]      NAME: Merry Sidhu  AGE: 68 y o  SEX: male  : 1945   MRN: [id number]    DATE: 2019  TIME: 9:51 AM    Assessment and Plan     Problem List Items Addressed This Visit     Arthritis       Still with ongoing pain with his knees  He will be following up with Orthopedics in 2 days         Venous insufficiency    Cellulitis of right lower extremity - Primary      History of cellulitis right lower extremity  Overall leg is much improved  No evidence of current infection  On examination today, Mild erythema without warmth or tenderness  Trace edema bilaterally  Distal pulses somewhat decreased bilaterally, but intact  Skin is dry  I encouraged Alexa Rodrigues to use more moisturizing cream on his legs  Otherwise he does not require any antibiotics at this time  Also recommend continue compression stockings and elevation as needed  Return to office in:  3 months (? Labs at that time)    Chief Complaint     Chief Complaint   Patient presents with    Follow-up     1 months check up for right knee pain       History of Present Illness     Pt presents for recheck  Of right lower extremity cellulitis / venous insufficiency  Overall leg is feeling much better  He is still having problems with his knee, he will be seeing Orthopedics in 2 days        The following portions of the patient's history were reviewed and updated as appropriate: allergies, current medications, past family history, past medical history, past social history, past surgical history and problem list     Review of Systems   Review of Systems   Respiratory: Negative  Cardiovascular: Negative  Gastrointestinal: Negative  Genitourinary: Negative  Musculoskeletal: Positive for arthralgias  Skin: Positive for rash         Active Problem List     Patient Active Problem List   Diagnosis    Anxiety    Arthritis    Paroxysmal A-fib ([address] )    Benign essential hypertension    CAD (coronary artery disease)    COPD, moderate (HCC)    Dyspnea    Elevated PSA    Esophagitis, reflux    Extrinsic asthma    Gait disturbance    Hypoxia    Leukocytosis    Localized osteoarthritis of right knee    Lymphedema    Mixed hyperlipidemia    Obstructive sleep apnea    Venous insufficiency    Contusion of right hand    Other dysphagia    Aspiration into respiratory tract    Age-related cataract of both eyes    Cataract of both eyes    Depression with anxiety    Aspiration pneumonia of left lower lobe (HCC)    Pressure injury of skin of sacral region    Cellulitis of right lower extremity       Objective   /70 (BP Location: Left arm, Patient Position: Sitting, Cuff Size: Large)   Pulse 66   Temp 98 9 °F (37 2 °C) (Tympanic)   Resp 18   Ht 6' 0 83" (1 85 m)   Wt 123 kg (271 lb)   SpO2 98%   BMI 35 92 kg/m²     Physical Exam   Cardiovascular: Normal rate, regular rhythm, normal heart sounds and intact distal pulses  Carotids: no bruits  Ext: no edema   Pulmonary/Chest: Effort normal  No respiratory distress  He has no wheezes  He has no rales  Skin:    Right lower extremity / calf  Mild erythema without warmth or tenderness  Trace edema bilaterally  Distal pulses somewhat decreased bilaterally, but intact  Skin is dry   Psychiatric: He has a normal mood and affect   His behavior is normal  Thought content normal        Pertinent Laboratory/Diagnostic Studies:   non    Current Medications     Current Outpatient Prescriptions:     aspirin (ASPIR-81) 81 mg EC tablet, Take 1 tablet by mouth daily, Disp: , Rfl:     famotidine (PEPCID) 40 MG tablet, Take 1 tablet (40 mg total) by mouth 2 (two) times a day, Disp: 180 tablet, Rfl: 0    fluticasone furoate-vilanterol (BREO ELLIPTA), Inhale Daily, Disp: , Rfl:     furosemide (LASIX) 20 mg tablet, TAKE 2 TABLETS BY MOUTH  DAILY, Disp: 180 tablet, Rfl: 1    glucosamine 500 MG CAPS capsule, Take 500 mg by mouth daily  , Disp: , Rfl:     meloxicam (MOBIC) 7 5 mg tablet, TAKE 1 TABLET BY MOUTH  TWICE A DAY WITH FOOD, Disp: 180 tablet, Rfl: 1    PARoxetine (PAXIL) 20 mg tablet, TAKE 1 TABLET BY MOUTH  DAILY, Disp: 90 tablet, Rfl: 1    pravastatin (PRAVACHOL) 80 mg tablet, TAKE 1 TABLET BY MOUTH  DAILY, Disp: 90 tablet, Rfl: 1    Respiratory Therapy Supplies (NEBULIZER AIR TUBE/PLUGS) MISC, by Does not apply route, Disp: , Rfl:     Respiratory Therapy Supplies (NEBULIZER/ADULT MASK) KIT, by Does not apply route, Disp: , Rfl:     atorvastatin (LIPITOR) 80 mg tablet, Take 80 mg by mouth, Disp: , Rfl:     ketorolac (ACULAR) 0 5 % ophthalmic solution, , Disp: , Rfl:     menthol-zinc oxide (CALMOSEPTINE) 0 44-20 6 % OINT, Apply topically, Disp: , Rfl:     triamterene-hydrochlorothiazide (DYAZIDE) 37 5-25 mg per capsule, TAKE 1 CAPSULE BY MOUTH  DAILY (Patient not taking: Reported on 1/5/2019 ), Disp: 90 capsule, Rfl: 1    Health Maintenance     Health Maintenance   Topic Date Due    Hepatitis C Screening  1945    CRC Screening: Colonoscopy  06/12/2019    Fall Risk  11/26/2019    Medicare Annual Wellness Visit (AWV)  11/26/2019    DTaP,Tdap,and Td Vaccines (2 - Td) 02/18/2027    INFLUENZA VACCINE  Completed    Pneumococcal PPSV23/PCV13 65+ Years / Low and Medium Risk  Completed     Immunization History   Administered Date(s) Administered    H1N1, All Formulations 04/02/2010    Influenza 10/08/2015, 10/12/2016, 10/20/2016, 10/30/2017, 11/01/2017    Influenza Split High Dose Preservative Free IM 10/30/2014, 10/08/2015, 10/12/2016, 10/20/2016, 10/30/2017    Influenza TIV (IM) 10/16/2013    Influenza, high dose seasonal 0 5 mL 10/04/2018    Pneumococcal Conjugate 13-Valent 03/26/2015    Pneumococcal Polysaccharide PPV23 10/16/2013    Tdap 02/18/2017       Nneka Ramírez DO  [facility]

## 2019-01-07 ENCOUNTER — TELEPHONE (OUTPATIENT)
Dept: OBGYN CLINIC | Facility: MEDICAL CENTER | Age: 74
End: 2019-01-07

## 2019-01-07 ENCOUNTER — APPOINTMENT (OUTPATIENT)
Dept: RADIOLOGY | Facility: CLINIC | Age: 74
End: 2019-01-07
Payer: MEDICARE

## 2019-01-07 ENCOUNTER — OFFICE VISIT (OUTPATIENT)
Dept: OBGYN CLINIC | Facility: MEDICAL CENTER | Age: 74
End: 2019-01-07
Payer: MEDICARE

## 2019-01-07 VITALS
DIASTOLIC BLOOD PRESSURE: 70 MMHG | SYSTOLIC BLOOD PRESSURE: 107 MMHG | WEIGHT: 273 LBS | BODY MASS INDEX: 36.18 KG/M2 | HEIGHT: 73 IN | HEART RATE: 89 BPM

## 2019-01-07 DIAGNOSIS — M17.11 LOCALIZED OSTEOARTHRITIS OF RIGHT KNEE: Primary | ICD-10-CM

## 2019-01-07 DIAGNOSIS — Z01.818 PREOPERATIVE TESTING: ICD-10-CM

## 2019-01-07 DIAGNOSIS — M17.11 LOCALIZED OSTEOARTHRITIS OF RIGHT KNEE: ICD-10-CM

## 2019-01-07 PROCEDURE — 99213 OFFICE O/P EST LOW 20 MIN: CPT | Performed by: ORTHOPAEDIC SURGERY

## 2019-01-07 PROCEDURE — 77073 BONE LENGTH STUDIES: CPT

## 2019-01-07 RX ORDER — CHLORHEXIDINE GLUCONATE 4 G/100ML
SOLUTION TOPICAL DAILY PRN
Status: CANCELLED | OUTPATIENT
Start: 2019-01-07

## 2019-01-07 RX ORDER — FERROUS SULFATE TAB EC 324 MG (65 MG FE EQUIVALENT) 324 (65 FE) MG
324 TABLET DELAYED RESPONSE ORAL
Qty: 60 TABLET | Refills: 0 | Status: SHIPPED | OUTPATIENT
Start: 2019-01-07 | End: 2021-11-12

## 2019-01-07 RX ORDER — ACETAMINOPHEN 325 MG/1
975 TABLET ORAL ONCE
Status: CANCELLED | OUTPATIENT
Start: 2019-01-07 | End: 2019-01-07

## 2019-01-07 RX ORDER — SODIUM CHLORIDE 9 MG/ML
75 INJECTION, SOLUTION INTRAVENOUS CONTINUOUS
Status: CANCELLED | OUTPATIENT
Start: 2019-01-07

## 2019-01-07 RX ORDER — ASCORBIC ACID 500 MG
500 TABLET ORAL DAILY
Qty: 30 TABLET | Refills: 0 | Status: SHIPPED | OUTPATIENT
Start: 2019-01-07 | End: 2021-11-12

## 2019-01-07 RX ORDER — FOLIC ACID 1 MG/1
1 TABLET ORAL DAILY
Qty: 30 TABLET | Refills: 0 | Status: SHIPPED | OUTPATIENT
Start: 2019-01-07 | End: 2020-04-22 | Stop reason: SDUPTHER

## 2019-01-07 RX ORDER — CEFAZOLIN SODIUM 2 G/50ML
2000 SOLUTION INTRAVENOUS ONCE
Status: CANCELLED | OUTPATIENT
Start: 2019-01-07 | End: 2019-01-07

## 2019-01-07 NOTE — TELEPHONE ENCOUNTER
PT script has been faxed to [facility] @ [phone] per the patient's preference @ this facility   Phone # to [facility] is [phone]

## 2019-01-07 NOTE — PROGRESS NOTES
Assessment/Plan:  1  Localized osteoarthritis of right knee    2  Preoperative testing      Orders Placed This Encounter   Procedures    Urine culture    XR bone length (scanogram)    Basic metabolic panel    APTT    CBC and Platelet    Protime-INR    Hemoglobin A1c    Ambulatory referral to Vascular Surgery    Ambulatory Referral to [address] [place] [address] Amb[facility] referral to [facility]    Ambulatory referral to Pulmonology    Ambulatory referral to Cardiology    Ambulatory referral to Vascular Surgery    EKG 12 lead    Type and screen    Prepare RBC   ·  Discussed treatment options with patient as well as risks and benefits of treatment options  At this time patient has intolerable pain and is limited  He has tried and failed conservative treatment options  He feels that his frequent falls or due to his knee giving way  He would like to proceed with a right total knee replacement  The risks of surgery include, but are not limited to infection, blood clot, wound healing problems, blood loss, damage to blood vessels and nerves, persistent pain and stiffness, fracture, need for additional surgery, need for revision surgery, failure of hardware, heart attack, stroke, death  The patient understood and agreed to by oral consent  He is unable to write some his wife gave written consent  She is his POA  I answered all questions regarding surgery  The patient his wife were given the direct phone number to the office for any further questions  He will see his pulmonologist, cardiologist, PCP for clearance prior to surgery  He has some questionable venous insufficiency and recent cellulitis  Will send to vascular doctor to evaluate circulation and ability to heal after TKA  Would appreciate input from these physicians as this patient has some comorbidities and may need to be optimized prior to surgery  · Surgical consent was obtained today     · Folic acid, Vitamin C, and ferrous sulfate were prescribed today to take 30 days prior to surgery  · Patient will require cardiology, pulmonology, vascular surgery, and PCP clearance prior to sx  Patient will not require dental clearance because he does not have teeth  · Patient is interested in [facility], specifically [facility] or [facility]  Phone number is [phone]  · Patient will require Lovenox for DVT ppx post-operatively  · Until his sx, patient will continue Tylenol for pain  Continue using walker for ambulation  Brace for comfort  Home PT was been ordered for one time visit to review knee exercises  Follow up post-operatively or sooner if patient has questions or concers  I answered all of the patient's questions during the visit and provided education of the patient's condition during the visit  The patient verbalized understanding of the information given and agrees with the plan  This note was dictated using [employer] software  It may contain errors including improperly dictated words  Please contact physician directly for any questions  Subjective   Chief Complaint:   Chief Complaint   Patient presents with    Right Knee - Follow-up       Masood Collier is a 68 y o  male who presents for follow up for right knee pain  Patient reports he's fallen multiple times since his last visit  He states his right knee has been giving out on him, despite wearing brace daily and using walker to ambulate  His worse fall was the first week of December  His wife states that they tried to call to get a same day appointment but were told that they couldn't be fit in and were schedule on 12/10  His wife then states the patient's appointment was  Cancelled several times prior to this appointment today  Patient denies hx of MRSA  He denies personal history of clots but does admit that his mom and dad had blood clots  Patient sees cardiology for A-Fib and pulmonology for COPD   Patient has no teeth and has not seen a dentist      Review of Systems  ROS:    See HPI for musculoskeletal review  All other systems reviewed are negative     History:  Past Medical History:   Diagnosis Date    Chronic bronchitis [address] [address] )     RESOLVED: 3/26/15    Chronic obstructive lung disease ([facility] )     RESOLVED: 9/7/16    Edema     RESOLVED: 9/21/15    Leukocytosis     RESOLVED: 9/21/15    LLL pneumonia ([facility] )     RESOLVED: 6/20/17    Methicillin resistant Staphylococcus aureus infection     RESOLVED: 9/21/15    Pneumonia     RESOLVED: 6/20/17     Past Surgical History:   Procedure Laterality Date    FOOT SURGERY Left     SPLENECTOMY, TOTAL      SURGERY OF THE SPLEEN     Social History   History   Alcohol Use No     History   Drug Use No     History   Smoking Status    Former Smoker   Smokeless Tobacco    Never Used     Family History:   Family History   Problem Relation Age of Onset    Coronary artery disease Mother     Diabetes type II Father         MELLITUS       Meds/Allergies     (Not in a hospital admission)  No Known Allergies       Objective     /70   Pulse 89   Ht 6' 1" (1 854 m)   Wt 124 kg (273 lb)   BMI 36 02 kg/m²      PE:  AAOx 3  WDWN  Hearing intact, no drainage from eyes  no audible wheezing  no abdominal distension  LE compartments soft, skin intact    Ortho Exam:  right Knee:   No erythema  Mild swelling  no effusion  no warmth  AROM: 5-120  Discoloration noted in bilateral lower extremities below patient's knees       Imaging Studies: I have personally reviewed pertinent films in PACS  XR R knee:  Severe DJD

## 2019-01-08 ENCOUNTER — HOSPITAL ENCOUNTER (OUTPATIENT)
Facility: HOSPITAL | Age: 74
Setting detail: OUTPATIENT SURGERY
End: 2019-01-08
Attending: ORTHOPAEDIC SURGERY | Admitting: ORTHOPAEDIC SURGERY
Payer: MEDICARE

## 2019-01-08 NOTE — TELEPHONE ENCOUNTER
Jovita De Leon from [facility] AT [place] [phone] called because Medicare will not cover one visit for knee exercises  They will cover an evaluation and assessment to determine how many visits to reach goal or maximum potential    If Dr Luwanna Leyden approves please fax a new order to [phone] removing the one time review for knee exercises    Thank you

## 2019-01-08 NOTE — TELEPHONE ENCOUNTER
Spoke with Krissy Ernandez and relayed our concerns that the Pt would use too many visits pre-op, thus hindering him post-op  Per Krissy Ernandez, patient has no limit in PT visits  New order was put into chart

## 2019-01-15 ENCOUNTER — HOSPITAL ENCOUNTER (OUTPATIENT)
Dept: NON INVASIVE DIAGNOSTICS | Facility: HOSPITAL | Age: 74
Discharge: HOME | End: 2019-01-15
Attending: ORTHOPAEDIC SURGERY
Payer: MEDICARE

## 2019-01-15 ENCOUNTER — APPOINTMENT (OUTPATIENT)
Dept: LAB | Facility: HOSPITAL | Age: 74
End: 2019-01-15
Attending: ORTHOPAEDIC SURGERY
Payer: MEDICARE

## 2019-01-15 ENCOUNTER — APPOINTMENT (OUTPATIENT)
Dept: PREADMISSION TESTING | Facility: HOSPITAL | Age: 74
End: 2019-01-15
Payer: MEDICARE

## 2019-01-15 VITALS
SYSTOLIC BLOOD PRESSURE: 145 MMHG | HEART RATE: 75 BPM | OXYGEN SATURATION: 93 % | BODY MASS INDEX: 36.57 KG/M2 | WEIGHT: 270 LBS | DIASTOLIC BLOOD PRESSURE: 76 MMHG | RESPIRATION RATE: 18 BRPM | HEIGHT: 72 IN | TEMPERATURE: 98.5 F

## 2019-01-15 DIAGNOSIS — Z01.818 PREOPERATIVE TESTING: ICD-10-CM

## 2019-01-15 DIAGNOSIS — M17.11 LOCALIZED OSTEOARTHRITIS OF RIGHT KNEE: ICD-10-CM

## 2019-01-15 DIAGNOSIS — Z01.818 PREOP EXAMINATION: ICD-10-CM

## 2019-01-15 LAB
ABO GROUP BLD: NORMAL
ANION GAP SERPL CALCULATED.3IONS-SCNC: 8 MMOL/L (ref 4–13)
APTT PPP: 29 SECONDS (ref 26–38)
BLD GP AB SCN SERPL QL: NEGATIVE
BUN SERPL-MCNC: 18 MG/DL (ref 5–25)
CALCIUM SERPL-MCNC: 9.4 MG/DL (ref 8.3–10.1)
CHLORIDE SERPL-SCNC: 100 MMOL/L (ref 100–108)
CO2 SERPL-SCNC: 34 MMOL/L (ref 21–32)
CREAT SERPL-MCNC: 0.85 MG/DL (ref 0.6–1.3)
CRP SERPL QL: 5 MG/L
ERYTHROCYTE [DISTWIDTH] IN BLOOD BY AUTOMATED COUNT: 15.1 % (ref 11.6–15.1)
EST. AVERAGE GLUCOSE BLD GHB EST-MCNC: 146 MG/DL
FERRITIN SERPL-MCNC: 58 NG/ML (ref 8–388)
GFR SERPL CREATININE-BSD FRML MDRD: 86 ML/MIN/1.73SQ M
GLUCOSE SERPL-MCNC: 90 MG/DL (ref 65–140)
HBA1C MFR BLD: 6.7 % (ref 4.2–6.3)
HCT VFR BLD AUTO: 46.9 % (ref 36.5–49.3)
HGB BLD-MCNC: 14.9 G/DL (ref 12–17)
INR PPP: 1.09 (ref 0.86–1.17)
IRON SATN MFR SERPL: 27 %
IRON SERPL-MCNC: 81 UG/DL (ref 65–175)
MCH RBC QN AUTO: 32 PG (ref 26.8–34.3)
MCHC RBC AUTO-ENTMCNC: 31.8 G/DL (ref 31.4–37.4)
MCV RBC AUTO: 101 FL (ref 82–98)
PLATELET # BLD AUTO: 261 THOUSANDS/UL (ref 149–390)
PMV BLD AUTO: 11.2 FL (ref 8.9–12.7)
POTASSIUM SERPL-SCNC: 4 MMOL/L (ref 3.5–5.3)
PROTHROMBIN TIME: 14.2 SECONDS (ref 11.8–14.2)
RBC # BLD AUTO: 4.66 MILLION/UL (ref 3.88–5.62)
RH BLD: NEGATIVE
SODIUM SERPL-SCNC: 142 MMOL/L (ref 136–145)
SPECIMEN EXPIRATION DATE: NORMAL
TIBC SERPL-MCNC: 303 UG/DL (ref 250–450)
WBC # BLD AUTO: 11.07 THOUSAND/UL (ref 4.31–10.16)

## 2019-01-15 PROCEDURE — 86140 C-REACTIVE PROTEIN: CPT

## 2019-01-15 PROCEDURE — 82728 ASSAY OF FERRITIN: CPT

## 2019-01-15 PROCEDURE — 83036 HEMOGLOBIN GLYCOSYLATED A1C: CPT

## 2019-01-15 PROCEDURE — 86901 BLOOD TYPING SEROLOGIC RH(D): CPT

## 2019-01-15 PROCEDURE — 85610 PROTHROMBIN TIME: CPT

## 2019-01-15 PROCEDURE — 83540 ASSAY OF IRON: CPT

## 2019-01-15 PROCEDURE — 87086 URINE CULTURE/COLONY COUNT: CPT

## 2019-01-15 PROCEDURE — 86850 RBC ANTIBODY SCREEN: CPT

## 2019-01-15 PROCEDURE — 36415 COLL VENOUS BLD VENIPUNCTURE: CPT

## 2019-01-15 PROCEDURE — 83550 IRON BINDING TEST: CPT

## 2019-01-15 PROCEDURE — 93005 ELECTROCARDIOGRAM TRACING: CPT

## 2019-01-15 PROCEDURE — 85730 THROMBOPLASTIN TIME PARTIAL: CPT

## 2019-01-15 PROCEDURE — 85027 COMPLETE CBC AUTOMATED: CPT

## 2019-01-15 PROCEDURE — 80048 BASIC METABOLIC PNL TOTAL CA: CPT

## 2019-01-15 PROCEDURE — 86900 BLOOD TYPING SEROLOGIC ABO: CPT

## 2019-01-16 ENCOUNTER — OFFICE VISIT (OUTPATIENT)
Dept: VASCULAR SURGERY | Facility: CLINIC | Age: 74
End: 2019-01-16
Payer: MEDICARE

## 2019-01-16 VITALS
DIASTOLIC BLOOD PRESSURE: 86 MMHG | TEMPERATURE: 98.8 F | BODY MASS INDEX: 36.62 KG/M2 | SYSTOLIC BLOOD PRESSURE: 146 MMHG | HEIGHT: 72 IN

## 2019-01-16 DIAGNOSIS — M17.11 LOCALIZED OSTEOARTHRITIS OF RIGHT KNEE: ICD-10-CM

## 2019-01-16 DIAGNOSIS — I87.2 VENOUS INSUFFICIENCY: Primary | ICD-10-CM

## 2019-01-16 LAB
BACTERIA UR CULT: NORMAL
QRS AXIS: -21 DEGREES
QRSD INTERVAL: 92 MS
QT INTERVAL: 394 MS
QTC INTERVAL: 437 MS
T WAVE AXIS: 20 DEGREES
VENTRICULAR RATE: 74 BPM

## 2019-01-16 PROCEDURE — 99203 OFFICE O/P NEW LOW 30 MIN: CPT | Performed by: SURGERY

## 2019-01-16 PROCEDURE — 93010 ELECTROCARDIOGRAM REPORT: CPT | Performed by: INTERNAL MEDICINE

## 2019-01-16 NOTE — ASSESSMENT & PLAN NOTE
Caitlin Luz is a 61-year-old man who was apparently scheduled for total knee replacement  He is referred to our office for [address]  He has history of chronic venous insufficiency  There is evidence of chronic venous stasis skin changes  There is chronic discoloration of the right lower leg and foot  There is no open wounds  He has pulsatile/ multiphasic bilateral  posterior tibial Doppler signals  No contraindications for planned total knee replacement from a vascular surgery standpoint  May proceed as planned  Recommend early ambulation/chemical DVT prophylaxis perioperatively  May follow up with our office on an as-needed basis

## 2019-01-16 NOTE — PATIENT INSTRUCTIONS
No vascular contraindications for upcoming total knee replacement    Recommend early perioperative mobilization/chemical DVT prophylaxis

## 2019-01-16 NOTE — PROGRESS NOTES
Venous insufficiency  Maxime Velarde is a 79-year-old man who was apparently scheduled for total knee replacement  He is referred to our office for [address]  He has history of chronic venous insufficiency  There is evidence of chronic venous stasis skin changes  There is chronic discoloration of the right lower leg and foot  There is no open wounds  He has pulsatile/ multiphasic bilateral  posterior tibial Doppler signals  No contraindications for planned total knee replacement from a vascular surgery standpoint  May proceed as planned  Recommend early ambulation/chemical DVT prophylaxis perioperatively  May follow up with our office on an as-needed basis  Assessment/Plan   Diagnoses and all orders for this visit:    Venous insufficiency    Localized osteoarthritis of right knee  -     Ambulatory referral to Vascular Surgery        No chief complaint on file  Subjective   Patient ID: Tamar Valdez is a 68 y o  male  Chief complaint: Pt is new to our office referred by Dr Letha Rico (Ortho)  Pt needs vascular clerance for total knee replacement 2/6/19  Pt is on asa and statin  HPI    The following portions of the patient's history were reviewed and updated as appropriate: allergies, current medications, past family history, past medical history, past social history, past surgical history and problem list     Review of Systems   Constitutional: Negative  HENT: Positive for hearing loss  Eyes: Negative  Respiratory: Positive for shortness of breath  Cardiovascular: Negative  Gastrointestinal: Negative  Endocrine: Negative  Genitourinary: Negative  Musculoskeletal: Negative  Skin: Negative  Allergic/Immunologic: Negative  Neurological: Negative  Hematological: Negative  Psychiatric/Behavioral: Negative          Patient Active Problem List   Diagnosis    Anxiety    Arthritis    Paroxysmal A-fib ([address] )    Benign essential hypertension    CAD (coronary artery disease)    COPD, moderate (HCC)    Dyspnea    Elevated PSA    Esophagitis, reflux    Extrinsic asthma    Gait disturbance    Hypoxia    Leukocytosis    Localized osteoarthritis of right knee    Lymphedema    Mixed hyperlipidemia    Obstructive sleep apnea    Venous insufficiency    Contusion of right hand    Other dysphagia    Aspiration into respiratory tract    Age-related cataract of both eyes    Cataract of both eyes    Depression with anxiety    Aspiration pneumonia of left lower lobe (HCC)    Pressure injury of skin of sacral region    Cellulitis of right lower extremity       Past Surgical History:   Procedure Laterality Date    CATARACT EXTRACTION Bilateral     COLONOSCOPY      ESOPHAGOGASTRODUODENOSCOPY      FOOT SURGERY Left     SPLENECTOMY, TOTAL      SURGERY OF THE SPLEEN    TONSILLECTOMY         Family History   Problem Relation Age of Onset    Coronary artery disease Mother     Diabetes type II Father         MELLITUS       Social History     Social History    Marital status: /Civil Union     Spouse name: N/A    Number of children: N/A    Years of education: N/A     Occupational History    Not on file       Social History Main Topics    Smoking status: Former Smoker     Quit date: 1/15/1995    Smokeless tobacco: Never Used    Alcohol use No    Drug use: No    Sexual activity: Not on file     Other Topics Concern    Not on file     Social History Narrative    No narrative on file       No Known Allergies      Current Outpatient Prescriptions:     ascorbic acid (VITAMIN C) 500 mg tablet, Take 1 tablet (500 mg total) by mouth daily for 30 days, Disp: 30 tablet, Rfl: 0    aspirin (ASPIR-81) 81 mg EC tablet, Take 1 tablet by mouth daily, Disp: , Rfl:     atorvastatin (LIPITOR) 80 mg tablet, Take 80 mg by mouth daily  , Disp: , Rfl:     famotidine (PEPCID) 40 MG tablet, Take 1 tablet (40 mg total) by mouth 2 (two) times a day, Disp: 180 tablet, Rfl: 0   ferrous sulfate 324 (65 Fe) mg, Take 1 tablet (324 mg total) by mouth 2 (two) times a day before meals for 30 days, Disp: 60 tablet, Rfl: 0    fluticasone furoate-vilanterol (BREO ELLIPTA), Inhale 1 puff Daily  , Disp: , Rfl:     folic acid (FOLVITE) 1 mg tablet, Take 1 tablet (1 mg total) by mouth daily for 30 days, Disp: 30 tablet, Rfl: 0    furosemide (LASIX) 20 mg tablet, TAKE 2 TABLETS BY MOUTH  DAILY (Patient taking differently: Take 20 mg by mouth daily  ), Disp: 180 tablet, Rfl: 1    glucosamine 500 MG CAPS capsule, Take 500 mg by mouth daily  , Disp: , Rfl:     meloxicam (MOBIC) 7 5 mg tablet, TAKE 1 TABLET BY MOUTH  TWICE A DAY WITH FOOD, Disp: 180 tablet, Rfl: 1    menthol-zinc oxide (CALMOSEPTINE) 0 44-20 6 % OINT, Apply topically daily  , Disp: , Rfl:     PARoxetine (PAXIL) 20 mg tablet, TAKE 1 TABLET BY MOUTH  DAILY (Patient taking differently: Take 20 mg by mouth daily  ), Disp: 90 tablet, Rfl: 1    Respiratory Therapy Supplies (NEBULIZER AIR TUBE/PLUGS) MISC, by Does not apply route, Disp: , Rfl:     Respiratory Therapy Supplies (NEBULIZER/ADULT MASK) KIT, by Does not apply route, Disp: , Rfl:     triamterene-hydrochlorothiazide (DYAZIDE) 37 5-25 mg per capsule, TAKE 1 CAPSULE BY MOUTH  DAILY, Disp: 90 capsule, Rfl: 1    Objective       Physical Exam:    General appearance: alert and oriented, in no acute distress  Skin:  Bilateral lower leg chronic venous stasis skin hyper pigmentation/discoloration  Neurologic: Grossly normal  Head: Normocephalic, without obvious abnormality, atraumatic  Eyes: conjunctivae/corneas clear  EOM's intact  Neck: no adenopathy, no carotid bruit, no JVD and supple, symmetrical, trachea midline  Back:  No CVA tenderness  Lungs: clear to auscultation bilaterally  Chest wall: no tenderness  Heart:  Irregularly irregular  Abdomen: soft, non-tender; bowel sounds normal; no masses,  no organomegaly  Extremities:  Chronic venous stasis skin changes    History of remote traumatic left 2nd toe amputation (approximately 40 years ago)    Pulse exam:  Radial: Right: 2+ Left[de-identified] 2+  Popliteal: Right: non-palpable Left: non-palpable  DP: Right: doppler signal Left: doppler signal  PT: Right: doppler signal Left: doppler signal

## 2019-01-18 ENCOUNTER — OFFICE VISIT (OUTPATIENT)
Dept: FAMILY MEDICINE CLINIC | Facility: CLINIC | Age: 74
End: 2019-01-18
Payer: MEDICARE

## 2019-01-18 VITALS
WEIGHT: 271.7 LBS | BODY MASS INDEX: 36.8 KG/M2 | DIASTOLIC BLOOD PRESSURE: 78 MMHG | HEART RATE: 76 BPM | HEIGHT: 72 IN | OXYGEN SATURATION: 98 % | TEMPERATURE: 98.4 F | RESPIRATION RATE: 18 BRPM | SYSTOLIC BLOOD PRESSURE: 132 MMHG

## 2019-01-18 DIAGNOSIS — F41.9 ANXIETY: ICD-10-CM

## 2019-01-18 DIAGNOSIS — M17.11 ARTHRITIS OF RIGHT KNEE: ICD-10-CM

## 2019-01-18 DIAGNOSIS — Z01.818 PREOP EXAMINATION: Primary | ICD-10-CM

## 2019-01-18 DIAGNOSIS — E78.2 MIXED HYPERLIPIDEMIA: ICD-10-CM

## 2019-01-18 DIAGNOSIS — I48.0 PAROXYSMAL A-FIB (HCC): ICD-10-CM

## 2019-01-18 DIAGNOSIS — J44.9 COPD, MODERATE (HCC): ICD-10-CM

## 2019-01-18 PROCEDURE — 99213 OFFICE O/P EST LOW 20 MIN: CPT | Performed by: FAMILY MEDICINE

## 2019-01-18 NOTE — LETTER
2019     Jen Schaefer [address]  [address] [facility]    Patient: Cadence Malcolm   YOB: 1945   Date of Visit: 2019       Dear Dr Parvin Ariza: Thank you for referring Enzo Bridges to me for evaluation  Below are my notes for this consultation  If you have questions, please do not hesitate to call me  I look forward to following your patient along with you  Sincerely,        Vann Lanes, DO        CC: No Recipients  Vann Lanes, DO  2019  3:39 PM  Sign at close encounter  [address] [facility]      NAME: Cadence Malcolm  AGE: 68 y o  SEX: male  : 1945   MRN: [id number]    DATE: 2019  TIME: 3:39 PM    Assessment and Plan     Problem List Items Addressed This Visit     Anxiety     Doing well on Paxil 20 mg daily         Paroxysmal A-fib (HCC)     Stable rate controlled on aspirin  Continue follow-up with cardiologist         COPD, moderate ([address] )     Stable on nebulizer and oxygen  Mixed hyperlipidemia     Doing well on pravastatin 80 mg daily         Arthritis of right knee     This is a preop consultation for upcoming right total knee replacement on  by Dr Parvin Ariza at [facility] in [facility]  Otherwise patient is feeling well  His shortness of breath as well controlled on nebulizer and oxygen  He is doing well on all other prescribed medications without side effects  PAT labs did not show any significant abnormalities       HE IS MEDICALLY CLEARED FOR UPCOMING SURGERY  (HE WILL RECEIVE CARDIAC CLEARANCE THROUGH DR Duke Manriquez)           Other Visit Diagnoses     Preop examination    -  Primary              Return to office in:  Next regularly scheduled appointment    Chief Complaint     Chief Complaint   Patient presents with    Pre-op Exam     R Knee Arthroplasty 19 Dr Evans Day       History of Present Illness     This is a preop consultation for upcoming right total knee replacement on February 6th by Dr Parvin Ariza at [facility] in [facility]  Otherwise patient is feeling well  His shortness of breath as well controlled on nebulizer and oxygen  He is doing well on all other prescribed medications without side effects        The following portions of the patient's history were reviewed and updated as appropriate: allergies, current medications, past family history, past medical history, past social history, past surgical history and problem list     Review of Systems   Review of Systems   Respiratory: Negative  Cardiovascular: Negative  Gastrointestinal: Negative  Genitourinary: Negative  Active Problem List     Patient Active Problem List   Diagnosis    Anxiety    Arthritis    Paroxysmal A-fib ([address] )    Benign essential hypertension    CAD (coronary artery disease)    COPD, moderate (HCC)    Dyspnea    Elevated PSA    Esophagitis, reflux    Extrinsic asthma    Gait disturbance    Hypoxia    Leukocytosis    Localized osteoarthritis of right knee    Lymphedema    Mixed hyperlipidemia    Obstructive sleep apnea    Venous insufficiency    Contusion of right hand    Other dysphagia    Aspiration into respiratory tract    Age-related cataract of both eyes    Cataract of both eyes    Depression with anxiety    Aspiration pneumonia of left lower lobe (HCC)    Pressure injury of skin of sacral region    Cellulitis of right lower extremity    Arthritis of right knee       Objective   /78   Pulse 76   Temp 98 4 °F (36 9 °C) (Tympanic)   Resp 18   Ht 6' (1 829 m)   Wt 123 kg (271 lb 11 2 oz)   SpO2 98%   BMI 36 85 kg/m²      Physical Exam   Cardiovascular: Normal rate, regular rhythm, normal heart sounds and intact distal pulses  Carotids: no bruits  Ext: no edema   Pulmonary/Chest: Effort normal  No respiratory distress  He has no wheezes  He has no rales  Psychiatric: He has a normal mood and affect   His behavior is normal  Thought content normal        Pertinent Laboratory/Diagnostic Studies:  PAT results    Current Medications     Current Outpatient Prescriptions:     ascorbic acid (VITAMIN C) 500 mg tablet, Take 1 tablet (500 mg total) by mouth daily for 30 days, Disp: 30 tablet, Rfl: 0    aspirin (ASPIR-81) 81 mg EC tablet, Take 1 tablet by mouth daily, Disp: , Rfl:     atorvastatin (LIPITOR) 80 mg tablet, Take 80 mg by mouth daily  , Disp: , Rfl:     famotidine (PEPCID) 40 MG tablet, Take 1 tablet (40 mg total) by mouth 2 (two) times a day, Disp: 180 tablet, Rfl: 0    ferrous sulfate 324 (65 Fe) mg, Take 1 tablet (324 mg total) by mouth 2 (two) times a day before meals for 30 days, Disp: 60 tablet, Rfl: 0    fluticasone furoate-vilanterol (BREO ELLIPTA), Inhale 1 puff Daily  , Disp: , Rfl:     folic acid (FOLVITE) 1 mg tablet, Take 1 tablet (1 mg total) by mouth daily for 30 days, Disp: 30 tablet, Rfl: 0    furosemide (LASIX) 20 mg tablet, TAKE 2 TABLETS BY MOUTH  DAILY (Patient taking differently: Take 20 mg by mouth daily  ), Disp: 180 tablet, Rfl: 1    glucosamine 500 MG CAPS capsule, Take 500 mg by mouth daily  , Disp: , Rfl:     meloxicam (MOBIC) 7 5 mg tablet, TAKE 1 TABLET BY MOUTH  TWICE A DAY WITH FOOD, Disp: 180 tablet, Rfl: 1    menthol-zinc oxide (CALMOSEPTINE) 0 44-20 6 % OINT, Apply topically daily  , Disp: , Rfl:     PARoxetine (PAXIL) 20 mg tablet, TAKE 1 TABLET BY MOUTH  DAILY (Patient taking differently: Take 20 mg by mouth daily  ), Disp: 90 tablet, Rfl: 1    Respiratory Therapy Supplies (NEBULIZER AIR TUBE/PLUGS) MISC, by Does not apply route, Disp: , Rfl:     Respiratory Therapy Supplies (NEBULIZER/ADULT MASK) KIT, by Does not apply route, Disp: , Rfl:     triamterene-hydrochlorothiazide (DYAZIDE) 37 5-25 mg per capsule, TAKE 1 CAPSULE BY MOUTH  DAILY, Disp: 90 capsule, Rfl: 1    Health Maintenance     Health Maintenance   Topic Date Due    Hepatitis C Screening  1945    [facility] Screening: Colonoscopy  06/12/2019    Fall Risk  11/26/2019    Medicare Annual Wellness Visit (AWV)  11/26/2019    DTaP,Tdap,and Td Vaccines (2 - Td) 02/18/2027    INFLUENZA VACCINE  Completed    Pneumococcal PPSV23/PCV13 65+ Years / Low and Medium Risk  Completed     Immunization History   Administered Date(s) Administered    H1N1, All Formulations 04/02/2010    Influenza 10/08/2015, 10/12/2016, 10/20/2016, 10/30/2017, 11/01/2017    Influenza Split High Dose Preservative Free IM 10/30/2014, 10/08/2015, 10/12/2016, 10/20/2016, 10/30/2017    Influenza TIV (IM) 10/16/2013    Influenza, high dose seasonal 0 5 mL 10/04/2018    Pneumococcal Conjugate 13-Valent 03/26/2015    Pneumococcal Polysaccharide PPV23 10/16/2013    Tdap 02/18/2017       Ruddy Fabry, DO  [facility]

## 2019-01-18 NOTE — ASSESSMENT & PLAN NOTE
This is a preop consultation for upcoming right total knee replacement on February 6th by Dr Elvi Mera at [facility] in [facility]  Otherwise patient is feeling well  His shortness of breath as well controlled on nebulizer and oxygen  He is doing well on all other prescribed medications without side effects  PAT labs did not show any significant abnormalities       HE IS MEDICALLY CLEARED FOR UPCOMING SURGERY  (HE WILL RECEIVE CARDIAC CLEARANCE THROUGH DR Mica Jack)

## 2019-01-18 NOTE — PROGRESS NOTES
[address] [facility]      NAME: Haley Serna  AGE: 68 y o  SEX: male  : 1945   MRN: [id number]    DATE: 2019  TIME: 3:39 PM    Assessment and Plan     Problem List Items Addressed This Visit     Anxiety     Doing well on Paxil 20 mg daily         Paroxysmal A-fib (HCC)     Stable rate controlled on aspirin  Continue follow-up with cardiologist         COPD, moderate ([address] )     Stable on nebulizer and oxygen  Mixed hyperlipidemia     Doing well on pravastatin 80 mg daily         Arthritis of right knee     This is a preop consultation for upcoming right total knee replacement on  by Dr Terry Wheat at [facility] in [facility]  Otherwise patient is feeling well  His shortness of breath as well controlled on nebulizer and oxygen  He is doing well on all other prescribed medications without side effects  PAT labs did not show any significant abnormalities  HE IS MEDICALLY CLEARED FOR UPCOMING SURGERY  (HE WILL RECEIVE CARDIAC CLEARANCE THROUGH DR Paul Burch)           Other Visit Diagnoses     Preop examination    -  Primary              Return to office in:  Next regularly scheduled appointment    Chief Complaint     Chief Complaint   Patient presents with    Pre-op Exam     R Knee Arthroplasty 19 Dr Pringle Channel       History of Present Illness     This is a preop consultation for upcoming right total knee replacement on  by Dr Terry Wheat at [facility] in [facility]  Otherwise patient is feeling well  His shortness of breath as well controlled on nebulizer and oxygen  He is doing well on all other prescribed medications without side effects        The following portions of the patient's history were reviewed and updated as appropriate: allergies, current medications, past family history, past medical history, past social history, past surgical history and problem list     Review of Systems   Review of Systems   Respiratory: Negative  Cardiovascular: Negative  Gastrointestinal: Negative  Genitourinary: Negative  Active Problem List     Patient Active Problem List   Diagnosis    Anxiety    Arthritis    Paroxysmal A-fib ([address] )    Benign essential hypertension    CAD (coronary artery disease)    COPD, moderate (HCC)    Dyspnea    Elevated PSA    Esophagitis, reflux    Extrinsic asthma    Gait disturbance    Hypoxia    Leukocytosis    Localized osteoarthritis of right knee    Lymphedema    Mixed hyperlipidemia    Obstructive sleep apnea    Venous insufficiency    Contusion of right hand    Other dysphagia    Aspiration into respiratory tract    Age-related cataract of both eyes    Cataract of both eyes    Depression with anxiety    Aspiration pneumonia of left lower lobe (HCC)    Pressure injury of skin of sacral region    Cellulitis of right lower extremity    Arthritis of right knee       Objective   /78   Pulse 76   Temp 98 4 °F (36 9 °C) (Tympanic)   Resp 18   Ht 6' (1 829 m)   Wt 123 kg (271 lb 11 2 oz)   SpO2 98%   BMI 36 85 kg/m²     Physical Exam   Cardiovascular: Normal rate, regular rhythm, normal heart sounds and intact distal pulses  Carotids: no bruits  Ext: no edema   Pulmonary/Chest: Effort normal  No respiratory distress  He has no wheezes  He has no rales  Psychiatric: He has a normal mood and affect   His behavior is normal  Thought content normal        Pertinent Laboratory/Diagnostic Studies:  PAT results    Current Medications     Current Outpatient Prescriptions:     ascorbic acid (VITAMIN C) 500 mg tablet, Take 1 tablet (500 mg total) by mouth daily for 30 days, Disp: 30 tablet, Rfl: 0    aspirin (ASPIR-81) 81 mg EC tablet, Take 1 tablet by mouth daily, Disp: , Rfl:     atorvastatin (LIPITOR) 80 mg tablet, Take 80 mg by mouth daily  , Disp: , Rfl:     famotidine (PEPCID) 40 MG tablet, Take 1 tablet (40 mg total) by mouth 2 (two) times a day, Disp: 180 tablet, Rfl: 0    ferrous sulfate 324 (65 Fe) mg, Take 1 tablet (324 mg total) by mouth 2 (two) times a day before meals for 30 days, Disp: 60 tablet, Rfl: 0    fluticasone furoate-vilanterol (BREO ELLIPTA), Inhale 1 puff Daily  , Disp: , Rfl:     folic acid (FOLVITE) 1 mg tablet, Take 1 tablet (1 mg total) by mouth daily for 30 days, Disp: 30 tablet, Rfl: 0    furosemide (LASIX) 20 mg tablet, TAKE 2 TABLETS BY MOUTH  DAILY (Patient taking differently: Take 20 mg by mouth daily  ), Disp: 180 tablet, Rfl: 1    glucosamine 500 MG CAPS capsule, Take 500 mg by mouth daily  , Disp: , Rfl:     meloxicam (MOBIC) 7 5 mg tablet, TAKE 1 TABLET BY MOUTH  TWICE A DAY WITH FOOD, Disp: 180 tablet, Rfl: 1    menthol-zinc oxide (CALMOSEPTINE) 0 44-20 6 % OINT, Apply topically daily  , Disp: , Rfl:     PARoxetine (PAXIL) 20 mg tablet, TAKE 1 TABLET BY MOUTH  DAILY (Patient taking differently: Take 20 mg by mouth daily  ), Disp: 90 tablet, Rfl: 1    Respiratory Therapy Supplies (NEBULIZER AIR TUBE/PLUGS) MISC, by Does not apply route, Disp: , Rfl:     Respiratory Therapy Supplies (NEBULIZER/ADULT MASK) KIT, by Does not apply route, Disp: , Rfl:     triamterene-hydrochlorothiazide (DYAZIDE) 37 5-25 mg per capsule, TAKE 1 CAPSULE BY MOUTH  DAILY, Disp: 90 capsule, Rfl: 1    Health Maintenance     Health Maintenance   Topic Date Due    Hepatitis C Screening  1945    CRC Screening: Colonoscopy  06/12/2019    Fall Risk  11/26/2019    Medicare Annual Wellness Visit (AWV)  11/26/2019    DTaP,Tdap,and Td Vaccines (2 - Td) 02/18/2027    INFLUENZA VACCINE  Completed    Pneumococcal PPSV23/PCV13 65+ Years / Low and Medium Risk  Completed     Immunization History   Administered Date(s) Administered    H1N1, All Formulations 04/02/2010    Influenza 10/08/2015, 10/12/2016, 10/20/2016, 10/30/2017, 11/01/2017    Influenza Split High Dose Preservative Free IM 10/30/2014, 10/08/2015, 10/12/2016, 10/20/2016, 10/30/2017    Influenza TIV (IM) 10/16/2013    Influenza, high dose seasonal 0 5 mL 10/04/2018    Pneumococcal Conjugate 13-Valent 03/26/2015    Pneumococcal Polysaccharide PPV23 10/16/2013    Tdap 02/18/2017       Maggie Le DO  [facility]

## 2019-01-22 ENCOUNTER — TELEPHONE (OUTPATIENT)
Dept: OBGYN CLINIC | Facility: HOSPITAL | Age: 74
End: 2019-01-22

## 2019-01-24 ENCOUNTER — TELEPHONE (OUTPATIENT)
Dept: OBGYN CLINIC | Facility: MEDICAL CENTER | Age: 74
End: 2019-01-24

## 2019-01-24 NOTE — TELEPHONE ENCOUNTER
Patient needs pulmonology clearance for upcoming right total knee arthroplasty on 2/6/19  Pulmonologist would like to know if patient is receiving general or spinal anesthesia and also the anticipated length of the procedure  Please call   at [phone] with info and she states it is fine to leave msg  This is her cell phone  I was unable to determine doctor's name on my voicemail

## 2019-01-24 NOTE — TELEPHONE ENCOUNTER
I tried to call back the number you provided  It went right to voicemail and stated the voicemail was not set up  Can you please find out the pulmonologist's name and their office phone number? Zachary Frye will pursue it further tomorrow once we have that information  Thank you!

## 2019-01-25 NOTE — TELEPHONE ENCOUNTER
Preoperative Elective Admission Assessment-Spoke at length with wife, Mars Huang  Received OK from pt Hussain to do so  >1 hour spent on phone with wife  Living Situation: Lives in an apartment with his wife, Mars Huang  Home Layout: Apartment, step-in tub                     Steps: #1 step up, then pt would have to ambulate approximately 10ft, then up 7 more steps to the porch, then 1 step into the kitchen  First Floor Setup: Once inside apartment, Yes  Post-op Caregiver: Pt's wife can assist but limitedly  She has medical commodities herself, she reports she has a hx of stroke and "6 MIs"  Wife reports she can assist with house upkeep, meals, pt dressing/bathing/ADLs  Discussed with wife the option to pay out of pocked for a home health aide for additional supports and wife declined reporting she could not afford to do this  Pt/wife are estranged from their daughter  Pt's grandson can assist pt "only on the weekends- he has school during the week and lives 1 hour away"  Grandson could stay with pt on the weekend  Pt denies having any additional family or friends  Post-op Transport: Pt has limited to no transportation availability  Pt's wife drives but very limitedly due to her comorbidities  Pt has a stroke history and would not be able to safely maneuver himself independently in and out of an uber/bus  Outpatient [facility]  DME: Pt has a walker with 4 wheels and cane  Pt does not have a BSC  Patient's Current Level of Function: Pt currently ambulates using a cane and is independent with his ADLs per wife  Medication Management: Pt self manages his meds                     [facility] Plan:   Plan A: Home with outpatient PT  Plan B: Home with home health care- patient prefers to use [facility] as he has used them in the past and has a rapport with the staff    Plan C: SNF 1) [address] [place] [address]  2) [facility]                    Barriers to DC identified preoperatively: Inaccessible home environment, caregiver burden, limited caregiver support, limited transportation availability, fall risk r/t fall in December, stroke and arrhythmia hx for pt  BMI: 36 02 at 1/7 OV  Caresense: Not enrolled at this time due to possible OR date change                     RAPT: Score 10, already in caresense                      ACE/ARB Form:Not enrolled at this time due to possible OR date change                     HOOS/KOOS: Score 31 307, already in caresense  Patient Education:  Pt educated on post-op pain, early mobilization (POD0), indication for/use of incentive spirometer (10x/hour while awake) and indication for/use of foot/leg pumps (18 hours/day)  Pt/wife educated that our goal, if at all possible, is to appropriately discharge patient based off their post-op function while striving to maintain maximal independence  If possible, the goal is to discharge patient to home and for them to attend outpatient physical therapy  I educated patient on the many benefits of outpt PT(Including maintaining independence, additional resources at outpt site, better outcomes etc  )  Also educated on how home PT vs  outpt PT is determined (while inpt)  Pt and wife deny having any questions at this time  Pt encouraged to call me with any questions, concerns or issues  Pt's wife reports she is not feeling well and that she is requesting to postpone patient's OR date  Wife is requesting a new date in April or May- I advised Radha Shields that pt's clearances would no longer be valid at that time and that pt would need to obtain the CC, pulmonary, vascular and MC again prior to proceeding with surgery  Radha Shields verbalized understanding and was transferred to surgical coordinator Jesse Ashby to discuss choosing a new OR date for pt

## 2019-01-25 NOTE — TELEPHONE ENCOUNTER
I called and talked to Dr Jet Jenkins today  I let her know that we leave the decision up to anesthesia for spinal vs general but Dr Gale Rosario is ok with either  I also let her know the length of procedure under anesthesia is about 2 5 hrs  She states she is having difficulty talking to the patient to get clearance  When she has tried to call, the patient has been sleeping and the wife did not want to wake him  His wife is also not feeling well and not wanting to discuss the risks and benefits  Dr Jet Jenkins is going to try to call them again on Monday  She will put her recommendations about anesthesia in her note  I called the patient to explain the importance of the pulmonology clearance and that they really need to answer Dr Helder Rayo phone call on Monday  The wife did not want to wake the patient to let me talk to him directly  She states she is sick and not feeling well and that is why she hasn't been up for talking on the phone  She also stated she might have to cancel his surgery if she is not feeling better as she is his only ride and won't be able to take him if she is sick  I again explained the importance of answering Dr Helder Rayo phone call and letting the patient speak to her on Monday  I also provided her the office phone number for the pulmonologist to call their office  Patients wife understands and agrees  I also gave her our office number again to call with any problems or concerns

## 2019-02-21 ENCOUNTER — TELEPHONE (OUTPATIENT)
Dept: PULMONOLOGY | Facility: CLINIC | Age: 74
End: 2019-02-21

## 2019-02-21 NOTE — TELEPHONE ENCOUNTER
Patient had a referral in the system from Dr Robert Ospina office but in their chart they have been seeing [facility]  So I reached out numerous times to see if we should coordinate scheduling with the patient to Rosalynd Schwab  Was told 2/21 by Rosalynd Schwab their Surgery scheduler that patient stated they would prefer to stay with their previous pulmonary physician they were seeing

## 2019-02-26 DIAGNOSIS — E78.2 MIXED HYPERLIPIDEMIA: Primary | ICD-10-CM

## 2019-02-26 DIAGNOSIS — K21.9 GASTROESOPHAGEAL REFLUX DISEASE WITHOUT ESOPHAGITIS: ICD-10-CM

## 2019-02-26 DIAGNOSIS — I10 ESSENTIAL HYPERTENSION: ICD-10-CM

## 2019-02-26 DIAGNOSIS — R60.0 LOCALIZED EDEMA: ICD-10-CM

## 2019-02-26 DIAGNOSIS — F32.A DEPRESSION, UNSPECIFIED DEPRESSION TYPE: ICD-10-CM

## 2019-02-26 DIAGNOSIS — M19.90 ARTHRITIS: ICD-10-CM

## 2019-02-26 RX ORDER — FUROSEMIDE 20 MG/1
TABLET ORAL DAILY
Qty: 180 TABLET | Refills: 1 | Status: SHIPPED | OUTPATIENT
Start: 2019-02-26 | End: 2019-08-01 | Stop reason: SDUPTHER

## 2019-02-26 RX ORDER — MELOXICAM 7.5 MG/1
TABLET ORAL
Qty: 180 TABLET | Refills: 1 | Status: SHIPPED | OUTPATIENT
Start: 2019-02-26 | End: 2019-05-11

## 2019-02-26 RX ORDER — TRIAMTERENE AND HYDROCHLOROTHIAZIDE 37.5; 25 MG/1; MG/1
1 CAPSULE ORAL DAILY
Qty: 90 CAPSULE | Refills: 1 | Status: SHIPPED | OUTPATIENT
Start: 2019-02-26 | End: 2019-08-01 | Stop reason: SDUPTHER

## 2019-02-26 RX ORDER — PAROXETINE HYDROCHLORIDE 20 MG/1
TABLET, FILM COATED ORAL DAILY
Qty: 90 TABLET | Refills: 1 | Status: SHIPPED | OUTPATIENT
Start: 2019-02-26 | End: 2019-08-01 | Stop reason: SDUPTHER

## 2019-02-26 RX ORDER — FAMOTIDINE 40 MG/1
TABLET, FILM COATED ORAL
Qty: 180 TABLET | Refills: 0 | Status: SHIPPED | OUTPATIENT
Start: 2019-02-26 | End: 2019-08-05 | Stop reason: SDUPTHER

## 2019-02-26 RX ORDER — PRAVASTATIN SODIUM 80 MG/1
TABLET ORAL
Qty: 90 TABLET | Refills: 1 | Status: SHIPPED | OUTPATIENT
Start: 2019-02-26 | End: 2019-08-01 | Stop reason: SDUPTHER

## 2019-03-13 ENCOUNTER — TELEPHONE (OUTPATIENT)
Dept: OBGYN CLINIC | Facility: HOSPITAL | Age: 74
End: 2019-03-13

## 2019-03-13 ENCOUNTER — OFFICE VISIT (OUTPATIENT)
Dept: FAMILY MEDICINE CLINIC | Facility: CLINIC | Age: 74
End: 2019-03-13
Payer: MEDICARE

## 2019-03-13 VITALS
TEMPERATURE: 97.5 F | HEART RATE: 82 BPM | SYSTOLIC BLOOD PRESSURE: 130 MMHG | WEIGHT: 257.6 LBS | OXYGEN SATURATION: 90 % | BODY MASS INDEX: 36.88 KG/M2 | HEIGHT: 70 IN | RESPIRATION RATE: 17 BRPM | DIASTOLIC BLOOD PRESSURE: 72 MMHG

## 2019-03-13 DIAGNOSIS — S81.001A OPEN WOUND OF RIGHT KNEE, INITIAL ENCOUNTER: ICD-10-CM

## 2019-03-13 DIAGNOSIS — L03.114 CELLULITIS OF LEFT ARM: Primary | ICD-10-CM
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
## 2019-03-13 DIAGNOSIS — S51.802A OPEN WOUND OF LEFT FOREARM, INITIAL ENCOUNTER: ICD-10-CM

## 2019-03-13 PROCEDURE — 87147 CULTURE TYPE IMMUNOLOGIC: CPT | Performed by: PHYSICIAN ASSISTANT

## 2019-03-13 PROCEDURE — 99214 OFFICE O/P EST MOD 30 MIN: CPT | Performed by: PHYSICIAN ASSISTANT

## 2019-03-13 PROCEDURE — 87186 SC STD MICRODIL/AGAR DIL: CPT | Performed by: PHYSICIAN ASSISTANT

## 2019-03-13 PROCEDURE — 87070 CULTURE OTHR SPECIMN AEROBIC: CPT | Performed by: PHYSICIAN ASSISTANT

## 2019-03-13 PROCEDURE — 87205 SMEAR GRAM STAIN: CPT | Performed by: PHYSICIAN ASSISTANT

## 2019-03-13 RX ORDER — DOXYCYCLINE 100 MG/1
100 CAPSULE ORAL 2 TIMES DAILY
Qty: 20 CAPSULE | Refills: 0 | Status: SHIPPED | OUTPATIENT
Start: 2019-03-13 | End: 2019-03-23

## 2019-03-13 NOTE — PROGRESS NOTES
Assessment/Plan:      Diagnoses and all orders for this visit:    Cellulitis of left arm  -     doxycycline monohydrate (MONODOX) 100 mg capsule; Take 1 capsule (100 mg total) by mouth 2 (two) times a day for 10 days  -     Wound culture and Gram stain    Open wound of left forearm, initial encounter  -     doxycycline monohydrate (MONODOX) 100 mg capsule; Take 1 capsule (100 mg total) by mouth 2 (two) times a day for 10 days  -     Wound culture and Gram stain    Open wound of right knee, initial encounter  -     doxycycline monohydrate (MONODOX) 100 mg capsule; Take 1 capsule (100 mg total) by mouth 2 (two) times a day for 10 days    Fall on same level as cause of accidental injury  -     Wound culture and Gram stain      patient is a 70-year-old male accompanied by wife today for concern of an ongoing wound on his left forearm since last week after sustaining injury to the area and his right knee from falling accidentally at his home  The wound is quite raised and appears as a possible infected abrasion  However it is firm and is draining some yellow clear discharge  I did take a sample of drainage to send out for culture however I would like to avoid any kind of invasive procedure for now as it does not appear cystic  Patient has abrasion on his right knee appears to be healing well and does not appear infected  He has no other musculoskeletal complaints at this time following the fall  He is up-to-date with his tetanus shot, last given in 2017  I advised wound care only washing with soap and water twice a day to the affected areas, antibiotic ointment and a dressing with supplies given today  I would like him to start doxycycline twice a day for 10 days as soon as possible  He is afebrile and is not exhibiting any evidence of a systemic infection    However I took about 5 minutes explaining my concern to both patient and wife the possibility for sepsis if the infection moves locally from the skin into his blood and what symptoms to look out for  If he becomes febrile at any time with a temperature above 100 or more they should consider going to the emergency room as he may need blood culturing and IV antibiotics, especially this has been for about a week now, the appearance of the wound itself and the swelling going into the wrist and hand  Patient and wife express all understanding of what was discussed today  I would like him to follow up in the office with me early next week just for recheck but we will certainly call sooner with the wound culture and follow up with them at that time  They were advised to call at any time with any concerns  Before patient left I did clean the wounds as well as apply antibiotic ointment and a bandage to both and provided dressings to wife so she can change the bandages daily  Chief Complaint   Patient presents with    Joint Swelling     pt complains of his left hand/wrist being swollen started last night  also has a sore on his left side and a sore on his right knee         Subjective:     Patient ID: Idamae Spatz is a 68 y o  male     66y/o male here today for wounds after fall  States his knee gave out and fell outside on sidewalk at his house sometime last week  He states he had a scrape on left lower dorsal forearm and right knee, states rinsed it out  However since then has been red and swollen, pus draining, swelling into left wrist and hand  No wrist or hand pain with movement or gripping  Wife was in hospital during time and states when she was discharge home, she washed it well and wrapped it  Wound on knee is healing, sore  He has no pain in wrist or hand itself, just swollen and stiff  Review of Systems   Constitutional: Negative  Respiratory: Negative  Cardiovascular: Negative  Musculoskeletal: Negative  Skin:        As in HPI   Neurological: Negative            The following portions of the patient's history were reviewed and updated as appropriate: allergies, current medications, past family history, past medical history, past social history, past surgical history and problem list       Objective:     Physical Exam   Constitutional: He appears well-developed  No distress  Musculoskeletal:   Fairly steady gait with rolling walker, no limping or evidence of LE instability or pain  Left arm with open wound as discussed, generalized mild swelling distal from wound into hand and fingers  No redness or warmth distally  Normal movement of fingers and hand on left side   strength normal  No instability of wrist or hand joints noted  Skin:   Large open, raised wound with surrounding erythema localized around wound left distal dorsal forearm, raised and weeping thin yellow drainage  Area tender  Right knee with vertical abrasion appearing dry and to be healing well without drainage, warmth or infection  Vitals reviewed        Vitals:    03/13/19 1103   BP: 130/72   BP Location: Right arm   Patient Position: Sitting   Cuff Size: Adult   Pulse: 82   Resp: 17   Temp: 97 5 °F (36 4 °C)   TempSrc: Tympanic   SpO2: 90%   Weight: 117 kg (257 lb 9 6 oz)   Height: 5' 9 5" (1 765 m)

## 2019-03-13 NOTE — TELEPHONE ENCOUNTER
Contacted pt today to complete a preoperative elective admission assessment  Pt's wife reports we are to cancel the 4/3 apt at this time, Does not want a new date reassigned until she calls because she is "Ill " at this time and needs to get better prior to pt having surgery  Surgery scheduler made aware  pt encouraged to call me with questions, concerns or issues

## 2019-03-15 LAB
BACTERIA WND AEROBE CULT: ABNORMAL
GRAM STN SPEC: ABNORMAL

## 2019-03-18 ENCOUNTER — TELEPHONE (OUTPATIENT)
Dept: FAMILY MEDICINE CLINIC | Facility: CLINIC | Age: 74
End: 2019-03-18

## 2019-03-18 NOTE — TELEPHONE ENCOUNTER
Called pt's spouse, spoke with Love Antionette, she stated Lisa Longoria doesn't have a fever, isn't getting the chills, she stated he doesn't seem to be weak or more fatigue than normal  She stated it doesn't appear to her that he is getting worse  She thinks he is about the same  Advised Monalisa I would get the message over to [place] and call her back if needed  She stated ok

## 2019-03-18 NOTE — TELEPHONE ENCOUNTER
Pt's spouse called back returning office call stating she isn't sure how Carmen Jovel is doing today, she didn't get a chance to wash and change the bandage today yet  However, last night when she changed it she noticed a lot of blood and puss  She isn't sure if it was swollen and red, but said there was a lot of blood and puss like something popped  She stated the pt is taking his antibiotic right now and has been really cranky because the instructions state not to have dairy 3 hours prior or 3 hours after taking so he has been drinking black coffee  Pt is keeping appointment for tomorrow  Advised Monalisa we would call her back if anything else is needed before tomorrow's appointment

## 2019-03-18 NOTE — TELEPHONE ENCOUNTER
Reba Calderón, can you ask if he has been having fever, chills, extreme weakness or worsening sxs since seeing me? I am ok with waiting until tomorrow to recheck him unless he seems he is very ill and getting worse, if that is the case he needs to go to ER and I cannot truly tell how he is overall by your message   I just want to make sure he is stable

## 2019-03-19 ENCOUNTER — OFFICE VISIT (OUTPATIENT)
Dept: FAMILY MEDICINE CLINIC | Facility: CLINIC | Age: 74
End: 2019-03-19
Payer: MEDICARE

## 2019-03-19 VITALS
OXYGEN SATURATION: 96 % | HEIGHT: 70 IN | TEMPERATURE: 97.4 F | WEIGHT: 261.9 LBS | RESPIRATION RATE: 15 BRPM | SYSTOLIC BLOOD PRESSURE: 126 MMHG | DIASTOLIC BLOOD PRESSURE: 78 MMHG | BODY MASS INDEX: 37.5 KG/M2 | HEART RATE: 104 BPM

## 2019-03-19 DIAGNOSIS — A49.02 MRSA INFECTION: Primary | ICD-10-CM

## 2019-03-19 DIAGNOSIS — L03.114 CELLULITIS OF LEFT ARM: ICD-10-CM

## 2019-03-19 PROCEDURE — 99213 OFFICE O/P EST LOW 20 MIN: CPT | Performed by: PHYSICIAN ASSISTANT

## 2019-03-19 RX ORDER — MULTIVITAMIN/IRON/FOLIC ACID 18MG-0.4MG
1 TABLET ORAL DAILY
COMMUNITY
End: 2022-01-29 | Stop reason: CLARIF

## 2019-03-19 RX ORDER — SULFAMETHOXAZOLE AND TRIMETHOPRIM 800; 160 MG/1; MG/1
1 TABLET ORAL EVERY 12 HOURS SCHEDULED
Qty: 14 TABLET | Refills: 0 | Status: SHIPPED | OUTPATIENT
Start: 2019-03-19 | End: 2019-03-26

## 2019-03-19 NOTE — PATIENT INSTRUCTIONS
Switch antibiotic to bactrim  STOP doxycycline  START bactrim with first dose tonight  Continue wound care - washing with soap and water 1-2 times a day, dry, apply neosporin and bandage and KEEP COVERED!  MRSA IS CONTAGIOUS! 8 Marya Rodriguez HANDS WELL AND AVOID TOUCHING THE WOUND -WEAR GLOVES

## 2019-03-19 NOTE — PROGRESS NOTES
Assessment/Plan:      Diagnoses and all orders for this visit:    MRSA infection  -     sulfamethoxazole-trimethoprim (BACTRIM DS) 800-160 mg per tablet; Take 1 tablet by mouth every 12 (twelve) hours for 7 days    Cellulitis of left arm  -     sulfamethoxazole-trimethoprim (BACTRIM DS) 800-160 mg per tablet; Take 1 tablet by mouth every 12 (twelve) hours for 7 days    Other orders  -     Multiple Vitamins-Minerals (EQ COMPLETE MULTIVITAMIN-ADULT) TABS; Take by mouth      69-year-old male presenting today for close follow-up in reassessment of open wound and cellulitis to his left arm confirmed over the weekend as MRSA with a positive culture  He has been taking doxycycline twice a day and is frustrated because he cannot have milk in his coffee, wife states  The wound appears to be healing significantly well and has made improvements from the initial diagnosis and swelling has gone down significantly  He is afebrile with no concerns for worsening infection  I redressed the wound before patient left today applying triple antibiotic ointment, nonadhesive dressing, wrapping it with Boom and tape  I gave patient's wife supplies to continue to change the dressing once a day after washing it with soap and water and drying thoroughly  Wound precautions were discussed in detail with patient and wife today and explained what murmur so was in great detail so they understood the significance of this type of bacteria and how it is contagious  Wife states that she has been using gloves which is a good idea  I would like to bring him in 1 more time for another reassessment and will do so in 1 weeks time  Unfortunately they did not do a doxycycline sensitivity test and though I believe doxycycline this is a good option for MRSA I will switch to Bactrim b i d  For 7 days and that way patient can have milk with his coffee as well  They are to call sooner with any concerns      Chief Complaint   Patient presents with   Sky Follow-up     Pt presents for a f/u due to cellulitis  Subjective:     Patient ID: Veronica Earl is a 68 y o  male  Patient is a 59-year-old male accompanied by grandson and wife today for recheck at my request of MRSA infection and cellulitis on left dorsal forearm  Wife states the wound is a mess  She has been dressing it every single day with the supplies we gave her and states that there is blood in some watery drainage  Patient states the pain has significantly improved and the swelling has gone down  No fever or chills  Review of Systems   Constitutional: Negative  Cardiovascular: Negative  Musculoskeletal: Negative  Skin:        As in HPI   Neurological: Negative  The following portions of the patient's history were reviewed and updated as appropriate: allergies, current medications, past family history, past medical history, past social history, past surgical history and problem list       Objective:     Physical Exam   Constitutional: He appears well-developed  No distress  Musculoskeletal:   Normal movement of LUE   Skin:   Small circular open wound, superficial and appearing to heal very well left lower dorsal forearm  surrounding tissue is pink-red in color but no severe erythema or warmth  Swelling in fingers, wrist and forearm has significantly improved and almost nonexistent  Drainage from wound is minimal blood, thin clear-yellow  Psychiatric: He has a normal mood and affect  Vitals reviewed        Vitals:    03/19/19 1307   BP: 126/78   BP Location: Left arm   Patient Position: Sitting   Cuff Size: Adult   Pulse: 104   Resp: 15   Temp: (!) 97 4 °F (36 3 °C)   TempSrc: Tympanic   SpO2: 96%   Weight: 119 kg (261 lb 14 4 oz)   Height: 5' 9 5" (1 765 m)

## 2019-03-25 ENCOUNTER — OFFICE VISIT (OUTPATIENT)
Dept: FAMILY MEDICINE CLINIC | Facility: CLINIC | Age: 74
End: 2019-03-25
Payer: MEDICARE

## 2019-03-25 VITALS
WEIGHT: 260.7 LBS | OXYGEN SATURATION: 86 % | HEIGHT: 68 IN | TEMPERATURE: 98.8 F | HEART RATE: 84 BPM | SYSTOLIC BLOOD PRESSURE: 120 MMHG | DIASTOLIC BLOOD PRESSURE: 70 MMHG | BODY MASS INDEX: 39.51 KG/M2 | RESPIRATION RATE: 16 BRPM

## 2019-03-25 DIAGNOSIS — L03.114 CELLULITIS OF LEFT FOREARM: ICD-10-CM

## 2019-03-25 DIAGNOSIS — A49.02 MRSA INFECTION: Primary | ICD-10-CM

## 2019-03-25 PROCEDURE — 99213 OFFICE O/P EST LOW 20 MIN: CPT | Performed by: PHYSICIAN ASSISTANT

## 2019-03-25 NOTE — PROGRESS NOTES
Assessment/Plan:      Diagnoses and all orders for this visit:    MRSA infection    Cellulitis of left forearm    Other orders  -     Cancel: Hepatitis C antibody; Future      patient is a 70-year-old male accompanied by wife today for another follow-up at my request for open wound/cellulitis with positive MRSA culture  He is doing well on additional Bactrim and will complete his course in a few days  The wound itself is healing very well and I am very pleased with his progression  He has no pain, just itching of the area  No drainage or evidence of persistent or worsening infection  I advised wife to continue keeping the area clean with soap and water, drying thoroughly, neosporin and keeping the area covered until it is completely healed and closed  Follow-up as needed, or as scheduled for routine f/u with Dr Mor Morse  Chief Complaint   Patient presents with    Follow-up     pt here for a recheck on his wound       Subjective:     Patient ID: Jeannine Adler is a 68 y o  male     64y/o male here today for f/u to MRSA left forearm  Overall doing well and healing well, no drainage  His knee wound has healing  No pain in wound, just itchy  Review of Systems   Constitutional: Negative  Negative for fatigue and fever  Musculoskeletal: Negative  Skin:        As in HPI         The following portions of the patient's history were reviewed and updated as appropriate: allergies, current medications, past family history, past medical history, past social history, past surgical history and problem list       Objective:     Physical Exam   Constitutional: He appears well-developed  No distress  Musculoskeletal:   Normal movement of left wrist and fingers  Skin:   Left forearm open wound/cellulitis almost completed healed  Small dime-sized opening, no drainage  Surrounding pink tissue, no significant redness, warmth or swelling  Vitals reviewed        Vitals:    03/25/19 1515   BP: 120/70   BP Location: Left arm   Patient Position: Sitting   Cuff Size: Adult   Pulse: 84   Resp: 16   Temp: 98 8 °F (37 1 °C)   TempSrc: Tympanic   SpO2: (!) 86%   Weight: 118 kg (260 lb 11 2 oz)   Height: 5' 8 2" (1 732 m)

## 2019-03-28 ENCOUNTER — TELEPHONE (OUTPATIENT)
Dept: FAMILY MEDICINE CLINIC | Facility: CLINIC | Age: 74
End: 2019-03-28

## 2019-03-28 NOTE — TELEPHONE ENCOUNTER
Pt's spouse called stating pt she came home from her Dr appointment and pt was very hot to touch  She said she took his temp under his arm and it is 99 9  She said pt's is also stating his throat hurts, and she isn't sure if the fever is from the wound on his arm he is being treated for, or if it is for phenomena as he has had that in the past    Spoke with diana, and without seeing him it is hard to say what is causing the fever  Spoke with Josette Galaviz, advised her without seeing him we can't tell her what is causing the fever  Josetteadolfo Galaviz is concerned about his temp and said she is going to call the ambulance to take him to the ER to be checked out  She said she is calling the ambulance because she can't handle him by herself and he will get looked at faster going in that way   Advised I would get a message to the physician to let her know he was going to the hospital

## 2019-03-30 NOTE — TELEPHONE ENCOUNTER
Patient wife call, consent okay, stating that she had to take patient is at [facility] because of the symptoms that patient is having, patient wife apologize because she miss your call

## 2019-04-01 NOTE — TELEPHONE ENCOUNTER
Ok, last week when I called I didn't see any correspondence from [facility] as of yet but now I see it in system   Note reviewed

## 2019-04-05 ENCOUNTER — TRANSITIONAL CARE MANAGEMENT (OUTPATIENT)
Dept: FAMILY MEDICINE CLINIC | Facility: CLINIC | Age: 74
End: 2019-04-05

## 2019-05-09 ENCOUNTER — TELEPHONE (OUTPATIENT)
Dept: FAMILY MEDICINE CLINIC | Facility: CLINIC | Age: 74
End: 2019-05-09

## 2019-05-11 ENCOUNTER — OFFICE VISIT (OUTPATIENT)
Dept: FAMILY MEDICINE CLINIC | Facility: CLINIC | Age: 74
End: 2019-05-11
Payer: MEDICARE

## 2019-05-11 VITALS
RESPIRATION RATE: 18 BRPM | WEIGHT: 264 LBS | SYSTOLIC BLOOD PRESSURE: 100 MMHG | OXYGEN SATURATION: 90 % | HEART RATE: 80 BPM | HEIGHT: 68 IN | TEMPERATURE: 98.2 F | BODY MASS INDEX: 40.01 KG/M2 | DIASTOLIC BLOOD PRESSURE: 70 MMHG

## 2019-05-11 DIAGNOSIS — E11.9 DIABETES MELLITUS WITHOUT COMPLICATION (HCC): ICD-10-CM

## 2019-05-11 DIAGNOSIS — L89.320 DECUBITUS ULCER OF LEFT BUTTOCK, UNSTAGEABLE (HCC): ICD-10-CM

## 2019-05-11 DIAGNOSIS — J44.9 COPD, MODERATE (HCC): Primary | ICD-10-CM

## 2019-05-11 DIAGNOSIS — I77.9 BILATERAL CAROTID ARTERY DISEASE, UNSPECIFIED TYPE (HCC): ICD-10-CM

## 2019-05-11 DIAGNOSIS — I25.119 CORONARY ARTERY DISEASE INVOLVING NATIVE HEART WITH ANGINA PECTORIS, UNSPECIFIED VESSEL OR LESION TYPE (HCC): ICD-10-CM

## 2019-05-11 DIAGNOSIS — I48.0 PAROXYSMAL A-FIB (HCC): ICD-10-CM

## 2019-05-11 DIAGNOSIS — E78.2 MIXED HYPERLIPIDEMIA: ICD-10-CM

## 2019-05-11 DIAGNOSIS — M17.11 ARTHRITIS OF RIGHT KNEE: ICD-10-CM

## 2019-05-11 DIAGNOSIS — F41.9 ANXIETY: ICD-10-CM

## 2019-05-11 PROCEDURE — 99214 OFFICE O/P EST MOD 30 MIN: CPT | Performed by: FAMILY MEDICINE

## 2019-05-13 ENCOUNTER — TELEPHONE (OUTPATIENT)
Dept: FAMILY MEDICINE CLINIC | Facility: CLINIC | Age: 74
End: 2019-05-13

## 2019-05-17 ENCOUNTER — TELEPHONE (OUTPATIENT)
Dept: FAMILY MEDICINE CLINIC | Facility: CLINIC | Age: 74
End: 2019-05-17

## 2019-05-21 ENCOUNTER — TELEPHONE (OUTPATIENT)
Dept: FAMILY MEDICINE CLINIC | Facility: CLINIC | Age: 74
End: 2019-05-21

## 2019-05-21 DIAGNOSIS — E78.2 MIXED HYPERLIPIDEMIA: ICD-10-CM

## 2019-05-21 DIAGNOSIS — I10 BENIGN ESSENTIAL HYPERTENSION: Primary | ICD-10-CM

## 2019-05-21 RX ORDER — ATORVASTATIN CALCIUM 80 MG/1
80 TABLET, FILM COATED ORAL DAILY
Qty: 90 TABLET | Refills: 1 | Status: CANCELLED | OUTPATIENT
Start: 2019-05-21

## 2019-05-23 ENCOUNTER — TELEPHONE (OUTPATIENT)
Dept: FAMILY MEDICINE CLINIC | Facility: CLINIC | Age: 74
End: 2019-05-23

## 2019-05-24 ENCOUNTER — OFFICE VISIT (OUTPATIENT)
Dept: FAMILY MEDICINE CLINIC | Facility: CLINIC | Age: 74
End: 2019-05-24
Payer: MEDICARE

## 2019-05-24 VITALS
HEART RATE: 84 BPM | BODY MASS INDEX: 37.41 KG/M2 | TEMPERATURE: 99 F | HEIGHT: 69 IN | RESPIRATION RATE: 17 BRPM | WEIGHT: 252.6 LBS | SYSTOLIC BLOOD PRESSURE: 124 MMHG | DIASTOLIC BLOOD PRESSURE: 80 MMHG | OXYGEN SATURATION: 91 %

## 2019-05-24 DIAGNOSIS — E78.2 MIXED HYPERLIPIDEMIA: ICD-10-CM

## 2019-05-24 DIAGNOSIS — F41.9 ANXIETY: Primary | ICD-10-CM

## 2019-05-24 DIAGNOSIS — J44.9 COPD, MODERATE (HCC): ICD-10-CM

## 2019-05-24 DIAGNOSIS — I48.0 PAROXYSMAL A-FIB (HCC): ICD-10-CM

## 2019-05-24 DIAGNOSIS — F41.8 DEPRESSION WITH ANXIETY: ICD-10-CM

## 2019-05-24 PROCEDURE — 99214 OFFICE O/P EST MOD 30 MIN: CPT | Performed by: FAMILY MEDICINE

## 2019-06-16 ENCOUNTER — TELEPHONE (OUTPATIENT)
Dept: OTHER | Facility: OTHER | Age: 74
End: 2019-06-16

## 2019-06-19 ENCOUNTER — OFFICE VISIT (OUTPATIENT)
Dept: FAMILY MEDICINE CLINIC | Facility: CLINIC | Age: 74
End: 2019-06-19
Payer: MEDICARE

## 2019-06-19 VITALS
SYSTOLIC BLOOD PRESSURE: 124 MMHG | TEMPERATURE: 97.6 F | WEIGHT: 248.25 LBS | BODY MASS INDEX: 36.66 KG/M2 | OXYGEN SATURATION: 92 % | DIASTOLIC BLOOD PRESSURE: 76 MMHG | HEART RATE: 74 BPM | RESPIRATION RATE: 16 BRPM

## 2019-06-19 DIAGNOSIS — L03.115 CELLULITIS OF RIGHT LOWER EXTREMITY: Primary | ICD-10-CM

## 2019-06-19 PROCEDURE — 99213 OFFICE O/P EST LOW 20 MIN: CPT | Performed by: FAMILY MEDICINE

## 2019-06-19 RX ORDER — CEPHALEXIN 500 MG/1
500 CAPSULE ORAL EVERY 8 HOURS SCHEDULED
Qty: 21 CAPSULE | Refills: 0 | Status: SHIPPED | OUTPATIENT
Start: 2019-06-19 | End: 2019-06-26

## 2019-08-01 ENCOUNTER — OFFICE VISIT (OUTPATIENT)
Dept: FAMILY MEDICINE CLINIC | Facility: CLINIC | Age: 74
End: 2019-08-01
Payer: MEDICARE

## 2019-08-01 VITALS
BODY MASS INDEX: 38.71 KG/M2 | DIASTOLIC BLOOD PRESSURE: 68 MMHG | TEMPERATURE: 97.8 F | HEART RATE: 89 BPM | OXYGEN SATURATION: 92 % | SYSTOLIC BLOOD PRESSURE: 124 MMHG | WEIGHT: 262.13 LBS | RESPIRATION RATE: 16 BRPM

## 2019-08-01 DIAGNOSIS — R60.0 LOCALIZED EDEMA: ICD-10-CM

## 2019-08-01 DIAGNOSIS — F32.A DEPRESSION, UNSPECIFIED DEPRESSION TYPE: ICD-10-CM

## 2019-08-01 DIAGNOSIS — Z01.818 PREOP EXAMINATION: Primary | ICD-10-CM

## 2019-08-01 DIAGNOSIS — E78.2 MIXED HYPERLIPIDEMIA: ICD-10-CM

## 2019-08-01 DIAGNOSIS — M17.11 ARTHRITIS OF RIGHT KNEE: ICD-10-CM

## 2019-08-01 DIAGNOSIS — Z12.11 SCREENING FOR COLON CANCER: ICD-10-CM

## 2019-08-01 DIAGNOSIS — I10 ESSENTIAL HYPERTENSION: ICD-10-CM

## 2019-08-01 PROCEDURE — 1123F ACP DISCUSS/DSCN MKR DOCD: CPT | Performed by: FAMILY MEDICINE

## 2019-08-01 PROCEDURE — 1124F ACP DISCUSS-NO DSCNMKR DOCD: CPT | Performed by: FAMILY MEDICINE

## 2019-08-01 PROCEDURE — 99213 OFFICE O/P EST LOW 20 MIN: CPT | Performed by: FAMILY MEDICINE

## 2019-08-01 RX ORDER — PAROXETINE HYDROCHLORIDE 20 MG/1
20 TABLET, FILM COATED ORAL DAILY
Qty: 90 TABLET | Refills: 1 | Status: SHIPPED | OUTPATIENT
Start: 2019-08-01 | End: 2020-01-22 | Stop reason: SDUPTHER

## 2019-08-01 RX ORDER — TRIAMTERENE AND HYDROCHLOROTHIAZIDE 37.5; 25 MG/1; MG/1
1 CAPSULE ORAL DAILY
Qty: 90 CAPSULE | Refills: 1 | Status: SHIPPED | OUTPATIENT
Start: 2019-08-01 | End: 2020-01-22 | Stop reason: SDUPTHER

## 2019-08-01 RX ORDER — FUROSEMIDE 20 MG/1
40 TABLET ORAL DAILY
Qty: 180 TABLET | Refills: 1 | Status: SHIPPED | OUTPATIENT
Start: 2019-08-01 | End: 2020-01-22 | Stop reason: SDUPTHER

## 2019-08-01 RX ORDER — PRAVASTATIN SODIUM 80 MG/1
80 TABLET ORAL DAILY
Qty: 90 TABLET | Refills: 1 | Status: SHIPPED | OUTPATIENT
Start: 2019-08-01 | End: 2020-01-22 | Stop reason: SDUPTHER

## 2019-08-01 NOTE — ASSESSMENT & PLAN NOTE
Patient presents for preoperative clearance for upcoming right total knee arthroplasty on August 15th by Dr Louisa Colunga  Other than knee pain, patient doing well today  He is in the process of obtaining cardiac clearance through his cardiologist, Dr Marlee Brito  Has stress test and echocardiogram scheduled in near future  Exam today is unremarkable  I reviewed PAT results from July 18th including CBC, CMP, PT/PTT, and EKG      PATIENT IS MEDICALLY CLEARED FOR UPCOMING SURGERY ON AUGUST 15TH (cardiology clearance will come from Dr Marlee Brito' office)

## 2019-08-01 NOTE — LETTER
2019     Noel Hinkle MD  C/Huey Leung    Patient: Ani Garcia   YOB: 1945   Date of Visit: 2019       Dear Dr Anjana Joy:    Thank you for referring Manav Lovett to me for evaluation  Below are my notes for this consultation  If you have questions, please do not hesitate to call me  I look forward to following your patient along with you  Sincerely,        Joseph Donato DO        CC: No Recipients  Joseph Donato DO  2019  1:26 PM  Incomplete  [facility]      NAME: Ani Garcia  AGE: 76 y o  SEX: male  : 1945   MRN: [id number]    DATE: 2019  TIME: 1:26 PM    Assessment and Plan     Problem List Items Addressed This Visit     Arthritis of right knee     Patient presents for preoperative clearance for upcoming right total knee arthroplasty on  by Dr Butch Spence  Other than knee pain, patient doing well today  He is in the process of obtaining cardiac clearance through his cardiologist, Dr Kait Drake  Has stress test and echocardiogram scheduled in near future  Exam today is unremarkable  I reviewed PAT results from  including CBC, CMP, PT/PTT, and EKG  PATIENT IS MEDICALLY CLEARED FOR UPCOMING SURGERY ON  (cardiology clearance will come from Dr Kait Drake' office)           Other Visit Diagnoses     Preop examination    -  Primary    Screening for colon cancer        Relevant Orders    Ambulatory referral to Gastroenterology              Return to office in: keep next regularly scheduled appt    Chief Complaint     Chief Complaint   Patient presents with    Pre-op Exam     Total R knee replacement on 8/15/19 with Dr Brianna Escobar  Pt had PATS done on 19       History of Present Illness     Patient presents for preoperative clearance for upcoming right total knee arthroplasty on  by Dr Butch Spence    Other than knee pain, patient doing well today  He is in the process of obtaining cardiac clearance through his cardiologist, Dr Wenceslao Logan  Has stress test and echocardiogram scheduled in near future  The following portions of the patient's history were reviewed and updated as appropriate: allergies, current medications, past family history, past medical history, past social history, past surgical history and problem list     Review of Systems   Review of Systems   Respiratory: Negative  Cardiovascular: Negative  Gastrointestinal: Negative  Genitourinary: Negative  Musculoskeletal: Positive for arthralgias  Active Problem List     Patient Active Problem List   Diagnosis    Arthritis    Paroxysmal A-fib ([address] )    Benign essential hypertension    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)    COPD, moderate (HCC)    Dyspnea    Elevated PSA    Esophagitis, reflux    Extrinsic asthma    Gait disturbance    Hypoxia    Leukocytosis    Localized osteoarthritis of right knee    Lymphedema    Mixed hyperlipidemia    Obstructive sleep apnea    Venous insufficiency    Contusion of right hand    Other dysphagia    Aspiration into respiratory tract    Age-related cataract of both eyes    Cataract of both eyes    Depression with anxiety    Aspiration pneumonia of left lower lobe (HCC)    Pressure injury of skin of sacral region    Cellulitis of right lower extremity    Arthritis of right knee    Decubitus ulcer of left buttock, unstageable (HCC)    Bilateral carotid artery disease (HCC)    Diabetes mellitus without complication (HCC)       Objective   /68 (BP Location: Left arm, Patient Position: Sitting, Cuff Size: Large)   Pulse 89   Temp 97 8 °F (36 6 °C) (Tympanic)   Resp 16   Wt 119 kg (262 lb 2 oz)   SpO2 92%   BMI 38 71 kg/m²      Physical Exam   Cardiovascular: Normal rate, regular rhythm, normal heart sounds and intact distal pulses     Carotids: no bruits  Ext: no edema Pulmonary/Chest: Effort normal  No respiratory distress  He has no wheezes  He has no rales  Psychiatric: He has a normal mood and affect   His behavior is normal  Thought content normal        Pertinent Laboratory/Diagnostic Studies:  PAT results    Current Medications     Current Outpatient Medications:     aspirin (ASPIR-81) 81 mg EC tablet, Take 1 tablet by mouth daily, Disp: , Rfl:     famotidine (PEPCID) 40 MG tablet, TAKE 1 TABLET BY MOUTH  TWICE A DAY, Disp: 180 tablet, Rfl: 0    fluticasone furoate-vilanterol (BREO ELLIPTA), Inhale 1 puff Daily  , Disp: , Rfl:     furosemide (LASIX) 20 mg tablet, TAKE 2 TABLETS BY MOUTH  DAILY, Disp: 180 tablet, Rfl: 1    glucosamine 500 MG CAPS capsule, Take 500 mg by mouth daily  , Disp: , Rfl:     Multiple Vitamins-Minerals (EQ COMPLETE MULTIVITAMIN-ADULT) TABS, Take 1 tablet by mouth daily , Disp: , Rfl:     PARoxetine (PAXIL) 20 mg tablet, TAKE 1 TABLET BY MOUTH  DAILY, Disp: 90 tablet, Rfl: 1    pravastatin (PRAVACHOL) 80 mg tablet, TAKE 1 TABLET BY MOUTH  DAILY, Disp: 90 tablet, Rfl: 1    Respiratory Therapy Supplies (NEBULIZER AIR TUBE/PLUGS) MISC, by Does not apply route, Disp: , Rfl:     Respiratory Therapy Supplies (NEBULIZER/ADULT MASK) KIT, by Does not apply route, Disp: , Rfl:     triamterene-hydrochlorothiazide (DYAZIDE) 37 5-25 mg per capsule, TAKE 1 CAPSULE BY MOUTH  DAILY, Disp: 90 capsule, Rfl: 1    ascorbic acid (VITAMIN C) 500 mg tablet, Take 1 tablet (500 mg total) by mouth daily for 30 days, Disp: 30 tablet, Rfl: 0    ferrous sulfate 324 (65 Fe) mg, Take 1 tablet (324 mg total) by mouth 2 (two) times a day before meals for 30 days, Disp: 60 tablet, Rfl: 0    folic acid (FOLVITE) 1 mg tablet, Take 1 tablet (1 mg total) by mouth daily for 30 days, Disp: 30 tablet, Rfl: 0    mupirocin (BACTROBAN) 2 % ointment, Apply topically 2 (two) times a day, Disp: , Rfl:     Health Maintenance     Health Maintenance   Topic Date Due    Hepatitis C Screening  1945    URINE MICROALBUMIN  06/16/1955    BMI: Followup Plan  06/16/1963    CRC Screening: Colonoscopy  06/12/2019    HEMOGLOBIN A1C  07/15/2019    INFLUENZA VACCINE  10/01/2019 (Originally 7/1/2019)    Diabetic Foot Exam  12/19/2019 (Originally 6/16/1955)    HEPATITIS B VACCINES (1 of 3 - Risk 3-dose series) 06/19/2020 (Originally 6/16/1964)    DM Eye Exam  08/01/2020 (Originally 6/16/1955)    Fall Risk  11/26/2019    Medicare Annual Wellness Visit (AWV)  11/26/2019    BMI: Adult  06/19/2020    DTaP,Tdap,and Td Vaccines (2 - Td) 02/18/2027    Pneumococcal Vaccine: 65+ Years  Completed    Pneumococcal Vaccine: Pediatrics (0 to 5 Years) and At-Risk Patients (6 to 59 Years)  Aged [employer] History   Administered Date(s) Administered    H1N1, All Formulations 04/02/2010    INFLUENZA 10/08/2015, 10/12/2016, 10/20/2016, 10/30/2017, 11/01/2017    Influenza Split High Dose Preservative Free IM 10/30/2014, 10/08/2015, 10/12/2016, 10/20/2016, 10/30/2017    Influenza TIV (IM) 10/16/2013    Influenza, high dose seasonal 0 5 mL 10/04/2018    Pneumococcal Conjugate 13-Valent 03/26/2015    Pneumococcal Polysaccharide PPV23 10/16/2013    Tdap 02/18/2017       Kyrie Wick DO  [facility]

## 2019-08-01 NOTE — PROGRESS NOTES
[facility]      NAME: Jayy   AGE: 76 y o  SEX: male  : 1945   MRN: [id number]    DATE: 2019  TIME: 1:29 PM    Assessment and Plan     Problem List Items Addressed This Visit     Arthritis of right knee     Patient presents for preoperative clearance for upcoming right total knee arthroplasty on  by Dr Brant Rios  Other than knee pain, patient doing well today  He is in the process of obtaining cardiac clearance through his cardiologist, Dr Jacob Sow  Has stress test and echocardiogram scheduled in near future  Exam today is unremarkable  I reviewed PAT results from  including CBC, CMP, PT/PTT, and EKG  PATIENT IS MEDICALLY CLEARED FOR UPCOMING SURGERY ON  (cardiology clearance will come from Dr Jacob Sow' office)           Other Visit Diagnoses     Preop examination    -  Primary    Screening for colon cancer        Relevant Orders    Ambulatory referral to Gastroenterology        Chronic medical conditions were reviewed and are stable  All medications were reviewed today's visit  Return to office in: keep next regularly scheduled appt    Chief Complaint     Chief Complaint   Patient presents with    Pre-op Exam     Total R knee replacement on 8/15/19 with Dr Bailey Armando  Pt had PATS done on 19       History of Present Illness     Patient presents for preoperative clearance for upcoming right total knee arthroplasty on  by Dr Brant Rios  Other than knee pain, patient doing well today  He is in the process of obtaining cardiac clearance through his cardiologist, Dr Jacob Sow  Has stress test and echocardiogram scheduled in near future        The following portions of the patient's history were reviewed and updated as appropriate: allergies, current medications, past family history, past medical history, past social history, past surgical history and problem list     Review of Systems   Review of Systems   Respiratory: Negative  Cardiovascular: Negative  Gastrointestinal: Negative  Genitourinary: Negative  Musculoskeletal: Positive for arthralgias  Active Problem List     Patient Active Problem List   Diagnosis    Arthritis    Paroxysmal A-fib ([address] )    Benign essential hypertension    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)    COPD, moderate (HCC)    Dyspnea    Elevated PSA    Esophagitis, reflux    Extrinsic asthma    Gait disturbance    Hypoxia    Leukocytosis    Localized osteoarthritis of right knee    Lymphedema    Mixed hyperlipidemia    Obstructive sleep apnea    Venous insufficiency    Contusion of right hand    Other dysphagia    Aspiration into respiratory tract    Age-related cataract of both eyes    Cataract of both eyes    Depression with anxiety    Aspiration pneumonia of left lower lobe (HCC)    Pressure injury of skin of sacral region    Cellulitis of right lower extremity    Arthritis of right knee    Decubitus ulcer of left buttock, unstageable (HCC)    Bilateral carotid artery disease (HCC)    Diabetes mellitus without complication (HCC)       Objective   /68 (BP Location: Left arm, Patient Position: Sitting, Cuff Size: Large)   Pulse 89   Temp 97 8 °F (36 6 °C) (Tympanic)   Resp 16   Wt 119 kg (262 lb 2 oz)   SpO2 92%   BMI 38 71 kg/m²     Physical Exam   Cardiovascular: Normal rate, regular rhythm, normal heart sounds and intact distal pulses  Carotids: no bruits  Ext: no edema   Pulmonary/Chest: Effort normal  No respiratory distress  He has no wheezes  He has no rales  Psychiatric: He has a normal mood and affect   His behavior is normal  Thought content normal        Pertinent Laboratory/Diagnostic Studies:  PAT results    Current Medications     Current Outpatient Medications:     aspirin (ASPIR-81) 81 mg EC tablet, Take 1 tablet by mouth daily, Disp: , Rfl:     famotidine (PEPCID) 40 MG tablet, TAKE 1 TABLET BY MOUTH  TWICE A DAY, Disp: 180 tablet, Rfl: 0    fluticasone furoate-vilanterol (BREO ELLIPTA), Inhale 1 puff Daily  , Disp: , Rfl:     furosemide (LASIX) 20 mg tablet, TAKE 2 TABLETS BY MOUTH  DAILY, Disp: 180 tablet, Rfl: 1    glucosamine 500 MG CAPS capsule, Take 500 mg by mouth daily  , Disp: , Rfl:     Multiple Vitamins-Minerals (EQ COMPLETE MULTIVITAMIN-ADULT) TABS, Take 1 tablet by mouth daily , Disp: , Rfl:     PARoxetine (PAXIL) 20 mg tablet, TAKE 1 TABLET BY MOUTH  DAILY, Disp: 90 tablet, Rfl: 1    pravastatin (PRAVACHOL) 80 mg tablet, TAKE 1 TABLET BY MOUTH  DAILY, Disp: 90 tablet, Rfl: 1    Respiratory Therapy Supplies (NEBULIZER AIR TUBE/PLUGS) MISC, by Does not apply route, Disp: , Rfl:     Respiratory Therapy Supplies (NEBULIZER/ADULT MASK) KIT, by Does not apply route, Disp: , Rfl:     triamterene-hydrochlorothiazide (DYAZIDE) 37 5-25 mg per capsule, TAKE 1 CAPSULE BY MOUTH  DAILY, Disp: 90 capsule, Rfl: 1    ascorbic acid (VITAMIN C) 500 mg tablet, Take 1 tablet (500 mg total) by mouth daily for 30 days, Disp: 30 tablet, Rfl: 0    ferrous sulfate 324 (65 Fe) mg, Take 1 tablet (324 mg total) by mouth 2 (two) times a day before meals for 30 days, Disp: 60 tablet, Rfl: 0    folic acid (FOLVITE) 1 mg tablet, Take 1 tablet (1 mg total) by mouth daily for 30 days, Disp: 30 tablet, Rfl: 0    mupirocin (BACTROBAN) 2 % ointment, Apply topically 2 (two) times a day, Disp: , Rfl:     Health Maintenance     Health Maintenance   Topic Date Due    Hepatitis C Screening  1945    URINE MICROALBUMIN  06/16/1955    BMI: Followup Plan  06/16/1963    CRC Screening: Colonoscopy  06/12/2019    HEMOGLOBIN A1C  07/15/2019    INFLUENZA VACCINE  10/01/2019 (Originally 7/1/2019)    Diabetic Foot Exam  12/19/2019 (Originally 6/16/1955)    HEPATITIS B VACCINES (1 of 3 - Risk 3-dose series) 06/19/2020 (Originally 6/16/1964)    DM Eye Exam  08/01/2020 (Originally 6/16/1955)    Fall Risk  11/26/2019  Medicare Annual Wellness Visit (AWV)  11/26/2019    BMI: Adult  06/19/2020    DTaP,Tdap,and Td Vaccines (2 - Td) 02/18/2027    Pneumococcal Vaccine: 65+ Years  Completed    Pneumococcal Vaccine: Pediatrics (0 to 5 Years) and At-Risk Patients (6 to 59 Years)  Aged [employer] History   Administered Date(s) Administered    H1N1, All Formulations 04/02/2010    INFLUENZA 10/08/2015, 10/12/2016, 10/20/2016, 10/30/2017, 11/01/2017    Influenza Split High Dose Preservative Free IM 10/30/2014, 10/08/2015, 10/12/2016, 10/20/2016, 10/30/2017    Influenza TIV (IM) 10/16/2013    Influenza, high dose seasonal 0 5 mL 10/04/2018    Pneumococcal Conjugate 13-Valent 03/26/2015    Pneumococcal Polysaccharide PPV23 10/16/2013    Tdap 02/18/2017       Nino Butler DO  [facility]

## 2019-08-05 DIAGNOSIS — K21.9 GASTROESOPHAGEAL REFLUX DISEASE WITHOUT ESOPHAGITIS: ICD-10-CM

## 2019-08-05 RX ORDER — FAMOTIDINE 40 MG/1
TABLET, FILM COATED ORAL
Qty: 180 TABLET | Refills: 0 | Status: SHIPPED | OUTPATIENT
Start: 2019-08-05 | End: 2020-01-22 | Stop reason: SDUPTHER

## 2019-08-09 ENCOUNTER — TELEPHONE (OUTPATIENT)
Dept: FAMILY MEDICINE CLINIC | Facility: CLINIC | Age: 74
End: 2019-08-09

## 2019-08-09 NOTE — TELEPHONE ENCOUNTER
JAN from [facility], looking for last office note stating that patient was still using oxygen  Faxed her office note from 5/11/19 to [phone]    She will fax us form for medical necessity, I told her to fax it to [phone]

## 2019-08-26 DIAGNOSIS — J45.20 MILD INTERMITTENT EXTRINSIC ASTHMA WITHOUT COMPLICATION: Primary | ICD-10-CM

## 2019-08-26 RX ORDER — ALBUTEROL SULFATE 90 UG/1
2 AEROSOL, METERED RESPIRATORY (INHALATION) EVERY 6 HOURS PRN
Qty: 3 INHALER | Refills: 1 | Status: SHIPPED | OUTPATIENT
Start: 2019-08-26 | End: 2022-02-08 | Stop reason: HOSPADM

## 2019-09-16 ENCOUNTER — TELEPHONE (OUTPATIENT)
Dept: FAMILY MEDICINE CLINIC | Facility: CLINIC | Age: 74
End: 2019-09-16

## 2019-09-16 NOTE — TELEPHONE ENCOUNTER
Phone call from Bakari Anna at [facility] stating that insurance won't pay for PRN O2    Requesting you edit note from 5/11/19 and remove "PRN" O2

## 2019-10-04 NOTE — TELEPHONE ENCOUNTER
Faxed addended office note from 5/11/19 to Brandy Rodríguez at [address] to [phone]  Phone number [phone]

## 2019-10-28 ENCOUNTER — TELEPHONE (OUTPATIENT)
Dept: FAMILY MEDICINE CLINIC | Facility: CLINIC | Age: 74
End: 2019-10-28

## 2019-10-28 DIAGNOSIS — J45.20 MILD INTERMITTENT EXTRINSIC ASTHMA WITHOUT COMPLICATION: Primary | ICD-10-CM

## 2019-10-28 NOTE — TELEPHONE ENCOUNTER
Anastasia Florentino called in and stated pt was currently released from [facility] after knee replacement:     Pt is currently not taking the following meds:  - triamterene-hydrocholorthiazide (dyazide) 37 5-25mg  Takes 1 capsule by mouth dailty   Qty: 90 capsules  - furosemide (lasix) 20mg tablets   Takes 2 tablets by mouth daily   Qty: 180 tablets  -- She wanted to know if you would like him to start taking again? She also stated he was given Breo Inhaler and can not afford, is there any other cheaper suggestions or would you like him to continue taking? And he was also given an Albuterol Inhaler but they are taking nebulizer treatments instead because his insurance will pay for this  Is that okay to replace?

## 2019-10-28 NOTE — TELEPHONE ENCOUNTER
Please call patient  I am switching his BREO  Inhaler to Advair ( this should be cheaper)  As far as his  Nebulizer treatments go, he can continue using these as needed      Recommend keep next regularly scheduled upcoming appointment with me    Call if further problems

## 2019-10-29 NOTE — TELEPHONE ENCOUNTER
I spoke to patient and informed him we sent rx for Advair  He does not have an appt at this time and will call us back to schedule  He's currently having therapy for his knee  Told him to call us if he needs us

## 2019-10-30 ENCOUNTER — TRANSITIONAL CARE MANAGEMENT (OUTPATIENT)
Dept: FAMILY MEDICINE CLINIC | Facility: CLINIC | Age: 74
End: 2019-10-30

## 2019-10-30 ENCOUNTER — OFFICE VISIT (OUTPATIENT)
Dept: FAMILY MEDICINE CLINIC | Facility: CLINIC | Age: 74
End: 2019-10-30
Payer: MEDICARE

## 2019-10-30 VITALS
BODY MASS INDEX: 37.18 KG/M2 | HEART RATE: 88 BPM | TEMPERATURE: 98 F | HEIGHT: 69 IN | WEIGHT: 251 LBS | RESPIRATION RATE: 18 BRPM | SYSTOLIC BLOOD PRESSURE: 120 MMHG | OXYGEN SATURATION: 92 % | DIASTOLIC BLOOD PRESSURE: 80 MMHG

## 2019-10-30 DIAGNOSIS — E11.9 DIABETES MELLITUS WITHOUT COMPLICATION (HCC): ICD-10-CM

## 2019-10-30 DIAGNOSIS — G47.33 OBSTRUCTIVE SLEEP APNEA: ICD-10-CM

## 2019-10-30 DIAGNOSIS — R09.02 HYPOXIA: ICD-10-CM

## 2019-10-30 DIAGNOSIS — Z13.0 SCREENING FOR DEFICIENCY ANEMIA: ICD-10-CM

## 2019-10-30 DIAGNOSIS — J44.9 COPD, MODERATE (HCC): ICD-10-CM

## 2019-10-30 DIAGNOSIS — B02.9 HERPES ZOSTER WITHOUT COMPLICATION: ICD-10-CM

## 2019-10-30 DIAGNOSIS — J45.20 MILD INTERMITTENT EXTRINSIC ASTHMA WITHOUT COMPLICATION: ICD-10-CM

## 2019-10-30 DIAGNOSIS — M17.11 ARTHRITIS OF RIGHT KNEE: Primary | ICD-10-CM

## 2019-10-30 DIAGNOSIS — F41.8 DEPRESSION WITH ANXIETY: ICD-10-CM

## 2019-10-30 PROCEDURE — 99496 TRANSJ CARE MGMT HIGH F2F 7D: CPT | Performed by: FAMILY MEDICINE

## 2019-10-30 RX ORDER — VALACYCLOVIR HYDROCHLORIDE 1 G/1
1000 TABLET, FILM COATED ORAL 3 TIMES DAILY
Qty: 21 TABLET | Refills: 0 | Status: SHIPPED | OUTPATIENT
Start: 2019-10-30 | End: 2021-11-12

## 2019-10-30 NOTE — ASSESSMENT & PLAN NOTE
Recently switched from Breo  To Advair 250 due to cost   Patient will report if any problems otherwise will keep regular follow-up

## 2019-10-30 NOTE — ASSESSMENT & PLAN NOTE
Patient experience hypoxia in hospital which was remedied by CPAP and supplemental oxygen  Current pulse oximetry is 92% on room air    Patient uses oxygen at night

## 2019-10-30 NOTE — ASSESSMENT & PLAN NOTE
This is a transition of care management visit for patient who was initially admitted to [facility] on August 15th for a right total knee replacement  Patient underwent replacement on the 15th postoperatively he was briefly hypotensive but improved with intravenous fluids  Hospitalist and pulmonologist were consulted to manage his multiple comorbidities  Patient experienced desaturations which improved with supplemental oxygen   And CPAP  Sleep study showed an AHI of 8 4  Patient was stabilized and transferred to [facility] for continued rehabilitation  Patient continued to slowly improve and was discharged on October 26th in stable condition  Pain has been stable    Patient will be seeing his surgeon next week for follow-up

## 2019-10-30 NOTE — PROGRESS NOTES
[facility]      NAME: Drake Be  AGE: 76 y o  SEX: male  : 1945   MRN: [id number]    DATE: 10/30/2019  TIME: 1:42 PM  TCM Call (since 2019)     Date and time call was made  10/29/2019  1:06 PM    Patient was hospitialized at  Other (comment)    [facility]     Date of Admission  19    Date of discharge  10/26/19    Diagnosis  chronic respiratory failure     Disposition  Home    Were the patients medications reviewed and updated  Yes    Current Symptoms  None      TCM Call (since 2019)     Post hospital issues  Reduced activity    Should patient be enrolled in anticoag monitoring? No    Scheduled for follow up? Yes    Did you obtain your prescribed medications  Yes    Do you need help managing your prescriptions or medications  Yes    Why type of assitance do you need  wife manages meds    Is transportation to your appointment needed  No    I have advised the patient to call PCP with any new or worsening symptoms  Julio France LPN     Living Arrangements  Spouse or Significiant other    Are you recieving any outpatient services  No    Are you recieving home care services  No    Are you using any community resources  No    Current waiver services  No    Have you fallen in the last 12 months  No    Interperter language line needed  No        Assessment and Plan     Problem List Items Addressed This Visit     COPD, moderate ([address] )      Recently switched from [address] 250 due to cost   Patient will report if any problems otherwise will keep regular follow-up         Relevant Medications    fluticasone-salmeterol (ADVAIR, [address]) 250-50 mcg/dose inhaler    Extrinsic asthma    Relevant Medications    fluticasone-salmeterol (ADVAIR, [address]) 250-50 mcg/dose inhaler    Hypoxia      Patient experience hypoxia in hospital which was remedied by CPAP and supplemental oxygen  Current pulse oximetry is 92% on room air    Patient uses oxygen at night Obstructive sleep apnea     (see  Hypoxia)  Patient will be going for repeat sleep study in the near future to see if he needs to have a CPAP machine reissued to him         Depression with anxiety      Doing well on Paxil 20 mg daily         Arthritis of right knee - Primary     This is a transition of care management visit for patient who was initially admitted to [facility] on August 15th for a right total knee replacement  Patient underwent replacement on the 15th postoperatively he was briefly hypotensive but improved with intravenous fluids  Hospitalist and pulmonologist were consulted to manage his multiple comorbidities  Patient experienced desaturations which improved with supplemental oxygen   And CPAP  Sleep study showed an AHI of 8 4  Patient was stabilized and transferred to [facility] for continued rehabilitation  Patient continued to slowly improve and was discharged on October 26th in stable condition  Pain has been stable  Patient will be seeing his surgeon next week for follow-up              Diabetes mellitus without complication ([facility] [address] )       Lab Results   Component Value Date    HGBA1C 6 7 (H) 01/15/2019    has been under good control with diet control  Will continue to monitor         Relevant Orders    Comprehensive metabolic panel    Lipid Panel with Direct LDL reflex    TSH, 3rd generation with Free T4 reflex    Hemoglobin A1c (w/out EAG)    Herpes zoster without complication     starting today, patient noticed a rash on his right flank  It is not painful or itchy  Denies any fevers or chills  Exam reveals vesicular clusters right flank  Suggestive of zoster  Rx given for Valtrex 1 g t i d  x7 days ( patient has normal renal function)    I would like to see him back in 3 weeks             Relevant Medications    valACYclovir (VALTREX) 1,000 mg tablet      Other Visit Diagnoses     Screening for deficiency anemia        Relevant Orders    CBC and differential              Return to office in:  Recheck 3 weeks for shingles recheck    3 mos, fasting blood work prior at [facility] labs (lab orders placed)  Chief Complaint     Chief Complaint   Patient presents with    Transition of Care Management    Rash       History of Present Illness       This is a transition of care management visit for patient who was initially admitted to [facility] on August 15th for a right total knee replacement  Patient underwent replacement on the 15th postoperatively he was briefly hypotensive but improved with intravenous fluids  Hospitalist and pulmonologist were consulted to manage his multiple comorbidities  Patient experienced desaturations which improved with supplemental oxygen   And CPAP  Sleep study showed an AHI of 8 4  Patient was stabilized and transferred to [facility] for continued rehabilitation  Patient continued to slowly improve and was discharged on October 26th in stable condition  starting today, patient noticed a rash on his right flank  It is not painful or itchy  Denies any fevers or chills      The following portions of the patient's history were reviewed and updated as appropriate: allergies, current medications, past family history, past medical history, past social history, past surgical history and problem list     Review of Systems   Review of Systems   Respiratory: Positive for shortness of breath  Cardiovascular: Negative  Gastrointestinal: Negative  Genitourinary: Negative          Active Problem List     Patient Active Problem List   Diagnosis    Arthritis    Paroxysmal A-fib ([address] )    Benign essential hypertension    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)    COPD, moderate (HCC)    Dyspnea    Elevated PSA    Esophagitis, reflux    Extrinsic asthma    Gait disturbance    Hypoxia    Leukocytosis    Localized osteoarthritis of right knee    Lymphedema    Mixed hyperlipidemia    Obstructive sleep apnea    Venous insufficiency    Contusion of right hand    Other dysphagia    Aspiration into respiratory tract    Age-related cataract of both eyes    Cataract of both eyes    Depression with anxiety    Aspiration pneumonia of left lower lobe (HCC)    Pressure injury of skin of sacral region    Cellulitis of right lower extremity    Arthritis of right knee    Decubitus ulcer of left buttock, unstageable (HCC)    Bilateral carotid artery disease (HCC)    Diabetes mellitus without complication (HCC)    Herpes zoster without complication       Objective   /80 (BP Location: Left arm, Patient Position: Sitting, Cuff Size: Large)   Pulse 88   Temp 98 °F (36 7 °C) (Tympanic)   Resp 18   Ht 5' 8 9" (1 75 m)   Wt 114 kg (251 lb)   SpO2 92%   BMI 37 18 kg/m²     Physical Exam   Cardiovascular: Normal rate, regular rhythm, normal heart sounds and intact distal pulses  Carotids: no bruits  Ext: no edema   Pulmonary/Chest: Effort normal  No respiratory distress  He has no wheezes  He has no rales  Psychiatric: He has a normal mood and affect   His behavior is normal  Thought content normal        Pertinent Laboratory/Diagnostic Studies:   hospital records reviewed    Current Medications     Current Outpatient Medications:     albuterol (PROAIR HFA) 90 mcg/act inhaler, Inhale 2 puffs every 6 (six) hours as needed for wheezing, Disp: 3 Inhaler, Rfl: 1    aspirin (ASPIR-81) 81 mg EC tablet, Take 1 tablet by mouth daily, Disp: , Rfl:     famotidine (PEPCID) 40 MG tablet, TAKE 1 TABLET BY MOUTH  TWICE A DAY, Disp: 180 tablet, Rfl: 0    fluticasone-salmeterol (ADVAIR, WIXELA) 250-50 mcg/dose inhaler, Inhale 1 puff 2 (two) times a day Rinse mouth after use , Disp: 3 Inhaler, Rfl: 1    furosemide (LASIX) 20 mg tablet, Take 2 tablets (40 mg total) by mouth daily, Disp: 180 tablet, Rfl: 1    glucosamine 500 MG CAPS capsule, Take 500 mg by mouth daily  , Disp: , Rfl:     Multiple Vitamins-Minerals (EQ COMPLETE MULTIVITAMIN-ADULT) TABS, Take 1 tablet by mouth daily , Disp: , Rfl:     mupirocin (BACTROBAN) 2 % ointment, Apply topically 2 (two) times a day, Disp: , Rfl:     PARoxetine (PAXIL) 20 mg tablet, Take 1 tablet (20 mg total) by mouth daily, Disp: 90 tablet, Rfl: 1    pravastatin (PRAVACHOL) 80 mg tablet, Take 1 tablet (80 mg total) by mouth daily, Disp: 90 tablet, Rfl: 1    Respiratory Therapy Supplies (NEBULIZER AIR TUBE/PLUGS) MISC, by Does not apply route, Disp: , Rfl:     Respiratory Therapy Supplies (NEBULIZER/ADULT MASK) KIT, by Does not apply route, Disp: , Rfl:     triamterene-hydrochlorothiazide (DYAZIDE) 37 5-25 mg per capsule, Take 1 capsule by mouth daily, Disp: 90 capsule, Rfl: 1    ascorbic acid (VITAMIN C) 500 mg tablet, Take 1 tablet (500 mg total) by mouth daily for 30 days, Disp: 30 tablet, Rfl: 0    ferrous sulfate 324 (65 Fe) mg, Take 1 tablet (324 mg total) by mouth 2 (two) times a day before meals for 30 days, Disp: 60 tablet, Rfl: 0    folic acid (FOLVITE) 1 mg tablet, Take 1 tablet (1 mg total) by mouth daily for 30 days, Disp: 30 tablet, Rfl: 0    valACYclovir (VALTREX) 1,000 mg tablet, Take 1 tablet (1,000 mg total) by mouth 3 (three) times a day for 7 days, Disp: 21 tablet, Rfl: 0    Health Maintenance     Health Maintenance   Topic Date Due    Hepatitis C Screening  1945    URINE MICROALBUMIN  06/16/1955    BMI: Followup Plan  06/16/1963    CRC Screening: Colonoscopy  06/12/2019    HEMOGLOBIN A1C  07/15/2019    Diabetic Foot Exam  12/19/2019 (Originally 6/16/1955)    HEPATITIS B VACCINES (1 of 3 - Risk 3-dose series) 06/19/2020 (Originally 6/16/1964)    DM Eye Exam  08/01/2020 (Originally 6/16/1955)    Fall Risk  11/26/2019    Medicare Annual Wellness Visit (AWV)  11/26/2019    BMI: Adult  10/30/2020    DTaP,Tdap,and Td Vaccines (2 - Td) 02/18/2027    INFLUENZA VACCINE  Completed    Pneumococcal Vaccine: 65+ Years  Completed    Pneumococcal Vaccine: Pediatrics (0 to 5 Years) and At-Risk Patients (6 to 59 Years)  Aged [employer] History   Administered Date(s) Administered    H1N1, All Formulations 04/02/2010    INFLUENZA 10/08/2015, 10/12/2016, 10/20/2016, 10/30/2017, 11/01/2017    Influenza Split High Dose Preservative Free IM 10/30/2014, 10/08/2015, 10/12/2016, 10/20/2016, 10/30/2017    Influenza TIV (IM) 10/16/2013    Influenza, high dose seasonal 0 5 mL 10/04/2018, 10/16/2019    Pneumococcal Conjugate 13-Valent 03/26/2015    Pneumococcal Polysaccharide PPV23 10/16/2013    Tdap 02/18/2017       Kevon Galicia DO  [facility]

## 2019-10-30 NOTE — ASSESSMENT & PLAN NOTE
Lab Results   Component Value Date    HGBA1C 6 7 (H) 01/15/2019    has been under good control with diet control    Will continue to monitor

## 2019-10-30 NOTE — ASSESSMENT & PLAN NOTE
starting today, patient noticed a rash on his right flank  It is not painful or itchy  Denies any fevers or chills  Exam reveals vesicular clusters right flank  Suggestive of zoster  Rx given for Valtrex 1 g t i d  x7 days ( patient has normal renal function)    I would like to see him back in 3 weeks

## 2019-10-30 NOTE — ASSESSMENT & PLAN NOTE
(see  Hypoxia)    Patient will be going for repeat sleep study in the near future to see if he needs to have a CPAP machine reissued to him

## 2019-11-11 ENCOUNTER — TELEPHONE (OUTPATIENT)
Dept: FAMILY MEDICINE CLINIC | Facility: CLINIC | Age: 74
End: 2019-11-11

## 2019-11-11 NOTE — TELEPHONE ENCOUNTER
ERIC Welsh from [facility], physical therapist from [place]  He states that patient is on Doxycycline Monohydrate 100 mg BID x 10 days starting 11/8/19 for RLE infection, redness, and 2 skin tears  He has a history of cellulitis  The home care nurse will be taking care of this  He can be reached at [phone] for any questions

## 2019-11-13 ENCOUNTER — OFFICE VISIT (OUTPATIENT)
Dept: FAMILY MEDICINE CLINIC | Facility: CLINIC | Age: 74
End: 2019-11-13
Payer: MEDICARE

## 2019-11-13 VITALS
WEIGHT: 266 LBS | SYSTOLIC BLOOD PRESSURE: 120 MMHG | HEIGHT: 69 IN | TEMPERATURE: 98 F | HEART RATE: 97 BPM | OXYGEN SATURATION: 94 % | BODY MASS INDEX: 39.4 KG/M2 | RESPIRATION RATE: 18 BRPM | DIASTOLIC BLOOD PRESSURE: 80 MMHG

## 2019-11-13 DIAGNOSIS — B02.9 HERPES ZOSTER WITHOUT COMPLICATION: Primary | ICD-10-CM

## 2019-11-13 PROCEDURE — 99213 OFFICE O/P EST LOW 20 MIN: CPT | Performed by: FAMILY MEDICINE

## 2019-11-13 RX ORDER — DOXYCYCLINE 100 MG/1
100 CAPSULE ORAL EVERY 12 HOURS
COMMUNITY
Start: 2019-11-08 | End: 2019-11-18

## 2019-11-13 NOTE — TELEPHONE ENCOUNTER
Phone call 11/11/19 from Kaycee Torres at [facility] [address]  regarding office note from 5/11/19  She states she needs doctor signature on the office note  I printed the office note, had Dr Omar Cabezas sign it, and faxed it to [phone]

## 2019-11-13 NOTE — ASSESSMENT & PLAN NOTE
Patient was seen and diagnosed with zoster on October 30th  Of right flank  Patient was started on Valtrex for 7 days  Rash is almost fully resolved  No pain  No need for further followup

## 2019-12-06 ENCOUNTER — EVALUATION (OUTPATIENT)
Dept: PHYSICAL THERAPY | Facility: CLINIC | Age: 74
End: 2019-12-06
Payer: MEDICARE

## 2019-12-06 DIAGNOSIS — Z96.651 S/P TKR (TOTAL KNEE REPLACEMENT), RIGHT: Primary | ICD-10-CM

## 2019-12-06 PROCEDURE — 97110 THERAPEUTIC EXERCISES: CPT | Performed by: PHYSICAL THERAPIST

## 2019-12-06 PROCEDURE — 97162 PT EVAL MOD COMPLEX 30 MIN: CPT | Performed by: PHYSICAL THERAPIST

## 2019-12-06 NOTE — LETTER
December 10, 2019    Mei Melara PA-C  [address] [facility] [address]    Patient: Maame Powell   YOB: 1945   Date of Visit: 2019     Encounter Diagnosis     ICD-10-CM    1  S/P TKR (total knee replacement), right Q43 [id number]        Dear Dr Brian Lane: Thank you for your recent referral of Maame Powell  Please review the attached evaluation summary from Hussain's recent visit  Please verify that you agree with the plan of care by signing the attached order  If you have any questions or concerns, please do not hesitate to call  I sincerely appreciate the opportunity to share in the care of one of your patients and hope to have another opportunity to work with you in the near future  Sincerely,    Dung Lae, PT      Referring Provider:      I certify that I have read the below Plan of Care and certify the need for these services furnished under this plan of treatment while under my care  Mei Melara PA-C  [address] [facility] [address]  VIA Facsimile: [phone]          PT Evaluation     Today's date: 2019  Patient name: Maame Powell  :   MRN: [id number]  Referring provider: Elias Guillermo PA-C  Dx:   Encounter Diagnosis     ICD-10-CM    1  S/P TKR (total knee replacement), right Z96 651                   Assessment  Assessment details: Maame Powell is a 76 y o  male presenting to physical therapy s/p right TKA on 08/15/2019 and presents with pain, decreased range of motion, decreased strength, gait/balance dysfunction and decreased activity tolerance  Assessment reveals extension PROM/AROM deficit and quad weakness as per MMT  Overall gait/stair function limited by cognitive function  Secondary to these impairments, patient has increased difficulty performing ADL's and household chores  Dianedarryl Camacho would benefit from skilled PT to address these issues and maximize function    Thank you for the referral     Impairments: abnormal gait, abnormal or restricted ROM, abnormal movement, activity intolerance, impaired balance, impaired physical strength, lacks appropriate home exercise program, pain with function and weight-bearing intolerance  Understanding of Dx/Px/POC: excellent  Goals  STG (4 weeks)  1  Patient will be independent with HEP   2  Decrease pain at worst by 2 points on NPRS   3  Increase right knee PROM to > 115 degrees of knee flexion   4  Patient will demonstrate ability to ambulate stairs reciprocally   LTG (8 weeks)  1  Decrease pain at worst from 4 points on NPRS  2  Increase right knee AROM to > 120 degrees of knee flexion  3  Increase right quad strength by 1 MMT grade  4  Patient will demonstrate ability to ambulate community distances  5  Patient will demonstrate ability to navigate a FF of stairs reciprocally w/ 1 railing  5  Increase FOTO > or equal to expected outcome    Plan  Patient would benefit from: skilled PT  Planned therapy interventions: joint mobilization, manual therapy, patient education, neuromuscular re-education, strengthening, stretching, therapeutic activities, therapeutic exercise, transfer training, home exercise program, functional ROM exercises, balance/weight bearing training and ADL training  Frequency: 2x week  Duration in weeks: 8  Treatment plan discussed with: patient        Subjective Evaluation    History of Present Illness  Mechanism of injury: Patient underwent a right TKA on 08/15/2019  Secondary to complications with PMH and home environment, patient went to inpatient rehab followed by home PT until he reported to outpatient PT today, on 12/6/2019  Patient states that he is now ambulating his steps at home and states that his current deficit is gait secondary to an A D, weakness and ROM  Patients goals for PT are to improve his motion and strength for improved ambulation and ADL Function             Recurrent probem    Quality of life: fair    Pain  Current pain ratin  At best pain ratin  At worst pain ratin  Quality: dull ache  Relieving factors: change in position  Aggravating factors: sitting (lying down)  Progression: improved    Social Support  Steps to enter house: yes  Stairs in house: yes   Lives in: multiple-level home  Lives with: spouse    Employment status: not working    Diagnostic Tests  X-ray: abnormal  Treatments  Previous treatment: physical therapy and medication  Current treatment: medication  Patient Goals  Patient goals for therapy: independence with ADLs/IADLs, increased strength, return to sport/leisure activities, increased motion, improved balance and decreased pain          Objective     Neurological Testing     Sensation     Knee   Left Knee   Intact: light touch    Right Knee   Intact: light touch     Reflexes   Left   Clonus sign: positive    Right   Clonus sign: positive    Active Range of Motion     Right Knee   Flexion: 111 degrees   Extension: 18 degrees     Passive Range of Motion     Right Knee   Flexion: 113 degrees   Extension: 12 degrees     Patellar Static Positioning     Additional Patellar Static Positioning Details  (+) hypomobility in all 4 planes    Strength/Myotome Testing     Left Knee   Flexion: 4+  Extension: 4+    Right Knee   Flexion: 4+  Extension: 4-    Additional Strength Details  5XSTS: 17 13 seconds with B/L UE push off  TU 77 seconds with B/L UE push off and RW        Tests     Additional Tests Details  Grade IV pitting edema    Ambulation     Ambulation: Stairs   Ascend stairs: independent  Pattern: reciprocal  Railings: two rails  Descend stairs: independent  Pattern: non-reciprocal  Railings: two rails    Additional Stairs Ambulation Details  Gait: Slow gait with B/L toe drag and reduced heel strike upon initial contact  Decreased knee extension noted in terminal stance        Flowsheet Rows      Most Recent Value   PT/OT G-Codes   Current Score  49   Projected Score  66 Precautions: S/P R TKA, Hx of stroke, HTN, HLD, COPD, Depression, Anxiety      Manual  12/6            R knee extension PROM             Patella mobs                                                        Exercise Diary  12/6            Recumbent bike             Quad sets 2x10 x10"            LAQ 2x10 x10"            Step down eccentric contrl             Mini squats             Leg Press             SAQ                                                                                                                                                                                          Modalities

## 2019-12-06 NOTE — PROGRESS NOTES
PT Evaluation     Today's date: 2019  Patient name: Drake Be  :   MRN: [id number]  Referring provider: Morenita Ramirez PA-C  Dx:   Encounter Diagnosis     ICD-10-CM    1  S/P TKR (total knee replacement), right Z96 651                   Assessment  Assessment details: Drake Be is a 76 y o  male presenting to physical therapy s/p right TKA on 08/15/2019 and presents with pain, decreased range of motion, decreased strength, gait/balance dysfunction and decreased activity tolerance  Assessment reveals extension PROM/AROM deficit and quad weakness as per MMT  Overall gait/stair function limited by cognitive function  Secondary to these impairments, patient has increased difficulty performing ADL's and household chores  Tram Akins would benefit from skilled PT to address these issues and maximize function  Thank you for the referral     Impairments: abnormal gait, abnormal or restricted ROM, abnormal movement, activity intolerance, impaired balance, impaired physical strength, lacks appropriate home exercise program, pain with function and weight-bearing intolerance  Understanding of Dx/Px/POC: excellent  Goals  STG (4 weeks)  1  Patient will be independent with HEP   2  Decrease pain at worst by 2 points on NPRS   3  Increase right knee PROM to > 115 degrees of knee flexion   4  Patient will demonstrate ability to ambulate stairs reciprocally   LTG (8 weeks)  1  Decrease pain at worst from 4 points on NPRS  2  Increase right knee AROM to > 120 degrees of knee flexion  3  Increase right quad strength by 1 MMT grade  4  Patient will demonstrate ability to ambulate community distances  5  Patient will demonstrate ability to navigate a FF of stairs reciprocally w/ 1 railing  5   Increase FOTO > or equal to expected outcome    Plan  Patient would benefit from: skilled PT  Planned therapy interventions: joint mobilization, manual therapy, patient education, neuromuscular re-education, strengthening, stretching, therapeutic activities, therapeutic exercise, transfer training, home exercise program, functional ROM exercises, balance/weight bearing training and ADL training  Frequency: 2x week  Duration in weeks: 8  Treatment plan discussed with: patient        Subjective Evaluation    History of Present Illness  Mechanism of injury: Patient underwent a right TKA on 08/15/2019  Secondary to complications with PMH and home environment, patient went to inpatient rehab followed by home PT until he reported to outpatient PT today, on 2019  Patient states that he is now ambulating his steps at home and states that his current deficit is gait secondary to an A D, weakness and ROM  Patients goals for PT are to improve his motion and strength for improved ambulation and ADL Function             Recurrent probem    Quality of life: fair    Pain  Current pain ratin  At best pain ratin  At worst pain ratin  Quality: dull ache  Relieving factors: change in position  Aggravating factors: sitting (lying down)  Progression: improved    Social Support  Steps to enter house: yes  Stairs in house: yes   Lives in: multiple-level home  Lives with: spouse    Employment status: not working    Diagnostic Tests  X-ray: abnormal  Treatments  Previous treatment: physical therapy and medication  Current treatment: medication  Patient Goals  Patient goals for therapy: independence with ADLs/IADLs, increased strength, return to sport/leisure activities, increased motion, improved balance and decreased pain          Objective     Neurological Testing     Sensation     Knee   Left Knee   Intact: light touch    Right Knee   Intact: light touch     Reflexes   Left   Clonus sign: positive    Right   Clonus sign: positive    Active Range of Motion     Right Knee   Flexion: 111 degrees   Extension: 18 degrees     Passive Range of Motion     Right Knee   Flexion: 113 degrees   Extension: 12 degrees     Patellar Static Positioning     Additional Patellar Static Positioning Details  (+) hypomobility in all 4 planes    Strength/Myotome Testing     Left Knee   Flexion: 4+  Extension: 4+    Right Knee   Flexion: 4+  Extension: 4-    Additional Strength Details  5XSTS: 17 13 seconds with B/L UE push off  TU 77 seconds with B/L UE push off and RW        Tests     Additional Tests Details  Grade IV pitting edema    Ambulation     Ambulation: Stairs   Ascend stairs: independent  Pattern: reciprocal  Railings: two rails  Descend stairs: independent  Pattern: non-reciprocal  Railings: two rails    Additional Stairs Ambulation Details  Gait: Slow gait with B/L toe drag and reduced heel strike upon initial contact  Decreased knee extension noted in terminal stance        Flowsheet Rows      Most Recent Value   PT/OT G-Codes   Current Score  49   Projected Score  66             Precautions: S/P R TKA, Hx of stroke, HTN, HLD, COPD, Depression, Anxiety      Manual  /6            R knee extension PROM             Patella mobs                                                        Exercise Diary  /6            Recumbent bike             Quad sets 2x10 x10"            LAQ 2x10 x10"            Step down eccentric contrl             Mini squats             Leg Press             SAQ                                                                                                                                                                                          Modalities

## 2019-12-10 ENCOUNTER — OFFICE VISIT (OUTPATIENT)
Dept: PHYSICAL THERAPY | Facility: CLINIC | Age: 74
End: 2019-12-10
Payer: MEDICARE

## 2019-12-10 ENCOUNTER — TRANSCRIBE ORDERS (OUTPATIENT)
Dept: PHYSICAL THERAPY | Facility: CLINIC | Age: 74
End: 2019-12-10

## 2019-12-10 DIAGNOSIS — Z96.651 PRESENCE OF RIGHT ARTIFICIAL KNEE JOINT: Primary | ICD-10-CM

## 2019-12-10 DIAGNOSIS — Z96.651 S/P TKR (TOTAL KNEE REPLACEMENT), RIGHT: Primary | ICD-10-CM

## 2019-12-10 PROCEDURE — 97112 NEUROMUSCULAR REEDUCATION: CPT

## 2019-12-10 PROCEDURE — 97140 MANUAL THERAPY 1/> REGIONS: CPT

## 2019-12-10 PROCEDURE — 97110 THERAPEUTIC EXERCISES: CPT

## 2019-12-10 NOTE — PROGRESS NOTES
Daily Note     Today's date: 12/10/2019  Patient name: Pepe Washington  : 1945  MRN: [id number]  Referring provider: Bandar Colón PA-C  Dx:   Encounter Diagnosis     ICD-10-CM    1  S/P TKR (total knee replacement), right Z96 651                   Subjective: Patient reported R knee is feeling better  Has maintained compliance to home program and using ice  Objective: See treatment diary below  Assessment: Limited patellar mobility and lacking full functional TKE  One LOB amb into treatment area with RW as he was unable to clear R foot on swing through and again when walking to the bike, kicking RW with L foot  Plan: Continue per plan of care  Progress treatment as tolerated              Precautions: S/P R TKA, Hx of stroke, HTN, HLD, COPD, Depression, Anxiety    Manual  12/6 12/10           R knee extension PROM  EH           Patella PROM  [facility]                                                      Exercise Diary  12/6 12/10           Recumbent bike  5 min  timer           Quad sets 2x10 x10" 10" hold, 2x10           LAQ 2x10 x10" 10" hold, 2x10           Step down eccentric contrl  NV           Mini squats  NV           Leg Press  75#  2x10           SAQ  2x10                                                                                                                                                                                        Modalities

## 2019-12-17 ENCOUNTER — OFFICE VISIT (OUTPATIENT)
Dept: PHYSICAL THERAPY | Facility: CLINIC | Age: 74
End: 2019-12-17
Payer: MEDICARE

## 2019-12-17 DIAGNOSIS — Z96.651 S/P TKR (TOTAL KNEE REPLACEMENT), RIGHT: Primary | ICD-10-CM

## 2019-12-17 PROCEDURE — 97110 THERAPEUTIC EXERCISES: CPT | Performed by: PHYSICAL THERAPY ASSISTANT

## 2019-12-17 NOTE — PROGRESS NOTES
Daily Note     Today's date: 2019  Patient name: Marcia Beauchamp  : 1945  MRN: [id number]  Referring provider: Kierra Mitchell PA-C  Dx:   Encounter Diagnosis     ICD-10-CM    1  S/P TKR (total knee replacement), right Z96 651                   Subjective: pt states R knee is feeling good  Pt reports good compliance to HEP  Objective: See treatment diary below  Assessment: Limited patellar mobility remains and lacking full functional TKE  No LOB with ambulation this session, however pt has some decreased safety awareness  Pt requires frequent seated rest breaks during TE and reported moderate fatigue following exercises  Pt would benefit from continued therapy  Plan: Continue per plan of care  Progress treatment as tolerated              Precautions: S/P R TKA, Hx of stroke, HTN, HLD, COPD, Depression, Anxiety    Manual  12/6 12/10 12/17          R knee extension PROM  EH RK          Patella PROM  [facility] RK                                                     Exercise Diary  12/6 12/10 12/17          Recumbent bike  5 min  timer 6 min timer          Quad sets 2x10 x10" 10" hold, 2x10 10"  2x10          LAQ 2x10 x10" 10" hold, 2x10 10"  2x10          Step down eccentric contrl  NV           Mini squats  NV 2x10          Leg Press  75#  2x10           SAQ  2x10 3x10          TR/HR   3x10          Stand ABD/ext   2x10 b/l                                                                                                                                                             Modalities              CP   10'

## 2019-12-24 ENCOUNTER — OFFICE VISIT (OUTPATIENT)
Dept: PHYSICAL THERAPY | Facility: CLINIC | Age: 74
End: 2019-12-24
Payer: MEDICARE

## 2019-12-24 DIAGNOSIS — Z96.651 S/P TKR (TOTAL KNEE REPLACEMENT), RIGHT: Primary | ICD-10-CM

## 2019-12-24 PROCEDURE — 97110 THERAPEUTIC EXERCISES: CPT | Performed by: PHYSICAL THERAPY ASSISTANT

## 2019-12-24 NOTE — PROGRESS NOTES
Daily Note     Today's date: 2019  Patient name: Mehran Rangel  : 1945  MRN: [id number]  Referring provider: Kait Cochran PA-C  Dx:   Encounter Diagnosis     ICD-10-CM    1  S/P TKR (total knee replacement), right Z96 651                   Subjective: No new complaints  Pt reports good compliance to HEP  Objective: See treatment diary below  Assessment:Pt remains limited in TKE  Improved endurance for activity noted today as evidenced by decreased rest breaks needed during standing TE  Pt would benefit from continued therapy  Plan: Continue per plan of care  Progress treatment as tolerated              Precautions: S/P R TKA, Hx of stroke, HTN, HLD, COPD, Depression, Anxiety    Manual  12/6 12/10 12/17 12/24         R knee extension PROM  [address] [facility] RK RK         Patella PROM  [address] [facility] RK RK                                                    Exercise Diary  12/6 12/10 12/17 12/24         Recumbent bike  5 min  timer 6 min timer 6 min timer         Quad sets 2x10 x10" 10" hold, 2x10 10"  2x10 10"  2x10         LAQ 2x10 x10" 10" hold, 2x10 10"  2x10 10"  2x10         Step down eccentric contrl  NV           Mini squats  NV 2x10 2x10         Leg Press  75#  2x10  75#  3x10         SAQ  2x10 3x10 3x10         TR/HR   3x10 3x10         Stand ABD/ext   2x10 b/l 2x10 b/l                                                                                                                                                            Modalities              CP   10'

## 2019-12-31 ENCOUNTER — OFFICE VISIT (OUTPATIENT)
Dept: PHYSICAL THERAPY | Facility: CLINIC | Age: 74
End: 2019-12-31
Payer: MEDICARE

## 2019-12-31 DIAGNOSIS — Z96.651 S/P TKR (TOTAL KNEE REPLACEMENT), RIGHT: Primary | ICD-10-CM

## 2019-12-31 PROCEDURE — 97140 MANUAL THERAPY 1/> REGIONS: CPT | Performed by: PHYSICAL THERAPIST

## 2019-12-31 PROCEDURE — 97110 THERAPEUTIC EXERCISES: CPT | Performed by: PHYSICAL THERAPIST

## 2019-12-31 NOTE — PROGRESS NOTES
Daily Note     Today's date: 2019  Patient name: Cristobal Mahoney  : 1945  MRN: [id number]  Referring provider: Sonya France PA-C  Dx:   Encounter Diagnosis     ICD-10-CM    1  S/P TKR (total knee replacement), right Z96 651                   Subjective: Terrence Frame tells me his R knee is sore  Pain level 5/10  Demonstrates to me that he has been dong HEP for squats and hip abduction at home  Objective: See treatment diary below  Progressed LE strengthening program     AROM R knee: Lacks 10 degrees extension, achieves 115 degrees flexion  PROM R knee: Lacks 3 degrees extension, achieves 120 degrees flexion with end range pain      Assessment: ROM improving since IE, still lacking extension > flexion  Gait is dysfunctional secondary to lacks of R knee extension and LE strength as well as poor balance due at least in part to poor vision  Patient would benefit from continued course of skilled physical therapy to address impairments in an effort to improve function  Plan: Continue per plan of care  Progress treatment as tolerated              Precautions: S/P R TKA, Hx of stroke, HTN, HLD, COPD, Depression, Anxiety    Manual  12/6 12/10 12/17 12/24 12/31        R knee extension PROM  [address] [facility] RK RK RG        Patella PROM  [address] [facility] RK RK RG                                                   Exercise Diary  12/6 12/10 12/17 12/24 12/31        Recumbent bike  5 min  timer 6 min timer 6 min timer 8 min        Quad sets 2x10 x10" 10" hold, 2x10 10"  2x10 10"  2x10 10sec x 20        LAQ 2x10 x10" 10" hold, 2x10 10"  2x10 10"  2x10 1#  3 x 10        Step down eccentric contrl  NV           Mini squats  NV 2x10 2x10 2 x 10        Leg Press  75#  2x10  75#  3x10 75#  3 x 10        SAQ  2x10 3x10 3x10 1#  3 x 10        TR/HR   3x10 3x10 3 x 10        Stand ABD/ext   2x10 b/l 2x10 b/l 2 x 10        Tband HS curls     Currie  2 x 10        Heel slides     5" hold  2 x 10 Modalities    12/17 12/31        CP   10'  10 min

## 2020-01-07 ENCOUNTER — OFFICE VISIT (OUTPATIENT)
Dept: PHYSICAL THERAPY | Facility: CLINIC | Age: 75
End: 2020-01-07
Payer: MEDICARE

## 2020-01-07 DIAGNOSIS — Z96.651 S/P TKR (TOTAL KNEE REPLACEMENT), RIGHT: Primary | ICD-10-CM

## 2020-01-07 PROCEDURE — 97112 NEUROMUSCULAR REEDUCATION: CPT | Performed by: PHYSICAL THERAPIST

## 2020-01-07 PROCEDURE — 97140 MANUAL THERAPY 1/> REGIONS: CPT | Performed by: PHYSICAL THERAPIST

## 2020-01-07 PROCEDURE — 97110 THERAPEUTIC EXERCISES: CPT | Performed by: PHYSICAL THERAPIST

## 2020-01-07 NOTE — PROGRESS NOTES
Daily Note     Today's date: 2020  Patient name: Ivonne Irving  : [birth year]  MRN: [id number]  Referring provider: Kaylee Barrett PA-C  Dx:   Encounter Diagnosis     ICD-10-CM    1  S/P TKR (total knee replacement), right Z96 651                   Subjective: Patient reports compliance with his home exercises and notes 4-5/10 pain pre-tx  Objective: See treatment diary below      Assessment: Patient demonstrating continued knee flexion throughout the gait cycle but a crouched pattern present contributing to extension PROM/AROM deficit  Quad weakness evident with stair training and 4" eccentric control  Also present is difficulty achieving full extension during 4" lateral step downs  Right knee flexion PROM and AROM are currently [facility] and largest deficit is right knee extension PROM/AROM  Plan: Continue per plan of care        Precautions: S/P R TKA, Hx of stroke, HTN, HLD, COPD, Depression, Anxiety    Manual  12/6 12/10 12/17 12/24 12/31 1/7       R knee extension PROM  [address] [facility] RK RK RG 10'       Patella PROM  [address] [facility] RK RK RG 5'       R HS stretch      4x30"                                     Exercise Diary  12/6 12/10 12/17 12/24 12/31 1/7       Recumbent bike  5 min  timer 6 min timer 6 min timer 8 min 10'       Quad sets 2x10 x10" 10" hold, 2x10 10"  2x10 10"  2x10 10sec x 20 2x10 x10"       LAQ 2x10 x10" 10" hold, 2x10 10"  2x10 10"  2x10 1#  3 x 10 2#  3x10       Step down eccentric contrl  NV    LSD  2x10  6"       Mini squats  NV 2x10 2x10 2 x 10 2x10       Leg Press  75#  2x10  75#  3x10 75#  3 x 10 85#  3x10       SAQ  2x10 3x10 3x10 1#  3 x 10        TR/HR   3x10 3x10 3 x 10 3x10       Stand ABD/ext   2x10 b/l 2x10 b/l 2 x 10        Tband HS curls     Orange  2 x 10 GTB  2x10       Heel slides     5" hold  2 x 10 2x10 x5"                                                                                                                                Modalities            CP   10'  10 min

## 2020-01-14 ENCOUNTER — OFFICE VISIT (OUTPATIENT)
Dept: PHYSICAL THERAPY | Facility: CLINIC | Age: 75
End: 2020-01-14
Payer: MEDICARE

## 2020-01-14 DIAGNOSIS — Z96.651 S/P TKR (TOTAL KNEE REPLACEMENT), RIGHT: Primary | ICD-10-CM

## 2020-01-14 PROCEDURE — 97112 NEUROMUSCULAR REEDUCATION: CPT | Performed by: PHYSICAL THERAPIST

## 2020-01-14 PROCEDURE — 97140 MANUAL THERAPY 1/> REGIONS: CPT | Performed by: PHYSICAL THERAPIST

## 2020-01-14 PROCEDURE — 97110 THERAPEUTIC EXERCISES: CPT | Performed by: PHYSICAL THERAPIST

## 2020-01-14 NOTE — PROGRESS NOTES
Daily Note     Today's date: 2020  Patient name: Marlee Lau  : [birth year]  MRN: [id number]  Referring provider: Vannessa Steh PA-C  Dx:   Encounter Diagnosis     ICD-10-CM    1  S/P TKR (total knee replacement), right Z96 651                   Subjective: Patient reports continued HEP compliance and states that his biggest limitation is long distance walking and balance  Objective: See treatment diary below      Assessment: Began performing balance activities secondary to significant instability with narrowed ANNABELLA  Patient required min to mod A at times with balance tasks and utilized a gait belt for patient safety  Right knee strength continues to improve including ability to perform lateral step down appropriately  Plan: Continue per plan of care        Precautions: S/P R TKA, Hx of stroke, HTN, HLD, COPD, Depression, Anxiety    Manual  12/6 12/10 12/17 12/24 12/31 1/7 1/14      R knee extension PROM  [address] [facility] RK RK RG 10' 10'      Patella PROM  [address] [facility] RK RK RG 5' 5'      R HS stretch      4x30" 4x30"                                    Exercise Diary  12/6 12/10 12/17 12/24 12/31 1/7 1/14      Recumbent bike  5 min  timer 6 min timer 6 min timer 8 min 10' 10'      Quad sets 2x10 x10" 10" hold, 2x10 10"  2x10 10"  2x10 10sec x 20 2x10 x10" 2x10 x10"      LAQ 2x10 x10" 10" hold, 2x10 10"  2x10 10"  2x10 1#  3 x 10 2#  3x10 5#  3x10      Step down eccentric control  NV    LSD  2x10  6" LSD  2x10 6"      Mini squats  NV 2x10 2x10 2 x 10 2x10 2x10      Leg Press  75#  2x10  75#  3x10 75#  3 x 10 85#  3x10 95#  3x10      SAQ  2x10 3x10 3x10 1#  3 x 10        TR/HR   3x10 3x10 3 x 10 3x10 3x10      Stand ABD/ext   2x10 b/l 2x10 b/l 2 x 10        Tband HS curls     Orange  2 x 10 GTB  2x10 GTB  2x10      Heel slides     5" hold  2 x 10 2x10 x5" 2x10 x5"      Semi- tandem balance       x6 failures B/L Modalities    12/17 12/31        CP   10'  10 min

## 2020-01-21 ENCOUNTER — OFFICE VISIT (OUTPATIENT)
Dept: PHYSICAL THERAPY | Facility: CLINIC | Age: 75
End: 2020-01-21
Payer: MEDICARE

## 2020-01-21 ENCOUNTER — TELEPHONE (OUTPATIENT)
Dept: FAMILY MEDICINE CLINIC | Facility: CLINIC | Age: 75
End: 2020-01-21

## 2020-01-21 DIAGNOSIS — Z96.651 S/P TKR (TOTAL KNEE REPLACEMENT), RIGHT: Primary | ICD-10-CM

## 2020-01-21 PROCEDURE — 97140 MANUAL THERAPY 1/> REGIONS: CPT | Performed by: PHYSICAL THERAPIST

## 2020-01-21 PROCEDURE — 97112 NEUROMUSCULAR REEDUCATION: CPT | Performed by: PHYSICAL THERAPIST

## 2020-01-21 PROCEDURE — 97110 THERAPEUTIC EXERCISES: CPT | Performed by: PHYSICAL THERAPIST

## 2020-01-21 NOTE — TELEPHONE ENCOUNTER
pts wife ty stopped into [facility] to like us know pt needs refills     Famotidine 40 mg tab  Take 1 tab twice a day  #180  [facility]    Furosemide 20 mg tab  Take 2 tabs daily  #180  [facility]    Paroxetine 20 mg tab  Take 1 tab daily  #90  [facility]    Pravastatin 80 mg tab  Take 1 tab daily  #90  [facility]    Triamterene hctz 37 5-25 mg tab  Take 1 tab daily  #90  [facility]

## 2020-01-21 NOTE — PROGRESS NOTES
Daily Note     Today's date: 2020  Patient name: Koffi Tyson  : [birth year]  MRN: [id number]  Referring provider: David Connolly PA-C  Dx:   Encounter Diagnosis     ICD-10-CM    1  S/P TKR (total knee replacement), right Z96 651                   Subjective: Patient reports 4/10 knee pain pre-tx and states that he is able to perform most activities without difficulty  Objective: See treatment diary below      Assessment: Patient demonstrating significant improvements in strength and functional mobility  Largest gait deficit appears cognitive and unrelated to the right quad/hamstring strength/stability  Plan: Continue per plan of care        Precautions: S/P R TKA, Hx of stroke, HTN, HLD, COPD, Depression, Anxiety    Manual  12/6 12/10 12/17 12/24 12/31 1/7 1/14 1/21     R knee extension PROM  [facility] RK RK RG 10' 10' 10'     Patella PROM  [facility] RK RK RG 5' 5' 5'     R HS stretch      4x30" 4x30" 4x30"                                   Exercise Diary  12/6 12/10 12/17 12/24 12/31 1/7 1/14 1/21     Recumbent bike  5 min  timer 6 min timer 6 min timer 8 min 10' 10' 10'     Quad sets 2x10 x10" 10" hold, 2x10 10"  2x10 10"  2x10 10sec x 20 2x10 x10" 2x10 x10" 2x10 x10"     LAQ 2x10 x10" 10" hold, 2x10 10"  2x10 10"  2x10 1#  3 x 10 2#  3x10 5#  3x10 5#  3x10     Step down eccentric control  NV    LSD  2x10  6" LSD  2x10 6" LSD  2x10 6"     Mini squats  NV 2x10 2x10 2 x 10 2x10 2x10 2x10     Leg Press  75#  2x10  75#  3x10 75#  3 x 10 85#  3x10 95#  3x10 105#  3x10     SAQ  2x10 3x10 3x10 1#  3 x 10        TR/HR   3x10 3x10 3 x 10 3x10 3x10 3x10     Stand ABD/ext   2x10 b/l 2x10 b/l 2 x 10        Tband HS curls     Orange  2 x 10 GTB  2x10 GTB  2x10 GTB  3x10     Heel slides     5" hold  2 x 10 2x10 x5" 2x10 x5" 2x10 x5"     Semi- tandem balance       x6 failures B/L x6 failures B/L                                                                                                                 Modalities 12/17 12/31        CP   10'  10 min

## 2020-01-22 DIAGNOSIS — F32.A DEPRESSION, UNSPECIFIED DEPRESSION TYPE: ICD-10-CM

## 2020-01-22 DIAGNOSIS — E78.2 MIXED HYPERLIPIDEMIA: ICD-10-CM

## 2020-01-22 DIAGNOSIS — K21.9 GASTROESOPHAGEAL REFLUX DISEASE WITHOUT ESOPHAGITIS: ICD-10-CM

## 2020-01-22 DIAGNOSIS — I10 ESSENTIAL HYPERTENSION: ICD-10-CM

## 2020-01-22 DIAGNOSIS — R60.0 LOCALIZED EDEMA: ICD-10-CM

## 2020-01-22 RX ORDER — FUROSEMIDE 20 MG/1
40 TABLET ORAL DAILY
Qty: 180 TABLET | Refills: 1 | Status: SHIPPED | OUTPATIENT
Start: 2020-01-22 | End: 2020-04-22 | Stop reason: SDUPTHER

## 2020-01-22 RX ORDER — PRAVASTATIN SODIUM 80 MG/1
80 TABLET ORAL DAILY
Qty: 90 TABLET | Refills: 1 | Status: SHIPPED | OUTPATIENT
Start: 2020-01-22 | End: 2020-04-22 | Stop reason: SDUPTHER

## 2020-01-22 RX ORDER — TRIAMTERENE AND HYDROCHLOROTHIAZIDE 37.5; 25 MG/1; MG/1
1 CAPSULE ORAL DAILY
Qty: 90 CAPSULE | Refills: 1 | Status: SHIPPED | OUTPATIENT
Start: 2020-01-22 | End: 2020-08-19

## 2020-01-22 RX ORDER — FAMOTIDINE 40 MG/1
40 TABLET, FILM COATED ORAL 2 TIMES DAILY
Qty: 180 TABLET | Refills: 0 | Status: SHIPPED | OUTPATIENT
Start: 2020-01-22 | End: 2020-04-22 | Stop reason: SDUPTHER

## 2020-01-22 RX ORDER — PAROXETINE HYDROCHLORIDE 20 MG/1
20 TABLET, FILM COATED ORAL DAILY
Qty: 90 TABLET | Refills: 1 | Status: SHIPPED | OUTPATIENT
Start: 2020-01-22 | End: 2020-04-22 | Stop reason: SDUPTHER

## 2020-01-28 ENCOUNTER — OFFICE VISIT (OUTPATIENT)
Dept: PHYSICAL THERAPY | Facility: CLINIC | Age: 75
End: 2020-01-28
Payer: MEDICARE

## 2020-01-28 DIAGNOSIS — Z96.651 S/P TKR (TOTAL KNEE REPLACEMENT), RIGHT: Primary | ICD-10-CM

## 2020-01-28 PROCEDURE — 97140 MANUAL THERAPY 1/> REGIONS: CPT | Performed by: PHYSICAL THERAPIST

## 2020-01-28 PROCEDURE — 97110 THERAPEUTIC EXERCISES: CPT | Performed by: PHYSICAL THERAPIST

## 2020-01-28 NOTE — PROGRESS NOTES
PT Re-Evaluation  and PT Discharge    Today's date: 2020  Patient name: Priscila Brown  : 1945  MRN: [id number]  Referring provider: Merry Cavazos PA-C  Dx:   Encounter Diagnosis     ICD-10-CM    1  S/P TKR (total knee replacement), right Z96 651                   Assessment  Assessment details: Patient is a 76y o  year old male who attended physical therapy for 8 treatment sessions s/p right TKA on 08/15/2019  Patient reports 90% improvement at this time which correlates with FOTO scoring  Patient has shown improvement throughout PT by demonstrating decreased pain, increased range of motion, increased strength, improved tolerance to activity and improved gait/balance  Secondary to achieving functional goals and independence with comprehensive Home Exercise Program, Lucila Mortimer will be discharged from PT at this time  Thank you  Impairments: abnormal gait, abnormal or restricted ROM, abnormal movement, activity intolerance, impaired balance, impaired physical strength, lacks appropriate home exercise program, pain with function and weight-bearing intolerance  Understanding of Dx/Px/POC: excellent   Prognosis: good    Goals  STG (4 weeks)  1  Patient will be independent with HEP = MET  2  Decrease pain at worst by 2 points on NPRS = MET   3  Increase right knee PROM to > 115 degrees of knee flexion = MET  4  Patient will demonstrate ability to ambulate stairs reciprocally = MET  LTG (8 weeks)  1  Decrease pain at worst from 4 points on NPRS = NOT MET  2  Increase right knee AROM to > 120 degrees of knee flexion = PARTIALLY MET  3  Increase right quad strength by 1 MMT grade = MET  4  Patient will demonstrate ability to ambulate community distances = MET  5  Patient will demonstrate ability to navigate a FF of stairs reciprocally w/ 1 railing = MET  5   Increase FOTO > or equal to expected outcome = MET    Plan  Patient would benefit from: skilled PT  Planned therapy interventions: joint mobilization, manual therapy, patient education, neuromuscular re-education, strengthening, stretching, therapeutic activities, therapeutic exercise, transfer training, home exercise program, functional ROM exercises, balance/weight bearing training and ADL training  Frequency: 2x week  Duration in weeks: 8  Treatment plan discussed with: patient        Subjective Evaluation    History of Present Illness  Mechanism of injury: Patient underwent a right TKA on 08/15/2019  Secondary to complications with PMH and home environment, patient went to inpatient rehab followed by home PT until he reported to outpatient PT today, on 2019  Patient states that he is now ambulating his steps at home and states that his current deficit is gait secondary to an A D, weakness and ROM  Patients goals for PT are to improve his motion and strength for improved ambulation and ADL Function  2020: Patient reports some continued knee discomfort but significant improvement in right knee extension PROM and AROM noted  Patient reports minimal to no difficulty performing ADL's  Patient also reports compliance with his HEP             Recurrent probem    Quality of life: fair    Pain  Current pain ratin  At best pain rating: 3  At worst pain ratin  Quality: dull ache  Relieving factors: change in position  Aggravating factors: sitting (lying down)  Progression: improved    Social Support  Steps to enter house: yes  Stairs in house: yes   Lives in: multiple-level home  Lives with: spouse    Employment status: not working    Diagnostic Tests  X-ray: abnormal  Treatments  Previous treatment: physical therapy and medication  Current treatment: medication  Patient Goals  Patient goals for therapy: independence with ADLs/IADLs, increased strength, return to sport/leisure activities, increased motion, improved balance and decreased pain          Objective     Neurological Testing     Sensation     Knee   Left Knee   Intact: light touch    Right Knee   Intact: light touch     Reflexes   Left   Clonus sign: positive    Right   Clonus sign: positive    Active Range of Motion     Right Knee   Flexion: 118 degrees   Extension: 10 degrees     Passive Range of Motion     Right Knee   Flexion: 120 degrees   Extension: 4 degrees     Patellar Static Positioning     Additional Patellar Static Positioning Details  (+) hypomobility in all 4 planes (IMPROVED)    Strength/Myotome Testing     Left Knee   Flexion: 4+  Extension: 4+    Right Knee   Flexion: 4+  Extension: 4+    Additional Strength Details  5XSTS: 17 13 seconds with B/L UE push off (2020: 15 52 seconds with B/L UE push off)  TU 77 seconds with B/L UE push off and RW (2020: 21 67 seconds with B/L UE push off)        Tests     Additional Tests Details  Grade IV pitting edema (DECREASING but remains)    Ambulation     Ambulation: Stairs   Ascend stairs: independent  Pattern: reciprocal  Railings: two rails  Descend stairs: independent  Pattern: non-reciprocal  Railings: two rails    Additional Stairs Ambulation Details  Gait: Slow gait with B/L toe drag and reduced heel strike upon initial contact  Decreased knee extension noted in terminal stance         Precautions: S/P R TKA, Hx of stroke, HTN, HLD, COPD, Depression, Anxiety    Manual  12/6 12/10 12/17 12/24 12/31 1/7 1/14 1/21 1/28    R knee extension PROM  [facility] RK RK RG 10' 10' 10' 10'    Patella PROM  [facility] RK RK RG 5' 5' 5' 5'    R HS stretch      4x30" 4x30" 4x30" 4x30"    Re-assessment         10'                     Exercise Diary  12/6 12/10 12/17 12/24 12/31 1/7 1/14 1/21 1/28    Recumbent bike  5 min  timer 6 min timer 6 min timer 8 min 10' 10' 10' 10'    Quad sets 2x10 x10" 10" hold, 2x10 10"  2x10 10"  2x10 10sec x 20 2x10 x10" 2x10 x10" 2x10 x10" 2x10 x10"    LAQ 2x10 x10" 10" hold, 2x10 10"  2x10 10"  2x10 1#  3 x 10 2#  3x10 5#  3x10 5#  3x10 5#  3x10    Step down eccentric control  NV    LSD  2x10  6" LSD  2x10 6" LSD  2x10 6" LSD 2x10 6"    Mini squats  NV 2x10 2x10 2 x 10 2x10 2x10 2x10 2x10    Leg Press  75#  2x10  75#  3x10 75#  3 x 10 85#  3x10 95#  3x10 105#  3x10 105#  3x10    SAQ  2x10 3x10 3x10 1#  3 x 10        TR/HR   3x10 3x10 3 x 10 3x10 3x10 3x10 3x10    Stand ABD/ext   2x10 b/l 2x10 b/l 2 x 10        Tband HS curls     Orange  2 x 10 GTB  2x10 GTB  2x10 GTB  3x10 GTB  3x10    Heel slides     5" hold  2 x 10 2x10 x5" 2x10 x5" 2x10 x5" 2x10 5"    Semi- tandem balance       x6 failures B/L x6 failures B/L x6 failures B/L                                                                                                                Modalities    12/17 12/31        CP   10'  10 min

## 2020-02-28 ENCOUNTER — OFFICE VISIT (OUTPATIENT)
Dept: FAMILY MEDICINE CLINIC | Facility: CLINIC | Age: 75
End: 2020-02-28
Payer: MEDICARE

## 2020-02-28 ENCOUNTER — TELEPHONE (OUTPATIENT)
Dept: ADMINISTRATIVE | Facility: OTHER | Age: 75
End: 2020-02-28

## 2020-02-28 VITALS
BODY MASS INDEX: 37.55 KG/M2 | HEIGHT: 71 IN | TEMPERATURE: 98.7 F | HEART RATE: 53 BPM | WEIGHT: 268.2 LBS | DIASTOLIC BLOOD PRESSURE: 96 MMHG | SYSTOLIC BLOOD PRESSURE: 162 MMHG | OXYGEN SATURATION: 91 %

## 2020-02-28 DIAGNOSIS — Z11.59 NEED FOR HEPATITIS C SCREENING TEST: ICD-10-CM

## 2020-02-28 DIAGNOSIS — J18.9 PNEUMONIA DUE TO INFECTIOUS ORGANISM, UNSPECIFIED LATERALITY, UNSPECIFIED PART OF LUNG: Primary | ICD-10-CM

## 2020-02-28 DIAGNOSIS — E11.9 DIABETES MELLITUS WITHOUT COMPLICATION (HCC): ICD-10-CM

## 2020-02-28 DIAGNOSIS — I48.0 PAROXYSMAL ATRIAL FIBRILLATION (HCC): ICD-10-CM

## 2020-02-28 PROBLEM — J96.11 CHRONIC RESPIRATORY FAILURE WITH HYPOXIA (HCC): Status: ACTIVE | Noted: 2020-02-28

## 2020-02-28 LAB
CREAT UR-MCNC: 165 MG/DL
MICROALBUMIN UR-MCNC: 73.7 MG/L (ref 0–20)
MICROALBUMIN/CREAT 24H UR: 45 MG/G CREATININE (ref 0–30)

## 2020-02-28 PROCEDURE — 3008F BODY MASS INDEX DOCD: CPT | Performed by: NURSE PRACTITIONER

## 2020-02-28 PROCEDURE — 82043 UR ALBUMIN QUANTITATIVE: CPT | Performed by: NURSE PRACTITIONER

## 2020-02-28 PROCEDURE — 1160F RVW MEDS BY RX/DR IN RCRD: CPT | Performed by: NURSE PRACTITIONER

## 2020-02-28 PROCEDURE — 4040F PNEUMOC VAC/ADMIN/RCVD: CPT | Performed by: NURSE PRACTITIONER

## 2020-02-28 PROCEDURE — 99214 OFFICE O/P EST MOD 30 MIN: CPT | Performed by: NURSE PRACTITIONER

## 2020-02-28 PROCEDURE — 82570 ASSAY OF URINE CREATININE: CPT | Performed by: NURSE PRACTITIONER

## 2020-02-28 PROCEDURE — 3077F SYST BP >= 140 MM HG: CPT | Performed by: NURSE PRACTITIONER

## 2020-02-28 PROCEDURE — 3080F DIAST BP >= 90 MM HG: CPT | Performed by: NURSE PRACTITIONER

## 2020-02-28 PROCEDURE — 1036F TOBACCO NON-USER: CPT | Performed by: NURSE PRACTITIONER

## 2020-02-28 PROCEDURE — 93000 ELECTROCARDIOGRAM COMPLETE: CPT | Performed by: NURSE PRACTITIONER

## 2020-02-28 RX ORDER — MELOXICAM 7.5 MG/1
TABLET ORAL
COMMUNITY
Start: 2020-01-20 | End: 2020-03-22

## 2020-02-28 NOTE — TELEPHONE ENCOUNTER
----- Message from Zafar Hammonds sent at 2/28/2020 11:20 AM EST -----  Regarding: [facility]-foot exam  02/28/20 11:21 AM    Hello, our patient Zahraa Rahman has had Diabetic Foot Exam completed/performed  Please assist in updating the patient chart by pulling the document from the Media Tab  The date of service is 1/9/20       Thank you,  Wilbert Gonzalez MA  [facility]

## 2020-02-28 NOTE — TELEPHONE ENCOUNTER
Upon review of the In Basket request we were able to locate, review, and update the patient chart as requested for Diabetic Foot Exam     Any additional questions or concerns should be emailed to the Practice Liaisons via [email or web address] email, please do not reply via In Basket      Thank you  Alisia Angeles

## 2020-02-28 NOTE — PROGRESS NOTES
[facility]    ASSESSMENT AND PLAN     1  Pneumonia due to infectious organism, unspecified laterality, unspecified part of lung  Patient presents today with wife, complaint of cough and sore throat since Monday  On examination, left lung with wheeze/rhonchi throughout  Wet cough  SpO2 91%  AFib on EKG  ASA 81   + 1/R, +2/L- lower extremity pitting edema  Note history of recurrent left lower lobe pneumonia  In light of patient's current symptoms and significant comorbidity history  Recommend he be evaluated at the ER for likely pneumonia  Wife agreeable and transferring per private car  Appears hemodynamically stable for same  Note history of CVA  Per wife, was cleared to stop using Thick-it in fall  She does not note any difficulty with food and or any liquid consistency  Was high aspiration risk prior     - Ambulatory Referral to Emergency Medicine; Future  - Transfer to other facility    2  Paroxysmal atrial fibrillation (HCC)    - POCT ECG  - Ambulatory Referral to Emergency Medicine; Future  - Transfer to other facility    3  Diabetes mellitus without complication (HCC)  Hemoglobin A1c 6 7  Due for routine lab  Add microalbumin and creatinine    - Microalbumin / creatinine urine ratio    4  Need for hepatitis C screening test    - Hepatitis C antibody; Future            SUBJECTIVE       Patient ID: Enid Lester is a 76 y o  male  Chief Complaint   Patient presents with    Sore Throat     Started Monday    Nasal Congestion    Cough       HISTORY OF PRESENT ILLNESS    Patient presents today with wife for an acute visit  Complains of sore throat and intermittent cough since Monday  Symptoms have been slowly worsening    Has not taken any over-the-counter medications        The following portions of the patient's history were reviewed and updated as appropriate: allergies, current medications, past family history, past medical history, past social history, past surgical history and problem list     REVIEW OF SYSTEMS  Review of Systems   Constitutional: Negative  HENT: Positive for congestion and sore throat  Respiratory: Positive for cough  Gastrointestinal: Negative  Genitourinary: Negative  Neurological: Negative  Psychiatric/Behavioral: Negative          OBJECTIVE      VITAL SIGNS  /96 (BP Location: Left arm, Patient Position: Sitting, Cuff Size: Large)   Pulse (!) 53   Temp 98 7 °F (37 1 °C)   Ht 5' 10 68" (1 795 m)   Wt 122 kg (268 lb 3 2 oz)   SpO2 91%   BMI 37 75 kg/m²     CURRENT MEDICATIONS    Current Outpatient Medications:     albuterol (PROAIR HFA) 90 mcg/act inhaler, Inhale 2 puffs every 6 (six) hours as needed for wheezing, Disp: 3 Inhaler, Rfl: 1    aspirin (ASPIR-81) 81 mg EC tablet, Take 1 tablet by mouth daily, Disp: , Rfl:     famotidine (PEPCID) 40 MG tablet, Take 1 tablet (40 mg total) by mouth 2 (two) times a day, Disp: 180 tablet, Rfl: 0    fluticasone-salmeterol (ADVAIR, WIXELA) 250-50 mcg/dose inhaler, Inhale 1 puff 2 (two) times a day Rinse mouth after use , Disp: 3 Inhaler, Rfl: 1    furosemide (LASIX) 20 mg tablet, Take 2 tablets (40 mg total) by mouth daily, Disp: 180 tablet, Rfl: 1    glucosamine 500 MG CAPS capsule, Take 500 mg by mouth daily  , Disp: , Rfl:     meloxicam (MOBIC) 7 5 mg tablet, , Disp: , Rfl:     Multiple Vitamins-Minerals (EQ COMPLETE MULTIVITAMIN-ADULT) TABS, Take 1 tablet by mouth daily , Disp: , Rfl:     mupirocin (BACTROBAN) 2 % ointment, Apply topically 2 (two) times a day, Disp: , Rfl:     PARoxetine (PAXIL) 20 mg tablet, Take 1 tablet (20 mg total) by mouth daily, Disp: 90 tablet, Rfl: 1    pravastatin (PRAVACHOL) 80 mg tablet, Take 1 tablet (80 mg total) by mouth daily, Disp: 90 tablet, Rfl: 1    Respiratory Therapy Supplies (NEBULIZER AIR TUBE/PLUGS) MISC, by Does not apply route, Disp: , Rfl:     Respiratory Therapy Supplies (NEBULIZER/ADULT MASK) KIT, by Does not apply route, Disp: , Rfl:     triamterene-hydrochlorothiazide (DYAZIDE) 37 5-25 mg per capsule, Take 1 capsule by mouth daily, Disp: 90 capsule, Rfl: 1    ascorbic acid (VITAMIN C) 500 mg tablet, Take 1 tablet (500 mg total) by mouth daily for 30 days, Disp: 30 tablet, Rfl: 0    ferrous sulfate 324 (65 Fe) mg, Take 1 tablet (324 mg total) by mouth 2 (two) times a day before meals for 30 days, Disp: 60 tablet, Rfl: 0    folic acid (FOLVITE) 1 mg tablet, Take 1 tablet (1 mg total) by mouth daily for 30 days, Disp: 30 tablet, Rfl: 0    valACYclovir (VALTREX) 1,000 mg tablet, Take 1 tablet (1,000 mg total) by mouth 3 (three) times a day for 7 days, Disp: 21 tablet, Rfl: 0      PHYSICAL EXAMINATION   Physical Exam   Constitutional: He is oriented to person, place, and time  Vital signs are normal  He appears well-developed and well-nourished  Cardiovascular: An irregular rhythm present  + 1/R, +2/L- lower extremity pitting edema  Pulmonary/Chest: Effort normal  He has decreased breath sounds  He has wheezes in the left upper field, the left middle field and the left lower field  He has rhonchi in the left middle field and the left lower field  He has no rales  Wet cough   Neurological: He is alert and oriented to person, place, and time  Psychiatric: He has a normal mood and affect  Nursing note and vitals reviewed

## 2020-03-06 ENCOUNTER — TRANSITIONAL CARE MANAGEMENT (OUTPATIENT)
Dept: FAMILY MEDICINE CLINIC | Facility: CLINIC | Age: 75
End: 2020-03-06

## 2020-03-09 ENCOUNTER — OFFICE VISIT (OUTPATIENT)
Dept: FAMILY MEDICINE CLINIC | Facility: CLINIC | Age: 75
End: 2020-03-09
Payer: MEDICARE

## 2020-03-09 VITALS
OXYGEN SATURATION: 97 % | WEIGHT: 264 LBS | DIASTOLIC BLOOD PRESSURE: 78 MMHG | TEMPERATURE: 97.9 F | SYSTOLIC BLOOD PRESSURE: 124 MMHG | BODY MASS INDEX: 37.15 KG/M2 | HEART RATE: 73 BPM

## 2020-03-09 DIAGNOSIS — J90 PLEURAL EFFUSION: ICD-10-CM

## 2020-03-09 DIAGNOSIS — J18.9 PNEUMONIA DUE TO INFECTIOUS ORGANISM, UNSPECIFIED LATERALITY, UNSPECIFIED PART OF LUNG: Primary | ICD-10-CM

## 2020-03-09 DIAGNOSIS — R13.10 SWALLOWING IMPAIRED: ICD-10-CM

## 2020-03-09 PROCEDURE — 4040F PNEUMOC VAC/ADMIN/RCVD: CPT | Performed by: NURSE PRACTITIONER

## 2020-03-09 PROCEDURE — 1160F RVW MEDS BY RX/DR IN RCRD: CPT | Performed by: NURSE PRACTITIONER

## 2020-03-09 PROCEDURE — 3078F DIAST BP <80 MM HG: CPT | Performed by: NURSE PRACTITIONER

## 2020-03-09 PROCEDURE — 3060F POS MICROALBUMINURIA REV: CPT | Performed by: NURSE PRACTITIONER

## 2020-03-09 PROCEDURE — 3074F SYST BP LT 130 MM HG: CPT | Performed by: NURSE PRACTITIONER

## 2020-03-09 PROCEDURE — 99214 OFFICE O/P EST MOD 30 MIN: CPT | Performed by: NURSE PRACTITIONER

## 2020-03-09 PROCEDURE — 1036F TOBACCO NON-USER: CPT | Performed by: NURSE PRACTITIONER

## 2020-03-12 NOTE — PROGRESS NOTES
[facility]    ASSESSMENT AND PLAN     1  Pneumonia due to infectious organism, unspecified laterality, unspecified part of lung  Patient presents today for a transition of care/follow-up from recent hospitalization for pneumonia  Influenza A positive as well  Physical assessment today as below  ER records reviewed  Initially treated with IV antibiotics and transition to p o  For discharge  Completed course of Ceftin and Tamiflu  Unclear if pneumonia was community-acquired and or aspiration  Admits he was on thin liquids and regular diet for last several months  Note patient post CVA and was on thick it prior  Does not appear that there was any formal testing to downgrade from modified diet  Both he and wife states that he is again using the thick it  Further thier description, it appears he is probably at nectar thick  Patient requesting to return to thin liquids  Discussed at length need for follow-up swallow study to assess for safety of same  Patient and wife agreeable  Barium swallow study ordered today  Patient instructed to maintain nectar thick until testing completion  Discussed at length, complications of repeated pneumonia  Patient should eat meals slow, using chin-tuck technique  Will contact once swallow study is completed with further plan/treatment as indicated  ER precautions reviewed with both patient and wife should he develop any further symptoms    2  Pleural effusion  Obtain repeat chest x-ray 1 month    - XR chest pa & lateral; Future    3  Swallowing impaired    - FL barium swallow video w speech; Future      No problem-specific Assessment & Plan notes found for this encounter  SUBJECTIVE       Patient ID: Verito Srinivasan is a 76 y o  male      Chief Complaint   Patient presents with    Transition of Care Management     [facility] 2/28/20-3/2/20 Cough, Hypoxemia, CAP, Pneumonia, Pharyngitis       HISTORY OF PRESENT ILLNESS    Patient presents today for follow-up after recent hospitalization 2/28 to 3/2  Admitted with pneumonia  Was transitioned to oral antibiotics when he was discharged  Completed that course today  Denies any concerns today  States he feels well  No respiratory distress  States he is continuing with thickened liquids as per discharge instructions  He is requesting to go back to thin        The following portions of the patient's history were reviewed and updated as appropriate: allergies, current medications, past family history, past medical history, past social history, past surgical history and problem list     REVIEW OF SYSTEMS  Review of Systems   Constitutional: Negative  HENT: Negative  Respiratory: Negative  Cardiovascular: Negative  Gastrointestinal: Negative  Genitourinary: Negative  Neurological: Negative  Psychiatric/Behavioral: Negative  TCM Call (since 2/10/2020)     Date and time call was made  3/6/2020 11:48 AM    Hospital care reviewed  Records reviewed    Patient was hospitial[facility] at  [facility]    Date of Admission  02/28/20    Date of discharge  03/02/20    Diagnosis  pneumonia     Disposition  Home    Were the patients medications reviewed and updated  Yes    Current Symptoms  Weakness    Weakness severity  Mild      TCM Call (since 2/10/2020)     Post hospital issues  Reduced activity    Should patient be enrolled in anticoag monitoring? No    Scheduled for follow up?   Yes    Did you obtain your prescribed medications  Yes    Do you need help managing your prescriptions or medications  Yes    Why type of assitance do you need  wife handles meds for patient     Is transportation to your appointment needed  No    I have advised the patient to call PCP with any new or worsening symptoms  [place] or [facility] other    Are you recieving any outpatient services  No    Are you recieving home care services  No    Are you using any community resources  No    Current waiver services  No    Have you fallen in the last 12 months  No    Interperter language line needed  No            OBJECTIVE      VITAL SIGNS  /78 (BP Location: Left arm, Patient Position: Sitting, Cuff Size: Large)   Pulse 73   Temp 97 9 °F (36 6 °C)   Wt 120 kg (264 lb)   SpO2 97%   BMI 37 15 kg/m²     CURRENT MEDICATIONS    Current Outpatient Medications:     albuterol (PROAIR HFA) 90 mcg/act inhaler, Inhale 2 puffs every 6 (six) hours as needed for wheezing, Disp: 3 Inhaler, Rfl: 1    aspirin (ASPIR-81) 81 mg EC tablet, Take 1 tablet by mouth daily, Disp: , Rfl:     famotidine (PEPCID) 40 MG tablet, Take 1 tablet (40 mg total) by mouth 2 (two) times a day, Disp: 180 tablet, Rfl: 0    fluticasone-salmeterol (ADVAIR, WIXELA) 250-50 mcg/dose inhaler, Inhale 1 puff 2 (two) times a day Rinse mouth after use , Disp: 3 Inhaler, Rfl: 1    furosemide (LASIX) 20 mg tablet, Take 2 tablets (40 mg total) by mouth daily, Disp: 180 tablet, Rfl: 1    glucosamine 500 MG CAPS capsule, Take 500 mg by mouth daily  , Disp: , Rfl:     meloxicam (MOBIC) 7 5 mg tablet, , Disp: , Rfl:     Multiple Vitamins-Minerals (EQ COMPLETE MULTIVITAMIN-ADULT) TABS, Take 1 tablet by mouth daily , Disp: , Rfl:     mupirocin (BACTROBAN) 2 % ointment, Apply topically 2 (two) times a day, Disp: , Rfl:     PARoxetine (PAXIL) 20 mg tablet, Take 1 tablet (20 mg total) by mouth daily, Disp: 90 tablet, Rfl: 1    pravastatin (PRAVACHOL) 80 mg tablet, Take 1 tablet (80 mg total) by mouth daily, Disp: 90 tablet, Rfl: 1    Respiratory Therapy Supplies (NEBULIZER AIR TUBE/PLUGS) MISC, by Does not apply route, Disp: , Rfl:     Respiratory Therapy Supplies (NEBULIZER/ADULT MASK) KIT, by Does not apply route, Disp: , Rfl:     triamterene-hydrochlorothiazide (DYAZIDE) 37 5-25 mg per capsule, Take 1 capsule by mouth daily, Disp: 90 capsule, Rfl: 1    ascorbic acid (VITAMIN C) 500 mg tablet, Take 1 tablet (500 mg total) by mouth daily for 30 days, Disp: 30 tablet, Rfl: 0    ferrous sulfate 324 (65 Fe) mg, Take 1 tablet (324 mg total) by mouth 2 (two) times a day before meals for 30 days, Disp: 60 tablet, Rfl: 0    folic acid (FOLVITE) 1 mg tablet, Take 1 tablet (1 mg total) by mouth daily for 30 days, Disp: 30 tablet, Rfl: 0    valACYclovir (VALTREX) 1,000 mg tablet, Take 1 tablet (1,000 mg total) by mouth 3 (three) times a day for 7 days, Disp: 21 tablet, Rfl: 0      PHYSICAL EXAMINATION   Physical Exam   Constitutional: He is oriented to person, place, and time  Vital signs are normal  He appears well-developed and well-nourished  HENT:   Right Ear: Tympanic membrane and ear canal normal    Left Ear: Tympanic membrane and ear canal normal    Nose: Nose normal    Cardiovascular: An irregular rhythm present  Rate controlled AFib  ASA 81   Pulmonary/Chest: Effort normal and breath sounds normal  No respiratory distress  He has no decreased breath sounds  He has no wheezes  He has no rhonchi  He has no rales  Lymphadenopathy:        Head (right side): No submandibular and no tonsillar adenopathy present  Head (left side): No submandibular and no tonsillar adenopathy present  He has no cervical adenopathy  Neurological: He is alert and oriented to person, place, and time  Psychiatric: He has a normal mood and affect  Thought content normal  His speech is delayed (S/P stroke)  Nursing note and vitals reviewed

## 2020-03-22 DIAGNOSIS — M19.90 ARTHRITIS: Primary | ICD-10-CM

## 2020-03-22 RX ORDER — MELOXICAM 7.5 MG/1
TABLET ORAL
Qty: 180 TABLET | Refills: 0 | Status: SHIPPED | OUTPATIENT
Start: 2020-03-22 | End: 2020-06-24

## 2020-04-20 PROBLEM — R06.00 DYSPNEA: Status: RESOLVED | Noted: 2017-12-24 | Resolved: 2020-04-20

## 2020-04-22 ENCOUNTER — OFFICE VISIT (OUTPATIENT)
Dept: FAMILY MEDICINE CLINIC | Facility: CLINIC | Age: 75
End: 2020-04-22
Payer: MEDICARE

## 2020-04-22 VITALS
TEMPERATURE: 98.4 F | DIASTOLIC BLOOD PRESSURE: 82 MMHG | HEIGHT: 70 IN | SYSTOLIC BLOOD PRESSURE: 124 MMHG | BODY MASS INDEX: 38.51 KG/M2 | OXYGEN SATURATION: 90 % | WEIGHT: 269 LBS | HEART RATE: 84 BPM

## 2020-04-22 DIAGNOSIS — J45.20 MILD INTERMITTENT EXTRINSIC ASTHMA WITHOUT COMPLICATION: ICD-10-CM

## 2020-04-22 DIAGNOSIS — M17.11 LOCALIZED OSTEOARTHRITIS OF RIGHT KNEE: ICD-10-CM

## 2020-04-22 DIAGNOSIS — I48.0 PAROXYSMAL A-FIB (HCC): ICD-10-CM

## 2020-04-22 DIAGNOSIS — Z00.00 ENCOUNTER FOR MEDICARE ANNUAL WELLNESS EXAM: Primary | ICD-10-CM

## 2020-04-22 DIAGNOSIS — K21.9 GASTROESOPHAGEAL REFLUX DISEASE WITHOUT ESOPHAGITIS: ICD-10-CM

## 2020-04-22 DIAGNOSIS — I89.0 LYMPHEDEMA: ICD-10-CM

## 2020-04-22 DIAGNOSIS — F41.8 DEPRESSION WITH ANXIETY: ICD-10-CM

## 2020-04-22 DIAGNOSIS — J44.9 COPD, MODERATE (HCC): ICD-10-CM

## 2020-04-22 DIAGNOSIS — F32.A DEPRESSION, UNSPECIFIED DEPRESSION TYPE: ICD-10-CM

## 2020-04-22 DIAGNOSIS — G47.33 OBSTRUCTIVE SLEEP APNEA: ICD-10-CM

## 2020-04-22 DIAGNOSIS — E11.9 DIABETES MELLITUS WITHOUT COMPLICATION (HCC): ICD-10-CM

## 2020-04-22 DIAGNOSIS — E78.2 MIXED HYPERLIPIDEMIA: ICD-10-CM

## 2020-04-22 DIAGNOSIS — R60.0 LOCALIZED EDEMA: ICD-10-CM

## 2020-04-22 PROCEDURE — 99214 OFFICE O/P EST MOD 30 MIN: CPT | Performed by: FAMILY MEDICINE

## 2020-04-22 PROCEDURE — 3079F DIAST BP 80-89 MM HG: CPT | Performed by: FAMILY MEDICINE

## 2020-04-22 PROCEDURE — 4040F PNEUMOC VAC/ADMIN/RCVD: CPT | Performed by: FAMILY MEDICINE

## 2020-04-22 PROCEDURE — 3060F POS MICROALBUMINURIA REV: CPT | Performed by: FAMILY MEDICINE

## 2020-04-22 PROCEDURE — 1170F FXNL STATUS ASSESSED: CPT | Performed by: FAMILY MEDICINE

## 2020-04-22 PROCEDURE — 1125F AMNT PAIN NOTED PAIN PRSNT: CPT | Performed by: FAMILY MEDICINE

## 2020-04-22 PROCEDURE — 1160F RVW MEDS BY RX/DR IN RCRD: CPT | Performed by: FAMILY MEDICINE

## 2020-04-22 PROCEDURE — G0439 PPPS, SUBSEQ VISIT: HCPCS | Performed by: FAMILY MEDICINE

## 2020-04-22 PROCEDURE — 3074F SYST BP LT 130 MM HG: CPT | Performed by: FAMILY MEDICINE

## 2020-04-22 PROCEDURE — 1036F TOBACCO NON-USER: CPT | Performed by: FAMILY MEDICINE

## 2020-04-22 PROCEDURE — 3008F BODY MASS INDEX DOCD: CPT | Performed by: FAMILY MEDICINE

## 2020-04-22 RX ORDER — FOLIC ACID 1 MG/1
1 TABLET ORAL DAILY
Qty: 30 TABLET | Refills: 0 | Status: SHIPPED | OUTPATIENT
Start: 2020-04-22 | End: 2021-11-12

## 2020-04-22 RX ORDER — PAROXETINE HYDROCHLORIDE 20 MG/1
20 TABLET, FILM COATED ORAL DAILY
Qty: 90 TABLET | Refills: 1 | Status: SHIPPED | OUTPATIENT
Start: 2020-04-22 | End: 2020-08-19

## 2020-04-22 RX ORDER — FAMOTIDINE 40 MG/1
40 TABLET, FILM COATED ORAL 2 TIMES DAILY
Qty: 180 TABLET | Refills: 0 | Status: SHIPPED | OUTPATIENT
Start: 2020-04-22 | End: 2020-06-24

## 2020-04-22 RX ORDER — FUROSEMIDE 20 MG/1
40 TABLET ORAL DAILY
Qty: 180 TABLET | Refills: 1 | Status: SHIPPED | OUTPATIENT
Start: 2020-04-22 | End: 2021-01-06 | Stop reason: SDUPTHER

## 2020-04-22 RX ORDER — PRAVASTATIN SODIUM 80 MG/1
80 TABLET ORAL DAILY
Qty: 90 TABLET | Refills: 1 | Status: SHIPPED | OUTPATIENT
Start: 2020-04-22 | End: 2021-01-06 | Stop reason: SDUPTHER

## 2020-04-29 LAB
ALBUMIN SERPL-MCNC: 4.1 G/DL (ref 3.6–5.1)
ALBUMIN/GLOB SERPL: 1.2 (CALC) (ref 1–2.5)
ALP SERPL-CCNC: 58 U/L (ref 35–144)
ALT SERPL-CCNC: 20 U/L (ref 9–46)
AST SERPL-CCNC: 21 U/L (ref 10–35)
BASOPHILS # BLD AUTO: 103 CELLS/UL (ref 0–200)
BASOPHILS NFR BLD AUTO: 1.2 %
BILIRUB SERPL-MCNC: 0.9 MG/DL (ref 0.2–1.2)
BUN SERPL-MCNC: 18 MG/DL (ref 7–25)
BUN/CREAT SERPL: ABNORMAL (CALC) (ref 6–22)
CALCIUM SERPL-MCNC: 9.2 MG/DL (ref 8.6–10.3)
CHLORIDE SERPL-SCNC: 96 MMOL/L (ref 98–110)
CHOLEST SERPL-MCNC: 175 MG/DL
CHOLEST/HDLC SERPL: 4.6 (CALC)
CO2 SERPL-SCNC: 38 MMOL/L (ref 20–32)
CREAT SERPL-MCNC: 0.87 MG/DL (ref 0.7–1.18)
EOSINOPHIL # BLD AUTO: 189 CELLS/UL (ref 15–500)
EOSINOPHIL NFR BLD AUTO: 2.2 %
ERYTHROCYTE [DISTWIDTH] IN BLOOD BY AUTOMATED COUNT: 13.6 % (ref 11–15)
GLOBULIN SER CALC-MCNC: 3.3 G/DL (CALC) (ref 1.9–3.7)
GLUCOSE SERPL-MCNC: 83 MG/DL (ref 65–99)
HBA1C MFR BLD: 6.3 % OF TOTAL HGB
HCT VFR BLD AUTO: 48.6 % (ref 38.5–50)
HDLC SERPL-MCNC: 38 MG/DL
HGB BLD-MCNC: 16.1 G/DL (ref 13.2–17.1)
LDLC SERPL CALC-MCNC: 110 MG/DL (CALC)
LYMPHOCYTES # BLD AUTO: 2270 CELLS/UL (ref 850–3900)
LYMPHOCYTES NFR BLD AUTO: 26.4 %
MCH RBC QN AUTO: 32.3 PG (ref 27–33)
MCHC RBC AUTO-ENTMCNC: 33.1 G/DL (ref 32–36)
MCV RBC AUTO: 97.6 FL (ref 80–100)
MONOCYTES # BLD AUTO: 851 CELLS/UL (ref 200–950)
MONOCYTES NFR BLD AUTO: 9.9 %
NEUTROPHILS # BLD AUTO: 5186 CELLS/UL (ref 1500–7800)
NEUTROPHILS NFR BLD AUTO: 60.3 %
NONHDLC SERPL-MCNC: 137 MG/DL (CALC)
PLATELET # BLD AUTO: 257 THOUSAND/UL (ref 140–400)
PMV BLD REES-ECKER: 11.3 FL (ref 7.5–12.5)
POTASSIUM SERPL-SCNC: 4.3 MMOL/L (ref 3.5–5.3)
PROT SERPL-MCNC: 7.4 G/DL (ref 6.1–8.1)
RBC # BLD AUTO: 4.98 MILLION/UL (ref 4.2–5.8)
SL AMB EGFR AFRICAN AMERICAN: 99 ML/MIN/1.73M2
SL AMB EGFR NON AFRICAN AMERICAN: 85 ML/MIN/1.73M2
SODIUM SERPL-SCNC: 140 MMOL/L (ref 135–146)
TRIGL SERPL-MCNC: 153 MG/DL
TSH SERPL-ACNC: 2.39 MIU/L (ref 0.4–4.5)
WBC # BLD AUTO: 8.6 THOUSAND/UL (ref 3.8–10.8)

## 2020-06-23 DIAGNOSIS — K21.9 GASTROESOPHAGEAL REFLUX DISEASE WITHOUT ESOPHAGITIS: ICD-10-CM

## 2020-06-23 DIAGNOSIS — M19.90 ARTHRITIS: ICD-10-CM

## 2020-06-24 RX ORDER — FAMOTIDINE 40 MG/1
TABLET, FILM COATED ORAL
Qty: 180 TABLET | Refills: 0 | Status: SHIPPED | OUTPATIENT
Start: 2020-06-24 | End: 2020-08-19

## 2020-06-24 RX ORDER — MELOXICAM 7.5 MG/1
TABLET ORAL
Qty: 180 TABLET | Refills: 0 | Status: SHIPPED | OUTPATIENT
Start: 2020-06-24 | End: 2020-08-06

## 2020-08-06 ENCOUNTER — OFFICE VISIT (OUTPATIENT)
Dept: FAMILY MEDICINE CLINIC | Facility: CLINIC | Age: 75
End: 2020-08-06
Payer: MEDICARE

## 2020-08-06 VITALS
HEART RATE: 88 BPM | DIASTOLIC BLOOD PRESSURE: 84 MMHG | TEMPERATURE: 98 F | SYSTOLIC BLOOD PRESSURE: 124 MMHG | BODY MASS INDEX: 38.08 KG/M2 | RESPIRATION RATE: 18 BRPM | HEIGHT: 70 IN | OXYGEN SATURATION: 91 % | WEIGHT: 266 LBS

## 2020-08-06 DIAGNOSIS — I48.0 PAROXYSMAL A-FIB (HCC): ICD-10-CM

## 2020-08-06 DIAGNOSIS — F41.8 DEPRESSION WITH ANXIETY: ICD-10-CM

## 2020-08-06 DIAGNOSIS — J44.9 COPD, MODERATE (HCC): Primary | ICD-10-CM

## 2020-08-06 DIAGNOSIS — E11.9 DIABETES MELLITUS WITHOUT COMPLICATION (HCC): ICD-10-CM

## 2020-08-06 DIAGNOSIS — G47.33 OBSTRUCTIVE SLEEP APNEA: ICD-10-CM

## 2020-08-06 DIAGNOSIS — I89.0 LYMPHEDEMA: ICD-10-CM

## 2020-08-06 DIAGNOSIS — E78.2 MIXED HYPERLIPIDEMIA: ICD-10-CM

## 2020-08-06 PROBLEM — D72.829 LEUKOCYTOSIS: Status: RESOLVED | Noted: 2017-06-20 | Resolved: 2020-08-06

## 2020-08-06 PROBLEM — B02.9 HERPES ZOSTER WITHOUT COMPLICATION: Status: RESOLVED | Noted: 2019-10-30 | Resolved: 2020-08-06

## 2020-08-06 PROCEDURE — 1160F RVW MEDS BY RX/DR IN RCRD: CPT | Performed by: FAMILY MEDICINE

## 2020-08-06 PROCEDURE — 3079F DIAST BP 80-89 MM HG: CPT | Performed by: FAMILY MEDICINE

## 2020-08-06 PROCEDURE — 99214 OFFICE O/P EST MOD 30 MIN: CPT | Performed by: FAMILY MEDICINE

## 2020-08-06 PROCEDURE — 3074F SYST BP LT 130 MM HG: CPT | Performed by: FAMILY MEDICINE

## 2020-08-06 PROCEDURE — 4040F PNEUMOC VAC/ADMIN/RCVD: CPT | Performed by: FAMILY MEDICINE

## 2020-08-06 PROCEDURE — 3008F BODY MASS INDEX DOCD: CPT | Performed by: FAMILY MEDICINE

## 2020-08-06 PROCEDURE — 3060F POS MICROALBUMINURIA REV: CPT | Performed by: FAMILY MEDICINE

## 2020-08-06 PROCEDURE — 1036F TOBACCO NON-USER: CPT | Performed by: FAMILY MEDICINE

## 2020-08-06 NOTE — ASSESSMENT & PLAN NOTE
Lab Results   Component Value Date    HGBA1C 6 3 (H) 04/28/2020    A1c from April 8th was 6 3%  Continue diet control    Will continue to monitor

## 2020-08-06 NOTE — ASSESSMENT & PLAN NOTE
Patient doing well off Advair  Doing well on p r n  use of albuterol HFA    Doing well on p r n  use of home oxygen

## 2020-08-06 NOTE — PROGRESS NOTES
[facility]      NAME: Edie Bradley  AGE: 76 y o  SEX: male  : 1945   MRN: [id number]    DATE: 2020  TIME: 2:31 PM    Assessment and Plan     Problem List Items Addressed This Visit     Paroxysmal A-fib ([address] )      Rate controlled  Continue low-dose aspirin  Continue follow-up with cardiologist as advised         COPD, moderate ([address] ) - Primary      Patient doing well off Advair  Doing well on p r n  use of albuterol HFA  Doing well on p r n  use of home oxygen         Lymphedema      Stable on furosemide 20 mg daily         Mixed hyperlipidemia      Cholesterol 175/110  Doing reasonably well on pravastatin 80  Relevant Orders    Lipid Panel with Direct LDL reflex    Obstructive sleep apnea      Doing well on nightly CPAP         Depression with anxiety      Doing well Paxil 20 mg daily         Diabetes mellitus without complication (HCC)       Lab Results   Component Value Date    HGBA1C 6 3 (H) 2020    A1c from  was 6 3%  Continue diet control  Will continue to monitor         Relevant Orders    Comprehensive metabolic panel    Hemoglobin A1c (w/out EAG)              Return to office in: 3 mos, FBW prior at [facility]    Chief Complaint     Chief Complaint   Patient presents with    Follow-up     4 months check up       History of Present Illness      Patient presents for recheck chronic medical problems today  Overall is doing well  Doing well on home oxygen  Doing well on albuterol  Doing well on Lasix for edema  Doing well on pravastatin for cholesterol  Patient had labs drawn on       The following portions of the patient's history were reviewed and updated as appropriate: allergies, current medications, past family history, past medical history, past social history, past surgical history and problem list     Review of Systems   Review of Systems   Respiratory: Negative  Cardiovascular: Negative  Gastrointestinal: Negative  Genitourinary: Negative  Active Problem List     Patient Active Problem List   Diagnosis    Arthritis    [facility]ox[facility]mal [facility]fib [facility] )    Benign essential hypertension    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)    COPD, moderate (HCC)    Elevated PSA    Esophagitis, reflux    Gait disturbance    Hypoxia    Localized osteoarthritis of right knee    Lymphedema    Mixed hyperlipidemia    Obstructive sleep apnea    Venous insufficiency    Contusion of right hand    Other dysphagia    Aspiration into respiratory tract    Age-related cataract of both eyes    Cataract of both eyes    Depression with anxiety    Aspiration pneumonia of left lower lobe ([facility] )    Pressure injury of skin of sacral region    Cellulitis of right lower extremity    Arthritis of right knee    Decubitus ulcer of left buttock, unstageable ([facility] )    Bilateral carotid artery disease ([facility] )    Diabetes mellitus without complication ([facility] )    Chronic respiratory failure with hypoxia ([facility])       Objective   /84 (BP Location: Left arm, Patient Position: Sitting, Cuff Size: Large)   Pulse 88   Temp 98 °F (36 7 °C) (Tympanic)   Resp 18   Ht 5' 9 69" (1 77 m)   Wt 121 kg (266 lb)   SpO2 91%   BMI 38 51 kg/m²     Physical Exam   Cardiovascular: Normal rate, regular rhythm and normal heart sounds  Carotids: no bruits  Ext: no edema   Pulmonary/Chest: Effort normal  No respiratory distress  He has no wheezes  He has no rales     Psychiatric: His behavior is normal  Thought content normal        Pertinent Laboratory/Diagnostic Studies:  labs 4/28    Current Medications     Current Outpatient Medications:     albuterol (PROAIR HFA) 90 mcg/act inhaler, Inhale 2 puffs every 6 (six) hours as needed for wheezing, Disp: 3 Inhaler, Rfl: 1    aspirin (ASPIR-81) 81 mg EC tablet, Take 1 tablet by mouth daily, Disp: , Rfl:     famotidine (PEPCID) 40 MG tablet, TAKE 1 TABLET BY MOUTH  TWICE DAILY, Disp: 180 tablet, Rfl: 0    furosemide (LASIX) 20 mg tablet, Take 2 tablets (40 mg total) by mouth daily, Disp: 180 tablet, Rfl: 1    glucosamine 500 MG CAPS capsule, Take 500 mg by mouth daily  , Disp: , Rfl:     Multiple Vitamins-Minerals (EQ COMPLETE MULTIVITAMIN-ADULT) TABS, Take 1 tablet by mouth daily , Disp: , Rfl:     PARoxetine (PAXIL) 20 mg tablet, Take 1 tablet (20 mg total) by mouth daily, Disp: 90 tablet, Rfl: 1    pravastatin (PRAVACHOL) 80 mg tablet, Take 1 tablet (80 mg total) by mouth daily, Disp: 90 tablet, Rfl: 1    Respiratory Therapy Supplies (NEBULIZER AIR TUBE/PLUGS) MISC, by Does not apply route, Disp: , Rfl:     Respiratory Therapy Supplies (NEBULIZER/ADULT MASK) KIT, by Does not apply route, Disp: , Rfl:     ascorbic acid (VITAMIN C) 500 mg tablet, Take 1 tablet (500 mg total) by mouth daily for 30 days, Disp: 30 tablet, Rfl: 0    ferrous sulfate 324 (65 Fe) mg, Take 1 tablet (324 mg total) by mouth 2 (two) times a day before meals for 30 days, Disp: 60 tablet, Rfl: 0    folic acid (FOLVITE) 1 mg tablet, Take 1 tablet (1 mg total) by mouth daily, Disp: 30 tablet, Rfl: 0    mupirocin (BACTROBAN) 2 % ointment, Apply topically 2 (two) times a day, Disp: , Rfl:     triamterene-hydrochlorothiazide (DYAZIDE) 37 5-25 mg per capsule, Take 1 capsule by mouth daily (Patient not taking: Reported on 8/6/2020), Disp: 90 capsule, Rfl: 1    valACYclovir (VALTREX) 1,000 mg tablet, Take 1 tablet (1,000 mg total) by mouth 3 (three) times a day for 7 days, Disp: 21 tablet, Rfl: 0    Health Maintenance     Health Maintenance   Topic Date Due    Hepatitis C Screening  1945    Meningococcal ACWY Vaccine (1 - Risk start before 7 months 4-dose series) 1945    HIB Vaccine (1 of 1 - Risk 1-dose series) 09/16/1946    DM Eye Exam  06/16/1955    BMI: Followup Plan  06/16/1963    Colonoscopy Surveillance  06/12/2019    Influenza Vaccine  07/01/2020    HEMOGLOBIN A1C  10/28/2020    Diabetic Foot Exam  01/09/2021    URINE MICROALBUMIN  02/28/2021    Fall Risk  04/22/2021    Medicare Annual Wellness Visit (AWV)  04/22/2021    BMI: Adult  08/06/2021    Colorectal Cancer Screening  06/12/2024    DTaP,Tdap,and Td Vaccines (2 - Td) 02/18/2027    Pneumococcal Vaccine: 65+ Years  Completed    Hepatitis B Vaccine  Aged Out    IPV Vaccine  Aged Out    Hepatitis A Vaccine  Aged Out    HPV Vaccine  Aged [employer] History   Administered Date(s) Administered    H1N1, All Formulations 04/02/2010    INFLUENZA 10/08/2015, 10/12/2016, 10/20/2016, 10/30/2017, 11/01/2017, 10/16/2019    Influenza Split High Dose Preservative Free IM 10/30/2014, 10/08/2015, 10/12/2016, 10/20/2016, 10/30/2017    Influenza TIV (IM) 10/16/2013    Influenza, high dose seasonal 0 5 mL 10/04/2018, 10/16/2019    Pneumococcal Conjugate 13-Valent 03/26/2015    Pneumococcal Polysaccharide PPV23 10/16/2013    Tdap 02/18/2017       Maggie Le DO  [facility]

## 2020-08-19 DIAGNOSIS — K21.9 GASTROESOPHAGEAL REFLUX DISEASE WITHOUT ESOPHAGITIS: ICD-10-CM

## 2020-08-19 DIAGNOSIS — I10 ESSENTIAL HYPERTENSION: ICD-10-CM

## 2020-08-19 DIAGNOSIS — F32.A DEPRESSION, UNSPECIFIED DEPRESSION TYPE: ICD-10-CM

## 2020-08-19 RX ORDER — TRIAMTERENE AND HYDROCHLOROTHIAZIDE 37.5; 25 MG/1; MG/1
1 CAPSULE ORAL DAILY
Qty: 90 CAPSULE | Refills: 3 | Status: SHIPPED | OUTPATIENT
Start: 2020-08-19 | End: 2021-01-06 | Stop reason: SDUPTHER

## 2020-08-19 RX ORDER — FAMOTIDINE 40 MG/1
TABLET, FILM COATED ORAL
Qty: 180 TABLET | Refills: 3 | Status: SHIPPED | OUTPATIENT
Start: 2020-08-19 | End: 2021-01-06 | Stop reason: SDUPTHER

## 2020-08-19 RX ORDER — PAROXETINE HYDROCHLORIDE 20 MG/1
TABLET, FILM COATED ORAL
Qty: 90 TABLET | Refills: 3 | Status: SHIPPED | OUTPATIENT
Start: 2020-08-19 | End: 2021-01-06 | Stop reason: SDUPTHER

## 2020-09-10 LAB
ALBUMIN SERPL-MCNC: 3.9 G/DL (ref 3.6–5.1)
ALBUMIN/GLOB SERPL: 1.2 (CALC) (ref 1–2.5)
ALP SERPL-CCNC: 60 U/L (ref 35–144)
ALT SERPL-CCNC: 23 U/L (ref 9–46)
AST SERPL-CCNC: 19 U/L (ref 10–35)
BILIRUB SERPL-MCNC: 0.5 MG/DL (ref 0.2–1.2)
BUN SERPL-MCNC: 22 MG/DL (ref 7–25)
BUN/CREAT SERPL: ABNORMAL (CALC) (ref 6–22)
CALCIUM SERPL-MCNC: 9.2 MG/DL (ref 8.6–10.3)
CHLORIDE SERPL-SCNC: 96 MMOL/L (ref 98–110)
CHOLEST SERPL-MCNC: 160 MG/DL
CHOLEST/HDLC SERPL: 4.3 (CALC)
CO2 SERPL-SCNC: 38 MMOL/L (ref 20–32)
CREAT SERPL-MCNC: 0.92 MG/DL (ref 0.7–1.18)
GLOBULIN SER CALC-MCNC: 3.3 G/DL (CALC) (ref 1.9–3.7)
GLUCOSE SERPL-MCNC: 92 MG/DL (ref 65–99)
HBA1C MFR BLD: 6.2 % OF TOTAL HGB
HDLC SERPL-MCNC: 37 MG/DL
LDLC SERPL CALC-MCNC: 98 MG/DL (CALC)
NONHDLC SERPL-MCNC: 123 MG/DL (CALC)
POTASSIUM SERPL-SCNC: 4.2 MMOL/L (ref 3.5–5.3)
PROT SERPL-MCNC: 7.2 G/DL (ref 6.1–8.1)
SL AMB EGFR AFRICAN AMERICAN: 94 ML/MIN/1.73M2
SL AMB EGFR NON AFRICAN AMERICAN: 81 ML/MIN/1.73M2
SODIUM SERPL-SCNC: 140 MMOL/L (ref 135–146)
TRIGL SERPL-MCNC: 145 MG/DL

## 2020-11-05 ENCOUNTER — TELEPHONE (OUTPATIENT)
Dept: FAMILY MEDICINE CLINIC | Facility: CLINIC | Age: 75
End: 2020-11-05

## 2020-11-27 ENCOUNTER — OFFICE VISIT (OUTPATIENT)
Dept: FAMILY MEDICINE CLINIC | Facility: CLINIC | Age: 75
End: 2020-11-27
Payer: MEDICARE

## 2020-11-27 VITALS
RESPIRATION RATE: 20 BRPM | SYSTOLIC BLOOD PRESSURE: 120 MMHG | TEMPERATURE: 99 F | OXYGEN SATURATION: 90 % | WEIGHT: 264 LBS | HEART RATE: 56 BPM | HEIGHT: 70 IN | BODY MASS INDEX: 37.8 KG/M2 | DIASTOLIC BLOOD PRESSURE: 80 MMHG

## 2020-11-27 DIAGNOSIS — I48.0 PAROXYSMAL A-FIB (HCC): ICD-10-CM

## 2020-11-27 DIAGNOSIS — E11.9 DIABETES MELLITUS WITHOUT COMPLICATION (HCC): ICD-10-CM

## 2020-11-27 DIAGNOSIS — E78.2 MIXED HYPERLIPIDEMIA: ICD-10-CM

## 2020-11-27 DIAGNOSIS — J44.9 COPD, MODERATE (HCC): Primary | ICD-10-CM

## 2020-11-27 DIAGNOSIS — F41.8 DEPRESSION WITH ANXIETY: ICD-10-CM

## 2020-11-27 DIAGNOSIS — Z23 NEED FOR VACCINATION: ICD-10-CM

## 2020-11-27 DIAGNOSIS — G47.33 OBSTRUCTIVE SLEEP APNEA: ICD-10-CM

## 2020-11-27 DIAGNOSIS — I89.0 LYMPHEDEMA: ICD-10-CM

## 2020-11-27 PROCEDURE — 99214 OFFICE O/P EST MOD 30 MIN: CPT | Performed by: FAMILY MEDICINE

## 2020-11-27 PROCEDURE — G0008 ADMIN INFLUENZA VIRUS VAC: HCPCS | Performed by: FAMILY MEDICINE

## 2020-11-27 PROCEDURE — 90662 IIV NO PRSV INCREASED AG IM: CPT | Performed by: FAMILY MEDICINE

## 2021-01-06 DIAGNOSIS — I48.0 PAROXYSMAL A-FIB (HCC): Primary | ICD-10-CM

## 2021-01-06 DIAGNOSIS — K21.9 GASTROESOPHAGEAL REFLUX DISEASE WITHOUT ESOPHAGITIS: ICD-10-CM

## 2021-01-06 DIAGNOSIS — R60.0 LOCALIZED EDEMA: ICD-10-CM

## 2021-01-06 DIAGNOSIS — E78.2 MIXED HYPERLIPIDEMIA: ICD-10-CM

## 2021-01-06 DIAGNOSIS — I10 ESSENTIAL HYPERTENSION: ICD-10-CM

## 2021-01-06 DIAGNOSIS — F32.A DEPRESSION, UNSPECIFIED DEPRESSION TYPE: ICD-10-CM

## 2021-01-06 RX ORDER — TRIAMTERENE AND HYDROCHLOROTHIAZIDE 37.5; 25 MG/1; MG/1
1 CAPSULE ORAL DAILY
Qty: 90 CAPSULE | Refills: 3 | Status: SHIPPED | OUTPATIENT
Start: 2021-01-06 | End: 2021-09-27

## 2021-01-06 RX ORDER — PRAVASTATIN SODIUM 80 MG/1
80 TABLET ORAL DAILY
Qty: 90 TABLET | Refills: 3 | Status: SHIPPED | OUTPATIENT
Start: 2021-01-06 | End: 2021-09-27

## 2021-01-06 RX ORDER — WARFARIN SODIUM 5 MG/1
5 TABLET ORAL
Qty: 90 TABLET | Refills: 1 | Status: SHIPPED | OUTPATIENT
Start: 2021-01-06 | End: 2021-05-07 | Stop reason: SDUPTHER

## 2021-01-06 RX ORDER — PAROXETINE HYDROCHLORIDE 20 MG/1
20 TABLET, FILM COATED ORAL DAILY
Qty: 90 TABLET | Refills: 3 | Status: SHIPPED | OUTPATIENT
Start: 2021-01-06 | End: 2021-09-27

## 2021-01-06 RX ORDER — FAMOTIDINE 40 MG/1
40 TABLET, FILM COATED ORAL 2 TIMES DAILY
Qty: 180 TABLET | Refills: 3 | Status: SHIPPED | OUTPATIENT
Start: 2021-01-06 | End: 2021-01-08 | Stop reason: SDUPTHER

## 2021-01-06 RX ORDER — FUROSEMIDE 20 MG/1
40 TABLET ORAL DAILY
Qty: 180 TABLET | Refills: 1 | Status: SHIPPED | OUTPATIENT
Start: 2021-01-06 | End: 2021-05-07 | Stop reason: SDUPTHER

## 2021-01-08 DIAGNOSIS — K21.9 GASTROESOPHAGEAL REFLUX DISEASE WITHOUT ESOPHAGITIS: ICD-10-CM

## 2021-01-08 RX ORDER — FAMOTIDINE 40 MG/1
40 TABLET, FILM COATED ORAL 2 TIMES DAILY
Qty: 180 TABLET | Refills: 0 | Status: SHIPPED | OUTPATIENT
Start: 2021-01-08 | End: 2021-01-13 | Stop reason: SDUPTHER

## 2021-01-08 NOTE — TELEPHONE ENCOUNTER
Famotidine 40 mg was sent to \[facility]\"" , they told pt they don't know when it will be available  Monalisa asked for 90 days to be sent to [facility] till it comes

## 2021-01-13 DIAGNOSIS — K21.9 GASTROESOPHAGEAL REFLUX DISEASE WITHOUT ESOPHAGITIS: ICD-10-CM

## 2021-01-13 RX ORDER — FAMOTIDINE 40 MG/1
40 TABLET, FILM COATED ORAL 2 TIMES DAILY
Qty: 180 TABLET | Refills: 1 | Status: SHIPPED | OUTPATIENT
Start: 2021-01-13 | End: 2021-03-15 | Stop reason: SDUPTHER

## 2021-02-26 ENCOUNTER — APPOINTMENT (OUTPATIENT)
Dept: RADIOLOGY | Facility: CLINIC | Age: 76
End: 2021-02-26
Payer: MEDICARE

## 2021-02-26 ENCOUNTER — TRANSCRIBE ORDERS (OUTPATIENT)
Dept: ADMINISTRATIVE | Facility: HOSPITAL | Age: 76
End: 2021-02-26

## 2021-02-26 DIAGNOSIS — M79.671 RIGHT FOOT PAIN: Primary | ICD-10-CM

## 2021-02-26 DIAGNOSIS — M79.671 RIGHT FOOT PAIN: ICD-10-CM

## 2021-02-26 PROCEDURE — 73630 X-RAY EXAM OF FOOT: CPT

## 2021-03-04 ENCOUNTER — OFFICE VISIT (OUTPATIENT)
Dept: URGENT CARE | Facility: CLINIC | Age: 76
End: 2021-03-04
Payer: MEDICARE

## 2021-03-04 DIAGNOSIS — L03.119 CELLULITIS OF LOWER EXTREMITY, UNSPECIFIED LATERALITY: Primary | ICD-10-CM

## 2021-03-04 PROCEDURE — 99213 OFFICE O/P EST LOW 20 MIN: CPT | Performed by: FAMILY MEDICINE

## 2021-03-04 PROCEDURE — G0463 HOSPITAL OUTPT CLINIC VISIT: HCPCS | Performed by: FAMILY MEDICINE

## 2021-03-04 RX ORDER — CEPHALEXIN 500 MG/1
500 CAPSULE ORAL EVERY 6 HOURS SCHEDULED
Qty: 28 CAPSULE | Refills: 0 | Status: SHIPPED | OUTPATIENT
Start: 2021-03-04 | End: 2021-03-10 | Stop reason: DRUGHIGH

## 2021-03-04 NOTE — PROGRESS NOTES
[facility]        NAME: Verito Srinivasan is a 76 y o  male  : 1945    MRN: [id number]  DATE: 2021  TIME: 10:38 AM    Assessment and Plan   Cellulitis of lower extremity, unspecified laterality [E29 203]  1  Cellulitis of lower extremity, unspecified laterality  cephalexin (KEFLEX) 500 mg capsule     Start course of keflex  Discussed proper wound care   Bandages applied with vaseline gauze and ace wrap  F/u with pcp   Pt and wife in agreement with plan of care    Patient Instructions     Follow up with PCP in 3-5 days  Proceed to  ER if symptoms worsen  Chief Complaint   No chief complaint on file  History of Present Illness   Verito Srinivasan presents to the clinic c/o    Pt was walking down the steps in his house with his walker - missed the last step and fell - scraping the front of his bilateral shins  Happened "a few days ago"   Wife has been putting peroxide, iodine, and neosporin on  Now noticed serous drainage, surrounding erythema, and edema  Has had cellulitis in the past - this reminds him of that time  Review of Systems   Review of Systems   All other systems reviewed and are negative          Current Medications     Long-Term Medications   Medication Sig Dispense Refill    aspirin (ASPIR-81) 81 mg EC tablet Take 1 tablet by mouth daily      famotidine (PEPCID) 40 MG tablet Take 1 tablet (40 mg total) by mouth 2 (two) times a day 180 tablet 1    furosemide (LASIX) 20 mg tablet Take 2 tablets (40 mg total) by mouth daily 180 tablet 1    Multiple Vitamins-Minerals (EQ COMPLETE MULTIVITAMIN-ADULT) TABS Take 1 tablet by mouth daily       mupirocin (BACTROBAN) 2 % ointment Apply topically 2 (two) times a day      PARoxetine (PAXIL) 20 mg tablet Take 1 tablet (20 mg total) by mouth daily 90 tablet 3    pravastatin (PRAVACHOL) 80 mg tablet Take 1 tablet (80 mg total) by mouth daily 90 tablet 3    Respiratory Therapy Supplies (NEBULIZER AIR TUBE/PLUGS) MISC by Does not apply route      Respiratory Therapy Supplies (NEBULIZER/ADULT MASK) KIT by Does not apply route      triamterene-hydrochlorothiazide (DYAZIDE) 37 5-25 mg per capsule Take 1 capsule by mouth daily 90 capsule 3    warfarin (COUMADIN) 5 mg tablet Take 1 tablet (5 mg total) by mouth daily 90 tablet 1    ascorbic acid (VITAMIN C) 500 mg tablet Take 1 tablet (500 mg total) by mouth daily for 30 days 30 tablet 0    ferrous sulfate 324 (65 Fe) mg Take 1 tablet (324 mg total) by mouth 2 (two) times a day before meals for 30 days 60 tablet 0    folic acid (FOLVITE) 1 mg tablet Take 1 tablet (1 mg total) by mouth daily 30 tablet 0    valACYclovir (VALTREX) 1,000 mg tablet Take 1 tablet (1,000 mg total) by mouth 3 (three) times a day for 7 days 21 tablet 0       Current Allergies     Allergies as of 03/04/2021    (No Known Allergies)            The following portions of the patient's history were reviewed and updated as appropriate: allergies, current medications, past family history, past medical history, past social history, past surgical history and problem list     Objective   There were no vitals taken for this visit  Physical Exam     Physical Exam  Vitals signs and nursing note reviewed  Constitutional:       Appearance: He is well-developed  He is obese  HENT:      Head: Normocephalic and atraumatic  Eyes:      General: Lids are normal       Conjunctiva/sclera: Conjunctivae normal       Pupils: Pupils are equal, round, and reactive to light  Cardiovascular:      Rate and Rhythm: Normal rate and regular rhythm  Heart sounds: Normal heart sounds, S1 normal and S2 normal    Pulmonary:      Effort: Pulmonary effort is normal       Breath sounds: Normal breath sounds  Musculoskeletal:      Left lower leg: He exhibits swelling  Legs:       Comments: Abrasions central as drawn   Serous drainage   nonodorous   Erythema     Skin:     General: Skin is warm and dry     Neurological: Mental Status: He is alert  Psychiatric:         Attention and Perception: Attention and perception normal          Mood and Affect: Mood and affect normal          Behavior: Behavior normal          Thought Content:  Thought content normal          Judgment: Judgment normal

## 2021-03-04 NOTE — PATIENT INSTRUCTIONS
Cellulitis   WHAT YOU NEED TO KNOW:   Cellulitis is a skin infection caused by bacteria  Cellulitis may go away on its own or you may need treatment  Your healthcare provider may draw a [place] around the outside edges of your cellulitis  If your cellulitis spreads, your healthcare provider will see it outside of the [place]  DISCHARGE INSTRUCTIONS:   Call 911 if:   · You have sudden trouble breathing or chest pain  Return to the emergency department if:   · Your wound gets larger and more painful  · You feel a crackling under your skin when you touch it  · You have purple dots or bumps on your skin, or you see bleeding under your skin  · You have new swelling and pain in your legs  · The red, warm, swollen area gets larger  · You see red streaks coming from the infected area  Contact your healthcare provider if:   · You have a fever  · Your fever or pain does not go away or gets worse  · The area does not get smaller after 2 days of antibiotics  · Your skin is flaking or peeling off  · You have questions or concerns about your condition or care  Medicines:   · Antibiotics  help treat the bacterial infection  · NSAIDs , such as ibuprofen, help decrease swelling, pain, and fever  NSAIDs can cause stomach bleeding or kidney problems in certain people  If you take blood thinner medicine, always ask if NSAIDs are safe for you  Always read the medicine label and follow directions  Do not give these medicines to children under 10months of age without direction from your child's healthcare provider  · Acetaminophen  decreases pain and fever  It is available without a doctor's order  Ask how much to take and how often to take it  Follow directions  Read the labels of all other medicines you are using to see if they also contain acetaminophen, or ask your doctor or pharmacist  Acetaminophen can cause liver damage if not taken correctly   Do not use more than 4 grams (4,000 milligrams) total of acetaminophen in one day  · Take your medicine as directed  Contact your healthcare provider if you think your medicine is not helping or if you have side effects  Tell him or her if you are allergic to any medicine  Keep a list of the medicines, vitamins, and herbs you take  Include the amounts, and when and why you take them  Bring the list or the pill bottles to follow-up visits  Carry your medicine list with you in case of an emergency  Self-care:   · Elevate the area above the level of your heart  as often as you can  This will help decrease swelling and pain  Prop the area on pillows or blankets to keep it elevated comfortably  · Clean the area daily until the wound scabs over  Gently wash the area with soap and water  Pat dry  Use dressings as directed  · Place cool or warm, wet cloths on the area as directed  Use clean cloths and clean water  Leave it on the area until the cloth is room temperature  Pat the area dry with a clean, dry cloth  The cloths may help decrease pain  Prevent cellulitis:   · Do not scratch bug bites or areas of injury  You increase your risk for cellulitis by scratching these areas  · Do not share personal items, such as towels, clothing, and razors  · Clean exercise equipment  with germ-killing  before and after you use it  · Wash your hands often  Use soap and water  Wash your hands after you use the bathroom, change a child's diapers, or sneeze  Wash your hands before you prepare or eat food  Use lotion to prevent dry, cracked skin  · Wear pressure stockings as directed  You may be told to wear the stockings if you have peripheral edema  The stockings improve blood flow and decrease swelling  · Treat athlete's foot  This can help prevent the spread of a bacterial skin infection  Follow up with your healthcare provider within 3 days, or as directed:   Your healthcare provider will check if your cellulitis is getting better  You may need different medicine  Write down your questions so you remember to ask them during your visits  © Copyright [address] Information is for End User's use only and may not be sold, redistributed or otherwise used for commercial purposes  All illustrations and images included in CareNotes® are the copyrighted property of A D A M , Inc  or [facility] Kulwant Kee   The above information is an  only  It is not intended as medical advice for individual conditions or treatments  Talk to your doctor, nurse or pharmacist before following any medical regimen to see if it is safe and effective for you

## 2021-03-10 ENCOUNTER — OFFICE VISIT (OUTPATIENT)
Dept: FAMILY MEDICINE CLINIC | Facility: CLINIC | Age: 76
End: 2021-03-10
Payer: MEDICARE

## 2021-03-10 VITALS
WEIGHT: 267 LBS | SYSTOLIC BLOOD PRESSURE: 130 MMHG | HEART RATE: 94 BPM | DIASTOLIC BLOOD PRESSURE: 74 MMHG | HEIGHT: 69 IN | TEMPERATURE: 99.2 F | OXYGEN SATURATION: 91 % | BODY MASS INDEX: 39.55 KG/M2

## 2021-03-10 DIAGNOSIS — L03.115 CELLULITIS OF RIGHT LOWER EXTREMITY: Primary | ICD-10-CM

## 2021-03-10 DIAGNOSIS — S81.809A OPEN WOUNDS INVOLVING MULTIPLE REGIONS OF LOWER EXTREMITY: ICD-10-CM

## 2021-03-10 DIAGNOSIS — E11.9 DIABETES MELLITUS WITHOUT COMPLICATION (HCC): ICD-10-CM

## 2021-03-10 DIAGNOSIS — R22.41 LOCALIZED SWELLING OF RIGHT LOWER EXTREMITY: ICD-10-CM

## 2021-03-10 PROCEDURE — 82043 UR ALBUMIN QUANTITATIVE: CPT | Performed by: NURSE PRACTITIONER

## 2021-03-10 PROCEDURE — 99214 OFFICE O/P EST MOD 30 MIN: CPT | Performed by: NURSE PRACTITIONER

## 2021-03-10 PROCEDURE — 82570 ASSAY OF URINE CREATININE: CPT | Performed by: NURSE PRACTITIONER

## 2021-03-10 RX ORDER — CEPHALEXIN 500 MG/1
500 CAPSULE ORAL EVERY 6 HOURS SCHEDULED
Qty: 40 CAPSULE | Refills: 0 | Status: SHIPPED | OUTPATIENT
Start: 2021-03-10 | End: 2021-03-20

## 2021-03-10 NOTE — PROGRESS NOTES
[facility]    ASSESSMENT AND PLAN     1  Cellulitis of right lower extremity   treat today with continued course of Keflex  Patient admits to taking incorrectly  Has only been taking 500 twice daily  Recommend 500 q 6 hours for better coverage  Take with food  Complete course    - cephalexin (KEFLEX) 500 mg capsule; Take 1 capsule (500 mg total) by mouth every 6 (six) hours for 10 days  Dispense: 40 capsule; Refill: 0    2  Open wounds involving multiple regions of lower extremity   Treat today for cellulitis as above, but will also refer to wound care for further evaluation and care  Patient with history of vascular insufficiency  Note PVD discoloration  Vascular ulcerations  Advised patient and wife to cleanse area daily with mild soap and water  Do not rub, pat dry  May continue to use OTC antibiotic cream until wound care evaluations/recommendations  Keep all wounds clean and covered  Change dressings daily  Patient does admit to leaving open to air at night  Discussed concern for further infection  Poor wound healing     - Ambulatory referral to Wound Care; Future  - cephalexin (KEFLEX) 500 mg capsule; Take 1 capsule (500 mg total) by mouth every 6 (six) hours for 10 days  Dispense: 40 capsule; Refill: 0    3  Localized swelling of right lower extremity   swelling and redness noted  With vascular compromise, will assess/ rule out clot    - VAS lower limb venous duplex study, unilateral/limited; Future    4  Diabetes mellitus without complication ([facility] )    - Microalbumin / creatinine urine ratio  - Ambulatory referral to Wound Care; Future            SUBJECTIVE       Patient ID: Brandie Bianchi is a 76 y o  male  Chief Complaint   Patient presents with    Follow-up     leg wound check s/p Urgent Care       HISTORY OF PRESENT ILLNESS     Presents today for follow-up /evaluation of lower extremity wounds    Recently seen at care now on 03/04 and treated for cellulitis of the bilateral lower extremities  States he was walking down a set of steps, and missed the last step, scraping his bilateral shins along the edge of the step  White has been changing his dressing daily  Cleaning with soap and water    Applying Neosporin        The following portions of the patient's history were reviewed and updated as appropriate: allergies, current medications, past family history, past medical history, past social history, past surgical history and problem list     REVIEW OF SYSTEMS  Review of Systems    OBJECTIVE      VITAL SIGNS  /74 (BP Location: Left arm, Patient Position: Sitting, Cuff Size: Large)   Pulse 94   Temp 99 2 °F (37 3 °C)   Ht 5' 9" (1 753 m)   Wt 121 kg (267 lb)   SpO2 91%   BMI 39 43 kg/m²     CURRENT MEDICATIONS    Current Outpatient Medications:     albuterol (PROAIR HFA) 90 mcg/act inhaler, Inhale 2 puffs every 6 (six) hours as needed for wheezing, Disp: 3 Inhaler, Rfl: 1    ascorbic acid (VITAMIN C) 500 mg tablet, Take 1 tablet (500 mg total) by mouth daily for 30 days, Disp: 30 tablet, Rfl: 0    aspirin (ASPIR-81) 81 mg EC tablet, Take 1 tablet by mouth daily, Disp: , Rfl:     cephalexin (KEFLEX) 500 mg capsule, Take 1 capsule (500 mg total) by mouth every 6 (six) hours for 10 days, Disp: 40 capsule, Rfl: 0    famotidine (PEPCID) 40 MG tablet, Take 1 tablet (40 mg total) by mouth 2 (two) times a day, Disp: 180 tablet, Rfl: 1    ferrous sulfate 324 (65 Fe) mg, Take 1 tablet (324 mg total) by mouth 2 (two) times a day before meals for 30 days, Disp: 60 tablet, Rfl: 0    folic acid (FOLVITE) 1 mg tablet, Take 1 tablet (1 mg total) by mouth daily, Disp: 30 tablet, Rfl: 0    furosemide (LASIX) 20 mg tablet, Take 2 tablets (40 mg total) by mouth daily, Disp: 180 tablet, Rfl: 1    glucosamine 500 MG CAPS capsule, Take 500 mg by mouth daily  , Disp: , Rfl:     Multiple Vitamins-Minerals (EQ COMPLETE MULTIVITAMIN-ADULT) TABS, Take 1 tablet by mouth daily , Disp: , Rfl:     mupirocin (BACTROBAN) 2 % ointment, Apply topically 2 (two) times a day, Disp: , Rfl:     PARoxetine (PAXIL) 20 mg tablet, Take 1 tablet (20 mg total) by mouth daily, Disp: 90 tablet, Rfl: 3    pravastatin (PRAVACHOL) 80 mg tablet, Take 1 tablet (80 mg total) by mouth daily, Disp: 90 tablet, Rfl: 3    Respiratory Therapy Supplies (NEBULIZER AIR TUBE/PLUGS) MISC, by Does not apply route, Disp: , Rfl:     Respiratory Therapy Supplies (NEBULIZER/ADULT MASK) KIT, by Does not apply route, Disp: , Rfl:     triamterene-hydrochlorothiazide (DYAZIDE) 37 5-25 mg per capsule, Take 1 capsule by mouth daily, Disp: 90 capsule, Rfl: 3    valACYclovir (VALTREX) 1,000 mg tablet, Take 1 tablet (1,000 mg total) by mouth 3 (three) times a day for 7 days, Disp: 21 tablet, Rfl: 0    warfarin (COUMADIN) 5 mg tablet, Take 1 tablet (5 mg total) by mouth daily, Disp: 90 tablet, Rfl: 1      PHYSICAL EXAMINATION   Physical Exam  Vitals signs and nursing note reviewed  Constitutional:       General: He is not in acute distress  Appearance: Normal appearance  He is not ill-appearing  Skin:            Comments:   PVD discoloration noted bilateral lower extremities  Right calf circumference greater  Mild warmth and redness  Pulses intact  Negative Homans  Feet extremely dry and scaly  At risk for cracks and infection   Neurological:      Mental Status: He is alert and oriented to person, place, and time     Psychiatric:         Attention and Perception: Attention normal          Mood and Affect: Mood normal          Speech: Speech normal          Behavior: Behavior normal

## 2021-03-11 LAB
CREAT UR-MCNC: 115 MG/DL
MICROALBUMIN UR-MCNC: 20.5 MG/L (ref 0–20)
MICROALBUMIN/CREAT 24H UR: 18 MG/G CREATININE (ref 0–30)

## 2021-03-15 DIAGNOSIS — K21.9 GASTROESOPHAGEAL REFLUX DISEASE WITHOUT ESOPHAGITIS: ICD-10-CM

## 2021-03-17 RX ORDER — FAMOTIDINE 40 MG/1
40 TABLET, FILM COATED ORAL 2 TIMES DAILY
Qty: 180 TABLET | Refills: 1 | Status: SHIPPED | OUTPATIENT
Start: 2021-03-17 | End: 2021-07-11

## 2021-05-07 DIAGNOSIS — I48.0 PAROXYSMAL A-FIB (HCC): ICD-10-CM

## 2021-05-07 DIAGNOSIS — R60.0 LOCALIZED EDEMA: ICD-10-CM

## 2021-05-08 RX ORDER — FUROSEMIDE 20 MG/1
40 TABLET ORAL DAILY
Qty: 180 TABLET | Refills: 1 | Status: SHIPPED | OUTPATIENT
Start: 2021-05-08 | End: 2021-09-07

## 2021-05-08 RX ORDER — WARFARIN SODIUM 5 MG/1
5 TABLET ORAL
Qty: 90 TABLET | Refills: 1 | Status: SHIPPED | OUTPATIENT
Start: 2021-05-08 | End: 2021-09-16

## 2021-07-10 DIAGNOSIS — K21.9 GASTROESOPHAGEAL REFLUX DISEASE WITHOUT ESOPHAGITIS: ICD-10-CM

## 2021-07-11 RX ORDER — FAMOTIDINE 40 MG/1
TABLET, FILM COATED ORAL
Qty: 180 TABLET | Refills: 3 | Status: SHIPPED | OUTPATIENT
Start: 2021-07-11 | End: 2021-12-20

## 2021-07-14 ENCOUNTER — TELEPHONE (OUTPATIENT)
Dept: FAMILY MEDICINE CLINIC | Facility: CLINIC | Age: 76
End: 2021-07-14

## 2021-07-14 NOTE — TELEPHONE ENCOUNTER
----- Message from Kelsey Acosta DO sent at 7/14/2021  9:54 AM EDT -----   Please call patient    I would like to see him in the office for regular checkup in the near future ( to slots within the next month please)

## 2021-09-05 DIAGNOSIS — R60.0 LOCALIZED EDEMA: ICD-10-CM

## 2021-09-07 RX ORDER — FUROSEMIDE 20 MG/1
TABLET ORAL
Qty: 180 TABLET | Refills: 3 | Status: SHIPPED | OUTPATIENT
Start: 2021-09-07 | End: 2021-12-20

## 2021-09-16 DIAGNOSIS — I48.0 PAROXYSMAL A-FIB (HCC): ICD-10-CM

## 2021-09-16 RX ORDER — WARFARIN SODIUM 5 MG/1
TABLET ORAL
Qty: 90 TABLET | Refills: 3 | Status: SHIPPED | OUTPATIENT
Start: 2021-09-16 | End: 2021-12-20

## 2021-09-25 DIAGNOSIS — E78.2 MIXED HYPERLIPIDEMIA: ICD-10-CM

## 2021-09-25 DIAGNOSIS — I10 ESSENTIAL HYPERTENSION: ICD-10-CM

## 2021-09-25 DIAGNOSIS — F32.A DEPRESSION, UNSPECIFIED DEPRESSION TYPE: ICD-10-CM

## 2021-09-27 RX ORDER — PAROXETINE HYDROCHLORIDE 20 MG/1
TABLET, FILM COATED ORAL
Qty: 90 TABLET | Refills: 3 | Status: SHIPPED | OUTPATIENT
Start: 2021-09-27 | End: 2021-12-20

## 2021-09-27 RX ORDER — TRIAMTERENE AND HYDROCHLOROTHIAZIDE 37.5; 25 MG/1; MG/1
1 CAPSULE ORAL DAILY
Qty: 90 CAPSULE | Refills: 3 | Status: SHIPPED | OUTPATIENT
Start: 2021-09-27 | End: 2021-11-22 | Stop reason: HOSPADM

## 2021-09-27 RX ORDER — PRAVASTATIN SODIUM 80 MG/1
TABLET ORAL
Qty: 90 TABLET | Refills: 3 | Status: SHIPPED | OUTPATIENT
Start: 2021-09-27 | End: 2021-12-20

## 2021-11-11 ENCOUNTER — HOSPITAL ENCOUNTER (INPATIENT)
Facility: HOSPITAL | Age: 76
LOS: 10 days | Discharge: NURSING, REHAB OR LONG-TERM CARE | DRG: 871 | End: 2021-11-22
Attending: EMERGENCY MEDICINE | Admitting: STUDENT IN AN ORGANIZED HEALTH CARE EDUCATION/TRAINING PROGRAM
Payer: MEDICARE

## 2021-11-11 ENCOUNTER — NURSE TRIAGE (OUTPATIENT)
Dept: OTHER | Facility: OTHER | Age: 76
End: 2021-11-11

## 2021-11-11 DIAGNOSIS — J96.11 CHRONIC RESPIRATORY FAILURE WITH HYPOXIA (HCC): ICD-10-CM

## 2021-11-11 DIAGNOSIS — U07.1 COVID-19: ICD-10-CM

## 2021-11-11 DIAGNOSIS — U07.1 SEPSIS DUE TO COVID-19 (HCC): ICD-10-CM

## 2021-11-11 DIAGNOSIS — J44.9 COPD (CHRONIC OBSTRUCTIVE PULMONARY DISEASE) (HCC): ICD-10-CM

## 2021-11-11 DIAGNOSIS — J18.9 PNEUMONIA: ICD-10-CM

## 2021-11-11 DIAGNOSIS — B95.61 STAPHYLOCOCCUS AUREUS BACTEREMIA: ICD-10-CM

## 2021-11-11 DIAGNOSIS — J12.82 PNEUMONIA DUE TO COVID-19 VIRUS: ICD-10-CM

## 2021-11-11 DIAGNOSIS — A41.89 SEPSIS DUE TO COVID-19 (HCC): ICD-10-CM

## 2021-11-11 DIAGNOSIS — G93.41 ACUTE METABOLIC ENCEPHALOPATHY: ICD-10-CM

## 2021-11-11 DIAGNOSIS — R09.02 HYPOXEMIA: ICD-10-CM

## 2021-11-11 DIAGNOSIS — R78.81 STAPHYLOCOCCUS AUREUS BACTEREMIA: ICD-10-CM

## 2021-11-11 DIAGNOSIS — J96.21 ACUTE ON CHRONIC RESPIRATORY FAILURE WITH HYPOXIA (HCC): ICD-10-CM

## 2021-11-11 DIAGNOSIS — R78.81 BACTEREMIA: ICD-10-CM

## 2021-11-11 DIAGNOSIS — U07.1 PNEUMONIA DUE TO COVID-19 VIRUS: ICD-10-CM

## 2021-11-11 DIAGNOSIS — R41.82 AMS (ALTERED MENTAL STATUS): Primary | ICD-10-CM

## 2021-11-11 PROCEDURE — 94760 N-INVAS EAR/PLS OXIMETRY 1: CPT

## 2021-11-11 PROCEDURE — 94002 VENT MGMT INPAT INIT DAY: CPT

## 2021-11-11 PROCEDURE — 1123F ACP DISCUSS/DSCN MKR DOCD: CPT | Performed by: EMERGENCY MEDICINE

## 2021-11-11 PROCEDURE — 99285 EMERGENCY DEPT VISIT HI MDM: CPT

## 2021-11-12 ENCOUNTER — APPOINTMENT (EMERGENCY)
Dept: RADIOLOGY | Facility: HOSPITAL | Age: 76
DRG: 871 | End: 2021-11-12
Payer: MEDICARE

## 2021-11-12 PROBLEM — U07.1 PNEUMONIA DUE TO COVID-19 VIRUS: Status: ACTIVE | Noted: 2021-11-12

## 2021-11-12 PROBLEM — I48.21 PERMANENT ATRIAL FIBRILLATION (HCC): Status: ACTIVE | Noted: 2021-11-12

## 2021-11-12 PROBLEM — J12.82 PNEUMONIA DUE TO COVID-19 VIRUS: Status: ACTIVE | Noted: 2021-11-12

## 2021-11-12 PROBLEM — G93.41 ACUTE METABOLIC ENCEPHALOPATHY: Status: ACTIVE | Noted: 2021-11-12

## 2021-11-12 PROBLEM — U07.1 SEPSIS DUE TO COVID-19 (HCC): Status: ACTIVE | Noted: 2021-11-12

## 2021-11-12 PROBLEM — A41.89 SEPSIS DUE TO COVID-19 (HCC): Status: ACTIVE | Noted: 2021-11-12

## 2021-11-12 PROBLEM — R77.8 ELEVATED TROPONIN LEVEL NOT DUE MYOCARDIAL INFARCTION: Status: ACTIVE | Noted: 2021-11-12

## 2021-11-12 PROBLEM — J96.21 ACUTE ON CHRONIC RESPIRATORY FAILURE WITH HYPOXIA (HCC): Status: ACTIVE | Noted: 2017-12-24

## 2021-11-12 LAB
2HR DELTA HS TROPONIN: 27 NG/L
4HR DELTA HS TROPONIN: 22 NG/L
ALBUMIN SERPL BCP-MCNC: 2.3 G/DL (ref 3.5–5)
ALP SERPL-CCNC: 54 U/L (ref 46–116)
ALT SERPL W P-5'-P-CCNC: 55 U/L (ref 12–78)
ANION GAP SERPL CALCULATED.3IONS-SCNC: 5 MMOL/L (ref 4–13)
ANION GAP SERPL CALCULATED.3IONS-SCNC: 7 MMOL/L (ref 4–13)
APTT PPP: 100 SECONDS (ref 23–37)
APTT PPP: 152 SECONDS (ref 23–37)
APTT PPP: 45 SECONDS (ref 23–37)
AST SERPL W P-5'-P-CCNC: 92 U/L (ref 5–45)
BACTERIA UR QL AUTO: ABNORMAL /HPF
BASE EX.OXY STD BLDV CALC-SCNC: 80.7 % (ref 60–80)
BASE EXCESS BLDV CALC-SCNC: 7.4 MMOL/L
BASOPHILS # BLD AUTO: 0.01 THOUSANDS/ΜL (ref 0–0.1)
BASOPHILS NFR BLD AUTO: 0 % (ref 0–1)
BETA-HYDROXYBUTYRATE: 0.3 MMOL/L
BILIRUB SERPL-MCNC: 0.34 MG/DL (ref 0.2–1)
BILIRUB UR QL STRIP: ABNORMAL
BUN SERPL-MCNC: 19 MG/DL (ref 5–25)
BUN SERPL-MCNC: 21 MG/DL (ref 5–25)
CALCIUM ALBUM COR SERPL-MCNC: 8.7 MG/DL (ref 8.3–10.1)
CALCIUM SERPL-MCNC: 7 MG/DL (ref 8.3–10.1)
CALCIUM SERPL-MCNC: 7.3 MG/DL (ref 8.3–10.1)
CARDIAC TROPONIN I PNL SERPL HS: 113 NG/L
CARDIAC TROPONIN I PNL SERPL HS: 118 NG/L
CARDIAC TROPONIN I PNL SERPL HS: 91 NG/L
CHLORIDE SERPL-SCNC: 92 MMOL/L (ref 100–108)
CHLORIDE SERPL-SCNC: 97 MMOL/L (ref 100–108)
CLARITY UR: CLEAR
CO2 SERPL-SCNC: 32 MMOL/L (ref 21–32)
CO2 SERPL-SCNC: 36 MMOL/L (ref 21–32)
COARSE GRAN CASTS URNS QL MICRO: ABNORMAL /LPF
COLOR UR: YELLOW
CREAT SERPL-MCNC: 1.04 MG/DL (ref 0.6–1.3)
CREAT SERPL-MCNC: 1.16 MG/DL (ref 0.6–1.3)
D DIMER PPP FEU-MCNC: 1.01 UG/ML FEU
EOSINOPHIL # BLD AUTO: 0 THOUSAND/ΜL (ref 0–0.61)
EOSINOPHIL NFR BLD AUTO: 0 % (ref 0–6)
ERYTHROCYTE [DISTWIDTH] IN BLOOD BY AUTOMATED COUNT: 15.7 % (ref 11.6–15.1)
EST. AVERAGE GLUCOSE BLD GHB EST-MCNC: 146 MG/DL
FINE GRAN CASTS URNS QL MICRO: ABNORMAL /LPF
FLUAV RNA RESP QL NAA+PROBE: NEGATIVE
FLUBV RNA RESP QL NAA+PROBE: NEGATIVE
GFR SERPL CREATININE-BSD FRML MDRD: 61 ML/MIN/1.73SQ M
GFR SERPL CREATININE-BSD FRML MDRD: 69 ML/MIN/1.73SQ M
GLUCOSE SERPL-MCNC: 117 MG/DL (ref 65–140)
GLUCOSE SERPL-MCNC: 145 MG/DL (ref 65–140)
GLUCOSE SERPL-MCNC: 161 MG/DL (ref 65–140)
GLUCOSE SERPL-MCNC: 161 MG/DL (ref 65–140)
GLUCOSE UR STRIP-MCNC: NEGATIVE MG/DL
HBA1C MFR BLD: 6.7 %
HCO3 BLDV-SCNC: 34.1 MMOL/L (ref 24–30)
HCT VFR BLD AUTO: 36.6 % (ref 36.5–49.3)
HGB BLD-MCNC: 11.9 G/DL (ref 12–17)
HGB UR QL STRIP.AUTO: ABNORMAL
HYALINE CASTS #/AREA URNS LPF: ABNORMAL /LPF
IMM GRANULOCYTES # BLD AUTO: 0.04 THOUSAND/UL (ref 0–0.2)
IMM GRANULOCYTES NFR BLD AUTO: 1 % (ref 0–2)
INR PPP: 1.49 (ref 0.84–1.19)
KETONES UR STRIP-MCNC: NEGATIVE MG/DL
LACTATE SERPL-SCNC: 1.5 MMOL/L (ref 0.5–2)
LACTATE SERPL-SCNC: 2.1 MMOL/L (ref 0.5–2)
LEUKOCYTE ESTERASE UR QL STRIP: NEGATIVE
LYMPHOCYTES # BLD AUTO: 0.84 THOUSANDS/ΜL (ref 0.6–4.47)
LYMPHOCYTES NFR BLD AUTO: 17 % (ref 14–44)
MAGNESIUM SERPL-MCNC: 1.9 MG/DL (ref 1.6–2.6)
MCH RBC QN AUTO: 30.6 PG (ref 26.8–34.3)
MCHC RBC AUTO-ENTMCNC: 32.5 G/DL (ref 31.4–37.4)
MCV RBC AUTO: 94 FL (ref 82–98)
MONOCYTES # BLD AUTO: 0.64 THOUSAND/ΜL (ref 0.17–1.22)
MONOCYTES NFR BLD AUTO: 13 % (ref 4–12)
MUCOUS THREADS UR QL AUTO: ABNORMAL
NEUTROPHILS # BLD AUTO: 3.44 THOUSANDS/ΜL (ref 1.85–7.62)
NEUTS SEG NFR BLD AUTO: 69 % (ref 43–75)
NITRITE UR QL STRIP: NEGATIVE
NON-SQ EPI CELLS URNS QL MICRO: ABNORMAL /HPF
NRBC BLD AUTO-RTO: 1 /100 WBCS
NT-PROBNP SERPL-MCNC: 894 PG/ML
O2 CT BLDV-SCNC: 14.4 ML/DL
PCO2 BLDV: 57.3 MM HG (ref 42–50)
PH BLDV: 7.39 [PH] (ref 7.3–7.4)
PH UR STRIP.AUTO: 6 [PH]
PLATELET # BLD AUTO: 269 THOUSANDS/UL (ref 149–390)
PMV BLD AUTO: 10.1 FL (ref 8.9–12.7)
PO2 BLDV: 50.1 MM HG (ref 35–45)
POTASSIUM SERPL-SCNC: 3.4 MMOL/L (ref 3.5–5.3)
POTASSIUM SERPL-SCNC: 3.8 MMOL/L (ref 3.5–5.3)
PROCALCITONIN SERPL-MCNC: 0.28 NG/ML
PROT SERPL-MCNC: 7.4 G/DL (ref 6.4–8.2)
PROT UR STRIP-MCNC: ABNORMAL MG/DL
PROTHROMBIN TIME: 17.7 SECONDS (ref 11.6–14.5)
RBC # BLD AUTO: 3.89 MILLION/UL (ref 3.88–5.62)
RBC #/AREA URNS AUTO: ABNORMAL /HPF
RSV RNA RESP QL NAA+PROBE: NEGATIVE
SARS-COV-2 RNA RESP QL NAA+PROBE: POSITIVE
SODIUM SERPL-SCNC: 133 MMOL/L (ref 136–145)
SODIUM SERPL-SCNC: 136 MMOL/L (ref 136–145)
SP GR UR STRIP.AUTO: >=1.03 (ref 1–1.03)
UROBILINOGEN UR QL STRIP.AUTO: 2 E.U./DL
WBC # BLD AUTO: 4.97 THOUSAND/UL (ref 4.31–10.16)
WBC #/AREA URNS AUTO: ABNORMAL /HPF

## 2021-11-12 PROCEDURE — 85379 FIBRIN DEGRADATION QUANT: CPT | Performed by: NURSE PRACTITIONER

## 2021-11-12 PROCEDURE — 99291 CRITICAL CARE FIRST HOUR: CPT | Performed by: EMERGENCY MEDICINE

## 2021-11-12 PROCEDURE — 96360 HYDRATION IV INFUSION INIT: CPT

## 2021-11-12 PROCEDURE — 83880 ASSAY OF NATRIURETIC PEPTIDE: CPT | Performed by: EMERGENCY MEDICINE

## 2021-11-12 PROCEDURE — 82805 BLOOD GASES W/O2 SATURATION: CPT | Performed by: EMERGENCY MEDICINE

## 2021-11-12 PROCEDURE — 87040 BLOOD CULTURE FOR BACTERIA: CPT | Performed by: EMERGENCY MEDICINE

## 2021-11-12 PROCEDURE — 96365 THER/PROPH/DIAG IV INF INIT: CPT

## 2021-11-12 PROCEDURE — 94660 CPAP INITIATION&MGMT: CPT

## 2021-11-12 PROCEDURE — 85610 PROTHROMBIN TIME: CPT | Performed by: EMERGENCY MEDICINE

## 2021-11-12 PROCEDURE — 83605 ASSAY OF LACTIC ACID: CPT | Performed by: EMERGENCY MEDICINE

## 2021-11-12 PROCEDURE — 94760 N-INVAS EAR/PLS OXIMETRY 1: CPT

## 2021-11-12 PROCEDURE — 87086 URINE CULTURE/COLONY COUNT: CPT | Performed by: EMERGENCY MEDICINE

## 2021-11-12 PROCEDURE — 82948 REAGENT STRIP/BLOOD GLUCOSE: CPT

## 2021-11-12 PROCEDURE — 71045 X-RAY EXAM CHEST 1 VIEW: CPT

## 2021-11-12 PROCEDURE — 83735 ASSAY OF MAGNESIUM: CPT | Performed by: NURSE PRACTITIONER

## 2021-11-12 PROCEDURE — 36415 COLL VENOUS BLD VENIPUNCTURE: CPT | Performed by: EMERGENCY MEDICINE

## 2021-11-12 PROCEDURE — 87186 SC STD MICRODIL/AGAR DIL: CPT | Performed by: FAMILY MEDICINE

## 2021-11-12 PROCEDURE — 87181 SC STD AGAR DILUTION PER AGT: CPT | Performed by: EMERGENCY MEDICINE

## 2021-11-12 PROCEDURE — 82010 KETONE BODYS QUAN: CPT | Performed by: EMERGENCY MEDICINE

## 2021-11-12 PROCEDURE — 85730 THROMBOPLASTIN TIME PARTIAL: CPT | Performed by: STUDENT IN AN ORGANIZED HEALTH CARE EDUCATION/TRAINING PROGRAM

## 2021-11-12 PROCEDURE — 87070 CULTURE OTHR SPECIMN AEROBIC: CPT | Performed by: FAMILY MEDICINE

## 2021-11-12 PROCEDURE — 84145 PROCALCITONIN (PCT): CPT | Performed by: NURSE PRACTITIONER

## 2021-11-12 PROCEDURE — 80053 COMPREHEN METABOLIC PANEL: CPT | Performed by: EMERGENCY MEDICINE

## 2021-11-12 PROCEDURE — 87147 CULTURE TYPE IMMUNOLOGIC: CPT | Performed by: FAMILY MEDICINE

## 2021-11-12 PROCEDURE — 96361 HYDRATE IV INFUSION ADD-ON: CPT

## 2021-11-12 PROCEDURE — 99223 1ST HOSP IP/OBS HIGH 75: CPT | Performed by: FAMILY MEDICINE

## 2021-11-12 PROCEDURE — 84484 ASSAY OF TROPONIN QUANT: CPT | Performed by: EMERGENCY MEDICINE

## 2021-11-12 PROCEDURE — 80048 BASIC METABOLIC PNL TOTAL CA: CPT | Performed by: NURSE PRACTITIONER

## 2021-11-12 PROCEDURE — 87186 SC STD MICRODIL/AGAR DIL: CPT | Performed by: EMERGENCY MEDICINE

## 2021-11-12 PROCEDURE — 85730 THROMBOPLASTIN TIME PARTIAL: CPT | Performed by: EMERGENCY MEDICINE

## 2021-11-12 PROCEDURE — 87205 SMEAR GRAM STAIN: CPT | Performed by: FAMILY MEDICINE

## 2021-11-12 PROCEDURE — 83036 HEMOGLOBIN GLYCOSYLATED A1C: CPT | Performed by: FAMILY MEDICINE

## 2021-11-12 PROCEDURE — 81001 URINALYSIS AUTO W/SCOPE: CPT | Performed by: EMERGENCY MEDICINE

## 2021-11-12 PROCEDURE — 0241U HB NFCT DS VIR RESP RNA 4 TRGT: CPT | Performed by: EMERGENCY MEDICINE

## 2021-11-12 PROCEDURE — 87077 CULTURE AEROBIC IDENTIFY: CPT | Performed by: FAMILY MEDICINE

## 2021-11-12 PROCEDURE — 87081 CULTURE SCREEN ONLY: CPT | Performed by: FAMILY MEDICINE

## 2021-11-12 PROCEDURE — 93005 ELECTROCARDIOGRAM TRACING: CPT

## 2021-11-12 PROCEDURE — 85025 COMPLETE CBC W/AUTO DIFF WBC: CPT | Performed by: EMERGENCY MEDICINE

## 2021-11-12 RX ORDER — PAROXETINE HYDROCHLORIDE 20 MG/1
20 TABLET, FILM COATED ORAL DAILY
Status: DISCONTINUED | OUTPATIENT
Start: 2021-11-12 | End: 2021-11-22 | Stop reason: HOSPADM

## 2021-11-12 RX ORDER — CEFTRIAXONE 1 G/50ML
1000 INJECTION, SOLUTION INTRAVENOUS ONCE
Status: COMPLETED | OUTPATIENT
Start: 2021-11-12 | End: 2021-11-12

## 2021-11-12 RX ORDER — CEFTRIAXONE 2 G/50ML
2000 INJECTION, SOLUTION INTRAVENOUS EVERY 24 HOURS
Status: DISCONTINUED | OUTPATIENT
Start: 2021-11-12 | End: 2021-11-15

## 2021-11-12 RX ORDER — METHYLPREDNISOLONE SODIUM SUCCINATE 40 MG/ML
40 INJECTION, POWDER, LYOPHILIZED, FOR SOLUTION INTRAMUSCULAR; INTRAVENOUS EVERY 8 HOURS SCHEDULED
Status: DISCONTINUED | OUTPATIENT
Start: 2021-11-12 | End: 2021-11-15

## 2021-11-12 RX ORDER — MELATONIN
1000 DAILY
Status: DISCONTINUED | OUTPATIENT
Start: 2021-11-12 | End: 2021-11-22 | Stop reason: HOSPADM

## 2021-11-12 RX ORDER — HEPARIN SODIUM 1000 [USP'U]/ML
4000 INJECTION, SOLUTION INTRAVENOUS; SUBCUTANEOUS
Status: DISCONTINUED | OUTPATIENT
Start: 2021-11-12 | End: 2021-11-21

## 2021-11-12 RX ORDER — FAMOTIDINE 20 MG/1
20 TABLET, FILM COATED ORAL DAILY
Status: DISCONTINUED | OUTPATIENT
Start: 2021-11-12 | End: 2021-11-22 | Stop reason: HOSPADM

## 2021-11-12 RX ORDER — HEPARIN SODIUM 1000 [USP'U]/ML
4000 INJECTION, SOLUTION INTRAVENOUS; SUBCUTANEOUS ONCE
Status: COMPLETED | OUTPATIENT
Start: 2021-11-12 | End: 2021-11-12

## 2021-11-12 RX ORDER — HEPARIN SODIUM 10000 [USP'U]/100ML
3-20 INJECTION, SOLUTION INTRAVENOUS
Status: DISCONTINUED | OUTPATIENT
Start: 2021-11-12 | End: 2021-11-21

## 2021-11-12 RX ORDER — BUDESONIDE AND FORMOTEROL FUMARATE DIHYDRATE 160; 4.5 UG/1; UG/1
2 AEROSOL RESPIRATORY (INHALATION) 2 TIMES DAILY
Status: DISCONTINUED | OUTPATIENT
Start: 2021-11-12 | End: 2021-11-22 | Stop reason: HOSPADM

## 2021-11-12 RX ORDER — DEXAMETHASONE SODIUM PHOSPHATE 4 MG/ML
6 INJECTION, SOLUTION INTRA-ARTICULAR; INTRALESIONAL; INTRAMUSCULAR; INTRAVENOUS; SOFT TISSUE EVERY 24 HOURS
Status: DISCONTINUED | OUTPATIENT
Start: 2021-11-12 | End: 2021-11-12

## 2021-11-12 RX ORDER — ACETAMINOPHEN 325 MG/1
650 TABLET ORAL EVERY 6 HOURS PRN
Status: DISCONTINUED | OUTPATIENT
Start: 2021-11-12 | End: 2021-11-22 | Stop reason: HOSPADM

## 2021-11-12 RX ORDER — POTASSIUM CHLORIDE 20 MEQ/1
40 TABLET, EXTENDED RELEASE ORAL ONCE
Status: DISCONTINUED | OUTPATIENT
Start: 2021-11-12 | End: 2021-11-17

## 2021-11-12 RX ORDER — ALBUTEROL SULFATE 90 UG/1
2 AEROSOL, METERED RESPIRATORY (INHALATION) 4 TIMES DAILY
Status: DISCONTINUED | OUTPATIENT
Start: 2021-11-12 | End: 2021-11-22 | Stop reason: HOSPADM

## 2021-11-12 RX ORDER — MULTIVITAMIN/IRON/FOLIC ACID 18MG-0.4MG
1 TABLET ORAL DAILY
Status: DISCONTINUED | OUTPATIENT
Start: 2021-11-12 | End: 2021-11-22 | Stop reason: HOSPADM

## 2021-11-12 RX ORDER — PRAVASTATIN SODIUM 80 MG/1
80 TABLET ORAL
Status: DISCONTINUED | OUTPATIENT
Start: 2021-11-12 | End: 2021-11-22 | Stop reason: HOSPADM

## 2021-11-12 RX ORDER — ALBUTEROL SULFATE 90 UG/1
2 AEROSOL, METERED RESPIRATORY (INHALATION) EVERY 4 HOURS PRN
Status: DISCONTINUED | OUTPATIENT
Start: 2021-11-12 | End: 2021-11-12

## 2021-11-12 RX ORDER — HEPARIN SODIUM 1000 [USP'U]/ML
2000 INJECTION, SOLUTION INTRAVENOUS; SUBCUTANEOUS
Status: DISCONTINUED | OUTPATIENT
Start: 2021-11-12 | End: 2021-11-21

## 2021-11-12 RX ORDER — FUROSEMIDE 10 MG/ML
20 INJECTION INTRAMUSCULAR; INTRAVENOUS ONCE
Status: COMPLETED | OUTPATIENT
Start: 2021-11-12 | End: 2021-11-12

## 2021-11-12 RX ORDER — FUROSEMIDE 40 MG/1
40 TABLET ORAL DAILY
Status: DISCONTINUED | OUTPATIENT
Start: 2021-11-12 | End: 2021-11-13

## 2021-11-12 RX ADMIN — BUDESONIDE AND FORMOTEROL FUMARATE DIHYDRATE 2 PUFF: 160; 4.5 AEROSOL RESPIRATORY (INHALATION) at 17:13

## 2021-11-12 RX ADMIN — METHYLPREDNISOLONE SODIUM SUCCINATE 40 MG: 40 INJECTION, POWDER, FOR SOLUTION INTRAMUSCULAR; INTRAVENOUS at 13:59

## 2021-11-12 RX ADMIN — HEPARIN SODIUM 11.1 UNITS/KG/HR: 10000 INJECTION, SOLUTION INTRAVENOUS at 04:56

## 2021-11-12 RX ADMIN — ALBUTEROL SULFATE 2 PUFF: 90 AEROSOL, METERED RESPIRATORY (INHALATION) at 17:13

## 2021-11-12 RX ADMIN — SODIUM CHLORIDE 2100 ML: 0.9 INJECTION, SOLUTION INTRAVENOUS at 00:21

## 2021-11-12 RX ADMIN — BARICITINIB 4 MG: 2 TABLET, FILM COATED ORAL at 14:30

## 2021-11-12 RX ADMIN — DEXAMETHASONE SODIUM PHOSPHATE 6 MG: 4 INJECTION INTRA-ARTICULAR; INTRALESIONAL; INTRAMUSCULAR; INTRAVENOUS; SOFT TISSUE at 09:01

## 2021-11-12 RX ADMIN — CEFTRIAXONE 2000 MG: 2 INJECTION, SOLUTION INTRAVENOUS at 14:01

## 2021-11-12 RX ADMIN — METHYLPREDNISOLONE SODIUM SUCCINATE 40 MG: 40 INJECTION, POWDER, FOR SOLUTION INTRAMUSCULAR; INTRAVENOUS at 22:00

## 2021-11-12 RX ADMIN — INSULIN LISPRO 1 UNITS: 100 INJECTION, SOLUTION INTRAVENOUS; SUBCUTANEOUS at 22:00

## 2021-11-12 RX ADMIN — HEPARIN SODIUM 4000 UNITS: 1000 INJECTION INTRAVENOUS; SUBCUTANEOUS at 04:56

## 2021-11-12 RX ADMIN — ALBUTEROL SULFATE 2 PUFF: 90 AEROSOL, METERED RESPIRATORY (INHALATION) at 09:04

## 2021-11-12 RX ADMIN — BUDESONIDE AND FORMOTEROL FUMARATE DIHYDRATE 2 PUFF: 160; 4.5 AEROSOL RESPIRATORY (INHALATION) at 09:02

## 2021-11-12 RX ADMIN — ALBUTEROL SULFATE 2 PUFF: 90 AEROSOL, METERED RESPIRATORY (INHALATION) at 12:35

## 2021-11-12 RX ADMIN — PRAVASTATIN SODIUM 80 MG: 80 TABLET ORAL at 17:12

## 2021-11-12 RX ADMIN — CEFTRIAXONE 1000 MG: 1 INJECTION, SOLUTION INTRAVENOUS at 01:22

## 2021-11-12 RX ADMIN — REMDESIVIR 200 MG: 100 INJECTION, POWDER, LYOPHILIZED, FOR SOLUTION INTRAVENOUS at 04:48

## 2021-11-12 RX ADMIN — FUROSEMIDE 20 MG: 10 INJECTION, SOLUTION INTRAMUSCULAR; INTRAVENOUS at 13:59

## 2021-11-12 RX ADMIN — VANCOMYCIN HYDROCHLORIDE 1250 MG: 1 INJECTION, POWDER, LYOPHILIZED, FOR SOLUTION INTRAVENOUS at 14:27

## 2021-11-13 PROBLEM — B95.61 STAPHYLOCOCCUS AUREUS BACTEREMIA: Status: ACTIVE | Noted: 2021-11-13

## 2021-11-13 PROBLEM — R78.81 STAPHYLOCOCCUS AUREUS BACTEREMIA: Status: ACTIVE | Noted: 2021-11-13

## 2021-11-13 LAB
ANION GAP SERPL CALCULATED.3IONS-SCNC: 4 MMOL/L (ref 4–13)
APTT PPP: 55 SECONDS (ref 23–37)
APTT PPP: 56 SECONDS (ref 23–37)
APTT PPP: 61 SECONDS (ref 23–37)
APTT PPP: 92 SECONDS (ref 23–37)
BACTERIA UR CULT: NORMAL
BUN SERPL-MCNC: 25 MG/DL (ref 5–25)
CALCIUM SERPL-MCNC: 7.2 MG/DL (ref 8.3–10.1)
CHLORIDE SERPL-SCNC: 99 MMOL/L (ref 100–108)
CO2 SERPL-SCNC: 36 MMOL/L (ref 21–32)
CREAT SERPL-MCNC: 0.93 MG/DL (ref 0.6–1.3)
CRP SERPL QL: 113 MG/L
ERYTHROCYTE [DISTWIDTH] IN BLOOD BY AUTOMATED COUNT: 16.3 % (ref 11.6–15.1)
GFR SERPL CREATININE-BSD FRML MDRD: 79 ML/MIN/1.73SQ M
GLUCOSE SERPL-MCNC: 151 MG/DL (ref 65–140)
GLUCOSE SERPL-MCNC: 157 MG/DL (ref 65–140)
GLUCOSE SERPL-MCNC: 157 MG/DL (ref 65–140)
GLUCOSE SERPL-MCNC: 160 MG/DL (ref 65–140)
GLUCOSE SERPL-MCNC: 168 MG/DL (ref 65–140)
HCT VFR BLD AUTO: 40.6 % (ref 36.5–49.3)
HGB BLD-MCNC: 12.5 G/DL (ref 12–17)
MCH RBC QN AUTO: 29.6 PG (ref 26.8–34.3)
MCHC RBC AUTO-ENTMCNC: 30.8 G/DL (ref 31.4–37.4)
MCV RBC AUTO: 96 FL (ref 82–98)
PLATELET # BLD AUTO: 304 THOUSANDS/UL (ref 149–390)
PMV BLD AUTO: 10.2 FL (ref 8.9–12.7)
POTASSIUM SERPL-SCNC: 4.2 MMOL/L (ref 3.5–5.3)
PROCALCITONIN SERPL-MCNC: 0.25 NG/ML
RBC # BLD AUTO: 4.22 MILLION/UL (ref 3.88–5.62)
SODIUM SERPL-SCNC: 139 MMOL/L (ref 136–145)
WBC # BLD AUTO: 7.99 THOUSAND/UL (ref 4.31–10.16)

## 2021-11-13 PROCEDURE — 85730 THROMBOPLASTIN TIME PARTIAL: CPT | Performed by: STUDENT IN AN ORGANIZED HEALTH CARE EDUCATION/TRAINING PROGRAM

## 2021-11-13 PROCEDURE — 85730 THROMBOPLASTIN TIME PARTIAL: CPT | Performed by: FAMILY MEDICINE

## 2021-11-13 PROCEDURE — 94760 N-INVAS EAR/PLS OXIMETRY 1: CPT

## 2021-11-13 PROCEDURE — 99233 SBSQ HOSP IP/OBS HIGH 50: CPT | Performed by: FAMILY MEDICINE

## 2021-11-13 PROCEDURE — 86140 C-REACTIVE PROTEIN: CPT | Performed by: FAMILY MEDICINE

## 2021-11-13 PROCEDURE — 85027 COMPLETE CBC AUTOMATED: CPT | Performed by: FAMILY MEDICINE

## 2021-11-13 PROCEDURE — XW033E5 INTRODUCTION OF REMDESIVIR ANTI-INFECTIVE INTO PERIPHERAL VEIN, PERCUTANEOUS APPROACH, NEW TECHNOLOGY GROUP 5: ICD-10-PCS | Performed by: INTERNAL MEDICINE

## 2021-11-13 PROCEDURE — 84145 PROCALCITONIN (PCT): CPT | Performed by: NURSE PRACTITIONER

## 2021-11-13 PROCEDURE — 87040 BLOOD CULTURE FOR BACTERIA: CPT | Performed by: FAMILY MEDICINE

## 2021-11-13 PROCEDURE — 82948 REAGENT STRIP/BLOOD GLUCOSE: CPT

## 2021-11-13 PROCEDURE — 80048 BASIC METABOLIC PNL TOTAL CA: CPT | Performed by: FAMILY MEDICINE

## 2021-11-13 RX ORDER — FUROSEMIDE 10 MG/ML
40 INJECTION INTRAMUSCULAR; INTRAVENOUS
Status: COMPLETED | OUTPATIENT
Start: 2021-11-13 | End: 2021-11-15

## 2021-11-13 RX ADMIN — PAROXETINE 20 MG: 20 TABLET, FILM COATED ORAL at 08:02

## 2021-11-13 RX ADMIN — INSULIN LISPRO 2 UNITS: 100 INJECTION, SOLUTION INTRAVENOUS; SUBCUTANEOUS at 17:06

## 2021-11-13 RX ADMIN — INSULIN LISPRO 2 UNITS: 100 INJECTION, SOLUTION INTRAVENOUS; SUBCUTANEOUS at 07:57

## 2021-11-13 RX ADMIN — HEPARIN SODIUM 2000 UNITS: 1000 INJECTION INTRAVENOUS; SUBCUTANEOUS at 08:23

## 2021-11-13 RX ADMIN — FUROSEMIDE 40 MG: 40 TABLET ORAL at 08:02

## 2021-11-13 RX ADMIN — BUDESONIDE AND FORMOTEROL FUMARATE DIHYDRATE 2 PUFF: 160; 4.5 AEROSOL RESPIRATORY (INHALATION) at 08:07

## 2021-11-13 RX ADMIN — HEPARIN SODIUM 8.1 UNITS/KG/HR: 10000 INJECTION, SOLUTION INTRAVENOUS at 14:50

## 2021-11-13 RX ADMIN — METHYLPREDNISOLONE SODIUM SUCCINATE 40 MG: 40 INJECTION, POWDER, FOR SOLUTION INTRAMUSCULAR; INTRAVENOUS at 14:48

## 2021-11-13 RX ADMIN — INSULIN LISPRO 1 UNITS: 100 INJECTION, SOLUTION INTRAVENOUS; SUBCUTANEOUS at 21:23

## 2021-11-13 RX ADMIN — MULTIPLE VITAMINS W/ MINERALS TAB 1 TABLET: TAB ORAL at 08:02

## 2021-11-13 RX ADMIN — INSULIN LISPRO 2 UNITS: 100 INJECTION, SOLUTION INTRAVENOUS; SUBCUTANEOUS at 10:41

## 2021-11-13 RX ADMIN — FUROSEMIDE 40 MG: 10 INJECTION, SOLUTION INTRAMUSCULAR; INTRAVENOUS at 17:10

## 2021-11-13 RX ADMIN — BUDESONIDE AND FORMOTEROL FUMARATE DIHYDRATE 2 PUFF: 160; 4.5 AEROSOL RESPIRATORY (INHALATION) at 17:06

## 2021-11-13 RX ADMIN — ALBUTEROL SULFATE 2 PUFF: 90 AEROSOL, METERED RESPIRATORY (INHALATION) at 08:07

## 2021-11-13 RX ADMIN — CEFTRIAXONE 2000 MG: 2 INJECTION, SOLUTION INTRAVENOUS at 14:49

## 2021-11-13 RX ADMIN — REMDESIVIR 100 MG: 100 INJECTION, POWDER, LYOPHILIZED, FOR SOLUTION INTRAVENOUS at 04:44

## 2021-11-13 RX ADMIN — ALBUTEROL SULFATE 2 PUFF: 90 AEROSOL, METERED RESPIRATORY (INHALATION) at 17:06

## 2021-11-13 RX ADMIN — BARICITINIB 4 MG: 2 TABLET, FILM COATED ORAL at 14:48

## 2021-11-13 RX ADMIN — METHYLPREDNISOLONE SODIUM SUCCINATE 40 MG: 40 INJECTION, POWDER, FOR SOLUTION INTRAMUSCULAR; INTRAVENOUS at 21:22

## 2021-11-13 RX ADMIN — METHYLPREDNISOLONE SODIUM SUCCINATE 40 MG: 40 INJECTION, POWDER, FOR SOLUTION INTRAMUSCULAR; INTRAVENOUS at 05:50

## 2021-11-13 RX ADMIN — PRAVASTATIN SODIUM 80 MG: 80 TABLET ORAL at 17:07

## 2021-11-13 RX ADMIN — VANCOMYCIN HYDROCHLORIDE 1250 MG: 1 INJECTION, POWDER, LYOPHILIZED, FOR SOLUTION INTRAVENOUS at 02:55

## 2021-11-13 RX ADMIN — HEPARIN SODIUM 2000 UNITS: 1000 INJECTION INTRAVENOUS; SUBCUTANEOUS at 21:38

## 2021-11-13 RX ADMIN — FAMOTIDINE 20 MG: 20 TABLET, FILM COATED ORAL at 08:02

## 2021-11-13 RX ADMIN — VANCOMYCIN HYDROCHLORIDE 2000 MG: 1 INJECTION, POWDER, LYOPHILIZED, FOR SOLUTION INTRAVENOUS at 14:49

## 2021-11-13 RX ADMIN — ALBUTEROL SULFATE 2 PUFF: 90 AEROSOL, METERED RESPIRATORY (INHALATION) at 10:39

## 2021-11-13 RX ADMIN — Medication 1000 UNITS: at 08:02

## 2021-11-14 LAB
APTT PPP: 60 SECONDS (ref 23–37)
APTT PPP: 70 SECONDS (ref 23–37)
GLUCOSE SERPL-MCNC: 131 MG/DL (ref 65–140)
GLUCOSE SERPL-MCNC: 140 MG/DL (ref 65–140)
GLUCOSE SERPL-MCNC: 146 MG/DL (ref 65–140)
GLUCOSE SERPL-MCNC: 174 MG/DL (ref 65–140)
MRSA NOSE QL CULT: NORMAL
VANCOMYCIN TROUGH SERPL-MCNC: 18.8 UG/ML (ref 10–20)

## 2021-11-14 PROCEDURE — 82948 REAGENT STRIP/BLOOD GLUCOSE: CPT

## 2021-11-14 PROCEDURE — 94660 CPAP INITIATION&MGMT: CPT

## 2021-11-14 PROCEDURE — 85730 THROMBOPLASTIN TIME PARTIAL: CPT | Performed by: FAMILY MEDICINE

## 2021-11-14 PROCEDURE — 80202 ASSAY OF VANCOMYCIN: CPT | Performed by: FAMILY MEDICINE

## 2021-11-14 PROCEDURE — 94760 N-INVAS EAR/PLS OXIMETRY 1: CPT

## 2021-11-14 PROCEDURE — 97167 OT EVAL HIGH COMPLEX 60 MIN: CPT

## 2021-11-14 PROCEDURE — 99233 SBSQ HOSP IP/OBS HIGH 50: CPT | Performed by: FAMILY MEDICINE

## 2021-11-14 PROCEDURE — NC001 PR NO CHARGE: Performed by: NURSE PRACTITIONER

## 2021-11-14 PROCEDURE — 97163 PT EVAL HIGH COMPLEX 45 MIN: CPT

## 2021-11-14 RX ADMIN — MULTIPLE VITAMINS W/ MINERALS TAB 1 TABLET: TAB ORAL at 08:14

## 2021-11-14 RX ADMIN — PAROXETINE 20 MG: 20 TABLET, FILM COATED ORAL at 08:14

## 2021-11-14 RX ADMIN — METHYLPREDNISOLONE SODIUM SUCCINATE 40 MG: 40 INJECTION, POWDER, FOR SOLUTION INTRAMUSCULAR; INTRAVENOUS at 05:00

## 2021-11-14 RX ADMIN — METHYLPREDNISOLONE SODIUM SUCCINATE 40 MG: 40 INJECTION, POWDER, FOR SOLUTION INTRAMUSCULAR; INTRAVENOUS at 22:57

## 2021-11-14 RX ADMIN — ALBUTEROL SULFATE 2 PUFF: 90 AEROSOL, METERED RESPIRATORY (INHALATION) at 08:15

## 2021-11-14 RX ADMIN — REMDESIVIR 100 MG: 100 INJECTION, POWDER, LYOPHILIZED, FOR SOLUTION INTRAVENOUS at 04:52

## 2021-11-14 RX ADMIN — PRAVASTATIN SODIUM 80 MG: 80 TABLET ORAL at 15:38

## 2021-11-14 RX ADMIN — BUDESONIDE AND FORMOTEROL FUMARATE DIHYDRATE 2 PUFF: 160; 4.5 AEROSOL RESPIRATORY (INHALATION) at 08:15

## 2021-11-14 RX ADMIN — FUROSEMIDE 40 MG: 10 INJECTION, SOLUTION INTRAMUSCULAR; INTRAVENOUS at 14:59

## 2021-11-14 RX ADMIN — METHYLPREDNISOLONE SODIUM SUCCINATE 40 MG: 40 INJECTION, POWDER, FOR SOLUTION INTRAMUSCULAR; INTRAVENOUS at 14:59

## 2021-11-14 RX ADMIN — Medication 1000 UNITS: at 08:14

## 2021-11-14 RX ADMIN — FAMOTIDINE 20 MG: 20 TABLET, FILM COATED ORAL at 08:14

## 2021-11-14 RX ADMIN — ALBUTEROL SULFATE 2 PUFF: 90 AEROSOL, METERED RESPIRATORY (INHALATION) at 22:57

## 2021-11-14 RX ADMIN — VANCOMYCIN HYDROCHLORIDE 2000 MG: 1 INJECTION, POWDER, LYOPHILIZED, FOR SOLUTION INTRAVENOUS at 02:26

## 2021-11-14 RX ADMIN — INSULIN LISPRO 2 UNITS: 100 INJECTION, SOLUTION INTRAVENOUS; SUBCUTANEOUS at 11:30

## 2021-11-14 RX ADMIN — BUDESONIDE AND FORMOTEROL FUMARATE DIHYDRATE 2 PUFF: 160; 4.5 AEROSOL RESPIRATORY (INHALATION) at 17:25

## 2021-11-14 RX ADMIN — VANCOMYCIN HYDROCHLORIDE 2000 MG: 1 INJECTION, POWDER, LYOPHILIZED, FOR SOLUTION INTRAVENOUS at 15:37

## 2021-11-14 RX ADMIN — ALBUTEROL SULFATE 2 PUFF: 90 AEROSOL, METERED RESPIRATORY (INHALATION) at 17:25

## 2021-11-14 RX ADMIN — FUROSEMIDE 40 MG: 10 INJECTION, SOLUTION INTRAMUSCULAR; INTRAVENOUS at 08:14

## 2021-11-14 RX ADMIN — CEFTRIAXONE 2000 MG: 2 INJECTION, SOLUTION INTRAVENOUS at 14:52

## 2021-11-14 RX ADMIN — ALBUTEROL SULFATE 2 PUFF: 90 AEROSOL, METERED RESPIRATORY (INHALATION) at 11:31

## 2021-11-15 ENCOUNTER — APPOINTMENT (INPATIENT)
Dept: NON INVASIVE DIAGNOSTICS | Facility: HOSPITAL | Age: 76
DRG: 871 | End: 2021-11-15
Payer: MEDICARE

## 2021-11-15 ENCOUNTER — APPOINTMENT (INPATIENT)
Dept: CT IMAGING | Facility: HOSPITAL | Age: 76
DRG: 871 | End: 2021-11-15
Payer: MEDICARE

## 2021-11-15 ENCOUNTER — APPOINTMENT (INPATIENT)
Dept: RADIOLOGY | Facility: HOSPITAL | Age: 76
DRG: 871 | End: 2021-11-15
Payer: MEDICARE

## 2021-11-15 LAB
ALBUMIN SERPL BCP-MCNC: 2.1 G/DL (ref 3.5–5)
ALP SERPL-CCNC: 46 U/L (ref 46–116)
ALT SERPL W P-5'-P-CCNC: 68 U/L (ref 12–78)
AMMONIA PLAS-SCNC: 25 UMOL/L (ref 11–35)
ANION GAP SERPL CALCULATED.3IONS-SCNC: 5 MMOL/L (ref 4–13)
ANISOCYTOSIS BLD QL SMEAR: PRESENT
AORTIC VALVE ANNULUS: 2.6 CM
APICAL FOUR CHAMBER EJECTION FRACTION: 64 %
APTT PPP: 130 SECONDS (ref 23–37)
APTT PPP: 42 SECONDS (ref 23–37)
APTT PPP: 52 SECONDS (ref 23–37)
ARTERIAL PATENCY WRIST A: YES
ARTERIAL PATENCY WRIST A: YES
AST SERPL W P-5'-P-CCNC: 83 U/L (ref 5–45)
ATRIAL RATE: 187 BPM
ATRIAL RATE: 60 BPM
ATRIAL RATE: 60 BPM
BACTERIA BLD CULT: ABNORMAL
BACTERIA BLD CULT: ABNORMAL
BACTERIA WND AEROBE CULT: ABNORMAL
BASE EXCESS BLDA CALC-SCNC: 14.9 MMOL/L
BASE EXCESS BLDA CALC-SCNC: 15.4 MMOL/L
BASOPHILS # BLD MANUAL: 0 THOUSAND/UL (ref 0–0.1)
BASOPHILS NFR MAR MANUAL: 0 % (ref 0–1)
BILIRUB SERPL-MCNC: 0.5 MG/DL (ref 0.2–1)
BUN SERPL-MCNC: 31 MG/DL (ref 5–25)
CALCIUM ALBUM COR SERPL-MCNC: 8.8 MG/DL (ref 8.3–10.1)
CALCIUM SERPL-MCNC: 7.3 MG/DL (ref 8.3–10.1)
CHLORIDE SERPL-SCNC: 102 MMOL/L (ref 100–108)
CO2 SERPL-SCNC: 39 MMOL/L (ref 21–32)
CREAT SERPL-MCNC: 0.96 MG/DL (ref 0.6–1.3)
EOSINOPHIL # BLD MANUAL: 0 THOUSAND/UL (ref 0–0.4)
EOSINOPHIL NFR BLD MANUAL: 0 % (ref 0–6)
ERYTHROCYTE [DISTWIDTH] IN BLOOD BY AUTOMATED COUNT: 16 % (ref 11.6–15.1)
FRACTIONAL SHORTENING: 36 % (ref 28–44)
GFR SERPL CREATININE-BSD FRML MDRD: 76 ML/MIN/1.73SQ M
GLUCOSE SERPL-MCNC: 159 MG/DL (ref 65–140)
GLUCOSE SERPL-MCNC: 160 MG/DL (ref 65–140)
GLUCOSE SERPL-MCNC: 182 MG/DL (ref 65–140)
GLUCOSE SERPL-MCNC: 183 MG/DL (ref 65–140)
GLUCOSE SERPL-MCNC: 191 MG/DL (ref 65–140)
GRAM STN SPEC: ABNORMAL
HCO3 BLDA-SCNC: 40.2 MMOL/L (ref 22–28)
HCO3 BLDA-SCNC: 41 MMOL/L (ref 22–28)
HCT VFR BLD AUTO: 40.9 % (ref 36.5–49.3)
HGB BLD-MCNC: 12.8 G/DL (ref 12–17)
INTERVENTRICULAR SEPTUM IN DIASTOLE (PARASTERNAL SHORT AXIS VIEW): 0.9 CM
LAAS-AP4: 21.4 CM2
LEFT INTERNAL DIMENSION IN SYSTOLE: 3.2 CM (ref 2.1–4)
LEFT VENTRICULAR INTERNAL DIMENSION IN DIASTOLE: 5 CM (ref 11.71–17.46)
LEFT VENTRICULAR POSTERIOR WALL IN END DIASTOLE: 1.2 CM
LEFT VENTRICULAR STROKE VOLUME: 79 ML
LYMPHOCYTES # BLD AUTO: 0.48 THOUSAND/UL (ref 0.6–4.47)
LYMPHOCYTES # BLD AUTO: 5 % (ref 14–44)
MCH RBC QN AUTO: 29.7 PG (ref 26.8–34.3)
MCHC RBC AUTO-ENTMCNC: 31.3 G/DL (ref 31.4–37.4)
MCV RBC AUTO: 95 FL (ref 82–98)
MONOCYTES # BLD AUTO: 0.29 THOUSAND/UL (ref 0–1.22)
MONOCYTES NFR BLD: 3 % (ref 4–12)
NASAL CANNULA: 12
NEUTROPHILS # BLD MANUAL: 8.75 THOUSAND/UL (ref 1.85–7.62)
NEUTS BAND NFR BLD MANUAL: 1 % (ref 0–8)
NEUTS SEG NFR BLD AUTO: 90 % (ref 43–75)
NRBC BLD AUTO-RTO: 2 /100 WBC (ref 0–2)
O2 CT BLDA-SCNC: 16.4 ML/DL (ref 16–23)
O2 CT BLDA-SCNC: 17.3 ML/DL (ref 16–23)
OXYHGB MFR BLDA: 90.5 % (ref 94–97)
OXYHGB MFR BLDA: 93.7 % (ref 94–97)
PCO2 BLDA: 52 MM HG (ref 36–44)
PCO2 BLDA: 53.5 MM HG (ref 36–44)
PH BLDA: 7.5 [PH] (ref 7.35–7.45)
PH BLDA: 7.51 [PH] (ref 7.35–7.45)
PLATELET # BLD AUTO: 330 THOUSANDS/UL (ref 149–390)
PLATELET BLD QL SMEAR: ADEQUATE
PMV BLD AUTO: 10.4 FL (ref 8.9–12.7)
PO2 BLDA: 59.9 MM HG (ref 75–129)
PO2 BLDA: 74 MM HG (ref 75–129)
POTASSIUM SERPL-SCNC: 3.6 MMOL/L (ref 3.5–5.3)
PROT SERPL-MCNC: 6.9 G/DL (ref 6.4–8.2)
QRS AXIS: 0 DEGREES
QRS AXIS: 23 DEGREES
QRS AXIS: 26 DEGREES
QRSD INTERVAL: 88 MS
QRSD INTERVAL: 92 MS
QRSD INTERVAL: 94 MS
QT INTERVAL: 374 MS
QT INTERVAL: 384 MS
QT INTERVAL: 464 MS
QTC INTERVAL: 411 MS
QTC INTERVAL: 474 MS
QTC INTERVAL: 493 MS
RBC # BLD AUTO: 4.31 MILLION/UL (ref 3.88–5.62)
RBC MORPH BLD: PRESENT
RIGHT ATRIAL 2D VOLUME: 46 ML
RIGHT ATRIUM AREA SYSTOLE A4C: 17 CM2
RIGHT VENTRICLE ID DIMENSION: 3.4 CM
SL CV LV EF: 55
SL CV PED ECHO LEFT VENTRICLE DIASTOLIC VOLUME (MOD BIPLANE) 2D: 120 ML
SL CV PED ECHO LEFT VENTRICLE SYSTOLIC VOLUME (MOD BIPLANE) 2D: 41 ML
SODIUM SERPL-SCNC: 146 MMOL/L (ref 136–145)
SPECIMEN SOURCE: ABNORMAL
SPECIMEN SOURCE: ABNORMAL
T WAVE AXIS: -1 DEGREES
T WAVE AXIS: 12 DEGREES
T WAVE AXIS: 9 DEGREES
TARGETS BLD QL SMEAR: PRESENT
TRICUSPID VALVE PEAK REGURGITATION VELOCITY: 2.95 M/S
TRICUSPID VALVE S': 65 CM/S
TSH SERPL DL<=0.05 MIU/L-ACNC: 0.54 UIU/ML (ref 0.36–3.74)
TV PEAK GRADIENT: 35 MMHG
VARIANT LYMPHS # BLD AUTO: 1 %
VENTRICULAR RATE: 68 BPM
VENTRICULAR RATE: 69 BPM
VENTRICULAR RATE: 97 BPM
VIT B12 SERPL-MCNC: 1342 PG/ML (ref 100–900)
WBC # BLD AUTO: 9.62 THOUSAND/UL (ref 4.31–10.16)
Z-SCORE OF LEFT VENTRICULAR DIMENSION IN END SYSTOLE: -10.51

## 2021-11-15 PROCEDURE — 36600 WITHDRAWAL OF ARTERIAL BLOOD: CPT

## 2021-11-15 PROCEDURE — 82948 REAGENT STRIP/BLOOD GLUCOSE: CPT

## 2021-11-15 PROCEDURE — 85730 THROMBOPLASTIN TIME PARTIAL: CPT | Performed by: NURSE PRACTITIONER

## 2021-11-15 PROCEDURE — 71045 X-RAY EXAM CHEST 1 VIEW: CPT

## 2021-11-15 PROCEDURE — 82805 BLOOD GASES W/O2 SATURATION: CPT | Performed by: PHYSICIAN ASSISTANT

## 2021-11-15 PROCEDURE — G1004 CDSM NDSC: HCPCS

## 2021-11-15 PROCEDURE — 70450 CT HEAD/BRAIN W/O DYE: CPT

## 2021-11-15 PROCEDURE — 99233 SBSQ HOSP IP/OBS HIGH 50: CPT | Performed by: FAMILY MEDICINE

## 2021-11-15 PROCEDURE — 82805 BLOOD GASES W/O2 SATURATION: CPT | Performed by: FAMILY MEDICINE

## 2021-11-15 PROCEDURE — 80053 COMPREHEN METABOLIC PANEL: CPT | Performed by: FAMILY MEDICINE

## 2021-11-15 PROCEDURE — 84443 ASSAY THYROID STIM HORMONE: CPT | Performed by: FAMILY MEDICINE

## 2021-11-15 PROCEDURE — 93308 TTE F-UP OR LMTD: CPT

## 2021-11-15 PROCEDURE — 85007 BL SMEAR W/DIFF WBC COUNT: CPT | Performed by: FAMILY MEDICINE

## 2021-11-15 PROCEDURE — 94760 N-INVAS EAR/PLS OXIMETRY 1: CPT

## 2021-11-15 PROCEDURE — 85730 THROMBOPLASTIN TIME PARTIAL: CPT | Performed by: FAMILY MEDICINE

## 2021-11-15 PROCEDURE — 99291 CRITICAL CARE FIRST HOUR: CPT | Performed by: STUDENT IN AN ORGANIZED HEALTH CARE EDUCATION/TRAINING PROGRAM

## 2021-11-15 PROCEDURE — NC001 PR NO CHARGE: Performed by: INTERNAL MEDICINE

## 2021-11-15 PROCEDURE — 71275 CT ANGIOGRAPHY CHEST: CPT

## 2021-11-15 PROCEDURE — 82140 ASSAY OF AMMONIA: CPT | Performed by: FAMILY MEDICINE

## 2021-11-15 PROCEDURE — 82607 VITAMIN B-12: CPT | Performed by: FAMILY MEDICINE

## 2021-11-15 PROCEDURE — 94660 CPAP INITIATION&MGMT: CPT

## 2021-11-15 PROCEDURE — XW0DXM6 INTRODUCTION OF BARICITINIB INTO MOUTH AND PHARYNX, EXTERNAL APPROACH, NEW TECHNOLOGY GROUP 6: ICD-10-PCS | Performed by: INTERNAL MEDICINE

## 2021-11-15 PROCEDURE — 85027 COMPLETE CBC AUTOMATED: CPT | Performed by: FAMILY MEDICINE

## 2021-11-15 RX ADMIN — ALBUTEROL SULFATE 2 PUFF: 90 AEROSOL, METERED RESPIRATORY (INHALATION) at 18:23

## 2021-11-15 RX ADMIN — INSULIN LISPRO 2 UNITS: 100 INJECTION, SOLUTION INTRAVENOUS; SUBCUTANEOUS at 12:10

## 2021-11-15 RX ADMIN — DEXAMETHASONE SODIUM PHOSPHATE 12.2 MG: 10 INJECTION, SOLUTION INTRAMUSCULAR; INTRAVENOUS at 20:21

## 2021-11-15 RX ADMIN — INSULIN LISPRO 2 UNITS: 100 INJECTION, SOLUTION INTRAVENOUS; SUBCUTANEOUS at 08:11

## 2021-11-15 RX ADMIN — Medication 1000 UNITS: at 08:02

## 2021-11-15 RX ADMIN — BUDESONIDE AND FORMOTEROL FUMARATE DIHYDRATE 2 PUFF: 160; 4.5 AEROSOL RESPIRATORY (INHALATION) at 08:03

## 2021-11-15 RX ADMIN — INSULIN LISPRO 1 UNITS: 100 INJECTION, SOLUTION INTRAVENOUS; SUBCUTANEOUS at 22:10

## 2021-11-15 RX ADMIN — BUDESONIDE AND FORMOTEROL FUMARATE DIHYDRATE 2 PUFF: 160; 4.5 AEROSOL RESPIRATORY (INHALATION) at 18:23

## 2021-11-15 RX ADMIN — PAROXETINE 20 MG: 20 TABLET, FILM COATED ORAL at 08:02

## 2021-11-15 RX ADMIN — VANCOMYCIN HYDROCHLORIDE 2000 MG: 1 INJECTION, POWDER, LYOPHILIZED, FOR SOLUTION INTRAVENOUS at 02:54

## 2021-11-15 RX ADMIN — ALBUTEROL SULFATE 2 PUFF: 90 AEROSOL, METERED RESPIRATORY (INHALATION) at 21:58

## 2021-11-15 RX ADMIN — MULTIPLE VITAMINS W/ MINERALS TAB 1 TABLET: TAB ORAL at 08:02

## 2021-11-15 RX ADMIN — HEPARIN SODIUM 4000 UNITS: 1000 INJECTION INTRAVENOUS; SUBCUTANEOUS at 07:07

## 2021-11-15 RX ADMIN — HEPARIN SODIUM 2000 UNITS: 1000 INJECTION INTRAVENOUS; SUBCUTANEOUS at 15:18

## 2021-11-15 RX ADMIN — BARICITINIB 4 MG: 2 TABLET, FILM COATED ORAL at 21:55

## 2021-11-15 RX ADMIN — PRAVASTATIN SODIUM 80 MG: 80 TABLET ORAL at 18:22

## 2021-11-15 RX ADMIN — INSULIN LISPRO 2 UNITS: 100 INJECTION, SOLUTION INTRAVENOUS; SUBCUTANEOUS at 18:22

## 2021-11-15 RX ADMIN — IOHEXOL 100 ML: 350 INJECTION, SOLUTION INTRAVENOUS at 21:09

## 2021-11-15 RX ADMIN — REMDESIVIR 100 MG: 100 INJECTION, POWDER, LYOPHILIZED, FOR SOLUTION INTRAVENOUS at 05:23

## 2021-11-15 RX ADMIN — VANCOMYCIN HYDROCHLORIDE 2000 MG: 1 INJECTION, POWDER, LYOPHILIZED, FOR SOLUTION INTRAVENOUS at 15:20

## 2021-11-15 RX ADMIN — FUROSEMIDE 40 MG: 10 INJECTION, SOLUTION INTRAMUSCULAR; INTRAVENOUS at 08:03

## 2021-11-15 RX ADMIN — HEPARIN SODIUM 8.11 UNITS/KG/HR: 10000 INJECTION, SOLUTION INTRAVENOUS at 02:22

## 2021-11-15 RX ADMIN — METHYLPREDNISOLONE SODIUM SUCCINATE 40 MG: 40 INJECTION, POWDER, FOR SOLUTION INTRAMUSCULAR; INTRAVENOUS at 14:37

## 2021-11-15 RX ADMIN — ALBUTEROL SULFATE 2 PUFF: 90 AEROSOL, METERED RESPIRATORY (INHALATION) at 08:03

## 2021-11-15 RX ADMIN — METHYLPREDNISOLONE SODIUM SUCCINATE 40 MG: 40 INJECTION, POWDER, FOR SOLUTION INTRAMUSCULAR; INTRAVENOUS at 05:22

## 2021-11-15 RX ADMIN — ALBUTEROL SULFATE 2 PUFF: 90 AEROSOL, METERED RESPIRATORY (INHALATION) at 12:11

## 2021-11-15 RX ADMIN — FAMOTIDINE 20 MG: 20 TABLET, FILM COATED ORAL at 08:02

## 2021-11-16 ENCOUNTER — APPOINTMENT (INPATIENT)
Dept: RADIOLOGY | Facility: HOSPITAL | Age: 76
DRG: 871 | End: 2021-11-16
Payer: MEDICARE

## 2021-11-16 LAB
ANION GAP SERPL CALCULATED.3IONS-SCNC: 1 MMOL/L (ref 4–13)
APTT PPP: 61 SECONDS (ref 23–37)
APTT PPP: 79 SECONDS (ref 23–37)
BUN SERPL-MCNC: 23 MG/DL (ref 5–25)
CA-I BLD-SCNC: 0.91 MMOL/L (ref 1.12–1.32)
CALCIUM SERPL-MCNC: 7.1 MG/DL (ref 8.3–10.1)
CHLORIDE SERPL-SCNC: 104 MMOL/L (ref 100–108)
CO2 SERPL-SCNC: 41 MMOL/L (ref 21–32)
CREAT SERPL-MCNC: 0.9 MG/DL (ref 0.6–1.3)
ERYTHROCYTE [DISTWIDTH] IN BLOOD BY AUTOMATED COUNT: 16.1 % (ref 11.6–15.1)
GFR SERPL CREATININE-BSD FRML MDRD: 83 ML/MIN/1.73SQ M
GLUCOSE SERPL-MCNC: 139 MG/DL (ref 65–140)
GLUCOSE SERPL-MCNC: 157 MG/DL (ref 65–140)
GLUCOSE SERPL-MCNC: 168 MG/DL (ref 65–140)
GLUCOSE SERPL-MCNC: 168 MG/DL (ref 65–140)
GLUCOSE SERPL-MCNC: 174 MG/DL (ref 65–140)
HCT VFR BLD AUTO: 38.3 % (ref 36.5–49.3)
HGB BLD-MCNC: 12.1 G/DL (ref 12–17)
MAGNESIUM SERPL-MCNC: 2.4 MG/DL (ref 1.6–2.6)
MCH RBC QN AUTO: 30 PG (ref 26.8–34.3)
MCHC RBC AUTO-ENTMCNC: 31.6 G/DL (ref 31.4–37.4)
MCV RBC AUTO: 95 FL (ref 82–98)
PLATELET # BLD AUTO: 327 THOUSANDS/UL (ref 149–390)
PMV BLD AUTO: 10.5 FL (ref 8.9–12.7)
POTASSIUM SERPL-SCNC: 3.3 MMOL/L (ref 3.5–5.3)
PROCALCITONIN SERPL-MCNC: <0.05 NG/ML
RBC # BLD AUTO: 4.03 MILLION/UL (ref 3.88–5.62)
SODIUM SERPL-SCNC: 146 MMOL/L (ref 136–145)
WBC # BLD AUTO: 8.32 THOUSAND/UL (ref 4.31–10.16)

## 2021-11-16 PROCEDURE — 82948 REAGENT STRIP/BLOOD GLUCOSE: CPT

## 2021-11-16 PROCEDURE — 80048 BASIC METABOLIC PNL TOTAL CA: CPT | Performed by: PHYSICIAN ASSISTANT

## 2021-11-16 PROCEDURE — 94760 N-INVAS EAR/PLS OXIMETRY 1: CPT

## 2021-11-16 PROCEDURE — 71045 X-RAY EXAM CHEST 1 VIEW: CPT

## 2021-11-16 PROCEDURE — NC001 PR NO CHARGE: Performed by: STUDENT IN AN ORGANIZED HEALTH CARE EDUCATION/TRAINING PROGRAM

## 2021-11-16 PROCEDURE — 94660 CPAP INITIATION&MGMT: CPT

## 2021-11-16 PROCEDURE — 97530 THERAPEUTIC ACTIVITIES: CPT

## 2021-11-16 PROCEDURE — 99233 SBSQ HOSP IP/OBS HIGH 50: CPT | Performed by: STUDENT IN AN ORGANIZED HEALTH CARE EDUCATION/TRAINING PROGRAM

## 2021-11-16 PROCEDURE — 85027 COMPLETE CBC AUTOMATED: CPT | Performed by: PHYSICIAN ASSISTANT

## 2021-11-16 PROCEDURE — 36573 INSJ PICC RS&I 5 YR+: CPT | Performed by: STUDENT IN AN ORGANIZED HEALTH CARE EDUCATION/TRAINING PROGRAM

## 2021-11-16 PROCEDURE — 84145 PROCALCITONIN (PCT): CPT | Performed by: PHYSICIAN ASSISTANT

## 2021-11-16 PROCEDURE — 85730 THROMBOPLASTIN TIME PARTIAL: CPT | Performed by: STUDENT IN AN ORGANIZED HEALTH CARE EDUCATION/TRAINING PROGRAM

## 2021-11-16 PROCEDURE — 97110 THERAPEUTIC EXERCISES: CPT

## 2021-11-16 PROCEDURE — 02HV33Z INSERTION OF INFUSION DEVICE INTO SUPERIOR VENA CAVA, PERCUTANEOUS APPROACH: ICD-10-PCS | Performed by: INTERNAL MEDICINE

## 2021-11-16 PROCEDURE — 82330 ASSAY OF CALCIUM: CPT | Performed by: PHYSICIAN ASSISTANT

## 2021-11-16 PROCEDURE — 83735 ASSAY OF MAGNESIUM: CPT | Performed by: PHYSICIAN ASSISTANT

## 2021-11-16 RX ORDER — POTASSIUM CHLORIDE 20 MEQ/1
40 TABLET, EXTENDED RELEASE ORAL 2 TIMES DAILY
Status: COMPLETED | OUTPATIENT
Start: 2021-11-16 | End: 2021-11-16

## 2021-11-16 RX ORDER — CALCIUM GLUCONATE 20 MG/ML
2 INJECTION, SOLUTION INTRAVENOUS ONCE
Status: COMPLETED | OUTPATIENT
Start: 2021-11-16 | End: 2021-11-16

## 2021-11-16 RX ORDER — ACETAZOLAMIDE 500 MG/5ML
250 INJECTION, POWDER, LYOPHILIZED, FOR SOLUTION INTRAVENOUS ONCE
Status: COMPLETED | OUTPATIENT
Start: 2021-11-16 | End: 2021-11-16

## 2021-11-16 RX ORDER — LIDOCAINE HYDROCHLORIDE 10 MG/ML
5 INJECTION, SOLUTION EPIDURAL; INFILTRATION; INTRACAUDAL; PERINEURAL ONCE
Status: COMPLETED | OUTPATIENT
Start: 2021-11-16 | End: 2021-11-16

## 2021-11-16 RX ADMIN — PRAVASTATIN SODIUM 80 MG: 80 TABLET ORAL at 17:27

## 2021-11-16 RX ADMIN — ALBUTEROL SULFATE 2 PUFF: 90 AEROSOL, METERED RESPIRATORY (INHALATION) at 17:29

## 2021-11-16 RX ADMIN — VANCOMYCIN HYDROCHLORIDE 2000 MG: 1 INJECTION, POWDER, LYOPHILIZED, FOR SOLUTION INTRAVENOUS at 17:19

## 2021-11-16 RX ADMIN — FAMOTIDINE 20 MG: 20 TABLET, FILM COATED ORAL at 08:20

## 2021-11-16 RX ADMIN — ALBUTEROL SULFATE 2 PUFF: 90 AEROSOL, METERED RESPIRATORY (INHALATION) at 22:13

## 2021-11-16 RX ADMIN — BUDESONIDE AND FORMOTEROL FUMARATE DIHYDRATE 2 PUFF: 160; 4.5 AEROSOL RESPIRATORY (INHALATION) at 08:23

## 2021-11-16 RX ADMIN — Medication 1000 UNITS: at 08:20

## 2021-11-16 RX ADMIN — VANCOMYCIN HYDROCHLORIDE 2000 MG: 1 INJECTION, POWDER, LYOPHILIZED, FOR SOLUTION INTRAVENOUS at 03:54

## 2021-11-16 RX ADMIN — ALBUTEROL SULFATE 2 PUFF: 90 AEROSOL, METERED RESPIRATORY (INHALATION) at 13:12

## 2021-11-16 RX ADMIN — ALBUTEROL SULFATE 2 PUFF: 90 AEROSOL, METERED RESPIRATORY (INHALATION) at 10:31

## 2021-11-16 RX ADMIN — MULTIPLE VITAMINS W/ MINERALS TAB 1 TABLET: TAB ORAL at 08:20

## 2021-11-16 RX ADMIN — INSULIN LISPRO 1 UNITS: 100 INJECTION, SOLUTION INTRAVENOUS; SUBCUTANEOUS at 22:13

## 2021-11-16 RX ADMIN — DEXAMETHASONE SODIUM PHOSPHATE 12.2 MG: 10 INJECTION, SOLUTION INTRAMUSCULAR; INTRAVENOUS at 20:00

## 2021-11-16 RX ADMIN — BARICITINIB 4 MG: 2 TABLET, FILM COATED ORAL at 20:15

## 2021-11-16 RX ADMIN — REMDESIVIR 100 MG: 100 INJECTION, POWDER, LYOPHILIZED, FOR SOLUTION INTRAVENOUS at 04:13

## 2021-11-16 RX ADMIN — PAROXETINE 20 MG: 20 TABLET, FILM COATED ORAL at 08:20

## 2021-11-16 RX ADMIN — BUDESONIDE AND FORMOTEROL FUMARATE DIHYDRATE 2 PUFF: 160; 4.5 AEROSOL RESPIRATORY (INHALATION) at 17:27

## 2021-11-16 RX ADMIN — INSULIN LISPRO 2 UNITS: 100 INJECTION, SOLUTION INTRAVENOUS; SUBCUTANEOUS at 06:44

## 2021-11-16 RX ADMIN — HEPARIN SODIUM 11.1 UNITS/KG/HR: 10000 INJECTION, SOLUTION INTRAVENOUS at 00:53

## 2021-11-16 RX ADMIN — CALCIUM GLUCONATE 2 G: 20 INJECTION, SOLUTION INTRAVENOUS at 06:49

## 2021-11-16 RX ADMIN — ACETAZOLAMIDE 250 MG: 500 INJECTION, POWDER, LYOPHILIZED, FOR SOLUTION INTRAVENOUS at 10:35

## 2021-11-16 RX ADMIN — LIDOCAINE HYDROCHLORIDE 5 ML: 10 INJECTION, SOLUTION EPIDURAL; INFILTRATION; INTRACAUDAL; PERINEURAL at 17:34

## 2021-11-16 RX ADMIN — INSULIN LISPRO 2 UNITS: 100 INJECTION, SOLUTION INTRAVENOUS; SUBCUTANEOUS at 11:06

## 2021-11-16 RX ADMIN — POTASSIUM CHLORIDE 40 MEQ: 1500 TABLET, EXTENDED RELEASE ORAL at 20:00

## 2021-11-16 RX ADMIN — DEXAMETHASONE SODIUM PHOSPHATE 12.2 MG: 10 INJECTION, SOLUTION INTRAMUSCULAR; INTRAVENOUS at 06:01

## 2021-11-16 RX ADMIN — POTASSIUM CHLORIDE 40 MEQ: 1500 TABLET, EXTENDED RELEASE ORAL at 10:34

## 2021-11-17 PROBLEM — G93.41 ACUTE METABOLIC ENCEPHALOPATHY: Status: RESOLVED | Noted: 2021-11-12 | Resolved: 2021-11-17

## 2021-11-17 PROBLEM — L03.119 CELLULITIS OF LOWER EXTREMITY: Status: ACTIVE | Noted: 2018-11-26

## 2021-11-17 LAB
ANION GAP SERPL CALCULATED.3IONS-SCNC: 2 MMOL/L (ref 4–13)
APTT PPP: 44 SECONDS (ref 23–37)
APTT PPP: 68 SECONDS (ref 23–37)
APTT PPP: 75 SECONDS (ref 23–37)
BACTERIA BLD CULT: NORMAL
BUN SERPL-MCNC: 23 MG/DL (ref 5–25)
CALCIUM SERPL-MCNC: 7.5 MG/DL (ref 8.3–10.1)
CHLORIDE SERPL-SCNC: 105 MMOL/L (ref 100–108)
CO2 SERPL-SCNC: 35 MMOL/L (ref 21–32)
CREAT SERPL-MCNC: 0.78 MG/DL (ref 0.6–1.3)
ERYTHROCYTE [DISTWIDTH] IN BLOOD BY AUTOMATED COUNT: 16.2 % (ref 11.6–15.1)
GFR SERPL CREATININE-BSD FRML MDRD: 88 ML/MIN/1.73SQ M
GLUCOSE SERPL-MCNC: 127 MG/DL (ref 65–140)
GLUCOSE SERPL-MCNC: 143 MG/DL (ref 65–140)
GLUCOSE SERPL-MCNC: 150 MG/DL (ref 65–140)
GLUCOSE SERPL-MCNC: 160 MG/DL (ref 65–140)
GLUCOSE SERPL-MCNC: 162 MG/DL (ref 65–140)
HCT VFR BLD AUTO: 39.5 % (ref 36.5–49.3)
HGB BLD-MCNC: 12.5 G/DL (ref 12–17)
MAGNESIUM SERPL-MCNC: 2.6 MG/DL (ref 1.6–2.6)
MCH RBC QN AUTO: 29.8 PG (ref 26.8–34.3)
MCHC RBC AUTO-ENTMCNC: 31.6 G/DL (ref 31.4–37.4)
MCV RBC AUTO: 94 FL (ref 82–98)
PLATELET # BLD AUTO: 287 THOUSANDS/UL (ref 149–390)
PMV BLD AUTO: 10.9 FL (ref 8.9–12.7)
POTASSIUM SERPL-SCNC: 4.5 MMOL/L (ref 3.5–5.3)
PROCALCITONIN SERPL-MCNC: <0.05 NG/ML
RBC # BLD AUTO: 4.2 MILLION/UL (ref 3.88–5.62)
SODIUM SERPL-SCNC: 142 MMOL/L (ref 136–145)
WBC # BLD AUTO: 8.61 THOUSAND/UL (ref 4.31–10.16)

## 2021-11-17 PROCEDURE — 83735 ASSAY OF MAGNESIUM: CPT | Performed by: NURSE PRACTITIONER

## 2021-11-17 PROCEDURE — 84145 PROCALCITONIN (PCT): CPT | Performed by: NURSE PRACTITIONER

## 2021-11-17 PROCEDURE — 85027 COMPLETE CBC AUTOMATED: CPT | Performed by: NURSE PRACTITIONER

## 2021-11-17 PROCEDURE — 80048 BASIC METABOLIC PNL TOTAL CA: CPT | Performed by: NURSE PRACTITIONER

## 2021-11-17 PROCEDURE — 94760 N-INVAS EAR/PLS OXIMETRY 1: CPT

## 2021-11-17 PROCEDURE — 97530 THERAPEUTIC ACTIVITIES: CPT

## 2021-11-17 PROCEDURE — 85730 THROMBOPLASTIN TIME PARTIAL: CPT | Performed by: FAMILY MEDICINE

## 2021-11-17 PROCEDURE — 85730 THROMBOPLASTIN TIME PARTIAL: CPT | Performed by: STUDENT IN AN ORGANIZED HEALTH CARE EDUCATION/TRAINING PROGRAM

## 2021-11-17 PROCEDURE — 94660 CPAP INITIATION&MGMT: CPT

## 2021-11-17 PROCEDURE — 97116 GAIT TRAINING THERAPY: CPT

## 2021-11-17 PROCEDURE — 99233 SBSQ HOSP IP/OBS HIGH 50: CPT | Performed by: PHYSICIAN ASSISTANT

## 2021-11-17 PROCEDURE — 82948 REAGENT STRIP/BLOOD GLUCOSE: CPT

## 2021-11-17 RX ADMIN — HEPARIN SODIUM 11.1 UNITS/KG/HR: 10000 INJECTION, SOLUTION INTRAVENOUS at 02:30

## 2021-11-17 RX ADMIN — DEXAMETHASONE SODIUM PHOSPHATE 12.2 MG: 10 INJECTION, SOLUTION INTRAMUSCULAR; INTRAVENOUS at 17:59

## 2021-11-17 RX ADMIN — MULTIPLE VITAMINS W/ MINERALS TAB 1 TABLET: TAB ORAL at 08:59

## 2021-11-17 RX ADMIN — HEPARIN SODIUM 2000 UNITS: 1000 INJECTION INTRAVENOUS; SUBCUTANEOUS at 06:22

## 2021-11-17 RX ADMIN — INSULIN LISPRO 1 UNITS: 100 INJECTION, SOLUTION INTRAVENOUS; SUBCUTANEOUS at 22:13

## 2021-11-17 RX ADMIN — DEXAMETHASONE SODIUM PHOSPHATE 12.2 MG: 10 INJECTION, SOLUTION INTRAMUSCULAR; INTRAVENOUS at 05:38

## 2021-11-17 RX ADMIN — VANCOMYCIN HYDROCHLORIDE 2000 MG: 1 INJECTION, POWDER, LYOPHILIZED, FOR SOLUTION INTRAVENOUS at 02:32

## 2021-11-17 RX ADMIN — Medication 1000 UNITS: at 08:59

## 2021-11-17 RX ADMIN — PAROXETINE 20 MG: 20 TABLET, FILM COATED ORAL at 08:59

## 2021-11-17 RX ADMIN — ALBUTEROL SULFATE 2 PUFF: 90 AEROSOL, METERED RESPIRATORY (INHALATION) at 08:59

## 2021-11-17 RX ADMIN — ALBUTEROL SULFATE 2 PUFF: 90 AEROSOL, METERED RESPIRATORY (INHALATION) at 17:33

## 2021-11-17 RX ADMIN — ALBUTEROL SULFATE 2 PUFF: 90 AEROSOL, METERED RESPIRATORY (INHALATION) at 22:11

## 2021-11-17 RX ADMIN — VANCOMYCIN HYDROCHLORIDE 2000 MG: 1 INJECTION, POWDER, LYOPHILIZED, FOR SOLUTION INTRAVENOUS at 15:58

## 2021-11-17 RX ADMIN — ALBUTEROL SULFATE 2 PUFF: 90 AEROSOL, METERED RESPIRATORY (INHALATION) at 11:27

## 2021-11-17 RX ADMIN — BUDESONIDE AND FORMOTEROL FUMARATE DIHYDRATE 2 PUFF: 160; 4.5 AEROSOL RESPIRATORY (INHALATION) at 17:34

## 2021-11-17 RX ADMIN — FAMOTIDINE 20 MG: 20 TABLET, FILM COATED ORAL at 08:59

## 2021-11-17 RX ADMIN — INSULIN LISPRO 2 UNITS: 100 INJECTION, SOLUTION INTRAVENOUS; SUBCUTANEOUS at 11:30

## 2021-11-17 RX ADMIN — PRAVASTATIN SODIUM 80 MG: 80 TABLET ORAL at 16:01

## 2021-11-17 RX ADMIN — BUDESONIDE AND FORMOTEROL FUMARATE DIHYDRATE 2 PUFF: 160; 4.5 AEROSOL RESPIRATORY (INHALATION) at 08:59

## 2021-11-17 RX ADMIN — BARICITINIB 4 MG: 2 TABLET, FILM COATED ORAL at 19:24

## 2021-11-18 LAB
APTT PPP: 40 SECONDS (ref 23–37)
APTT PPP: 60 SECONDS (ref 23–37)
APTT PPP: 91 SECONDS (ref 23–37)
BACTERIA BLD CULT: NORMAL
BACTERIA BLD CULT: NORMAL
CRP SERPL QL: 16.4 MG/L
D DIMER PPP FEU-MCNC: 0.83 UG/ML FEU
GLUCOSE SERPL-MCNC: 146 MG/DL (ref 65–140)
GLUCOSE SERPL-MCNC: 147 MG/DL (ref 65–140)
GLUCOSE SERPL-MCNC: 152 MG/DL (ref 65–140)
GLUCOSE SERPL-MCNC: 156 MG/DL (ref 65–140)
GLUCOSE SERPL-MCNC: 186 MG/DL (ref 65–140)
VANCOMYCIN TROUGH SERPL-MCNC: 23.7 UG/ML (ref 10–20)

## 2021-11-18 PROCEDURE — NC001 PR NO CHARGE: Performed by: INTERNAL MEDICINE

## 2021-11-18 PROCEDURE — 80202 ASSAY OF VANCOMYCIN: CPT | Performed by: NURSE PRACTITIONER

## 2021-11-18 PROCEDURE — 85379 FIBRIN DEGRADATION QUANT: CPT | Performed by: PHYSICIAN ASSISTANT

## 2021-11-18 PROCEDURE — 99233 SBSQ HOSP IP/OBS HIGH 50: CPT | Performed by: PHYSICIAN ASSISTANT

## 2021-11-18 PROCEDURE — 85730 THROMBOPLASTIN TIME PARTIAL: CPT | Performed by: FAMILY MEDICINE

## 2021-11-18 PROCEDURE — 94760 N-INVAS EAR/PLS OXIMETRY 1: CPT

## 2021-11-18 PROCEDURE — 82948 REAGENT STRIP/BLOOD GLUCOSE: CPT

## 2021-11-18 PROCEDURE — 94660 CPAP INITIATION&MGMT: CPT

## 2021-11-18 PROCEDURE — 86140 C-REACTIVE PROTEIN: CPT | Performed by: PHYSICIAN ASSISTANT

## 2021-11-18 RX ORDER — FUROSEMIDE 10 MG/ML
40 INJECTION INTRAMUSCULAR; INTRAVENOUS ONCE
Status: COMPLETED | OUTPATIENT
Start: 2021-11-18 | End: 2021-11-18

## 2021-11-18 RX ADMIN — DEXAMETHASONE SODIUM PHOSPHATE 12.2 MG: 10 INJECTION, SOLUTION INTRAMUSCULAR; INTRAVENOUS at 06:45

## 2021-11-18 RX ADMIN — PRAVASTATIN SODIUM 80 MG: 80 TABLET ORAL at 16:18

## 2021-11-18 RX ADMIN — FUROSEMIDE 40 MG: 10 INJECTION, SOLUTION INTRAMUSCULAR; INTRAVENOUS at 15:09

## 2021-11-18 RX ADMIN — BUDESONIDE AND FORMOTEROL FUMARATE DIHYDRATE 2 PUFF: 160; 4.5 AEROSOL RESPIRATORY (INHALATION) at 18:12

## 2021-11-18 RX ADMIN — VANCOMYCIN HYDROCHLORIDE 2000 MG: 1 INJECTION, POWDER, LYOPHILIZED, FOR SOLUTION INTRAVENOUS at 02:06

## 2021-11-18 RX ADMIN — INSULIN LISPRO 1 UNITS: 100 INJECTION, SOLUTION INTRAVENOUS; SUBCUTANEOUS at 21:41

## 2021-11-18 RX ADMIN — HEPARIN SODIUM 13.1 UNITS/KG/HR: 10000 INJECTION, SOLUTION INTRAVENOUS at 00:35

## 2021-11-18 RX ADMIN — FAMOTIDINE 20 MG: 20 TABLET, FILM COATED ORAL at 11:29

## 2021-11-18 RX ADMIN — MULTIPLE VITAMINS W/ MINERALS TAB 1 TABLET: TAB ORAL at 11:28

## 2021-11-18 RX ADMIN — BARICITINIB 4 MG: 2 TABLET, FILM COATED ORAL at 19:54

## 2021-11-18 RX ADMIN — ALBUTEROL SULFATE 2 PUFF: 90 AEROSOL, METERED RESPIRATORY (INHALATION) at 11:29

## 2021-11-18 RX ADMIN — DEXAMETHASONE SODIUM PHOSPHATE 12.2 MG: 10 INJECTION, SOLUTION INTRAMUSCULAR; INTRAVENOUS at 18:14

## 2021-11-18 RX ADMIN — INSULIN LISPRO 2 UNITS: 100 INJECTION, SOLUTION INTRAVENOUS; SUBCUTANEOUS at 16:18

## 2021-11-18 RX ADMIN — INSULIN LISPRO 2 UNITS: 100 INJECTION, SOLUTION INTRAVENOUS; SUBCUTANEOUS at 11:35

## 2021-11-18 RX ADMIN — ALBUTEROL SULFATE 2 PUFF: 90 AEROSOL, METERED RESPIRATORY (INHALATION) at 18:12

## 2021-11-18 RX ADMIN — PAROXETINE 20 MG: 20 TABLET, FILM COATED ORAL at 11:28

## 2021-11-18 RX ADMIN — VANCOMYCIN HYDROCHLORIDE 2000 MG: 1 INJECTION, POWDER, LYOPHILIZED, FOR SOLUTION INTRAVENOUS at 15:08

## 2021-11-18 RX ADMIN — Medication 1000 UNITS: at 15:08

## 2021-11-18 RX ADMIN — ALBUTEROL SULFATE 2 PUFF: 90 AEROSOL, METERED RESPIRATORY (INHALATION) at 21:40

## 2021-11-18 RX ADMIN — HEPARIN SODIUM 4000 UNITS: 1000 INJECTION INTRAVENOUS; SUBCUTANEOUS at 21:47

## 2021-11-18 RX ADMIN — BUDESONIDE AND FORMOTEROL FUMARATE DIHYDRATE 2 PUFF: 160; 4.5 AEROSOL RESPIRATORY (INHALATION) at 11:30

## 2021-11-18 RX ADMIN — ALBUTEROL SULFATE 2 PUFF: 90 AEROSOL, METERED RESPIRATORY (INHALATION) at 15:09

## 2021-11-19 LAB
ANION GAP SERPL CALCULATED.3IONS-SCNC: -1 MMOL/L (ref 4–13)
APTT PPP: 132 SECONDS (ref 23–37)
APTT PPP: 55 SECONDS (ref 23–37)
APTT PPP: 62 SECONDS (ref 23–37)
BUN SERPL-MCNC: 26 MG/DL (ref 5–25)
CALCIUM SERPL-MCNC: 7.3 MG/DL (ref 8.3–10.1)
CHLORIDE SERPL-SCNC: 104 MMOL/L (ref 100–108)
CO2 SERPL-SCNC: 38 MMOL/L (ref 21–32)
CREAT SERPL-MCNC: 0.84 MG/DL (ref 0.6–1.3)
ERYTHROCYTE [DISTWIDTH] IN BLOOD BY AUTOMATED COUNT: 16.3 % (ref 11.6–15.1)
GFR SERPL CREATININE-BSD FRML MDRD: 85 ML/MIN/1.73SQ M
GLUCOSE SERPL-MCNC: 132 MG/DL (ref 65–140)
GLUCOSE SERPL-MCNC: 135 MG/DL (ref 65–140)
GLUCOSE SERPL-MCNC: 151 MG/DL (ref 65–140)
GLUCOSE SERPL-MCNC: 164 MG/DL (ref 65–140)
GLUCOSE SERPL-MCNC: 219 MG/DL (ref 65–140)
HCT VFR BLD AUTO: 37.9 % (ref 36.5–49.3)
HGB BLD-MCNC: 11.9 G/DL (ref 12–17)
MCH RBC QN AUTO: 29.4 PG (ref 26.8–34.3)
MCHC RBC AUTO-ENTMCNC: 31.4 G/DL (ref 31.4–37.4)
MCV RBC AUTO: 94 FL (ref 82–98)
PLATELET # BLD AUTO: 331 THOUSANDS/UL (ref 149–390)
PMV BLD AUTO: 10.8 FL (ref 8.9–12.7)
POTASSIUM SERPL-SCNC: 4 MMOL/L (ref 3.5–5.3)
RBC # BLD AUTO: 4.05 MILLION/UL (ref 3.88–5.62)
SODIUM SERPL-SCNC: 141 MMOL/L (ref 136–145)
WBC # BLD AUTO: 10.79 THOUSAND/UL (ref 4.31–10.16)

## 2021-11-19 PROCEDURE — 85027 COMPLETE CBC AUTOMATED: CPT | Performed by: FAMILY MEDICINE

## 2021-11-19 PROCEDURE — 94760 N-INVAS EAR/PLS OXIMETRY 1: CPT

## 2021-11-19 PROCEDURE — 85730 THROMBOPLASTIN TIME PARTIAL: CPT | Performed by: FAMILY MEDICINE

## 2021-11-19 PROCEDURE — 97116 GAIT TRAINING THERAPY: CPT

## 2021-11-19 PROCEDURE — 82948 REAGENT STRIP/BLOOD GLUCOSE: CPT

## 2021-11-19 PROCEDURE — 99233 SBSQ HOSP IP/OBS HIGH 50: CPT | Performed by: PHYSICIAN ASSISTANT

## 2021-11-19 PROCEDURE — 97530 THERAPEUTIC ACTIVITIES: CPT

## 2021-11-19 PROCEDURE — 80048 BASIC METABOLIC PNL TOTAL CA: CPT | Performed by: FAMILY MEDICINE

## 2021-11-19 PROCEDURE — 94660 CPAP INITIATION&MGMT: CPT

## 2021-11-19 PROCEDURE — 85730 THROMBOPLASTIN TIME PARTIAL: CPT | Performed by: PHYSICIAN ASSISTANT

## 2021-11-19 RX ORDER — FUROSEMIDE 10 MG/ML
40 INJECTION INTRAMUSCULAR; INTRAVENOUS ONCE
Status: COMPLETED | OUTPATIENT
Start: 2021-11-19 | End: 2021-11-19

## 2021-11-19 RX ADMIN — ALBUTEROL SULFATE 2 PUFF: 90 AEROSOL, METERED RESPIRATORY (INHALATION) at 08:45

## 2021-11-19 RX ADMIN — HEPARIN SODIUM 15.1 UNITS/KG/HR: 10000 INJECTION, SOLUTION INTRAVENOUS at 03:15

## 2021-11-19 RX ADMIN — VANCOMYCIN HYDROCHLORIDE 1500 MG: 1 INJECTION, POWDER, LYOPHILIZED, FOR SOLUTION INTRAVENOUS at 20:00

## 2021-11-19 RX ADMIN — PRAVASTATIN SODIUM 80 MG: 80 TABLET ORAL at 17:16

## 2021-11-19 RX ADMIN — INSULIN LISPRO 4 UNITS: 100 INJECTION, SOLUTION INTRAVENOUS; SUBCUTANEOUS at 17:00

## 2021-11-19 RX ADMIN — PAROXETINE 20 MG: 20 TABLET, FILM COATED ORAL at 08:53

## 2021-11-19 RX ADMIN — ALBUTEROL SULFATE 2 PUFF: 90 AEROSOL, METERED RESPIRATORY (INHALATION) at 22:07

## 2021-11-19 RX ADMIN — HEPARIN SODIUM 2000 UNITS: 1000 INJECTION INTRAVENOUS; SUBCUTANEOUS at 18:38

## 2021-11-19 RX ADMIN — DEXAMETHASONE SODIUM PHOSPHATE 12.2 MG: 10 INJECTION, SOLUTION INTRAMUSCULAR; INTRAVENOUS at 17:16

## 2021-11-19 RX ADMIN — BUDESONIDE AND FORMOTEROL FUMARATE DIHYDRATE 2 PUFF: 160; 4.5 AEROSOL RESPIRATORY (INHALATION) at 17:17

## 2021-11-19 RX ADMIN — VANCOMYCIN HYDROCHLORIDE 1500 MG: 1 INJECTION, POWDER, LYOPHILIZED, FOR SOLUTION INTRAVENOUS at 08:50

## 2021-11-19 RX ADMIN — BARICITINIB 4 MG: 2 TABLET, FILM COATED ORAL at 20:43

## 2021-11-19 RX ADMIN — FUROSEMIDE 40 MG: 10 INJECTION, SOLUTION INTRAMUSCULAR; INTRAVENOUS at 08:53

## 2021-11-19 RX ADMIN — MULTIPLE VITAMINS W/ MINERALS TAB 1 TABLET: TAB ORAL at 08:53

## 2021-11-19 RX ADMIN — FAMOTIDINE 20 MG: 20 TABLET, FILM COATED ORAL at 08:53

## 2021-11-19 RX ADMIN — BUDESONIDE AND FORMOTEROL FUMARATE DIHYDRATE 2 PUFF: 160; 4.5 AEROSOL RESPIRATORY (INHALATION) at 08:45

## 2021-11-19 RX ADMIN — ALBUTEROL SULFATE 2 PUFF: 90 AEROSOL, METERED RESPIRATORY (INHALATION) at 11:49

## 2021-11-19 RX ADMIN — DEXAMETHASONE SODIUM PHOSPHATE 12.2 MG: 10 INJECTION, SOLUTION INTRAMUSCULAR; INTRAVENOUS at 06:07

## 2021-11-19 RX ADMIN — ALBUTEROL SULFATE 2 PUFF: 90 AEROSOL, METERED RESPIRATORY (INHALATION) at 17:17

## 2021-11-19 RX ADMIN — Medication 1000 UNITS: at 08:53

## 2021-11-19 RX ADMIN — INSULIN LISPRO 2 UNITS: 100 INJECTION, SOLUTION INTRAVENOUS; SUBCUTANEOUS at 11:48

## 2021-11-20 LAB
ANION GAP SERPL CALCULATED.3IONS-SCNC: -1 MMOL/L (ref 4–13)
APTT PPP: 59 SECONDS (ref 23–37)
APTT PPP: 69 SECONDS (ref 23–37)
APTT PPP: 83 SECONDS (ref 23–37)
APTT PPP: 92 SECONDS (ref 23–37)
BUN SERPL-MCNC: 26 MG/DL (ref 5–25)
CALCIUM SERPL-MCNC: 7.6 MG/DL (ref 8.3–10.1)
CHLORIDE SERPL-SCNC: 104 MMOL/L (ref 100–108)
CO2 SERPL-SCNC: 39 MMOL/L (ref 21–32)
CREAT SERPL-MCNC: 0.75 MG/DL (ref 0.6–1.3)
ERYTHROCYTE [DISTWIDTH] IN BLOOD BY AUTOMATED COUNT: 16.5 % (ref 11.6–15.1)
GFR SERPL CREATININE-BSD FRML MDRD: 89 ML/MIN/1.73SQ M
GLUCOSE SERPL-MCNC: 139 MG/DL (ref 65–140)
GLUCOSE SERPL-MCNC: 141 MG/DL (ref 65–140)
GLUCOSE SERPL-MCNC: 143 MG/DL (ref 65–140)
GLUCOSE SERPL-MCNC: 146 MG/DL (ref 65–140)
GLUCOSE SERPL-MCNC: 172 MG/DL (ref 65–140)
HCT VFR BLD AUTO: 38.9 % (ref 36.5–49.3)
HGB BLD-MCNC: 12.5 G/DL (ref 12–17)
MCH RBC QN AUTO: 29.8 PG (ref 26.8–34.3)
MCHC RBC AUTO-ENTMCNC: 32.1 G/DL (ref 31.4–37.4)
MCV RBC AUTO: 93 FL (ref 82–98)
PLATELET # BLD AUTO: 335 THOUSANDS/UL (ref 149–390)
PMV BLD AUTO: 11 FL (ref 8.9–12.7)
POTASSIUM SERPL-SCNC: 4.1 MMOL/L (ref 3.5–5.3)
RBC # BLD AUTO: 4.2 MILLION/UL (ref 3.88–5.62)
SODIUM SERPL-SCNC: 142 MMOL/L (ref 136–145)
VANCOMYCIN TROUGH SERPL-MCNC: 19.6 UG/ML (ref 10–20)
WBC # BLD AUTO: 10.75 THOUSAND/UL (ref 4.31–10.16)

## 2021-11-20 PROCEDURE — 99232 SBSQ HOSP IP/OBS MODERATE 35: CPT | Performed by: PHYSICIAN ASSISTANT

## 2021-11-20 PROCEDURE — 94760 N-INVAS EAR/PLS OXIMETRY 1: CPT

## 2021-11-20 PROCEDURE — 94660 CPAP INITIATION&MGMT: CPT

## 2021-11-20 PROCEDURE — 85730 THROMBOPLASTIN TIME PARTIAL: CPT | Performed by: FAMILY MEDICINE

## 2021-11-20 PROCEDURE — 80202 ASSAY OF VANCOMYCIN: CPT | Performed by: FAMILY MEDICINE

## 2021-11-20 PROCEDURE — 82948 REAGENT STRIP/BLOOD GLUCOSE: CPT

## 2021-11-20 PROCEDURE — 80048 BASIC METABOLIC PNL TOTAL CA: CPT | Performed by: PHYSICIAN ASSISTANT

## 2021-11-20 PROCEDURE — 85027 COMPLETE CBC AUTOMATED: CPT | Performed by: PHYSICIAN ASSISTANT

## 2021-11-20 RX ORDER — FUROSEMIDE 10 MG/ML
40 INJECTION INTRAMUSCULAR; INTRAVENOUS ONCE
Status: COMPLETED | OUTPATIENT
Start: 2021-11-20 | End: 2021-11-20

## 2021-11-20 RX ADMIN — DEXAMETHASONE SODIUM PHOSPHATE 12.2 MG: 10 INJECTION, SOLUTION INTRAMUSCULAR; INTRAVENOUS at 17:01

## 2021-11-20 RX ADMIN — DEXAMETHASONE SODIUM PHOSPHATE 12.2 MG: 10 INJECTION, SOLUTION INTRAMUSCULAR; INTRAVENOUS at 05:25

## 2021-11-20 RX ADMIN — BUDESONIDE AND FORMOTEROL FUMARATE DIHYDRATE 2 PUFF: 160; 4.5 AEROSOL RESPIRATORY (INHALATION) at 17:01

## 2021-11-20 RX ADMIN — ALBUTEROL SULFATE 2 PUFF: 90 AEROSOL, METERED RESPIRATORY (INHALATION) at 12:49

## 2021-11-20 RX ADMIN — HEPARIN SODIUM 2000 UNITS: 1000 INJECTION INTRAVENOUS; SUBCUTANEOUS at 14:50

## 2021-11-20 RX ADMIN — ALBUTEROL SULFATE 2 PUFF: 90 AEROSOL, METERED RESPIRATORY (INHALATION) at 22:38

## 2021-11-20 RX ADMIN — BUDESONIDE AND FORMOTEROL FUMARATE DIHYDRATE 2 PUFF: 160; 4.5 AEROSOL RESPIRATORY (INHALATION) at 08:01

## 2021-11-20 RX ADMIN — ALBUTEROL SULFATE 2 PUFF: 90 AEROSOL, METERED RESPIRATORY (INHALATION) at 08:01

## 2021-11-20 RX ADMIN — INSULIN LISPRO 1 UNITS: 100 INJECTION, SOLUTION INTRAVENOUS; SUBCUTANEOUS at 22:38

## 2021-11-20 RX ADMIN — FAMOTIDINE 20 MG: 20 TABLET, FILM COATED ORAL at 08:01

## 2021-11-20 RX ADMIN — BARICITINIB 4 MG: 2 TABLET, FILM COATED ORAL at 20:11

## 2021-11-20 RX ADMIN — PAROXETINE 20 MG: 20 TABLET, FILM COATED ORAL at 08:01

## 2021-11-20 RX ADMIN — ACETAMINOPHEN 650 MG: 325 TABLET ORAL at 16:56

## 2021-11-20 RX ADMIN — PRAVASTATIN SODIUM 80 MG: 80 TABLET ORAL at 16:57

## 2021-11-20 RX ADMIN — FUROSEMIDE 40 MG: 10 INJECTION, SOLUTION INTRAVENOUS at 14:50

## 2021-11-20 RX ADMIN — Medication 1000 UNITS: at 08:01

## 2021-11-20 RX ADMIN — MULTIPLE VITAMINS W/ MINERALS TAB 1 TABLET: TAB ORAL at 08:01

## 2021-11-20 RX ADMIN — VANCOMYCIN HYDROCHLORIDE 1500 MG: 1 INJECTION, POWDER, LYOPHILIZED, FOR SOLUTION INTRAVENOUS at 20:00

## 2021-11-20 RX ADMIN — HEPARIN SODIUM 14.1 UNITS/KG/HR: 10000 INJECTION, SOLUTION INTRAVENOUS at 00:30

## 2021-11-20 RX ADMIN — HEPARIN SODIUM 14.1 UNITS/KG/HR: 10000 INJECTION, SOLUTION INTRAVENOUS at 23:30

## 2021-11-20 RX ADMIN — ALBUTEROL SULFATE 2 PUFF: 90 AEROSOL, METERED RESPIRATORY (INHALATION) at 17:01

## 2021-11-20 RX ADMIN — VANCOMYCIN HYDROCHLORIDE 1500 MG: 1 INJECTION, POWDER, LYOPHILIZED, FOR SOLUTION INTRAVENOUS at 08:01

## 2021-11-21 LAB
ANION GAP SERPL CALCULATED.3IONS-SCNC: 1 MMOL/L (ref 4–13)
ANISOCYTOSIS BLD QL SMEAR: PRESENT
APTT PPP: 79 SECONDS (ref 23–37)
BASOPHILS # BLD MANUAL: 0 THOUSAND/UL (ref 0–0.1)
BASOPHILS NFR MAR MANUAL: 0 % (ref 0–1)
BUN SERPL-MCNC: 26 MG/DL (ref 5–25)
CALCIUM SERPL-MCNC: 7.4 MG/DL (ref 8.3–10.1)
CHLORIDE SERPL-SCNC: 101 MMOL/L (ref 100–108)
CO2 SERPL-SCNC: 38 MMOL/L (ref 21–32)
CREAT SERPL-MCNC: 0.76 MG/DL (ref 0.6–1.3)
EOSINOPHIL # BLD MANUAL: 0 THOUSAND/UL (ref 0–0.4)
EOSINOPHIL NFR BLD MANUAL: 0 % (ref 0–6)
ERYTHROCYTE [DISTWIDTH] IN BLOOD BY AUTOMATED COUNT: 16.4 % (ref 11.6–15.1)
GFR SERPL CREATININE-BSD FRML MDRD: 89 ML/MIN/1.73SQ M
GIANT PLATELETS BLD QL SMEAR: PRESENT
GLUCOSE SERPL-MCNC: 128 MG/DL (ref 65–140)
GLUCOSE SERPL-MCNC: 136 MG/DL (ref 65–140)
GLUCOSE SERPL-MCNC: 146 MG/DL (ref 65–140)
GLUCOSE SERPL-MCNC: 146 MG/DL (ref 65–140)
GLUCOSE SERPL-MCNC: 149 MG/DL (ref 65–140)
HCT VFR BLD AUTO: 39.3 % (ref 36.5–49.3)
HGB BLD-MCNC: 12.7 G/DL (ref 12–17)
INR PPP: 1.29 (ref 0.84–1.19)
LYMPHOCYTES # BLD AUTO: 0.46 THOUSAND/UL (ref 0.6–4.47)
LYMPHOCYTES # BLD AUTO: 4 % (ref 14–44)
MACROCYTES BLD QL AUTO: PRESENT
MCH RBC QN AUTO: 30.1 PG (ref 26.8–34.3)
MCHC RBC AUTO-ENTMCNC: 32.3 G/DL (ref 31.4–37.4)
MCV RBC AUTO: 93 FL (ref 82–98)
MONOCYTES # BLD AUTO: 0.46 THOUSAND/UL (ref 0–1.22)
MONOCYTES NFR BLD: 4 % (ref 4–12)
NEUTROPHILS # BLD MANUAL: 10.5 THOUSAND/UL (ref 1.85–7.62)
NEUTS SEG NFR BLD AUTO: 92 % (ref 43–75)
OVALOCYTES BLD QL SMEAR: PRESENT
PLATELET # BLD AUTO: 329 THOUSANDS/UL (ref 149–390)
PLATELET BLD QL SMEAR: ABNORMAL
PMV BLD AUTO: 10.9 FL (ref 8.9–12.7)
POIKILOCYTOSIS BLD QL SMEAR: PRESENT
POTASSIUM SERPL-SCNC: 4.2 MMOL/L (ref 3.5–5.3)
PROTHROMBIN TIME: 16 SECONDS (ref 11.6–14.5)
RBC # BLD AUTO: 4.22 MILLION/UL (ref 3.88–5.62)
RBC MORPH BLD: PRESENT
SODIUM SERPL-SCNC: 140 MMOL/L (ref 136–145)
STOMATOCYTES BLD QL SMEAR: PRESENT
TARGETS BLD QL SMEAR: PRESENT
WBC # BLD AUTO: 11.41 THOUSAND/UL (ref 4.31–10.16)

## 2021-11-21 PROCEDURE — 82948 REAGENT STRIP/BLOOD GLUCOSE: CPT

## 2021-11-21 PROCEDURE — 85730 THROMBOPLASTIN TIME PARTIAL: CPT | Performed by: FAMILY MEDICINE

## 2021-11-21 PROCEDURE — 99232 SBSQ HOSP IP/OBS MODERATE 35: CPT | Performed by: PHYSICIAN ASSISTANT

## 2021-11-21 PROCEDURE — 85027 COMPLETE CBC AUTOMATED: CPT | Performed by: PHYSICIAN ASSISTANT

## 2021-11-21 PROCEDURE — 94760 N-INVAS EAR/PLS OXIMETRY 1: CPT

## 2021-11-21 PROCEDURE — 85610 PROTHROMBIN TIME: CPT | Performed by: PHYSICIAN ASSISTANT

## 2021-11-21 PROCEDURE — 80048 BASIC METABOLIC PNL TOTAL CA: CPT | Performed by: PHYSICIAN ASSISTANT

## 2021-11-21 PROCEDURE — 85007 BL SMEAR W/DIFF WBC COUNT: CPT | Performed by: PHYSICIAN ASSISTANT

## 2021-11-21 RX ORDER — WARFARIN SODIUM 5 MG/1
5 TABLET ORAL
Status: COMPLETED | OUTPATIENT
Start: 2021-11-21 | End: 2021-11-21

## 2021-11-21 RX ADMIN — BUDESONIDE AND FORMOTEROL FUMARATE DIHYDRATE 2 PUFF: 160; 4.5 AEROSOL RESPIRATORY (INHALATION) at 09:25

## 2021-11-21 RX ADMIN — FAMOTIDINE 20 MG: 20 TABLET, FILM COATED ORAL at 09:25

## 2021-11-21 RX ADMIN — ENOXAPARIN SODIUM 120 MG: 120 INJECTION SUBCUTANEOUS at 20:25

## 2021-11-21 RX ADMIN — DEXAMETHASONE SODIUM PHOSPHATE 12.2 MG: 10 INJECTION, SOLUTION INTRAMUSCULAR; INTRAVENOUS at 18:12

## 2021-11-21 RX ADMIN — ALBUTEROL SULFATE 2 PUFF: 90 AEROSOL, METERED RESPIRATORY (INHALATION) at 12:26

## 2021-11-21 RX ADMIN — MULTIPLE VITAMINS W/ MINERALS TAB 1 TABLET: TAB ORAL at 09:25

## 2021-11-21 RX ADMIN — ALBUTEROL SULFATE 2 PUFF: 90 AEROSOL, METERED RESPIRATORY (INHALATION) at 09:25

## 2021-11-21 RX ADMIN — WARFARIN SODIUM 5 MG: 5 TABLET ORAL at 20:25

## 2021-11-21 RX ADMIN — VANCOMYCIN HYDROCHLORIDE 1500 MG: 1 INJECTION, POWDER, LYOPHILIZED, FOR SOLUTION INTRAVENOUS at 20:25

## 2021-11-21 RX ADMIN — DEXAMETHASONE SODIUM PHOSPHATE 12.2 MG: 10 INJECTION, SOLUTION INTRAMUSCULAR; INTRAVENOUS at 06:48

## 2021-11-21 RX ADMIN — BUDESONIDE AND FORMOTEROL FUMARATE DIHYDRATE 2 PUFF: 160; 4.5 AEROSOL RESPIRATORY (INHALATION) at 17:01

## 2021-11-21 RX ADMIN — ALBUTEROL SULFATE 2 PUFF: 90 AEROSOL, METERED RESPIRATORY (INHALATION) at 17:01

## 2021-11-21 RX ADMIN — ALBUTEROL SULFATE 2 PUFF: 90 AEROSOL, METERED RESPIRATORY (INHALATION) at 23:10

## 2021-11-21 RX ADMIN — HEPARIN SODIUM 14.1 UNITS/KG/HR: 10000 INJECTION, SOLUTION INTRAVENOUS at 17:04

## 2021-11-21 RX ADMIN — BARICITINIB 4 MG: 2 TABLET, FILM COATED ORAL at 20:25

## 2021-11-21 RX ADMIN — VANCOMYCIN HYDROCHLORIDE 1500 MG: 1 INJECTION, POWDER, LYOPHILIZED, FOR SOLUTION INTRAVENOUS at 09:25

## 2021-11-21 RX ADMIN — Medication 1000 UNITS: at 09:25

## 2021-11-21 RX ADMIN — PRAVASTATIN SODIUM 80 MG: 80 TABLET ORAL at 16:58

## 2021-11-21 RX ADMIN — PAROXETINE 20 MG: 20 TABLET, FILM COATED ORAL at 09:25

## 2021-11-22 VITALS
OXYGEN SATURATION: 90 % | DIASTOLIC BLOOD PRESSURE: 76 MMHG | SYSTOLIC BLOOD PRESSURE: 147 MMHG | HEIGHT: 71 IN | TEMPERATURE: 97.9 F | RESPIRATION RATE: 20 BRPM | BODY MASS INDEX: 37.31 KG/M2 | HEART RATE: 77 BPM | WEIGHT: 266.54 LBS

## 2021-11-22 LAB
ANION GAP SERPL CALCULATED.3IONS-SCNC: -1 MMOL/L (ref 4–13)
BUN SERPL-MCNC: 26 MG/DL (ref 5–25)
CALCIUM SERPL-MCNC: 7.5 MG/DL (ref 8.3–10.1)
CHLORIDE SERPL-SCNC: 104 MMOL/L (ref 100–108)
CO2 SERPL-SCNC: 38 MMOL/L (ref 21–32)
CREAT SERPL-MCNC: 0.76 MG/DL (ref 0.6–1.3)
ERYTHROCYTE [DISTWIDTH] IN BLOOD BY AUTOMATED COUNT: 16.4 % (ref 11.6–15.1)
GFR SERPL CREATININE-BSD FRML MDRD: 89 ML/MIN/1.73SQ M
GLUCOSE SERPL-MCNC: 144 MG/DL (ref 65–140)
GLUCOSE SERPL-MCNC: 147 MG/DL (ref 65–140)
GLUCOSE SERPL-MCNC: 151 MG/DL (ref 65–140)
GLUCOSE SERPL-MCNC: 153 MG/DL (ref 65–140)
HCT VFR BLD AUTO: 40.1 % (ref 36.5–49.3)
HGB BLD-MCNC: 12.8 G/DL (ref 12–17)
INR PPP: 1.34 (ref 0.84–1.19)
MCH RBC QN AUTO: 29.6 PG (ref 26.8–34.3)
MCHC RBC AUTO-ENTMCNC: 31.9 G/DL (ref 31.4–37.4)
MCV RBC AUTO: 93 FL (ref 82–98)
PLATELET # BLD AUTO: 301 THOUSANDS/UL (ref 149–390)
PMV BLD AUTO: 11.1 FL (ref 8.9–12.7)
POTASSIUM SERPL-SCNC: 4.5 MMOL/L (ref 3.5–5.3)
PROTHROMBIN TIME: 16.3 SECONDS (ref 11.6–14.5)
RBC # BLD AUTO: 4.33 MILLION/UL (ref 3.88–5.62)
SODIUM SERPL-SCNC: 141 MMOL/L (ref 136–145)
WBC # BLD AUTO: 13.99 THOUSAND/UL (ref 4.31–10.16)

## 2021-11-22 PROCEDURE — 85610 PROTHROMBIN TIME: CPT

## 2021-11-22 PROCEDURE — 97116 GAIT TRAINING THERAPY: CPT

## 2021-11-22 PROCEDURE — 82948 REAGENT STRIP/BLOOD GLUCOSE: CPT

## 2021-11-22 PROCEDURE — 97530 THERAPEUTIC ACTIVITIES: CPT

## 2021-11-22 PROCEDURE — 80048 BASIC METABOLIC PNL TOTAL CA: CPT

## 2021-11-22 PROCEDURE — 94760 N-INVAS EAR/PLS OXIMETRY 1: CPT

## 2021-11-22 PROCEDURE — 94660 CPAP INITIATION&MGMT: CPT

## 2021-11-22 PROCEDURE — 85027 COMPLETE CBC AUTOMATED: CPT

## 2021-11-22 PROCEDURE — 99239 HOSP IP/OBS DSCHRG MGMT >30: CPT | Performed by: INTERNAL MEDICINE

## 2021-11-22 RX ORDER — PREDNISONE 20 MG/1
TABLET ORAL
Qty: 15 TABLET | Refills: 0
Start: 2021-11-22 | End: 2021-12-04

## 2021-11-22 RX ORDER — VANCOMYCIN 1.5 G/300ML
1500 INJECTION, SOLUTION INTRAVENOUS EVERY 12 HOURS SCHEDULED
Refills: 0
Start: 2021-11-22 | End: 2021-12-11

## 2021-11-22 RX ORDER — BUDESONIDE AND FORMOTEROL FUMARATE DIHYDRATE 160; 4.5 UG/1; UG/1
2 AEROSOL RESPIRATORY (INHALATION) 2 TIMES DAILY
Qty: 10.2 G | Refills: 0
Start: 2021-11-22 | End: 2021-12-20

## 2021-11-22 RX ORDER — WARFARIN SODIUM 5 MG/1
5 TABLET ORAL
Status: DISCONTINUED | OUTPATIENT
Start: 2021-11-22 | End: 2021-11-22 | Stop reason: HOSPADM

## 2021-11-22 RX ADMIN — PAROXETINE 20 MG: 20 TABLET, FILM COATED ORAL at 08:21

## 2021-11-22 RX ADMIN — WARFARIN SODIUM 5 MG: 5 TABLET ORAL at 16:43

## 2021-11-22 RX ADMIN — ALBUTEROL SULFATE 2 PUFF: 90 AEROSOL, METERED RESPIRATORY (INHALATION) at 08:19

## 2021-11-22 RX ADMIN — ALBUTEROL SULFATE 2 PUFF: 90 AEROSOL, METERED RESPIRATORY (INHALATION) at 16:42

## 2021-11-22 RX ADMIN — BUDESONIDE AND FORMOTEROL FUMARATE DIHYDRATE 2 PUFF: 160; 4.5 AEROSOL RESPIRATORY (INHALATION) at 12:06

## 2021-11-22 RX ADMIN — DEXAMETHASONE SODIUM PHOSPHATE 12.2 MG: 10 INJECTION, SOLUTION INTRAMUSCULAR; INTRAVENOUS at 05:36

## 2021-11-22 RX ADMIN — BUDESONIDE AND FORMOTEROL FUMARATE DIHYDRATE 2 PUFF: 160; 4.5 AEROSOL RESPIRATORY (INHALATION) at 16:42

## 2021-11-22 RX ADMIN — Medication 1000 UNITS: at 08:21

## 2021-11-22 RX ADMIN — INSULIN LISPRO 2 UNITS: 100 INJECTION, SOLUTION INTRAVENOUS; SUBCUTANEOUS at 16:41

## 2021-11-22 RX ADMIN — PRAVASTATIN SODIUM 80 MG: 80 TABLET ORAL at 16:43

## 2021-11-22 RX ADMIN — MULTIPLE VITAMINS W/ MINERALS TAB 1 TABLET: TAB ORAL at 08:21

## 2021-11-22 RX ADMIN — ALBUTEROL SULFATE 2 PUFF: 90 AEROSOL, METERED RESPIRATORY (INHALATION) at 12:07

## 2021-11-22 RX ADMIN — VANCOMYCIN HYDROCHLORIDE 1500 MG: 1 INJECTION, POWDER, LYOPHILIZED, FOR SOLUTION INTRAVENOUS at 06:43

## 2021-11-22 RX ADMIN — FAMOTIDINE 20 MG: 20 TABLET, FILM COATED ORAL at 08:21

## 2021-11-22 RX ADMIN — ENOXAPARIN SODIUM 120 MG: 120 INJECTION SUBCUTANEOUS at 08:20

## 2021-11-23 ENCOUNTER — NURSING HOME VISIT (OUTPATIENT)
Dept: GERIATRICS | Facility: OTHER | Age: 76
End: 2021-11-23
Payer: MEDICARE

## 2021-11-23 ENCOUNTER — TELEPHONE (OUTPATIENT)
Dept: FAMILY MEDICINE CLINIC | Facility: CLINIC | Age: 76
End: 2021-11-23

## 2021-11-23 ENCOUNTER — TELEPHONE (OUTPATIENT)
Dept: INFECTIOUS DISEASES | Facility: CLINIC | Age: 76
End: 2021-11-23

## 2021-11-23 VITALS
SYSTOLIC BLOOD PRESSURE: 120 MMHG | RESPIRATION RATE: 20 BRPM | HEART RATE: 72 BPM | BODY MASS INDEX: 38.1 KG/M2 | TEMPERATURE: 97.7 F | OXYGEN SATURATION: 90 % | WEIGHT: 273.2 LBS | DIASTOLIC BLOOD PRESSURE: 64 MMHG

## 2021-11-23 DIAGNOSIS — R78.81 STAPHYLOCOCCUS AUREUS BACTEREMIA: Primary | ICD-10-CM

## 2021-11-23 DIAGNOSIS — U07.1 SEPSIS DUE TO COVID-19 (HCC): ICD-10-CM

## 2021-11-23 DIAGNOSIS — A41.89 SEPSIS DUE TO COVID-19 (HCC): ICD-10-CM

## 2021-11-23 DIAGNOSIS — B95.61 STAPHYLOCOCCUS AUREUS BACTEREMIA: Primary | ICD-10-CM

## 2021-11-23 DIAGNOSIS — J12.82 PNEUMONIA DUE TO COVID-19 VIRUS: Primary | ICD-10-CM

## 2021-11-23 DIAGNOSIS — U07.1 PNEUMONIA DUE TO COVID-19 VIRUS: Primary | ICD-10-CM

## 2021-11-23 PROCEDURE — 99306 1ST NF CARE HIGH MDM 50: CPT | Performed by: INTERNAL MEDICINE

## 2021-11-26 ENCOUNTER — NURSING HOME VISIT (OUTPATIENT)
Dept: GERIATRICS | Facility: OTHER | Age: 76
End: 2021-11-26
Payer: MEDICARE

## 2021-11-26 VITALS
DIASTOLIC BLOOD PRESSURE: 72 MMHG | OXYGEN SATURATION: 93 % | RESPIRATION RATE: 18 BRPM | HEART RATE: 98 BPM | SYSTOLIC BLOOD PRESSURE: 126 MMHG | TEMPERATURE: 97.6 F

## 2021-11-26 DIAGNOSIS — E78.2 MIXED HYPERLIPIDEMIA: ICD-10-CM

## 2021-11-26 DIAGNOSIS — J12.82 PNEUMONIA DUE TO COVID-19 VIRUS: Primary | ICD-10-CM

## 2021-11-26 DIAGNOSIS — L03.119 CELLULITIS OF LOWER EXTREMITY, UNSPECIFIED LATERALITY: ICD-10-CM

## 2021-11-26 DIAGNOSIS — R78.81 STAPHYLOCOCCUS AUREUS BACTEREMIA: ICD-10-CM

## 2021-11-26 DIAGNOSIS — U07.1 PNEUMONIA DUE TO COVID-19 VIRUS: Primary | ICD-10-CM

## 2021-11-26 DIAGNOSIS — J96.21 ACUTE ON CHRONIC RESPIRATORY FAILURE WITH HYPOXIA (HCC): ICD-10-CM

## 2021-11-26 DIAGNOSIS — R26.2 AMBULATORY DYSFUNCTION: ICD-10-CM

## 2021-11-26 DIAGNOSIS — G47.33 OBSTRUCTIVE SLEEP APNEA: ICD-10-CM

## 2021-11-26 DIAGNOSIS — J44.9 COPD, MODERATE (HCC): ICD-10-CM

## 2021-11-26 DIAGNOSIS — B95.61 STAPHYLOCOCCUS AUREUS BACTEREMIA: ICD-10-CM

## 2021-11-26 DIAGNOSIS — I48.21 PERMANENT ATRIAL FIBRILLATION (HCC): ICD-10-CM

## 2021-11-26 PROCEDURE — 99309 SBSQ NF CARE MODERATE MDM 30: CPT | Performed by: NURSE PRACTITIONER

## 2021-11-30 ENCOUNTER — NURSING HOME VISIT (OUTPATIENT)
Dept: GERIATRICS | Facility: OTHER | Age: 76
End: 2021-11-30
Payer: MEDICARE

## 2021-11-30 VITALS
OXYGEN SATURATION: 93 % | SYSTOLIC BLOOD PRESSURE: 110 MMHG | DIASTOLIC BLOOD PRESSURE: 58 MMHG | TEMPERATURE: 97.1 F | HEART RATE: 80 BPM | RESPIRATION RATE: 18 BRPM

## 2021-11-30 DIAGNOSIS — J96.21 ACUTE ON CHRONIC RESPIRATORY FAILURE WITH HYPOXIA (HCC): ICD-10-CM

## 2021-11-30 DIAGNOSIS — J12.82 PNEUMONIA DUE TO COVID-19 VIRUS: ICD-10-CM

## 2021-11-30 DIAGNOSIS — I48.21 PERMANENT ATRIAL FIBRILLATION (HCC): ICD-10-CM

## 2021-11-30 DIAGNOSIS — R78.81 STAPHYLOCOCCUS AUREUS BACTEREMIA: Primary | ICD-10-CM

## 2021-11-30 DIAGNOSIS — U07.1 PNEUMONIA DUE TO COVID-19 VIRUS: ICD-10-CM

## 2021-11-30 DIAGNOSIS — G47.33 OBSTRUCTIVE SLEEP APNEA: ICD-10-CM

## 2021-11-30 DIAGNOSIS — J44.9 COPD, MODERATE (HCC): ICD-10-CM

## 2021-11-30 DIAGNOSIS — R26.2 AMBULATORY DYSFUNCTION: ICD-10-CM

## 2021-11-30 DIAGNOSIS — B95.61 STAPHYLOCOCCUS AUREUS BACTEREMIA: Primary | ICD-10-CM

## 2021-11-30 DIAGNOSIS — L03.119 CELLULITIS OF LOWER EXTREMITY, UNSPECIFIED LATERALITY: ICD-10-CM

## 2021-11-30 PROCEDURE — 99309 SBSQ NF CARE MODERATE MDM 30: CPT | Performed by: NURSE PRACTITIONER

## 2021-12-01 ENCOUNTER — NURSING HOME VISIT (OUTPATIENT)
Dept: WOUND CARE | Facility: HOSPITAL | Age: 76
End: 2021-12-01
Payer: MEDICARE

## 2021-12-01 DIAGNOSIS — L97.311 NON-PRESSURE CHRONIC ULCER RIGHT ANKLE, LIMITED TO BREAKDOWN SKIN (HCC): ICD-10-CM

## 2021-12-01 DIAGNOSIS — L30.8 DERMATITIS ASSOCIATED WITH MOISTURE: ICD-10-CM

## 2021-12-01 DIAGNOSIS — I87.2 VENOUS INSUFFICIENCY: ICD-10-CM

## 2021-12-01 DIAGNOSIS — L03.119 CELLULITIS OF LOWER EXTREMITY, UNSPECIFIED LATERALITY: ICD-10-CM

## 2021-12-01 DIAGNOSIS — L97.921 NON-PRESSURE CHRONIC ULCER LEFT LOWER LEG, LIMITED TO BREAKDOWN SKIN (HCC): Primary | ICD-10-CM

## 2021-12-01 PROCEDURE — 99305 1ST NF CARE MODERATE MDM 35: CPT | Performed by: NURSE PRACTITIONER

## 2021-12-02 ENCOUNTER — NURSING HOME VISIT (OUTPATIENT)
Dept: GERIATRICS | Facility: OTHER | Age: 76
End: 2021-12-02
Payer: MEDICARE

## 2021-12-02 VITALS
TEMPERATURE: 98.2 F | SYSTOLIC BLOOD PRESSURE: 118 MMHG | DIASTOLIC BLOOD PRESSURE: 58 MMHG | OXYGEN SATURATION: 92 % | HEART RATE: 99 BPM | RESPIRATION RATE: 20 BRPM

## 2021-12-02 DIAGNOSIS — I48.21 PERMANENT ATRIAL FIBRILLATION (HCC): ICD-10-CM

## 2021-12-02 DIAGNOSIS — J12.82 PNEUMONIA DUE TO COVID-19 VIRUS: ICD-10-CM

## 2021-12-02 DIAGNOSIS — J96.21 ACUTE ON CHRONIC RESPIRATORY FAILURE WITH HYPOXIA (HCC): ICD-10-CM

## 2021-12-02 DIAGNOSIS — L97.311 NON-PRESSURE CHRONIC ULCER RIGHT ANKLE, LIMITED TO BREAKDOWN SKIN (HCC): ICD-10-CM

## 2021-12-02 DIAGNOSIS — R26.2 AMBULATORY DYSFUNCTION: ICD-10-CM

## 2021-12-02 DIAGNOSIS — R78.81 STAPHYLOCOCCUS AUREUS BACTEREMIA: Primary | ICD-10-CM

## 2021-12-02 DIAGNOSIS — L03.119 CELLULITIS OF LOWER EXTREMITY, UNSPECIFIED LATERALITY: ICD-10-CM

## 2021-12-02 DIAGNOSIS — L97.921 NON-PRESSURE CHRONIC ULCER LEFT LOWER LEG, LIMITED TO BREAKDOWN SKIN (HCC): ICD-10-CM

## 2021-12-02 DIAGNOSIS — J44.9 COPD, MODERATE (HCC): ICD-10-CM

## 2021-12-02 DIAGNOSIS — B95.61 STAPHYLOCOCCUS AUREUS BACTEREMIA: Primary | ICD-10-CM

## 2021-12-02 DIAGNOSIS — U07.1 PNEUMONIA DUE TO COVID-19 VIRUS: ICD-10-CM

## 2021-12-02 PROCEDURE — 99309 SBSQ NF CARE MODERATE MDM 30: CPT | Performed by: NURSE PRACTITIONER

## 2021-12-06 ENCOUNTER — NURSING HOME VISIT (OUTPATIENT)
Dept: GERIATRICS | Facility: OTHER | Age: 76
End: 2021-12-06
Payer: MEDICARE

## 2021-12-06 VITALS
HEART RATE: 100 BPM | TEMPERATURE: 98.4 F | DIASTOLIC BLOOD PRESSURE: 62 MMHG | RESPIRATION RATE: 18 BRPM | SYSTOLIC BLOOD PRESSURE: 110 MMHG | OXYGEN SATURATION: 91 %

## 2021-12-06 DIAGNOSIS — J44.9 COPD, MODERATE (HCC): ICD-10-CM

## 2021-12-06 DIAGNOSIS — R26.2 AMBULATORY DYSFUNCTION: ICD-10-CM

## 2021-12-06 DIAGNOSIS — G47.33 OBSTRUCTIVE SLEEP APNEA: ICD-10-CM

## 2021-12-06 DIAGNOSIS — R78.81 STAPHYLOCOCCUS AUREUS BACTEREMIA: Primary | ICD-10-CM

## 2021-12-06 DIAGNOSIS — U07.1 PNEUMONIA DUE TO COVID-19 VIRUS: ICD-10-CM

## 2021-12-06 DIAGNOSIS — L97.921 NON-PRESSURE CHRONIC ULCER LEFT LOWER LEG, LIMITED TO BREAKDOWN SKIN (HCC): ICD-10-CM

## 2021-12-06 DIAGNOSIS — L03.119 CELLULITIS OF LOWER EXTREMITY, UNSPECIFIED LATERALITY: ICD-10-CM

## 2021-12-06 DIAGNOSIS — I48.21 PERMANENT ATRIAL FIBRILLATION (HCC): ICD-10-CM

## 2021-12-06 DIAGNOSIS — B95.61 STAPHYLOCOCCUS AUREUS BACTEREMIA: Primary | ICD-10-CM

## 2021-12-06 DIAGNOSIS — L97.311 NON-PRESSURE CHRONIC ULCER RIGHT ANKLE, LIMITED TO BREAKDOWN SKIN (HCC): ICD-10-CM

## 2021-12-06 DIAGNOSIS — J96.21 ACUTE ON CHRONIC RESPIRATORY FAILURE WITH HYPOXIA (HCC): ICD-10-CM

## 2021-12-06 DIAGNOSIS — J12.82 PNEUMONIA DUE TO COVID-19 VIRUS: ICD-10-CM

## 2021-12-06 PROCEDURE — 99309 SBSQ NF CARE MODERATE MDM 30: CPT | Performed by: NURSE PRACTITIONER

## 2021-12-08 ENCOUNTER — NURSING HOME VISIT (OUTPATIENT)
Dept: WOUND CARE | Facility: HOSPITAL | Age: 76
End: 2021-12-08
Payer: MEDICARE

## 2021-12-08 DIAGNOSIS — I87.2 VENOUS INSUFFICIENCY: ICD-10-CM

## 2021-12-08 DIAGNOSIS — L30.8 DERMATITIS ASSOCIATED WITH MOISTURE: ICD-10-CM

## 2021-12-08 DIAGNOSIS — L97.921 NON-PRESSURE CHRONIC ULCER LEFT LOWER LEG, LIMITED TO BREAKDOWN SKIN (HCC): Primary | ICD-10-CM

## 2021-12-08 DIAGNOSIS — L97.311 NON-PRESSURE CHRONIC ULCER RIGHT ANKLE, LIMITED TO BREAKDOWN SKIN (HCC): ICD-10-CM

## 2021-12-08 PROCEDURE — 99308 SBSQ NF CARE LOW MDM 20: CPT | Performed by: NURSE PRACTITIONER

## 2021-12-09 ENCOUNTER — NURSING HOME VISIT (OUTPATIENT)
Dept: GERIATRICS | Facility: OTHER | Age: 76
End: 2021-12-09
Payer: MEDICARE

## 2021-12-09 VITALS
SYSTOLIC BLOOD PRESSURE: 112 MMHG | OXYGEN SATURATION: 92 % | TEMPERATURE: 97.6 F | HEART RATE: 83 BPM | DIASTOLIC BLOOD PRESSURE: 68 MMHG | RESPIRATION RATE: 18 BRPM

## 2021-12-09 DIAGNOSIS — L97.311 NON-PRESSURE CHRONIC ULCER RIGHT ANKLE, LIMITED TO BREAKDOWN SKIN (HCC): ICD-10-CM

## 2021-12-09 DIAGNOSIS — L03.119 CELLULITIS OF LOWER EXTREMITY, UNSPECIFIED LATERALITY: ICD-10-CM

## 2021-12-09 DIAGNOSIS — R26.2 AMBULATORY DYSFUNCTION: ICD-10-CM

## 2021-12-09 DIAGNOSIS — U07.1 PNEUMONIA DUE TO COVID-19 VIRUS: ICD-10-CM

## 2021-12-09 DIAGNOSIS — I48.21 PERMANENT ATRIAL FIBRILLATION (HCC): ICD-10-CM

## 2021-12-09 DIAGNOSIS — J44.9 COPD, MODERATE (HCC): ICD-10-CM

## 2021-12-09 DIAGNOSIS — L97.921 NON-PRESSURE CHRONIC ULCER LEFT LOWER LEG, LIMITED TO BREAKDOWN SKIN (HCC): ICD-10-CM

## 2021-12-09 DIAGNOSIS — R78.81 STAPHYLOCOCCUS AUREUS BACTEREMIA: Primary | ICD-10-CM

## 2021-12-09 DIAGNOSIS — J12.82 PNEUMONIA DUE TO COVID-19 VIRUS: ICD-10-CM

## 2021-12-09 DIAGNOSIS — G47.33 OBSTRUCTIVE SLEEP APNEA: ICD-10-CM

## 2021-12-09 DIAGNOSIS — B95.61 STAPHYLOCOCCUS AUREUS BACTEREMIA: Primary | ICD-10-CM

## 2021-12-09 PROCEDURE — 99309 SBSQ NF CARE MODERATE MDM 30: CPT | Performed by: NURSE PRACTITIONER

## 2021-12-13 ENCOUNTER — TELEPHONE (OUTPATIENT)
Dept: INFECTIOUS DISEASES | Facility: CLINIC | Age: 76
End: 2021-12-13

## 2021-12-13 ENCOUNTER — NURSING HOME VISIT (OUTPATIENT)
Dept: GERIATRICS | Facility: OTHER | Age: 76
End: 2021-12-13
Payer: MEDICARE

## 2021-12-13 VITALS
SYSTOLIC BLOOD PRESSURE: 130 MMHG | HEART RATE: 80 BPM | DIASTOLIC BLOOD PRESSURE: 78 MMHG | OXYGEN SATURATION: 92 % | TEMPERATURE: 98 F | RESPIRATION RATE: 18 BRPM

## 2021-12-13 DIAGNOSIS — L30.8 DERMATITIS ASSOCIATED WITH MOISTURE: ICD-10-CM

## 2021-12-13 DIAGNOSIS — G47.33 OBSTRUCTIVE SLEEP APNEA: ICD-10-CM

## 2021-12-13 DIAGNOSIS — L97.311 NON-PRESSURE CHRONIC ULCER RIGHT ANKLE, LIMITED TO BREAKDOWN SKIN (HCC): ICD-10-CM

## 2021-12-13 DIAGNOSIS — I48.21 PERMANENT ATRIAL FIBRILLATION (HCC): ICD-10-CM

## 2021-12-13 DIAGNOSIS — J44.9 COPD, MODERATE (HCC): ICD-10-CM

## 2021-12-13 DIAGNOSIS — L97.921 NON-PRESSURE CHRONIC ULCER LEFT LOWER LEG, LIMITED TO BREAKDOWN SKIN (HCC): ICD-10-CM

## 2021-12-13 DIAGNOSIS — R78.81 STAPHYLOCOCCUS AUREUS BACTEREMIA: ICD-10-CM

## 2021-12-13 DIAGNOSIS — J12.82 PNEUMONIA DUE TO COVID-19 VIRUS: ICD-10-CM

## 2021-12-13 DIAGNOSIS — L03.119 CELLULITIS OF LOWER EXTREMITY, UNSPECIFIED LATERALITY: Primary | ICD-10-CM

## 2021-12-13 DIAGNOSIS — R26.2 AMBULATORY DYSFUNCTION: ICD-10-CM

## 2021-12-13 DIAGNOSIS — U07.1 PNEUMONIA DUE TO COVID-19 VIRUS: ICD-10-CM

## 2021-12-13 DIAGNOSIS — B95.61 STAPHYLOCOCCUS AUREUS BACTEREMIA: ICD-10-CM

## 2021-12-13 PROCEDURE — 99309 SBSQ NF CARE MODERATE MDM 30: CPT | Performed by: NURSE PRACTITIONER

## 2021-12-15 ENCOUNTER — NURSING HOME VISIT (OUTPATIENT)
Dept: GERIATRICS | Facility: OTHER | Age: 76
End: 2021-12-15
Payer: MEDICARE

## 2021-12-15 ENCOUNTER — NURSING HOME VISIT (OUTPATIENT)
Dept: WOUND CARE | Facility: HOSPITAL | Age: 76
End: 2021-12-15
Payer: MEDICARE

## 2021-12-15 VITALS
HEART RATE: 83 BPM | DIASTOLIC BLOOD PRESSURE: 72 MMHG | RESPIRATION RATE: 18 BRPM | TEMPERATURE: 97.6 F | WEIGHT: 257 LBS | OXYGEN SATURATION: 92 % | SYSTOLIC BLOOD PRESSURE: 109 MMHG | BODY MASS INDEX: 35.84 KG/M2

## 2021-12-15 DIAGNOSIS — R78.81 STAPHYLOCOCCUS AUREUS BACTEREMIA: ICD-10-CM

## 2021-12-15 DIAGNOSIS — L30.8 DERMATITIS ASSOCIATED WITH MOISTURE: ICD-10-CM

## 2021-12-15 DIAGNOSIS — U07.1 PNEUMONIA DUE TO COVID-19 VIRUS: ICD-10-CM

## 2021-12-15 DIAGNOSIS — J44.9 COPD, MODERATE (HCC): ICD-10-CM

## 2021-12-15 DIAGNOSIS — L97.311 NON-PRESSURE CHRONIC ULCER RIGHT ANKLE, LIMITED TO BREAKDOWN SKIN (HCC): ICD-10-CM

## 2021-12-15 DIAGNOSIS — R26.2 AMBULATORY DYSFUNCTION: ICD-10-CM

## 2021-12-15 DIAGNOSIS — L97.921 NON-PRESSURE CHRONIC ULCER LEFT LOWER LEG, LIMITED TO BREAKDOWN SKIN (HCC): Primary | ICD-10-CM

## 2021-12-15 DIAGNOSIS — J12.82 PNEUMONIA DUE TO COVID-19 VIRUS: ICD-10-CM

## 2021-12-15 DIAGNOSIS — G47.33 OBSTRUCTIVE SLEEP APNEA: ICD-10-CM

## 2021-12-15 DIAGNOSIS — L97.921 NON-PRESSURE CHRONIC ULCER LEFT LOWER LEG, LIMITED TO BREAKDOWN SKIN (HCC): ICD-10-CM

## 2021-12-15 DIAGNOSIS — I87.2 VENOUS INSUFFICIENCY: ICD-10-CM

## 2021-12-15 DIAGNOSIS — L03.119 CELLULITIS OF LOWER EXTREMITY, UNSPECIFIED LATERALITY: Primary | ICD-10-CM

## 2021-12-15 DIAGNOSIS — B95.61 STAPHYLOCOCCUS AUREUS BACTEREMIA: ICD-10-CM

## 2021-12-15 DIAGNOSIS — I48.21 PERMANENT ATRIAL FIBRILLATION (HCC): ICD-10-CM

## 2021-12-15 PROCEDURE — 99309 SBSQ NF CARE MODERATE MDM 30: CPT | Performed by: NURSE PRACTITIONER

## 2021-12-15 PROCEDURE — 99308 SBSQ NF CARE LOW MDM 20: CPT | Performed by: NURSE PRACTITIONER

## 2021-12-20 ENCOUNTER — NURSING HOME VISIT (OUTPATIENT)
Dept: GERIATRICS | Facility: OTHER | Age: 76
End: 2021-12-20
Payer: MEDICARE

## 2021-12-20 VITALS
HEART RATE: 82 BPM | DIASTOLIC BLOOD PRESSURE: 72 MMHG | SYSTOLIC BLOOD PRESSURE: 130 MMHG | RESPIRATION RATE: 18 BRPM | OXYGEN SATURATION: 98 % | TEMPERATURE: 97.6 F

## 2021-12-20 DIAGNOSIS — B95.61 STAPHYLOCOCCUS AUREUS BACTEREMIA: Primary | ICD-10-CM

## 2021-12-20 DIAGNOSIS — G47.33 OBSTRUCTIVE SLEEP APNEA: ICD-10-CM

## 2021-12-20 DIAGNOSIS — L30.8 DERMATITIS ASSOCIATED WITH MOISTURE: ICD-10-CM

## 2021-12-20 DIAGNOSIS — I87.2 VENOUS INSUFFICIENCY: ICD-10-CM

## 2021-12-20 DIAGNOSIS — I48.21 PERMANENT ATRIAL FIBRILLATION (HCC): ICD-10-CM

## 2021-12-20 DIAGNOSIS — J44.9 COPD, MODERATE (HCC): ICD-10-CM

## 2021-12-20 DIAGNOSIS — E88.09 HYPOALBUMINEMIA: ICD-10-CM

## 2021-12-20 DIAGNOSIS — L97.311 NON-PRESSURE CHRONIC ULCER RIGHT ANKLE, LIMITED TO BREAKDOWN SKIN (HCC): ICD-10-CM

## 2021-12-20 DIAGNOSIS — R78.81 STAPHYLOCOCCUS AUREUS BACTEREMIA: Primary | ICD-10-CM

## 2021-12-20 DIAGNOSIS — L03.119 CELLULITIS OF LOWER EXTREMITY, UNSPECIFIED LATERALITY: ICD-10-CM

## 2021-12-20 DIAGNOSIS — L97.921 NON-PRESSURE CHRONIC ULCER LEFT LOWER LEG, LIMITED TO BREAKDOWN SKIN (HCC): ICD-10-CM

## 2021-12-20 DIAGNOSIS — E78.2 MIXED HYPERLIPIDEMIA: ICD-10-CM

## 2021-12-20 DIAGNOSIS — R26.2 AMBULATORY DYSFUNCTION: ICD-10-CM

## 2021-12-20 DIAGNOSIS — D75.839 THROMBOCYTOSIS: ICD-10-CM

## 2021-12-20 PROCEDURE — 99316 NF DSCHRG MGMT 30 MIN+: CPT | Performed by: NURSE PRACTITIONER

## 2021-12-20 RX ORDER — HYDROPHILIC CREAM
1 PASTE (GRAM) TOPICAL 2 TIMES DAILY
COMMUNITY
End: 2022-02-08 | Stop reason: HOSPADM

## 2021-12-20 RX ORDER — PAROXETINE HYDROCHLORIDE 20 MG/1
20 TABLET, FILM COATED ORAL DAILY
COMMUNITY
End: 2022-02-08 | Stop reason: HOSPADM

## 2021-12-20 RX ORDER — AMMONIUM LACTATE 12 G/100G
1 CREAM TOPICAL DAILY
COMMUNITY
End: 2022-02-08 | Stop reason: HOSPADM

## 2021-12-20 RX ORDER — FUROSEMIDE 40 MG/1
40 TABLET ORAL DAILY
COMMUNITY
End: 2022-02-08 | Stop reason: HOSPADM

## 2021-12-20 RX ORDER — WARFARIN SODIUM 4 MG/1
4 TABLET ORAL
COMMUNITY
End: 2022-02-08 | Stop reason: HOSPADM

## 2021-12-20 RX ORDER — FLUTICASONE FUROATE AND VILANTEROL 100; 25 UG/1; UG/1
1 POWDER RESPIRATORY (INHALATION) DAILY
COMMUNITY
End: 2022-02-08 | Stop reason: HOSPADM

## 2021-12-20 RX ORDER — FAMOTIDINE 40 MG/1
40 TABLET, FILM COATED ORAL 2 TIMES DAILY
COMMUNITY
End: 2022-02-08 | Stop reason: HOSPADM

## 2021-12-20 RX ORDER — PRAVASTATIN SODIUM 80 MG/1
80 TABLET ORAL DAILY
COMMUNITY
End: 2022-02-08 | Stop reason: HOSPADM

## 2021-12-22 ENCOUNTER — TRANSITIONAL CARE MANAGEMENT (OUTPATIENT)
Dept: FAMILY MEDICINE CLINIC | Facility: CLINIC | Age: 76
End: 2021-12-22

## 2021-12-22 ENCOUNTER — PATIENT OUTREACH (OUTPATIENT)
Dept: FAMILY MEDICINE CLINIC | Facility: CLINIC | Age: 76
End: 2021-12-22

## 2021-12-22 ENCOUNTER — TELEPHONE (OUTPATIENT)
Dept: FAMILY MEDICINE CLINIC | Facility: CLINIC | Age: 76
End: 2021-12-22

## 2021-12-22 DIAGNOSIS — I10 BENIGN ESSENTIAL HYPERTENSION: ICD-10-CM

## 2021-12-22 DIAGNOSIS — U07.1 SEPSIS DUE TO COVID-19 (HCC): ICD-10-CM

## 2021-12-22 DIAGNOSIS — D68.9 COAGULATION DISORDER (HCC): Primary | ICD-10-CM

## 2021-12-22 DIAGNOSIS — Z71.89 COMPLEX CARE COORDINATION: Primary | ICD-10-CM

## 2021-12-22 DIAGNOSIS — A41.89 SEPSIS DUE TO COVID-19 (HCC): ICD-10-CM

## 2021-12-27 ENCOUNTER — PATIENT OUTREACH (OUTPATIENT)
Dept: FAMILY MEDICINE CLINIC | Facility: CLINIC | Age: 76
End: 2021-12-27

## 2021-12-28 ENCOUNTER — OFFICE VISIT (OUTPATIENT)
Dept: FAMILY MEDICINE CLINIC | Facility: CLINIC | Age: 76
End: 2021-12-28
Payer: MEDICARE

## 2021-12-28 ENCOUNTER — PATIENT OUTREACH (OUTPATIENT)
Dept: FAMILY MEDICINE CLINIC | Facility: CLINIC | Age: 76
End: 2021-12-28

## 2021-12-28 VITALS
SYSTOLIC BLOOD PRESSURE: 110 MMHG | HEIGHT: 71 IN | BODY MASS INDEX: 35.84 KG/M2 | DIASTOLIC BLOOD PRESSURE: 60 MMHG | TEMPERATURE: 97 F | HEART RATE: 105 BPM

## 2021-12-28 DIAGNOSIS — J44.9 COPD, MODERATE (HCC): ICD-10-CM

## 2021-12-28 DIAGNOSIS — R26.2 AMBULATORY DYSFUNCTION: ICD-10-CM

## 2021-12-28 DIAGNOSIS — L03.119 CELLULITIS OF LOWER EXTREMITY, UNSPECIFIED LATERALITY: ICD-10-CM

## 2021-12-28 DIAGNOSIS — I48.0 PAROXYSMAL A-FIB (HCC): Primary | ICD-10-CM

## 2021-12-28 PROCEDURE — 99496 TRANSJ CARE MGMT HIGH F2F 7D: CPT | Performed by: FAMILY MEDICINE

## 2021-12-28 RX ORDER — ERGOCALCIFEROL (VITAMIN D2) 10 MCG
TABLET ORAL
COMMUNITY
Start: 2021-12-17 | End: 2022-02-08 | Stop reason: HOSPADM

## 2021-12-28 RX ORDER — MELOXICAM 7.5 MG/1
TABLET ORAL
COMMUNITY
End: 2022-02-08 | Stop reason: HOSPADM

## 2021-12-28 RX ORDER — TRIAMTERENE AND HYDROCHLOROTHIAZIDE 37.5; 25 MG/1; MG/1
1 CAPSULE ORAL DAILY
COMMUNITY
End: 2022-02-08 | Stop reason: HOSPADM

## 2021-12-28 RX ORDER — CHOLECALCIFEROL (VITAMIN D3) 125 MCG
CAPSULE ORAL
COMMUNITY
Start: 2021-12-17 | End: 2022-01-29 | Stop reason: CLARIF

## 2021-12-29 ENCOUNTER — PATIENT OUTREACH (OUTPATIENT)
Dept: FAMILY MEDICINE CLINIC | Facility: CLINIC | Age: 76
End: 2021-12-29

## 2021-12-30 ENCOUNTER — PATIENT OUTREACH (OUTPATIENT)
Dept: FAMILY MEDICINE CLINIC | Facility: CLINIC | Age: 76
End: 2021-12-30

## 2021-12-30 ENCOUNTER — TELEPHONE (OUTPATIENT)
Dept: FAMILY MEDICINE CLINIC | Facility: CLINIC | Age: 76
End: 2021-12-30

## 2022-01-04 ENCOUNTER — PATIENT OUTREACH (OUTPATIENT)
Dept: FAMILY MEDICINE CLINIC | Facility: CLINIC | Age: 77
End: 2022-01-04

## 2022-01-05 ENCOUNTER — PATIENT OUTREACH (OUTPATIENT)
Dept: FAMILY MEDICINE CLINIC | Facility: CLINIC | Age: 77
End: 2022-01-05

## 2022-01-05 NOTE — PROGRESS NOTES
Outpatient Care Management Note:    Care manager called Hussain's daughter, Jacqueline Meade  She was still at work and had to keep interrupting our conversation to address work issues  She did provide CM with her 's contact information [phone])  She recommended I call him next week  He can give the phone to her father, so I can complete start of care assessment  I updated Amie that I left a message for [facility]  I gave her the contact information for [facility] so she can call about the commode  I also informed her that I am working to follow up on the wheelchair  Amie noted that the [facility] therapist saw her father yesterday  Amie was uncertain if her dad has any open wounds, redness or drainage from his legs  CM reviewed that he is to be wearing compression stockings  They should be applied each morning and removed each night  At that time, she should assess his legs for redness, swelling, drainage, or open areas  She should call [facility] with any concerns  CM will attempt to contact [place] again to determine when they will be seeing Eva Culp is on oxygen at 2-3 liters  Amie states he does not have any portable oxygen for going to doctor appts  CM instructed her to check the oxygen compressor to find out who is providing the oxygen  She can call the company to get portable tanks or contact CM for assistance  Amie states that her father needs more assistance at home  She agreed to outreach by our  to discuss in home care services  Amie denies her father having any further covid symptoms like cough, congestion or fever  CM did review that [facility] discharge instructions recommended follow up appts with cardiology (Dr Howie Cabrera), infectious disease and podiatry  Amie will call regarding follow up appts  She did note that cardiology is managing his coumadin  Amie did not have [facility] med list with her at work, so med review was not completed       Amie has my contact information  She will call with any questions  She knows to call Elliot Boland directly with any urgent concerns  CM called [address] [facility] again  Again I got the answering service  I requested someone call me back regarding when Pohailey Miller will be seen by the nurse to address his legs  CM notified our   She will outreach Po Miller and his daughter

## 2022-01-07 ENCOUNTER — PATIENT OUTREACH (OUTPATIENT)
Dept: FAMILY MEDICINE CLINIC | Facility: CLINIC | Age: 77
End: 2022-01-07

## 2022-01-07 NOTE — PROGRESS NOTES
Outpatient Care Management Note:    RN Care manager called [facility] to follow up on wheelchair order  CM spoke with Davi Polk  She states they received the prescription but they need office notes to support the need for the chair, demographics, height and weight faxed to them  RN care manager faxed above information to [address] [facility] at

## 2022-01-11 ENCOUNTER — PATIENT OUTREACH (OUTPATIENT)
Dept: FAMILY MEDICINE CLINIC | Facility: CLINIC | Age: 77
End: 2022-01-11

## 2022-01-11 NOTE — PROGRESS NOTES
Outpatient Care Management Note:    RN care manager attempted to call Kulwinder Horne back using Amie's husbands contact information [phone]  CM had to leave a voice mail message  I left my contact information on the message and requested a call back

## 2022-01-11 NOTE — PROGRESS NOTES
Outpatient Care Management Note:    RN Care Manager called Amie's  and introduced myself  He states that Bossman Redd is sleeping presently  He agreed to [facility] calling back this afternoon

## 2022-01-14 ENCOUNTER — PATIENT OUTREACH (OUTPATIENT)
Dept: FAMILY MEDICINE CLINIC | Facility: CLINIC | Age: 77
End: 2022-01-14

## 2022-01-14 NOTE — PROGRESS NOTES
Outpatient Care Management Note:    RN Care manager again tried to call Kiesha Valdez using Amie's 's contact information  Voice mail message left for Kiesha Valdez, with my contact information, introducing myself and requesting a call back  Amie called CM back  She states that she and her  are out today  CM requested that she call me back next week when she has time  Amie has not followed up on his portable oxygen tanks  She is planning on doing that this afternoon  She agreed to call back next week

## 2022-01-17 ENCOUNTER — TELEPHONE (OUTPATIENT)
Dept: FAMILY MEDICINE CLINIC | Facility: CLINIC | Age: 77
End: 2022-01-17

## 2022-01-17 NOTE — TELEPHONE ENCOUNTER
Fax [phone]    Moody Dandy called asking for [employer] because he is on Coumadin  and would a portable be acceptable  Please include how often you want it done  Wound on L great toe artrial vascular, changing treatment to zbigniew 2 - 3 times a week       Please advise [phone]

## 2022-01-18 ENCOUNTER — TELEPHONE (OUTPATIENT)
Dept: FAMILY MEDICINE CLINIC | Facility: CLINIC | Age: 77
End: 2022-01-18

## 2022-01-18 NOTE — TELEPHONE ENCOUNTER
Blaise from [place] at [facility] stating they faxed a request for 14 prescriptions to our office, but fax number she recited was incorrect  They do pre-made medication packs for patient  LMOM for them to re-fax medication request to office ([phone]

## 2022-01-19 ENCOUNTER — PATIENT OUTREACH (OUTPATIENT)
Dept: FAMILY MEDICINE CLINIC | Facility: CLINIC | Age: 77
End: 2022-01-19

## 2022-01-19 DIAGNOSIS — Z78.9 NEED FOR FOLLOW-UP BY SOCIAL WORKER: Primary | ICD-10-CM

## 2022-01-19 NOTE — PROGRESS NOTES
MICHEL MATOS received referral from [place] [address] office to provide patient/ patient's daughter Carmen Romo with in home assistance resources        MICHEL MATOS called patient's daughter and Sumeet Jordan ([id number]) who MICHEL MATOS confirmed is on patient's communication consent form from 12/28/21  MICHEL MATOS  Introduced MICHEL MATOS role and completed start of care psychosocial assessment with Amie  Per Amie, patient is a  and retired  Patient receives about $1,520 a month in SSI and $645 a month from his pension  Patient lives with his daughter Carmen Romo, son-in- law and 29year old grandson  Per Amie both her and her  work  Amie stated that when she is at work or her  is at work and not home, her 29year old son cares for patient  Per Amie, patient utilizes a RW and has a transport wheelchair  Amie brings patient to all appointments  Per Amie, patient does not have any mental health history or cognitive concerns  Per Amie, patient has adequate health coverage, no barriers to obtaining medication and no financial issues  Amie is interested in obtaining additional assistance in home for patient as he needs assistance with all basic ADLs  Amie stated that patient is receiving PT/ OT services through a VNA but cannot remember the name of the agency at this time  MICHEL MATOS discussed the waiver program  Carmen Romo is interested in applying  MICHEL MATOS discussed with Amie that the patient will have to give verbal consent when applying that he is agreeable to assistance as well, Amie gave verbal understanding  MICHEL MATOS discussed having the assistance of [facility], Ed Kayser assisting  Amie agreeable  MICHEL CM will place referral to Vivian Holloway did stated she has an inconsistent work schedule but does have off of work tomorrow to talk to [facility] if she is available  MICHEL CM placed referral to KRISTYW, Ed Kayser and sent inbasket to Vivian regarding  SW CM will continue to follow

## 2022-01-21 ENCOUNTER — PATIENT OUTREACH (OUTPATIENT)
Dept: FAMILY MEDICINE CLINIC | Facility: CLINIC | Age: 77
End: 2022-01-21

## 2022-01-21 NOTE — PROGRESS NOTES
Outpatient Care Management Note:    Care manager called Donal Horne via his son in [facility] contact information ()  Donal Horne got on the phone and we completed the start of care assessment  Donal Horne was slow to process many questions, but when given time, did answer appropriately  Donal Horne states he still needs portable oxygen  Johns son in law checked the compressor  The oxygen is provided by [facility]  CM will call them to see if they will provide portable tanks  Donal Horne states that the [address] VNA nurse and therapists are coming weekly  They have been assessing Hussain's legs during their visit  Hussain's son in law noted that Donal Horne had a wound on his toe but he thinks it is healed  He also noted that Donal Horne will be starting with [address] for med packs but is not certain when this will start  RN care manager called [facility] and spoke with Deonte Just  She states that Donal Horne has a portable oxygen compressor  If he lost the compressor or can not find the unit, he would need to pay $3000 for the cost   If [employer] provides portable tanks in the meantime, he would need to pay off the $3000  CARLO called [facility] and spoke with Kathrny Weir  She reviewed paperwork and thinks they have all needed information  She will contact Amie to set up delivery  CM called Hussain's daughter, Skyler Parekh, and updated her on the oxygen and wheelchair  She will look for the portable oxygen compressor this weekend  She is aware that she should be receiving a call from [facility] regarding the wheelchair

## 2022-01-21 NOTE — PROGRESS NOTES
Outpatient Care Management Note:    Voice mail message left for Hussain's daughter, Lisa Puckett, with my contact information, requesting a call back

## 2022-01-24 ENCOUNTER — TELEPHONE (OUTPATIENT)
Dept: FAMILY MEDICINE CLINIC | Facility: CLINIC | Age: 77
End: 2022-01-24

## 2022-01-24 NOTE — TELEPHONE ENCOUNTER
Albania Fitch from [facility] care called with Lluvia Alonso results      PT 61 8   INR 5 2     Currently taking 5 mil daily Warfarin       Call PT directly with any medication changes

## 2022-01-25 ENCOUNTER — TELEPHONE (OUTPATIENT)
Dept: FAMILY MEDICINE CLINIC | Facility: CLINIC | Age: 77
End: 2022-01-25

## 2022-01-25 ENCOUNTER — DOCUMENTATION (OUTPATIENT)
Dept: FAMILY MEDICINE CLINIC | Facility: CLINIC | Age: 77
End: 2022-01-25

## 2022-01-25 LAB — INR PPP: 5.2 (ref 0.84–1.19)

## 2022-01-25 NOTE — TELEPHONE ENCOUNTER
Spoke with Hussain's daughter Maryfrances [place] she said he takes 4mg coumadin everyday  I told her to hold today and tomorrow, no bleeding

## 2022-01-25 NOTE — TELEPHONE ENCOUNTER
Call   Ask if patient is having any active bleeding symptoms (nose bleeds, blood in urine, blood in stool)  If so, patient needs to go to emergency room  Otherwise, recommend holding Coumadin for 2 days  What is his current dosage    We will need to make an adjustment

## 2022-01-27 ENCOUNTER — PATIENT OUTREACH (OUTPATIENT)
Dept: CASE MANAGEMENT | Facility: HOSPITAL | Age: 77
End: 2022-01-27

## 2022-01-28 ENCOUNTER — APPOINTMENT (EMERGENCY)
Dept: RADIOLOGY | Facility: HOSPITAL | Age: 77
DRG: 186 | End: 2022-01-28
Payer: MEDICARE

## 2022-01-28 ENCOUNTER — PATIENT OUTREACH (OUTPATIENT)
Dept: FAMILY MEDICINE CLINIC | Facility: CLINIC | Age: 77
End: 2022-01-28

## 2022-01-28 ENCOUNTER — TELEPHONE (OUTPATIENT)
Dept: OTHER | Facility: OTHER | Age: 77
End: 2022-01-28

## 2022-01-28 ENCOUNTER — HOSPITAL ENCOUNTER (INPATIENT)
Facility: HOSPITAL | Age: 77
LOS: 11 days | Discharge: NURSING, REHAB OR LONG-TERM CARE | DRG: 186 | End: 2022-02-08
Attending: FAMILY MEDICINE | Admitting: ANESTHESIOLOGY
Payer: MEDICARE

## 2022-01-28 DIAGNOSIS — I50.9 CHF (CONGESTIVE HEART FAILURE) (HCC): Primary | ICD-10-CM

## 2022-01-28 DIAGNOSIS — L03.90 CELLULITIS: ICD-10-CM

## 2022-01-28 DIAGNOSIS — J96.20 ACUTE ON CHRONIC RESPIRATORY FAILURE (HCC): ICD-10-CM

## 2022-01-28 DIAGNOSIS — J18.9 PNEUMONIA: ICD-10-CM

## 2022-01-28 DIAGNOSIS — J90 PLEURAL EFFUSION: ICD-10-CM

## 2022-01-28 LAB
ALBUMIN SERPL BCP-MCNC: 2.4 G/DL (ref 3.5–5)
ALP SERPL-CCNC: 81 U/L (ref 46–116)
ALT SERPL W P-5'-P-CCNC: 73 U/L (ref 12–78)
ANION GAP SERPL CALCULATED.3IONS-SCNC: 4 MMOL/L (ref 4–13)
APTT PPP: 39 SECONDS (ref 23–37)
AST SERPL W P-5'-P-CCNC: 36 U/L (ref 5–45)
BASOPHILS # BLD AUTO: 0.1 THOUSANDS/ΜL (ref 0–0.1)
BASOPHILS NFR BLD AUTO: 1 % (ref 0–1)
BILIRUB SERPL-MCNC: 0.43 MG/DL (ref 0.2–1)
BUN SERPL-MCNC: 14 MG/DL (ref 5–25)
CALCIUM ALBUM COR SERPL-MCNC: 9.4 MG/DL (ref 8.3–10.1)
CALCIUM SERPL-MCNC: 8.1 MG/DL (ref 8.3–10.1)
CARDIAC TROPONIN I PNL SERPL HS: 40 NG/L
CHLORIDE SERPL-SCNC: 98 MMOL/L (ref 100–108)
CO2 SERPL-SCNC: 36 MMOL/L (ref 21–32)
CREAT SERPL-MCNC: 1.01 MG/DL (ref 0.6–1.3)
EOSINOPHIL # BLD AUTO: 0.17 THOUSAND/ΜL (ref 0–0.61)
EOSINOPHIL NFR BLD AUTO: 2 % (ref 0–6)
ERYTHROCYTE [DISTWIDTH] IN BLOOD BY AUTOMATED COUNT: 19.3 % (ref 11.6–15.1)
FLUAV RNA RESP QL NAA+PROBE: NEGATIVE
FLUBV RNA RESP QL NAA+PROBE: NEGATIVE
GFR SERPL CREATININE-BSD FRML MDRD: 71 ML/MIN/1.73SQ M
GLUCOSE SERPL-MCNC: 103 MG/DL (ref 65–140)
HCT VFR BLD AUTO: 36.4 % (ref 36.5–49.3)
HGB BLD-MCNC: 10.7 G/DL (ref 12–17)
IMM GRANULOCYTES # BLD AUTO: 0.03 THOUSAND/UL (ref 0–0.2)
IMM GRANULOCYTES NFR BLD AUTO: 0 % (ref 0–2)
INR PPP: 2.42 (ref 0.84–1.19)
LACTATE SERPL-SCNC: 2.4 MMOL/L (ref 0.5–2)
LYMPHOCYTES # BLD AUTO: 1.23 THOUSANDS/ΜL (ref 0.6–4.47)
LYMPHOCYTES NFR BLD AUTO: 14 % (ref 14–44)
MCH RBC QN AUTO: 27 PG (ref 26.8–34.3)
MCHC RBC AUTO-ENTMCNC: 29.4 G/DL (ref 31.4–37.4)
MCV RBC AUTO: 92 FL (ref 82–98)
MONOCYTES # BLD AUTO: 1.12 THOUSAND/ΜL (ref 0.17–1.22)
MONOCYTES NFR BLD AUTO: 13 % (ref 4–12)
NEUTROPHILS # BLD AUTO: 6.26 THOUSANDS/ΜL (ref 1.85–7.62)
NEUTS SEG NFR BLD AUTO: 70 % (ref 43–75)
NRBC BLD AUTO-RTO: 1 /100 WBCS
NT-PROBNP SERPL-MCNC: 4540 PG/ML
PLATELET # BLD AUTO: 434 THOUSANDS/UL (ref 149–390)
PMV BLD AUTO: 9.9 FL (ref 8.9–12.7)
POTASSIUM SERPL-SCNC: 4.6 MMOL/L (ref 3.5–5.3)
PROT SERPL-MCNC: 8.7 G/DL (ref 6.4–8.2)
PROTHROMBIN TIME: 25.7 SECONDS (ref 11.6–14.5)
RBC # BLD AUTO: 3.97 MILLION/UL (ref 3.88–5.62)
RSV RNA RESP QL NAA+PROBE: NEGATIVE
SARS-COV-2 RNA RESP QL NAA+PROBE: NEGATIVE
SODIUM SERPL-SCNC: 138 MMOL/L (ref 136–145)
WBC # BLD AUTO: 8.91 THOUSAND/UL (ref 4.31–10.16)

## 2022-01-28 PROCEDURE — 80053 COMPREHEN METABOLIC PANEL: CPT | Performed by: PHYSICIAN ASSISTANT

## 2022-01-28 PROCEDURE — 87070 CULTURE OTHR SPECIMN AEROBIC: CPT | Performed by: PHYSICIAN ASSISTANT

## 2022-01-28 PROCEDURE — 83605 ASSAY OF LACTIC ACID: CPT | Performed by: PHYSICIAN ASSISTANT

## 2022-01-28 PROCEDURE — 84145 PROCALCITONIN (PCT): CPT

## 2022-01-28 PROCEDURE — 96375 TX/PRO/DX INJ NEW DRUG ADDON: CPT

## 2022-01-28 PROCEDURE — 99285 EMERGENCY DEPT VISIT HI MDM: CPT

## 2022-01-28 PROCEDURE — 93005 ELECTROCARDIOGRAM TRACING: CPT

## 2022-01-28 PROCEDURE — 85379 FIBRIN DEGRADATION QUANT: CPT

## 2022-01-28 PROCEDURE — 85610 PROTHROMBIN TIME: CPT | Performed by: PHYSICIAN ASSISTANT

## 2022-01-28 PROCEDURE — 96374 THER/PROPH/DIAG INJ IV PUSH: CPT

## 2022-01-28 PROCEDURE — 87186 SC STD MICRODIL/AGAR DIL: CPT | Performed by: PHYSICIAN ASSISTANT

## 2022-01-28 PROCEDURE — 83880 ASSAY OF NATRIURETIC PEPTIDE: CPT | Performed by: PHYSICIAN ASSISTANT

## 2022-01-28 PROCEDURE — 84484 ASSAY OF TROPONIN QUANT: CPT | Performed by: PHYSICIAN ASSISTANT

## 2022-01-28 PROCEDURE — 87077 CULTURE AEROBIC IDENTIFY: CPT | Performed by: PHYSICIAN ASSISTANT

## 2022-01-28 PROCEDURE — 87205 SMEAR GRAM STAIN: CPT | Performed by: PHYSICIAN ASSISTANT

## 2022-01-28 PROCEDURE — 0241U HB NFCT DS VIR RESP RNA 4 TRGT: CPT | Performed by: PHYSICIAN ASSISTANT

## 2022-01-28 PROCEDURE — 85730 THROMBOPLASTIN TIME PARTIAL: CPT | Performed by: PHYSICIAN ASSISTANT

## 2022-01-28 PROCEDURE — 87040 BLOOD CULTURE FOR BACTERIA: CPT | Performed by: PHYSICIAN ASSISTANT

## 2022-01-28 PROCEDURE — 71045 X-RAY EXAM CHEST 1 VIEW: CPT

## 2022-01-28 PROCEDURE — 36415 COLL VENOUS BLD VENIPUNCTURE: CPT | Performed by: PHYSICIAN ASSISTANT

## 2022-01-28 PROCEDURE — 99285 EMERGENCY DEPT VISIT HI MDM: CPT | Performed by: PHYSICIAN ASSISTANT

## 2022-01-28 PROCEDURE — 85025 COMPLETE CBC W/AUTO DIFF WBC: CPT | Performed by: PHYSICIAN ASSISTANT

## 2022-01-28 RX ORDER — FUROSEMIDE 10 MG/ML
40 INJECTION INTRAMUSCULAR; INTRAVENOUS ONCE
Status: COMPLETED | OUTPATIENT
Start: 2022-01-28 | End: 2022-01-28

## 2022-01-28 RX ADMIN — DOXYCYCLINE 100 MG: 100 INJECTION, POWDER, LYOPHILIZED, FOR SOLUTION INTRAVENOUS at 22:53

## 2022-01-28 RX ADMIN — FUROSEMIDE 40 MG: 10 INJECTION, SOLUTION INTRAVENOUS at 22:52

## 2022-01-28 NOTE — PROGRESS NOTES
Outpatient Care Management Note:    Voice mail message left for Hussain's daughter, Lisa Puckett, with my contact information, requesting a call back  Call received from Hussain's daughter, Lisa Puckett  She states that Paul Geronimo is having increased difficulty walking  He was only seen by [facility] once in a 2 week period  She feels his legs are weak and he is complaining of increased shortness of breath  She did note that they have been using a transport chair instead of him walking  CM encouraged them to have him walk if he is safe  We reviewed that the less he does, the less able he will be  CM will attempt to follow up with VNA  Amie states that his pulse ox has been running around 85% for the past 3 days  She thinks his pulse ox was higher prior to this  She states he has increased coughing but no secretions and increased shortness of breath  She did not know if he had any swelling  Amie was not home and did not know how her father was doing currently  She works full time  CM recommended that Hussain call 911 if he has increased shortness of breath with decreasing pulse ox readings  Amie states that she really does not know too much about his care  The VNA nurse and therapist comes while she is at work  She did receive a note stating that the nurse wants her to begin doing wound care treatments  Amie does not know if her father has wounds  CM explained that the nurses role is short term  They routinely train family to do treatments and manage ongoing needs  CM encouraged her to take an active role in her father's care  CM informed her that she can always call [facility] with any questions or concerns as someone is always on call  Amie did not follow up on Hussain's commode or wheelchair  She did find the portable oxygen compressor and states it works   CM strongly encouraged her to call [employer] about the commode and call [facility] about the wheelchair as they are waiting to hear from her to set up delivery  CM reminded Amie that Kiesha Valdez still needs follow up appts with cardiology, podiatry, and infectious disease after his last hospitalization  Amie states she will call  CM called [facility] and requested to speak with Hussain's nurse  Staff will have the nurse, Fabian Malik, call me back  She took my contact information  CM attempted to call Kiesha Valdez directly via his son in law  I had to leave a voice mail message  CARLO requested Kiesha Valdez call me back as Amie had informed me he was having increased shortness of breath  Call received from [address], [place]   She states that Kiesha Valdez is not walking at all  Family is using the transport chair exclusively  She noted that he had a fall last Monday while the grandson was attempting to transfer him to the wheelchair  She also noted that Kiesha Valdez was discharged from cardiology services at the end of December  Fabian Malik is concerned that Kiesha Valdez is in an unsafe environment  She is not sure if he is taking his medications as ordered  Family are not changing his tubi  stockings and when Fabian Malik arrives weekly they are dirty  Since the tubi  are not being changed, she is unsure if he is bathing  She states he has a new open area on his right leg that she is monitoring  CARLO recommended that Fabian Malik have her  evaluate the home situation for safety  Fabian Malik agreed to place a referral      CARLO reviewed my concerns about Hussain's respiratory status, his pulse ox, increased shortness of breath, and cough  Fabian Malik stated that Hussain's pulse ox has always been above 92% during her visits  CARLO voiced my concerns and informed her that I recommended Amie have him evaluated in ER  Fabian Malik agreed to visit Kiesha Valdez now to evaluate his breathing status

## 2022-01-28 NOTE — TELEPHONE ENCOUNTER
Patient's RN case manager, Natanael Lutz, called to advise that the patient had some shortness of breath and weakness  She checked his pulse ox and it was 64% with oxygen, she had to increase his oxygen  She also said what is most concerning is that when she went to change his tubi , his legs were moist with a green discharge and many ulcers  She would like recommendations for care  She can be reached at [phone]

## 2022-01-29 ENCOUNTER — APPOINTMENT (INPATIENT)
Dept: CT IMAGING | Facility: HOSPITAL | Age: 77
DRG: 186 | End: 2022-01-29
Payer: MEDICARE

## 2022-01-29 PROBLEM — J96.20 ACUTE ON CHRONIC RESPIRATORY FAILURE (HCC): Status: ACTIVE | Noted: 2017-12-24

## 2022-01-29 PROBLEM — J96.00 ACUTE RESPIRATORY FAILURE (HCC): Status: ACTIVE | Noted: 2017-12-24

## 2022-01-29 LAB
2HR DELTA HS TROPONIN: 5 NG/L
4HR DELTA HS TROPONIN: 2 NG/L
ALBUMIN SERPL BCP-MCNC: 2.1 G/DL (ref 3.5–5)
ALP SERPL-CCNC: 73 U/L (ref 46–116)
ALT SERPL W P-5'-P-CCNC: 60 U/L (ref 12–78)
ANION GAP SERPL CALCULATED.3IONS-SCNC: 3 MMOL/L (ref 4–13)
AST SERPL W P-5'-P-CCNC: 24 U/L (ref 5–45)
BASE EX.OXY STD BLDV CALC-SCNC: 81.7 % (ref 60–80)
BASE EXCESS BLDV CALC-SCNC: 10.7 MMOL/L
BASOPHILS # BLD AUTO: 0.12 THOUSANDS/ΜL (ref 0–0.1)
BASOPHILS NFR BLD AUTO: 1 % (ref 0–1)
BILIRUB SERPL-MCNC: 0.41 MG/DL (ref 0.2–1)
BUN SERPL-MCNC: 16 MG/DL (ref 5–25)
CALCIUM ALBUM COR SERPL-MCNC: 9.1 MG/DL (ref 8.3–10.1)
CALCIUM SERPL-MCNC: 7.6 MG/DL (ref 8.3–10.1)
CARDIAC TROPONIN I PNL SERPL HS: 42 NG/L
CARDIAC TROPONIN I PNL SERPL HS: 45 NG/L
CHLORIDE SERPL-SCNC: 99 MMOL/L (ref 100–108)
CO2 SERPL-SCNC: 35 MMOL/L (ref 21–32)
CREAT SERPL-MCNC: 0.95 MG/DL (ref 0.6–1.3)
D DIMER PPP FEU-MCNC: 1.8 UG/ML FEU
EOSINOPHIL # BLD AUTO: 0.08 THOUSAND/ΜL (ref 0–0.61)
EOSINOPHIL NFR BLD AUTO: 1 % (ref 0–6)
ERYTHROCYTE [DISTWIDTH] IN BLOOD BY AUTOMATED COUNT: 19.4 % (ref 11.6–15.1)
GFR SERPL CREATININE-BSD FRML MDRD: 77 ML/MIN/1.73SQ M
GLUCOSE SERPL-MCNC: 116 MG/DL (ref 65–140)
HCO3 BLDV-SCNC: 38.1 MMOL/L (ref 24–30)
HCT VFR BLD AUTO: 33.6 % (ref 36.5–49.3)
HGB BLD-MCNC: 10.3 G/DL (ref 12–17)
IMM GRANULOCYTES # BLD AUTO: 0.04 THOUSAND/UL (ref 0–0.2)
IMM GRANULOCYTES NFR BLD AUTO: 0 % (ref 0–2)
L PNEUMO1 AG UR QL IA.RAPID: NEGATIVE
LACTATE SERPL-SCNC: 1.4 MMOL/L (ref 0.5–2)
LYMPHOCYTES # BLD AUTO: 0.84 THOUSANDS/ΜL (ref 0.6–4.47)
LYMPHOCYTES NFR BLD AUTO: 6 % (ref 14–44)
MCH RBC QN AUTO: 27.5 PG (ref 26.8–34.3)
MCHC RBC AUTO-ENTMCNC: 30.7 G/DL (ref 31.4–37.4)
MCV RBC AUTO: 90 FL (ref 82–98)
MONOCYTES # BLD AUTO: 1.63 THOUSAND/ΜL (ref 0.17–1.22)
MONOCYTES NFR BLD AUTO: 12 % (ref 4–12)
NEUTROPHILS # BLD AUTO: 10.45 THOUSANDS/ΜL (ref 1.85–7.62)
NEUTS SEG NFR BLD AUTO: 80 % (ref 43–75)
NRBC BLD AUTO-RTO: 0 /100 WBCS
O2 CT BLDV-SCNC: 13.6 ML/DL
PCO2 BLDV: 65.2 MM HG (ref 42–50)
PH BLDV: 7.38 [PH] (ref 7.3–7.4)
PLATELET # BLD AUTO: 402 THOUSANDS/UL (ref 149–390)
PMV BLD AUTO: 10 FL (ref 8.9–12.7)
PO2 BLDV: 53.5 MM HG (ref 35–45)
POTASSIUM SERPL-SCNC: 4 MMOL/L (ref 3.5–5.3)
PROCALCITONIN SERPL-MCNC: 0.14 NG/ML
PROCALCITONIN SERPL-MCNC: 0.17 NG/ML
PROT SERPL-MCNC: 7.8 G/DL (ref 6.4–8.2)
RBC # BLD AUTO: 3.74 MILLION/UL (ref 3.88–5.62)
S PNEUM AG UR QL: NEGATIVE
SODIUM SERPL-SCNC: 137 MMOL/L (ref 136–145)
WBC # BLD AUTO: 13.16 THOUSAND/UL (ref 4.31–10.16)

## 2022-01-29 PROCEDURE — 94640 AIRWAY INHALATION TREATMENT: CPT

## 2022-01-29 PROCEDURE — 71250 CT THORAX DX C-: CPT

## 2022-01-29 PROCEDURE — 94760 N-INVAS EAR/PLS OXIMETRY 1: CPT

## 2022-01-29 PROCEDURE — 87449 NOS EACH ORGANISM AG IA: CPT

## 2022-01-29 PROCEDURE — 99223 1ST HOSP IP/OBS HIGH 75: CPT | Performed by: FAMILY MEDICINE

## 2022-01-29 PROCEDURE — 36415 COLL VENOUS BLD VENIPUNCTURE: CPT | Performed by: PHYSICIAN ASSISTANT

## 2022-01-29 PROCEDURE — G1004 CDSM NDSC: HCPCS

## 2022-01-29 PROCEDURE — 84484 ASSAY OF TROPONIN QUANT: CPT | Performed by: PHYSICIAN ASSISTANT

## 2022-01-29 PROCEDURE — 87081 CULTURE SCREEN ONLY: CPT

## 2022-01-29 PROCEDURE — 85025 COMPLETE CBC W/AUTO DIFF WBC: CPT | Performed by: FAMILY MEDICINE

## 2022-01-29 PROCEDURE — 82805 BLOOD GASES W/O2 SATURATION: CPT

## 2022-01-29 PROCEDURE — 94660 CPAP INITIATION&MGMT: CPT

## 2022-01-29 PROCEDURE — 83605 ASSAY OF LACTIC ACID: CPT | Performed by: PHYSICIAN ASSISTANT

## 2022-01-29 PROCEDURE — 94002 VENT MGMT INPAT INIT DAY: CPT

## 2022-01-29 PROCEDURE — 80053 COMPREHEN METABOLIC PANEL: CPT | Performed by: FAMILY MEDICINE

## 2022-01-29 PROCEDURE — 84145 PROCALCITONIN (PCT): CPT

## 2022-01-29 RX ORDER — SODIUM CHLORIDE 30 MG/ML INHALATION SOLUTION 30 MG/ML
4 SOLUTION INHALANT
Status: DISCONTINUED | OUTPATIENT
Start: 2022-01-29 | End: 2022-02-02

## 2022-01-29 RX ORDER — PRAVASTATIN SODIUM 80 MG/1
80 TABLET ORAL
Status: DISCONTINUED | OUTPATIENT
Start: 2022-01-29 | End: 2022-02-04

## 2022-01-29 RX ORDER — FUROSEMIDE 10 MG/ML
40 INJECTION INTRAMUSCULAR; INTRAVENOUS EVERY 8 HOURS
Status: DISCONTINUED | OUTPATIENT
Start: 2022-01-29 | End: 2022-02-01

## 2022-01-29 RX ORDER — CEFEPIME HYDROCHLORIDE 2 G/50ML
2000 INJECTION, SOLUTION INTRAVENOUS EVERY 8 HOURS
Status: DISCONTINUED | OUTPATIENT
Start: 2022-01-29 | End: 2022-01-31

## 2022-01-29 RX ORDER — MEDICAL SUPPLY, MISCELLANEOUS
EACH MISCELLANEOUS
COMMUNITY

## 2022-01-29 RX ORDER — AZITHROMYCIN 250 MG/1
500 TABLET, FILM COATED ORAL EVERY 24 HOURS
Status: DISCONTINUED | OUTPATIENT
Start: 2022-01-29 | End: 2022-01-29

## 2022-01-29 RX ORDER — METRONIDAZOLE 500 MG/1
500 TABLET ORAL EVERY 8 HOURS SCHEDULED
Status: DISCONTINUED | OUTPATIENT
Start: 2022-01-29 | End: 2022-01-31

## 2022-01-29 RX ORDER — METOPROLOL TARTRATE 5 MG/5ML
2.5 INJECTION INTRAVENOUS EVERY 6 HOURS PRN
Status: DISCONTINUED | OUTPATIENT
Start: 2022-01-29 | End: 2022-02-04

## 2022-01-29 RX ORDER — BENZONATATE 100 MG/1
100 CAPSULE ORAL 3 TIMES DAILY PRN
Status: DISCONTINUED | OUTPATIENT
Start: 2022-01-29 | End: 2022-02-04

## 2022-01-29 RX ORDER — ALBUTEROL SULFATE 2.5 MG/3ML
2.5 SOLUTION RESPIRATORY (INHALATION) EVERY 6 HOURS PRN
Status: DISCONTINUED | OUTPATIENT
Start: 2022-01-29 | End: 2022-02-04

## 2022-01-29 RX ORDER — WARFARIN SODIUM 2 MG/1
4 TABLET ORAL
Status: DISCONTINUED | OUTPATIENT
Start: 2022-01-29 | End: 2022-01-31

## 2022-01-29 RX ORDER — FLUTICASONE FUROATE AND VILANTEROL 100; 25 UG/1; UG/1
1 POWDER RESPIRATORY (INHALATION) DAILY
Status: DISCONTINUED | OUTPATIENT
Start: 2022-01-29 | End: 2022-01-31

## 2022-01-29 RX ORDER — FUROSEMIDE 40 MG/1
40 TABLET ORAL DAILY
Status: DISCONTINUED | OUTPATIENT
Start: 2022-01-29 | End: 2022-01-29

## 2022-01-29 RX ORDER — ACETAMINOPHEN 325 MG/1
650 TABLET ORAL EVERY 6 HOURS PRN
Status: DISCONTINUED | OUTPATIENT
Start: 2022-01-29 | End: 2022-02-04

## 2022-01-29 RX ORDER — PAROXETINE HYDROCHLORIDE 20 MG/1
20 TABLET, FILM COATED ORAL DAILY
Status: DISCONTINUED | OUTPATIENT
Start: 2022-01-29 | End: 2022-02-04

## 2022-01-29 RX ORDER — AMMONIUM LACTATE 12 G/100G
1 CREAM TOPICAL DAILY
Status: DISCONTINUED | OUTPATIENT
Start: 2022-01-29 | End: 2022-02-04

## 2022-01-29 RX ORDER — MULTIVITAMIN/IRON/FOLIC ACID 18MG-0.4MG
1 TABLET ORAL DAILY
Status: DISCONTINUED | OUTPATIENT
Start: 2022-01-29 | End: 2022-02-04

## 2022-01-29 RX ORDER — FAMOTIDINE 20 MG/1
40 TABLET, FILM COATED ORAL 2 TIMES DAILY
Status: DISCONTINUED | OUTPATIENT
Start: 2022-01-29 | End: 2022-01-31

## 2022-01-29 RX ADMIN — ALBUTEROL SULFATE 2.5 MG: 2.5 SOLUTION RESPIRATORY (INHALATION) at 20:51

## 2022-01-29 RX ADMIN — PRAVASTATIN SODIUM 80 MG: 80 TABLET ORAL at 16:46

## 2022-01-29 RX ADMIN — Medication 1 APPLICATION: at 09:18

## 2022-01-29 RX ADMIN — CEFEPIME HYDROCHLORIDE 2000 MG: 2 INJECTION, SOLUTION INTRAVENOUS at 09:18

## 2022-01-29 RX ADMIN — Medication 1 TABLET: at 09:17

## 2022-01-29 RX ADMIN — FUROSEMIDE 40 MG: 40 TABLET ORAL at 09:17

## 2022-01-29 RX ADMIN — Medication 1500 MG: at 02:43

## 2022-01-29 RX ADMIN — FAMOTIDINE 40 MG: 20 TABLET, FILM COATED ORAL at 16:46

## 2022-01-29 RX ADMIN — Medication 1500 MG: at 12:59

## 2022-01-29 RX ADMIN — SODIUM CHLORIDE 500 ML: 0.9 INJECTION, SOLUTION INTRAVENOUS at 00:35

## 2022-01-29 RX ADMIN — METRONIDAZOLE 500 MG: 500 TABLET ORAL at 16:45

## 2022-01-29 RX ADMIN — PAROXETINE 20 MG: 20 TABLET, FILM COATED ORAL at 09:18

## 2022-01-29 RX ADMIN — FLUTICASONE FUROATE AND VILANTEROL TRIFENATATE 1 PUFF: 100; 25 POWDER RESPIRATORY (INHALATION) at 09:18

## 2022-01-29 RX ADMIN — CEFEPIME HYDROCHLORIDE 2000 MG: 2 INJECTION, SOLUTION INTRAVENOUS at 02:11

## 2022-01-29 RX ADMIN — AZITHROMYCIN MONOHYDRATE 500 MG: 250 TABLET ORAL at 09:09

## 2022-01-29 RX ADMIN — SODIUM CHLORIDE SOLN NEBU 3% 4 ML: 3 NEBU SOLN at 20:51

## 2022-01-29 RX ADMIN — CEFEPIME HYDROCHLORIDE 2000 MG: 2 INJECTION, SOLUTION INTRAVENOUS at 16:46

## 2022-01-29 RX ADMIN — FUROSEMIDE 40 MG: 10 INJECTION, SOLUTION INTRAVENOUS at 21:08

## 2022-01-29 RX ADMIN — FAMOTIDINE 40 MG: 20 TABLET, FILM COATED ORAL at 09:09

## 2022-01-29 RX ADMIN — FUROSEMIDE 40 MG: 10 INJECTION, SOLUTION INTRAVENOUS at 14:20

## 2022-01-29 NOTE — ASSESSMENT & PLAN NOTE
· Xray chest suggestive of pneumonia  Will check CT chest without  · +SIRS criteria- tachycardia, tachypnea  · Lactate 2 4, will give 500cc bolus  · DRIP score 7, High risk MDRO (recent IV Abx, recent hospitalization, wound care, recent MRSA infection, COPD)  · Start cefepime, vanco, azithromycin per protocol      · Check sputum cx, strep/legionella urine, MRSA swab  · Procalcitionin 0 14, continue to trend

## 2022-01-29 NOTE — RESPIRATORY THERAPY NOTE
RT Protocol Note  Idamae Spatz 68 y o  male MRN: [id number]  Unit/Bed#: ED 04 Encounter: [id number]    Assessment    Principal Problem:    Acute respiratory failure ([place] [address] )  Active Problems:    COPD, moderate ([place] [address] )    Sleep apnea    Community acquired pneumonia    Cellulitis of bilateral lower extremities    SIRS (systemic inflammatory response syndrome) ([place] [address] )      Home Pulmonary Medications:Albuterol MDI, Breo elipta  Home Devices/Therapy: BiPAP/CPAP    Past Medical History:   Diagnosis Date    A-fib ([place] [address] )     Ambulates with cane     Anxiety     Arthritis     At risk for falls     Cellulitis     right leg    Chronic bronchitis (HCC)     RESOLVED: 3/26/15    Chronic obstructive lung disease ([place] [address] )     RESOLVED: 16    COPD (chronic obstructive pulmonary disease) ([place] [address] )     Depression     Edema     RESOLVED: 9/21/15    GERD (gastroesophageal reflux disease)     Hard of hearing     Hyperlipidemia     Hypertension     Irregular heart beat     Afib    Knee pain, bilateral     Leukocytosis     RESOLVED: 9/21/15    LLL pneumonia     RESOLVED: 17    Methicillin resistant Staphylococcus aureus infection     RESOLVED: 9/21/15    Pneumonia     RESOLVED: 17    Skin abnormality     Left pinky toe has a wound due to nail cutting by a provider - being followed -Dr Arabella Quispe aware    Sleep apnea     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     Stroke ([place] [address] )     Venous ulcers of both lower extremities ([place] [address] )    Ryland Veliz as ambulation aid     Weakness of right hand     Wears dentures     upper only    Wears glasses      Social History     Socioeconomic History    Marital status:       Spouse name: None    Number of children: None    Years of education: None    Highest education level: None   Occupational History    None   Tobacco Use    Smoking status: Former Smoker     Quit date: 1/15/1995     Years since quittin 0    Smokeless tobacco: Never Used   Vaping Use    Vaping Use: Never used   Substance and Sexual Activity    Alcohol use: Never    Drug use: No    Sexual activity: Not Currently   Other Topics Concern    None   Social History Narrative    None     Social Determinants of Health     Financial Resource Strain: Not on file   Food Insecurity: Not on file   Transportation Needs: Not on file   Physical Activity: Not on file   Stress: Not on file   Social Connections: Not on file   Intimate Partner Violence: Not on file   Housing Stability: Not on file       Subjective         Objective    Physical Exam:   Assessment Type: During-treatment  General Appearance: Sleeping  Respiratory Pattern: Irregular  Chest Assessment: Chest expansion symmetrical  Bilateral Breath Sounds: Diminished  Cough: None  O2 Device: CPAP    Vitals:  Blood pressure 114/62, pulse (!) 107, temperature 97 5 °F (36 4 °C), temperature source Temporal, resp  rate 22, height 6' (1 829 m), weight 113 kg (250 lb), SpO2 91 %  Imaging and other studies: Patchy bilateral hazy airspace opacities compatible with pneumonia  Bilateral pleural effusions    O2 Device: CPAP     Plan    Respiratory Plan: Home Bronchodilator Patient pathway,Vent/NIV/HFNC        Resp Comments: paged to patient's room by SLIM  Patient was sleeping with noticeable apneas, SpO2 82% on 6L N/C  Patient was woke and interviewed  Patient reported he "slept with a mask"     Placed on CPAP for hours of sleep

## 2022-01-29 NOTE — ASSESSMENT & PLAN NOTE
· Patient lives with daughter, current VNA services   · Per chart review, increased difficulty with ambulation due to chronic leg wounds  · Consult PT/OT/CM

## 2022-01-29 NOTE — PROGRESS NOTES
Vancomycin Assessment    Jey Burns is a 68 y o  male who is currently receiving vancomycin 1500mg every 12 hours for Pneumonia     Relevant clinical data and objective history reviewed:  Creatinine   Date Value Ref Range Status   01/28/2022 1 01 0 60 - 1 30 mg/dL Final     Comment:     Standardized to IDMS reference method   11/22/2021 0 76 0 60 - 1 30 mg/dL Final     Comment:     Standardized to IDMS reference method   11/21/2021 0 76 0 60 - 1 30 mg/dL Final     Comment:     Standardized to IDMS reference method   09/26/2017 0 87 0 70 - 1 18 mg/dL Final     Comment:     Result Comment: For patients >52years of age, the reference limit  for Creatinine is approximately 13% higher for people  identified as -American      05/23/2017 0 94 0 70 - 1 18 mg/dL Final     Comment:     Result Comment: For patients >52years of age, the reference limit  for Creatinine is approximately 13% higher for people  identified as -American      01/18/2017 0 91 0 70 - 1 18 mg/dL Final     Comment:     Result Comment: For patients >52years of age, the reference limit  for Creatinine is approximately 13% higher for people  identified as -American  /77   Pulse (!) 115   Temp 97 5 °F (36 4 °C) (Temporal)   Resp (!) 32   Ht 6' (1 829 m)   Wt 113 kg (250 lb)   SpO2 92%   BMI 33 91 kg/m²   No intake/output data recorded    Lab Results   Component Value Date/Time    BUN 14 01/28/2022 09:22 PM    BUN 22 09/09/2020 10:09 AM    WBC 8 91 01/28/2022 09:22 PM    WBC 9 6 09/26/2017 10:21 AM    HGB 10 7 (L) 01/28/2022 09:22 PM    HGB 15 7 09/26/2017 10:21 AM    HCT 36 4 (L) 01/28/2022 09:22 PM    HCT 47 6 09/26/2017 10:21 AM    MCV 92 01/28/2022 09:22 PM    MCV 97 3 09/26/2017 10:21 AM     (H) 01/28/2022 09:22 PM     09/26/2017 10:21 AM     Temp Readings from Last 3 Encounters:   01/28/22 97 5 °F (36 4 °C) (Temporal)   12/28/21 (!) 97 °F (36 1 °C) (Tympanic)   12/20/21 97 6 °F (36 4 °C) Vancomycin Days of Therapy: 1    Assessment/Plan  The patient is currently on vancomycin utilizing scheduled dosing  Baseline risks associated with therapy include: advanced age  The patient is receiving 1500mg every 12 hours for a goal trough of 15-20 (appropriate for most indications)  Pharmacy will continue to follow closely for s/sx of nephrotoxicity, infusion reactions, and appropriateness of therapy  BMP and CBC will be ordered per protocol  Plan for trough as patient approaches steady state, prior to the 4th  dose at approximately 1230 on 1/30/2022  Pharmacy will continue to follow the patients culture results and clinical progress daily      Candice Díaz, Pharmacist, PharmD, BCPS

## 2022-01-29 NOTE — PLAN OF CARE
Problem: PAIN - ADULT  Goal: Verbalizes/displays adequate comfort level or baseline comfort level  Description: Interventions:  - Encourage patient to monitor pain and request assistance  - Assess pain using appropriate pain scale  - Administer analgesics based on type and severity of pain and evaluate response  - Implement non-pharmacological measures as appropriate and evaluate response  - Consider cultural and social influences on pain and pain management  - Notify physician/advanced practitioner if interventions unsuccessful or patient reports new pain  1/29/2022 1826 by Dev Salcedo RN  Outcome: Progressing     Problem: INFECTION - ADULT  Goal: Absence or prevention of progression during hospitalization  Description: INTERVENTIONS:  - Assess and monitor for signs and symptoms of infection  - Monitor lab/diagnostic results  - Monitor all insertion sites, i e  indwelling lines, tubes, and drains  - Monitor endotracheal if appropriate and nasal secretions for changes in amount and color  - [place] appropriate cooling/warming therapies per order  - Administer medications as ordered  - Instruct and encourage patient and family to use good hand hygiene technique  - Identify and instruct in appropriate isolation precautions for identified infection/condition  1/29/2022 1826 by Dev Salcedo RN  Outcome: Progressing     Problem: DISCHARGE PLANNING  Goal: Discharge to home or other facility with appropriate resources  Description: INTERVENTIONS:  - Identify barriers to discharge w/patient and caregiver  - Arrange for needed discharge resources and transportation as appropriate  - Identify discharge learning needs (meds, wound care, etc )  - Arrange for interpretive services to assist at discharge as needed  - Refer to Case Management Department for coordinating discharge planning if the patient needs post-hospital services based on physician/advanced practitioner order or complex needs related to functional status, cognitive ability, or social support system  1/29/2022 1826 by Moody Patel RN  Outcome: Progressing     Problem: Knowledge Deficit  Goal: Patient/family/caregiver demonstrates understanding of disease process, treatment plan, medications, and discharge instructions  Description: Complete learning assessment and assess knowledge base    Interventions:  - Provide teaching at level of understanding  - Provide teaching via preferred learning methods  1/29/2022 1826 by Moody Patel RN  Outcome: Progressing     Problem: RESPIRATORY - ADULT  Goal: Achieves optimal ventilation and oxygenation  Description: INTERVENTIONS:  - Assess for changes in respiratory status  - Assess for changes in mentation and behavior  - Position to facilitate oxygenation and minimize respiratory effort  - Oxygen administered by appropriate delivery if ordered  - Initiate smoking cessation education as indicated  - Encourage broncho-pulmonary hygiene including cough, deep breathe, Incentive Spirometry  - Assess the need for suctioning and aspirate as needed  - Assess and instruct to report SOB or any respiratory difficulty  - Respiratory Therapy support as indicated  1/29/2022 1826 by Moody Patel RN  Outcome: Progressing     Problem: SKIN/TISSUE INTEGRITY - ADULT  Goal: Incision(s), wounds(s) or drain site(s) healing without S/S of infection  Description: INTERVENTIONS  - Assess and document dressing, incision, wound bed, drain sites and surrounding tissue  - Provide patient and family education  1/29/2022 1826 by Moody Patel RN  Outcome: Progressing     Problem: SKIN/TISSUE INTEGRITY - ADULT  Goal: Pressure injury heals and does not worsen  Description: Interventions:  - Implement low air loss mattress or specialty surface (Criteria met)  - Apply silicone foam dressing  - Apply fecal or urinary incontinence containment device   - Perform passive or active ROM every 4 hours  - Turn and reposition patient & offload bony prominences every 2 hours   - Utilize friction reducing device or surface for transfers   - Consider consults to  interdisciplinary teams such as PT OT wound  - Use incontinent care products after each incontinent episode such as calazime  - Consider nutrition services referral as needed  1/29/2022 1826 by Moody Patel RN  Outcome: Progressing

## 2022-01-29 NOTE — ED PROVIDER NOTES
History  Chief Complaint   Patient presents with   [facility] nurse called doctor and doctor recommended that he be sent to Ed for R leg wound  68year old male presents the emergency department for wound check  Patient presents from home and was sent in by home health nurse for further evaluation  Patient reports he has wounds on both lower extremities  States the right leg has been worse and reports this has been weeping  States leg is painful  Denies any fevers or chills  Patient denies any chest pain, palpitations shortness of breath  Does have history of MRSA  Additionally per report of EMS there is believed this wound is staph infection  Per patient's daughter who was contacted via phone, she reports patient has had increased shortness of breath with ambulation over the past several days  States home health nurse was in on Monday to wrap the patient's wounds  States at that point they looked unremarkable  Reports when she presented today right leg was draining green discharge  Additionally states patient was hypoxic on his typical 2 L and was increased to 4 L  Reports home health nurse called PCP who recommended patient be seen by the emergency department  Patient is not on any antibiotics for leg wounds  COVID + on 11/12/21      History provided by:  Patient  Shortness of Breath  Severity:  Mild  Onset quality:  Gradual  Progression:  Worsening  Chronicity:  New  Relieved by:  None tried  Worsened by: Activity (laying flat)  Ineffective treatments:  Sitting up, diuretics and oxygen  Associated symptoms: no abdominal pain, no chest pain, no claudication, no cough, no diaphoresis, no ear pain, no fever, no headaches, no hemoptysis, no neck pain, no PND, no rash, no sore throat, no sputum production, no syncope, no swollen glands, no vomiting and no wheezing    Associated symptoms comment:  Leg swelling  Wound Check   He was treated in the ED today   There has been no treatment since the wound repair  There has been colored discharge from the wound  The redness has worsened  The swelling has worsened  The pain has worsened  Prior to Admission Medications   Prescriptions Last Dose Informant Patient Reported? Taking? Cholecalciferol (VITAMIN D3 PO)   Yes No   Sig: Take 50 mcg by mouth daily   Cholecalciferol (Vitamin D3) 50 MCG (2000 UT) TABS   Yes No   Sig: TAKE 1 TABLET BY MOUTH ONCE DAILY FOR SUPPLEMENT   Multiple Vitamin (Daily Value Multivitamin) TABS   Yes No   Sig: TAKE 1 TABLET BY MOUTH ONCE DAILY FOR SUPPLEMENT   Multiple Vitamins-Minerals (EQ COMPLETE MULTIVITAMIN-ADULT) TABS  Spouse/Significant Other Yes No   Sig: Take 1 tablet by mouth daily     PARoxetine (PAXIL) 20 mg tablet   Yes No   Sig: Take 20 mg by mouth daily   Wound Dressings (Triad Hydrophilic Wound Dressi) PSTE   Yes No   Sig: Apply 1 application topically 2 (two) times a day Applied to bilateral buttocks topically every day and evening shift for MASD   albuterol (PROAIR HFA) 90 mcg/act inhaler  Spouse/Significant Other No No   Sig: Inhale 2 puffs every 6 (six) hours as needed for wheezing   ammonium lactate (LAC-HYDRIN) 12 % cream   Yes No   Sig: Apply 1 application topically daily Applied to bilateral lower extremity topically every evening shift for dry skin   famotidine (PEPCID) 40 MG tablet   Yes No   Sig: Take 40 mg by mouth 2 (two) times a day   fluticasone-vilanterol (BREO ELLIPTA) 100-25 mcg/inh inhaler   Yes No   Sig: Inhale 1 puff daily Rinse mouth after use     furosemide (LASIX) 40 mg tablet   Yes No   Sig: Take 40 mg by mouth daily   meloxicam (MOBIC) 7 5 mg tablet   Yes No   Sig: meloxicam 7 5 mg tablet   pravastatin (PRAVACHOL) 80 mg tablet   Yes No   Sig: Take 80 mg by mouth daily   triamterene-hydrochlorothiazide (DYAZIDE) 37 5-25 mg per capsule   Yes No   Sig: Take 1 capsule by mouth daily   warfarin (COUMADIN) 4 mg tablet   Yes No   Sig: Take 4 mg by mouth daily      Facility-Administered Medications: None       Past Medical History:   Diagnosis Date    A-fib ([facility] )     Ambulates with cane     Anxiety     Arthritis     At risk for falls     Cellulitis     right leg    Chronic bronchitis (HCC)     RESOLVED: 3/26/15    Chronic obstructive lung disease ([facility] )     RESOLVED: 16    COPD [facility]ch[facility] obstructive pulmonary disease) ([facility])     Depression     Edema     RESOLVED: 9/21/15    GERD (gastroesophageal reflux disease)     Hard of hearing     Hyperlipidemia     Hypertension     Irregular heart beat     Afib    Knee pain, bilateral     Leukocytosis     RESOLVED: 9/21/15    LLL pneumonia     RESOLVED: 17    Methicillin resistant Staphylococcus aureus infection     RESOLVED: 9/21/15    Pneumonia     RESOLVED: 17    Skin abnormality     Left pinky toe has a wound due to nail cutting by a provider - being followed -Dr Jocelyn Jean aware    Sleep apnea     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     Stroke ([facility] )     Venous ulcers of both lower extremities ([facility] )    Rose Abbi as ambulation aid     Weakness of right hand     Wears dentures     upper only    Wears glasses        Past Surgical History:   Procedure Laterality Date    CATARACT EXTRACTION Bilateral     COLONOSCOPY      ESOPHAGOGASTRODUODENOSCOPY      FOOT SURGERY Left     SPLENECTOMY, TOTAL      SURGERY OF THE SPLEEN    TONSILLECTOMY         Family History   Problem Relation Age of Onset    Coronary artery disease Mother     Diabetes type II Father         MELLITUS     I have reviewed and agree with the history as documented      E-Cigarette/Vaping    E-Cigarette Use Never User      E-Cigarette/Vaping Substances    Nicotine No     THC No     CBD No     Flavoring No     Other No     Unknown No      Social History     Tobacco Use    Smoking status: Former Smoker     Quit date: 1/15/1995     Years since quittin 0    Smokeless tobacco: Never Used   Vaping Use    Vaping Use: Never used Substance Use Topics    Alcohol use: Never    Drug use: No       Review of Systems   Constitutional: Negative  Negative for appetite change, chills, diaphoresis, fatigue and fever  HENT: Negative  Negative for ear pain and sore throat  Respiratory: Positive for shortness of breath  Negative for cough, hemoptysis, sputum production, choking, chest tightness, wheezing and stridor  Cardiovascular: Positive for leg swelling  Negative for chest pain, palpitations, claudication, syncope and PND  Gastrointestinal: Negative for abdominal pain and vomiting  Genitourinary: Negative  Musculoskeletal: Negative  Negative for neck pain  Skin: Positive for color change and wound  Negative for rash  Neurological: Negative  Negative for headaches  All other systems reviewed and are negative  Physical Exam  Physical Exam  Vitals and nursing note reviewed  Constitutional:       General: He is not in acute distress  Appearance: Normal appearance  He is obese  He is ill-appearing (Chronically ill-appearing )  He is not toxic-appearing or diaphoretic  HENT:      Head: Normocephalic  Eyes:      Conjunctiva/sclera: Conjunctivae normal    Cardiovascular:      Rate and Rhythm: Normal rate  Rhythm irregularly irregular  Pulmonary:      Effort: Pulmonary effort is normal       Breath sounds: Decreased breath sounds and rales present  Chest:      Chest wall: No tenderness  Abdominal:      General: Abdomen is flat  Bowel sounds are normal  There is no distension  Palpations: Abdomen is soft  Tenderness: There is no abdominal tenderness  There is no right CVA tenderness, left CVA tenderness or guarding  Musculoskeletal:         General: Normal range of motion  Cervical back: Normal range of motion  Right lower leg: Edema present  Left lower leg: Edema present  Skin:     General: Skin is warm and dry  Capillary Refill: Capillary refill takes less than 2 seconds  Comments: Open wound on the posterior aspect of the right lower extremity  Surrounding erythema  Active purulent drainage Please see photo below  Bilateral lower extremities swollen with scaling and erythema   Neurological:      General: No focal deficit present  Mental Status: He is alert and oriented to person, place, and time  Sensory: No sensory deficit  Motor: No weakness  Psychiatric:         Mood and Affect: Mood normal          Behavior: Behavior normal              Vital Signs  ED Triage Vitals [01/28/22 2103]   Temperature Pulse Respirations Blood Pressure SpO2   97 5 °F (36 4 °C) 55 22 104/59 98 %      Temp Source Heart Rate Source Patient Position - Orthostatic VS BP Location FiO2 (%)   Temporal Monitor -- Right arm --      Pain Score       No Pain           Vitals:    01/28/22 2103   BP: 104/59   Pulse: 55         Visual Acuity      ED Medications  Medications   doxycycline (VIBRAMYCIN) 100 mg in sodium chloride 0 9 % 100 mL IVPB (100 mg Intravenous New Bag 1/28/22 2253)   furosemide (LASIX) injection 40 mg (40 mg Intravenous Given 1/28/22 2252)       Diagnostic Studies  Results Reviewed     Procedure Component Value Units Date/Time    COVID/FLU/RSV - 2 hour TAT [[id number]]  (Normal) Collected: 01/28/22 2152    Lab Status: Final result Specimen: Nares from Nose Updated: 01/28/22 2236     SARS-CoV-2 Negative     INFLUENZA A PCR Negative     INFLUENZA B PCR Negative     RSV PCR Negative    Narrative:      FOR PEDIATRIC PATIENTS - copy/paste COVID Guidelines URL to browser: [email or web address]    SARS-CoV-2 assay is a Nucleic Acid Amplification assay intended for the  qualitative detection of nucleic acid from SARS-CoV-2 in nasopharyngeal  swabs  Results are for the presumptive identification of SARS-CoV-2 RNA      Positive results are indicative of infection with SARS-CoV-2, the virus  causing COVID-19, but do not rule out bacterial infection or co-infection  with other viruses  Laboratories within the [place] and its  territories are required to report all positive results to the appropriate  public health authorities  Negative results do not preclude SARS-CoV-2  infection and should not be used as the sole basis for treatment or other  patient management decisions  Negative results must be combined with  clinical observations, patient history, and epidemiological information  This test has not been FDA cleared or approved  This test has been authorized by FDA under an Emergency Use Authorization  (EUA)  This test is only authorized for the duration of time the  declaration that circumstances exist justifying the authorization of the  emergency use of an in vitro diagnostic tests for detection of SARS-CoV-2  virus and/or diagnosis of COVID-19 infection under section 564(b)(1) of  the Act, 21 U  S C  801W[place]-4(Y)(3), unless the authorization is terminated  or revoked sooner  The test has been validated but independent review by FDA  and CLIA is pending  Test performed using [employer] GeneXpert: This RT-PCR assay targets N2,  a region unique to SARS-CoV-2  A conserved region in the E-gene was chosen  for pan-Sarbecovirus detection which includes SARS-CoV-2      HS Troponin I 2hr [[id number]]     Lab Status: No result Specimen: Blood     HS Troponin 0hr (reflex protocol) [[id number]]  (Normal) Resulted: 01/28/22 2210    Lab Status: Final result Specimen: Blood Updated: 01/28/22 2210     hs TnI 0hr 40 ng/L     Comprehensive metabolic panel [[id number]]  (Abnormal) Collected: 01/28/22 2122    Lab Status: Final result Specimen: Blood from Arm, Left Updated: 01/28/22 2208     Sodium 138 mmol/L      Potassium 4 6 mmol/L      Chloride 98 mmol/L      CO2 36 mmol/L      ANION GAP 4 mmol/L      BUN 14 mg/dL      Creatinine 1 01 mg/dL      Glucose 103 mg/dL      Calcium 8 1 mg/dL      Corrected Calcium 9 4 mg/dL      AST 36 U/L      ALT 73 U/L      Alkaline Phosphatase 81 U/L      Total Protein 8 7 g/dL      Albumin 2 4 g/dL      Total Bilirubin 0 43 mg/dL      eGFR 71 ml/min/1 73sq m     Narrative:      [place] guidelines for Chronic Kidney Disease (CKD):     Stage 1 with normal or high GFR (GFR > 90 mL/min/1 73 square meters)    Stage 2 Mild CKD (GFR = 60-89 mL/min/1 73 square meters)    Stage 3A Moderate CKD (GFR = 45-59 mL/min/1 73 square meters)    Stage 3B Moderate CKD (GFR = 30-44 mL/min/1 73 square meters)    Stage 4 Severe CKD (GFR = 15-29 mL/min/1 73 square meters)    Stage 5 End Stage CKD (GFR <15 mL/min/1 73 square meters)  Note: GFR calculation is accurate only with a steady state creatinine    NT-BNP PRO [[id number]]  (Abnormal) Collected: 01/28/22 2122    Lab Status: Final result Specimen: Blood from Arm, Left Updated: 01/28/22 2208     NT-proBNP 4,540 pg/mL     Lactic acid [[id number]]  (Abnormal) Collected: 01/28/22 2122    Lab Status: Final result Specimen: Blood from Arm, Left Updated: 01/28/22 2207     LACTIC ACID 2 4 mmol/L     Narrative:      Result may be elevated if tourniquet was used during collection      Lactic acid 2 Hours [[id number]]     Lab Status: No result Specimen: Blood     Protime-INR [[id number]]  (Abnormal) Collected: 01/28/22 2122    Lab Status: Final result Specimen: Blood from Arm, Left Updated: 01/28/22 2152     Protime 25 7 seconds      INR 2 42    APTT [[id number]]  (Abnormal) Collected: 01/28/22 2122    Lab Status: Final result Specimen: Blood from Arm, Left Updated: 01/28/22 2152     PTT 39 seconds     CBC and differential [[id number]]  (Abnormal) Collected: 01/28/22 2122    Lab Status: Final result Specimen: Blood from Arm, Left Updated: 01/28/22 2133     WBC 8 91 Thousand/uL      RBC 3 97 Million/uL      Hemoglobin 10 7 g/dL      Hematocrit 36 4 %      MCV 92 fL      MCH 27 0 pg      MCHC 29 4 g/dL      RDW 19 3 %      MPV 9 9 fL      Platelets 286 Thousands/uL      nRBC 1 /100 WBCs      Neutrophils Relative 70 %      Immat GRANS % 0 %      Lymphocytes Relative 14 %      Monocytes Relative 13 %      Eosinophils Relative 2 %      Basophils Relative 1 %      Neutrophils Absolute 6 26 Thousands/µL      Immature Grans Absolute 0 03 Thousand/uL      Lymphocytes Absolute 1 23 Thousands/µL      Monocytes Absolute 1 12 Thousand/µL      Eosinophils Absolute 0 17 Thousand/µL      Basophils Absolute 0 10 Thousands/µL     Blood culture #1 [[id number]] Collected: 01/28/22 2116    Lab Status: In process Specimen: Blood from Arm, Right Updated: 01/28/22 2130    Blood culture #2 [[id number]] Collected: 01/28/22 2122    Lab Status: In process Specimen: Blood from Arm, Left Updated: 01/28/22 2130    Wound culture and Gram stain [[id number]] Collected: 01/28/22 2122    Lab Status: In process Specimen: Wound from Leg, Right Updated: 01/28/22 2129                 XR chest 1 view portable   Final Result by Kevin Orosoc MD (01/28 2203)      Patchy bilateral hazy airspace opacities compatible with pneumonia        Bilateral pleural effusions            Workstation performed: [id number]                    Procedures  ECG 12 Lead Documentation Only    Date/Time: 1/28/2022 9:59 PM  Performed by: Sailaja Curran PA-C  Authorized by: Sailaja Curran PA-C     Patient location:  ED  Interpretation:     Interpretation: non-specific    Rate:     ECG rate:  103    ECG rate assessment: tachycardic    Rhythm:     Rhythm: atrial fibrillation    Ectopy:     Ectopy: none    QRS:     QRS axis:  Normal    QRS intervals:  Normal  Conduction:     Conduction: abnormal      Abnormal conduction: incomplete RBBB    ST segments:     ST segments:  Normal  T waves:     T waves: non-specific               ED Course  ED Course as of 01/28/22 2257 Fri Jan 28, 2022 2136 WBC: 8 91   2206 XR: Patchy bilateral hazy airspace opacities compatible with pneumonia      Bilateral pleural effusions   2209 LACTIC ACID(!!): 2 4 2209 NT-proBNP(!): 4,540 2240 SARS-COV-2: Negative   2244 TT sent to Radha Hatch requesting admission                   MDM  Number of Diagnoses or Management Options  Cellulitis: new and requires workup  CHF (congestive heart failure) [facility]): new and requires workup  Pneumonia: new and requires workup  Diagnosis management comments: 68year old male presents to the emergency department for evaluation of shortness of breath and right leg wound check  Vitals and medical record reviewed  Oxygen saturation mid 90s on 3 L nasal cannula, worse to at baseline  On exam lungs decreased with noted rales  Bilateral peripheral edema  Right leg wound  Please see photo above  Heart irregular regular rhythm with normal rate  Known history of AFib on Coumadin  Chest x-ray concerning for pneumonia  Patient with elevated lactic acid and elevated BNP  No leukocytosis  COVID negative  Patient started on antibiotics and Lasix  Discussed with Medicine Service who accepted the patient for admission         Amount and/or Complexity of Data Reviewed  Clinical lab tests: ordered and reviewed  Tests in the radiology section of CPT®: ordered and reviewed  Review and summarize past medical records: yes  Discuss the patient with other providers: yes  Independent visualization of images, tracings, or specimens: yes        Disposition  Final diagnoses:   CHF (congestive heart failure) ([facility] )   Pneumonia   Cellulitis     Time reflects when diagnosis was documented in both MDM as applicable and the Disposition within this note     Time User Action Codes Description Comment    1/28/2022 10:51 PM Lou Alves Add [I50 9] CHF (congestive heart failure) ([facility] )     1/28/2022 10:52 PM Lou Alves Add [J18 9] Pneumonia     1/28/2022 10:52 PM Lou Alves Add [L03 90] Cellulitis       ED Disposition     ED Disposition Condition Date/Time Comment    Admit Stable Fri Jan 28, 2022 10:51 PM Case was discussed with Radha Hatch and the patient's admission status was agreed to be Admission Status: inpatient status to the service of Dr Abena Quezada   Follow-up Information    None         Patient's Medications   Discharge Prescriptions    No medications on file       No discharge procedures on file      PDMP Review     None          ED Provider  Electronically Signed by           Emy Medel PA-C  01/28/22 3102

## 2022-01-29 NOTE — ASSESSMENT & PLAN NOTE
· Wears 2-3 L chronically at baseline  · Does not appear to be in acute exacerbation, will hold off on IV steroids   · Continue Breo, albuterol nebs   · Check vbg

## 2022-01-29 NOTE — ED NOTES
Noe Segura NP notified that patient flagged as a sepsis alert at this time       Mayur Aguayo RN  01/29/22 6174

## 2022-01-29 NOTE — H&P
[address]  H&P- Nolberto Fragosotead 1945, 68 y o  male MRN: [id number]  Unit/Bed#: ED 04 Encounter: [id number]  Primary Care Provider: Paris Bravo DO   Date and time admitted to hospital: 1/28/2022  9:02 PM    * Acute on chronic respiratory failure ([address] )  Assessment & Plan  · Presented with shortness of breath, cough, hypoxia (wears 2-3L NC at baseline), weeping right lower extremity wound  · COVID flu RSV negative on admission  Recent admission in Nov 2021 for COVID-19 pneumonia  ·  XR chest shows "Patchy bilateral hazy airspace opacities compatible with pneumonia  Bilateral pleural effusions "  · Differentials include fluid overload vs  CAP vs COPD exacerbation  Low suspicion for acute PE given patient is anticoagulated on warfarin with therapeutic INR 2 4    · Start broad-spectrum antibiotics to cover for CAP  · Obtain CT chest without   · Currently on 6L NC, wean as able back to baseline 2-3L NC  · Check VBG    Community acquired pneumonia  Assessment & Plan  · Xray chest suggestive of pneumonia  Will check CT chest without  · +SIRS criteria- tachycardia, tachypnea  · Lactate 2 4, will give 500cc bolus  · DRIP score 7, High risk MDRO (recent IV Abx, recent hospitalization, wound care, recent MRSA infection, COPD)  · Start cefepime, vanco, azithromycin per protocol   · Check sputum cx, strep/legionella urine, MRSA swab  · Procalcitionin 0 14, continue to trend    Cellulitis of bilateral lower extremities  Assessment & Plan  · On admission in November 2021 found to have positive wound culture and 1/2 + BC for Staph aureus  Treated with 4 weeks of IV vancomycin through 12/11/2021  · Hx of chronic wounds, chronic venous insufficiency, lymphedema  · +SIRS criteria  · On exam, bilateral lower extremities with edema L>R, weeping tan drainage, multiple open areas, erythema below knees     · Wound culture pending  · Blood cultures pending, continue Cefepime and Vancomycin given recent history   · Low suspicion for DVT, patient anticoagulated with warfarin   · Consider ID consult   · Consult wound RN, has VNA services for wound care at home    SIRS (systemic inflammatory response syndrome) ([facility] )  Assessment & Plan  · As evidenced by tachycardia and tachypnea  Lactate 2 4, now cleared  · Currently afebrile with no leukocytosis  · Procalcitonin 0 14, continue to trend   · Suspected source possible CAP vs cellulitis   · Blood cultures pending continue broad-spectrum antibiotics as above  · Wound culture pending  Pleural effusion  Assessment & Plan  · Noted on chest xray  · BNP 4,540  Patient appears volume overloaded on exam     · In ED, received 40 IV Lasix x 1  Takes 40 Lasix daily as outpatient, will continue  · Will most likely need further IV diuresis   · ECHO in Nov 2021 shows EF 78%, normal systolic function  · Check CT chest without      COPD, moderate (HCC)  Assessment & Plan  · Wears 2-3 L chronically at baseline  · Does not appear to be in acute exacerbation, will hold off on IV steroids   · Continue Breo, albuterol nebs   · Check vbg    Paroxysmal A-fib (HCC)  Assessment & Plan  · EKG shows Afib with   · Currently not on rate control medication   · PRN Lopressor for rate control  · Continue warfarin 4 mg, INR 2 4   · Daily INR     Sleep apnea  Assessment & Plan  · Wears CPAP at HS, continue     Ambulatory dysfunction  Assessment & Plan  · Patient lives with daughter, current VNA services   · Per chart review, increased difficulty with ambulation due to chronic leg wounds  · Consult PT/OT/CM    VTE Pharmacologic Prophylaxis:   Moderate Risk (Score 3-4) - Pharmacological DVT Prophylaxis Ordered: warfarin (Coumadin)  Code Status: Level 1 - Full Code   Discussion with family: Patient declined call to        Anticipated Length of Stay: Patient will be admitted on an inpatient basis with an anticipated length of stay of greater than 2 midnights secondary to iv abx, wounds b/l le-cellulitis  Total Time for Visit, including Counseling / Coordination of Care: 70 minutes Greater than 50% of this total time spent on direct patient counseling and coordination of care  Chief Complaint: SOB, RLE wound     History of Present Illness:  Saray Reeder is a 68 y o  male with a PMH of COPD 2-3 L NC at baseline, ANA LAURA, Afib- coumadin, CVA  who presents with shortness of breath, increased home oxygen requirements, and drainage of right lower extremity wound that started 1-2 days ago per patient  Per chart review family noted on home pulse ox that oxygen saturation in the mid 80s on his home 2-3 L NC  Per reports, family notes cough and generalized weakness  Patient reports pain in his legs, that has worsened over the past 1-2 days  Patient reports he has visiting nurse who helps with dressing changes  Denies fevers, chills, chest pain, abdominal pain, nausea, vomiting, diarrhea  In ED, patient requiring 6L NC  CXR shows bilateral pleural effusions and bilateral patchy infiltrates  Was given 40 IV Lasix x 1  Review of Systems:  Review of Systems   Constitutional: Positive for activity change and chills  Negative for diaphoresis, fatigue and fever  HENT: Negative for congestion and rhinorrhea  Respiratory: Positive for cough and shortness of breath  Cardiovascular: Positive for leg swelling  Negative for chest pain and palpitations  Gastrointestinal: Negative for abdominal distention, constipation, diarrhea, nausea and vomiting  Genitourinary: Negative for difficulty urinating  Musculoskeletal: Negative for arthralgias and back pain  Skin: Positive for color change and wound  Neurological: Negative for dizziness, light-headedness and headaches  All other systems reviewed and are negative        Past Medical and Surgical History:   Past Medical History:   Diagnosis Date    [facility]     Ambulates with cane     Anxiety     Arthritis  At risk for falls     Cellulitis     right leg    Chronic bronchitis (HCC)     RESOLVED: 3/26/15    Chronic obstructive lung disease ([facility] [address] )     RESOLVED: 9/7/16    COPD [facility]chronic obstructive pulmonary disease) ([facility]     Depression     Edema     RESOLVED: 9/21/15    GERD (gastroesophageal reflux disease)     Hard of hearing     Hyperlipidemia     Hypertension     Irregular heart beat     Afib    Knee pain, bilateral     Leukocytosis     RESOLVED: 9/21/15    LLL pneumonia     RESOLVED: 6/20/17    Methicillin resistant Staphylococcus aureus infection     RESOLVED: 9/21/15    Pneumonia     RESOLVED: 6/20/17    Skin abnormality     Left pinky toe has a wound due to nail cutting by a provider - being followed -Dr Gale Rosario aware    Sleep apnea     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 )     Venous ulcers of both lower extremities ([facility] )    Jurline Commander as ambulation aid     Weakness of right hand     Wears dentures     upper only    Wears glasses        Past Surgical History:   Procedure Laterality Date    CATARACT EXTRACTION Bilateral     COLONOSCOPY      ESOPHAGOGASTRODUODENOSCOPY      FOOT SURGERY Left     SPLENECTOMY, TOTAL      SURGERY OF THE SPLEEN    TONSILLECTOMY         Meds/Allergies:  Prior to Admission medications    Medication Sig Start Date End Date Taking?  Authorizing Provider   Elastic Bandages & Supports (A-4 High Compression Mens Hose) MISC Use   Yes Historical Provider, MD   albuterol (PROAIR HFA) 90 mcg/act inhaler Inhale 2 puffs every 6 (six) hours as needed for wheezing 8/26/19   Vernida Gell, DO   ammonium lactate (LAC-HYDRIN) 12 % cream Apply 1 application topically daily Applied to bilateral lower extremity topically every evening shift for dry skin    Historical Provider, MD   Cholecalciferol (VITAMIN D3 PO) Take 50 mcg by mouth daily    Historical Provider, MD   famotidine (PEPCID) 40 MG tablet Take 40 mg by mouth 2 (two) times a day Historical Provider, MD   fluticasone-vilanterol (BREO ELLIPTA) 100-25 mcg/inh inhaler Inhale 1 puff daily Rinse mouth after use  Historical Provider, MD   furosemide (LASIX) 40 mg tablet Take 40 mg by mouth daily    Historical Provider, MD   meloxicam (MOBIC) 7 5 mg tablet meloxicam 7 5 mg tablet    Historical Provider, MD   Multiple Vitamin (Daily Value Multivitamin) TABS TAKE 1 TABLET BY MOUTH ONCE DAILY FOR SUPPLEMENT 21   Historical Provider, MD   PARoxetine (PAXIL) 20 mg tablet Take 20 mg by mouth daily    Historical Provider, MD   pravastatin (PRAVACHOL) 80 mg tablet Take 80 mg by mouth daily    Historical Provider, MD   triamterene-hydrochlorothiazide (DYAZIDE) 37 5-25 mg per capsule Take 1 capsule by mouth daily    Historical Provider, MD   warfarin (COUMADIN) 4 mg tablet Take 4 mg by mouth daily    Historical Provider, MD   Wound Dressings (Triad Hydrophilic Wound Dressi) PSTE Apply 1 application topically 2 (two) times a day Applied to bilateral buttocks topically every day and evening shift for MASD    Historical Provider, MD   Cholecalciferol (Vitamin D3) 50 MCG ( UT) TABS TAKE 1 TABLET BY MOUTH ONCE DAILY FOR SUPPLEMENT 21  Historical Provider, MD   Multiple Vitamins-Minerals (EQ COMPLETE MULTIVITAMIN-ADULT) TABS Take 1 tablet by mouth daily    22  Historical Provider, MD     I have reviewed home medications using recent Epic encounter  Allergies: No Known Allergies    Social History:  Marital Status:     Patient Pre-hospital Living Situation: Home, With other family member: daughter  Patient Pre-hospital Level of Mobility: unable to be assessed at time of evaluation  Patient Pre-hospital Diet Restrictions: none  Substance Use History:   Social History     Substance and Sexual Activity   Alcohol Use Never     Social History     Tobacco Use   Smoking Status Former Smoker    Quit date: 1/15/1995    Years since quittin 0   Smokeless Tobacco Never Used Social History     Substance and Sexual Activity   Drug Use No       Family History:  Family History   Problem Relation Age of Onset    Coronary artery disease Mother     Diabetes type II Father         MELLITUS       Physical Exam:     Vitals:   Blood Pressure: 114/62 (01/29/22 0545)  Pulse: (!) 107 (01/29/22 0545)  Temperature: 97 5 °F (36 4 °C) (01/28/22 2103)  Temp Source: Temporal (01/28/22 2103)  Respirations: 22 (01/29/22 0545)  Height: 6' (182 9 cm) (01/28/22 2103)  Weight - Scale: 113 kg (250 lb) (01/28/22 2103)  SpO2: 91 % (01/29/22 0545)    Physical Exam  Vitals and nursing note reviewed  Constitutional:       General: He is not in acute distress  Appearance: He is obese  He is ill-appearing  HENT:      Head: Normocephalic and atraumatic  Mouth/Throat:      Mouth: Mucous membranes are moist    Eyes:      Extraocular Movements: Extraocular movements intact  Pupils: Pupils are equal, round, and reactive to light  Cardiovascular:      Rate and Rhythm: Tachycardia present  Rhythm irregular  Pulses: Normal pulses  Heart sounds: Normal heart sounds  Pulmonary:      Effort: No respiratory distress  Breath sounds: Rhonchi and rales present  Comments: tachypneic   Abdominal:      General: Bowel sounds are normal  There is no distension  Palpations: Abdomen is soft  Tenderness: There is no abdominal tenderness  Musculoskeletal:      Cervical back: Normal range of motion and neck supple  Right lower leg: Edema present  Left lower leg: Edema present  Comments: L>R LE edema   Skin:     General: Skin is warm  Findings: Erythema and lesion present  Comments: B/L LE erythema, warmth  Multiple open areas, weeping noted  Neurological:      General: No focal deficit present  Mental Status: He is alert         Additional Data:     Lab Results:  Results from last 7 days   Lab Units 01/29/22  0544   WBC Thousand/uL 13 16* HEMOGLOBIN g/dL 10 3*   HEMATOCRIT % 33 6*   PLATELETS Thousands/uL 402*   NEUTROS PCT % 80*   LYMPHS PCT % 6*   MONOS PCT % 12   EOS PCT % 1     Results from last 7 days   Lab Units 01/29/22  0544   SODIUM mmol/L 137   POTASSIUM mmol/L 4 0   CHLORIDE mmol/L 99*   CO2 mmol/L 35*   BUN mg/dL 16   CREATININE mg/dL 0 95   ANION GAP mmol/L 3*   CALCIUM mg/dL 7 6*   ALBUMIN g/dL 2 1*   TOTAL BILIRUBIN mg/dL 0 41   ALK PHOS U/L 73   ALT U/L 60   AST U/L 24   GLUCOSE RANDOM mg/dL 116     Results from last 7 days   Lab Units 01/28/22 2122   INR  2 42*             Results from last 7 days   Lab Units 01/29/22  0544 01/29/22  0033 01/28/22 2122   LACTIC ACID mmol/L  --  1 4 2 4*   PROCALCITONIN ng/ml 0 17  --  0 14       Imaging: Reviewed radiology reports from this admission including: chest xray  XR chest 1 view portable   Final Result by Tima Viera MD (01/28 2203)      Patchy bilateral hazy airspace opacities compatible with pneumonia  Bilateral pleural effusions            Workstation performed: [id number]         CT chest without contrast    (Results Pending)       EKG and Other Studies Reviewed on Admission:   · EKG: Atrial fibrillation  's  ** Please Note: This note has been constructed using a voice recognition system   **

## 2022-01-29 NOTE — ASSESSMENT & PLAN NOTE
· Presented with shortness of breath, cough, hypoxia (wears 2-3L NC at baseline), weeping right lower extremity wound  · COVID flu RSV negative on admission  Recent admission in Nov 2021 for COVID-19 pneumonia  ·  XR chest shows "Patchy bilateral hazy airspace opacities compatible with pneumonia  Bilateral pleural effusions "  · Differentials include fluid overload vs  CAP vs COPD exacerbation   Low suspicion for acute PE given patient is anticoagulated on warfarin with therapeutic INR 2 4    · Start broad-spectrum antibiotics to cover for CAP  · Obtain CT chest without   · Currently on 6L NC, wean as able back to baseline 2-3L NC  · Check VBG

## 2022-01-29 NOTE — ASSESSMENT & PLAN NOTE
· Noted on chest xray  · CT chest shows mod/large right pleural effusion   · BNP 4,540   Patient appears volume overloaded on exam     · Continue IV lasix 40mg q8h which patient seems to be responding well to   · ECHO in Nov 2021 shows EF 80%, normal systolic function, diastolic function not assessed   · Plan for right thora w/diagnostic studies with CC tomorrow, pending repeat CXR in the AM

## 2022-01-29 NOTE — ED NOTES
Pt linens changed and condom cath placed on pt at this time, pt at 50% of breakfast with the help from RN staff, no complaints at this time        Senait Jimenez RN  01/29/22 0527

## 2022-01-29 NOTE — TELEPHONE ENCOUNTER
Called daughter Cristel Guerrier and left message for patient to go to ER earlier this evening given his symptoms of increased oxygen requirements with drops to 64% with ambulation with 3L  Also per home nursing, pt legs look significant worse with green discharge and ulcers when she uncovered legs from bandages  Concern for recurrent/worsening cellulitis plus possible multisystem process with change in oxygen needs  Called Amie again and Amie did receive message  She states patient is on his way to ER  Amie also states she is concerned patient needs increased care in home and unable to provide herself due to needing to work  She does have appt with social work later next week  Will also notify complex care nurse pt's daughter requesting increased needs in home

## 2022-01-29 NOTE — ASSESSMENT & PLAN NOTE
· As evidenced by tachycardia and tachypnea  Lactate 2 4, now cleared  · Currently afebrile with no leukocytosis  · Procalcitonin 0 14, continue to trend   · Suspected source possible CAP vs cellulitis   · Blood cultures pending continue broad-spectrum antibiotics as above  · Wound culture pending

## 2022-01-29 NOTE — ASSESSMENT & PLAN NOTE
· Noted on chest xray  · BNP 4,540  Patient appears volume overloaded on exam     · In ED, received 40 IV Lasix x 1  Takes 40 Lasix daily as outpatient, will continue     · Will most likely need further IV diuresis   · Check CT chest without

## 2022-01-29 NOTE — TELEPHONE ENCOUNTER
Amie called, requesting a call back from provider at best convenience to inform that pt was taken to hospital via ambulance

## 2022-01-29 NOTE — ASSESSMENT & PLAN NOTE
· EKG shows Afib with     · Currently not on rate control medication   · PRN Lopressor for rate control  · Continue warfarin 4 mg, INR 2 4   · Daily INR

## 2022-01-30 ENCOUNTER — APPOINTMENT (INPATIENT)
Dept: CT IMAGING | Facility: HOSPITAL | Age: 77
DRG: 186 | End: 2022-01-30
Payer: MEDICARE

## 2022-01-30 PROBLEM — I87.2 VENOUS STASIS DERMATITIS OF BOTH LOWER EXTREMITIES: Status: ACTIVE | Noted: 2018-11-26

## 2022-01-30 LAB
ALBUMIN SERPL BCP-MCNC: 2.3 G/DL (ref 3.5–5)
ALP SERPL-CCNC: 74 U/L (ref 46–116)
ALT SERPL W P-5'-P-CCNC: 58 U/L (ref 12–78)
ANION GAP SERPL CALCULATED.3IONS-SCNC: 3 MMOL/L (ref 4–13)
ARTERIAL PATENCY WRIST A: YES
AST SERPL W P-5'-P-CCNC: 26 U/L (ref 5–45)
BASE EXCESS BLDA CALC-SCNC: 15.2 MMOL/L
BASOPHILS # BLD AUTO: 0.11 THOUSANDS/ΜL (ref 0–0.1)
BASOPHILS NFR BLD AUTO: 1 % (ref 0–1)
BILIRUB SERPL-MCNC: 0.74 MG/DL (ref 0.2–1)
BUN SERPL-MCNC: 14 MG/DL (ref 5–25)
CALCIUM ALBUM COR SERPL-MCNC: 9.5 MG/DL (ref 8.3–10.1)
CALCIUM SERPL-MCNC: 8.1 MG/DL (ref 8.3–10.1)
CHLORIDE SERPL-SCNC: 97 MMOL/L (ref 100–108)
CO2 SERPL-SCNC: 38 MMOL/L (ref 21–32)
CREAT SERPL-MCNC: 0.95 MG/DL (ref 0.6–1.3)
EOSINOPHIL # BLD AUTO: 0.26 THOUSAND/ΜL (ref 0–0.61)
EOSINOPHIL NFR BLD AUTO: 3 % (ref 0–6)
ERYTHROCYTE [DISTWIDTH] IN BLOOD BY AUTOMATED COUNT: 19.9 % (ref 11.6–15.1)
GFR SERPL CREATININE-BSD FRML MDRD: 77 ML/MIN/1.73SQ M
GLUCOSE SERPL-MCNC: 99 MG/DL (ref 65–140)
HCO3 BLDA-SCNC: 43.3 MMOL/L (ref 22–28)
HCT VFR BLD AUTO: 37.8 % (ref 36.5–49.3)
HGB BLD-MCNC: 10.9 G/DL (ref 12–17)
IMM GRANULOCYTES # BLD AUTO: 0.03 THOUSAND/UL (ref 0–0.2)
IMM GRANULOCYTES NFR BLD AUTO: 0 % (ref 0–2)
INR PPP: 2.14 (ref 0.84–1.19)
IPAP: 12
LDH SERPL-CCNC: 233 U/L (ref 81–234)
LYMPHOCYTES # BLD AUTO: 1.24 THOUSANDS/ΜL (ref 0.6–4.47)
LYMPHOCYTES NFR BLD AUTO: 12 % (ref 14–44)
MCH RBC QN AUTO: 26.6 PG (ref 26.8–34.3)
MCHC RBC AUTO-ENTMCNC: 28.8 G/DL (ref 31.4–37.4)
MCV RBC AUTO: 92 FL (ref 82–98)
MONOCYTES # BLD AUTO: 1.12 THOUSAND/ΜL (ref 0.17–1.22)
MONOCYTES NFR BLD AUTO: 11 % (ref 4–12)
MRSA NOSE QL CULT: ABNORMAL
MRSA NOSE QL CULT: ABNORMAL
NEUTROPHILS # BLD AUTO: 7.24 THOUSANDS/ΜL (ref 1.85–7.62)
NEUTS SEG NFR BLD AUTO: 73 % (ref 43–75)
NON VENT- BIPAP: ABNORMAL
NRBC BLD AUTO-RTO: 0 /100 WBCS
O2 CT BLDA-SCNC: 14.8 ML/DL (ref 16–23)
OXYHGB MFR BLDA: 97.2 % (ref 94–97)
PCO2 BLDA: 76.5 MM HG (ref 36–44)
PEEP MAX SETTING VENT: 6 CM[H2O]
PH BLDA: 7.37 [PH] (ref 7.35–7.45)
PLATELET # BLD AUTO: 415 THOUSANDS/UL (ref 149–390)
PMV BLD AUTO: 9.9 FL (ref 8.9–12.7)
PO2 BLDA: 110.5 MM HG (ref 75–129)
POTASSIUM SERPL-SCNC: 3.5 MMOL/L (ref 3.5–5.3)
PROCALCITONIN SERPL-MCNC: 0.18 NG/ML
PROT SERPL-MCNC: 8.7 G/DL (ref 6.4–8.2)
PROTHROMBIN TIME: 23.4 SECONDS (ref 11.6–14.5)
RBC # BLD AUTO: 4.1 MILLION/UL (ref 3.88–5.62)
SODIUM SERPL-SCNC: 138 MMOL/L (ref 136–145)
SPECIMEN SOURCE: ABNORMAL
VANCOMYCIN TROUGH SERPL-MCNC: 16 UG/ML (ref 10–20)
VENT BIPAP FIO2: 50 %
WBC # BLD AUTO: 10 THOUSAND/UL (ref 4.31–10.16)

## 2022-01-30 PROCEDURE — 94760 N-INVAS EAR/PLS OXIMETRY 1: CPT

## 2022-01-30 PROCEDURE — 94640 AIRWAY INHALATION TREATMENT: CPT

## 2022-01-30 PROCEDURE — 84145 PROCALCITONIN (PCT): CPT

## 2022-01-30 PROCEDURE — 94003 VENT MGMT INPAT SUBQ DAY: CPT

## 2022-01-30 PROCEDURE — 82805 BLOOD GASES W/O2 SATURATION: CPT | Performed by: PHYSICIAN ASSISTANT

## 2022-01-30 PROCEDURE — 80202 ASSAY OF VANCOMYCIN: CPT | Performed by: FAMILY MEDICINE

## 2022-01-30 PROCEDURE — 83615 LACTATE (LD) (LDH) ENZYME: CPT | Performed by: FAMILY MEDICINE

## 2022-01-30 PROCEDURE — 80053 COMPREHEN METABOLIC PANEL: CPT | Performed by: FAMILY MEDICINE

## 2022-01-30 PROCEDURE — 36600 WITHDRAWAL OF ARTERIAL BLOOD: CPT

## 2022-01-30 PROCEDURE — 99232 SBSQ HOSP IP/OBS MODERATE 35: CPT | Performed by: PHYSICIAN ASSISTANT

## 2022-01-30 PROCEDURE — 99223 1ST HOSP IP/OBS HIGH 75: CPT | Performed by: PHYSICIAN ASSISTANT

## 2022-01-30 PROCEDURE — 70450 CT HEAD/BRAIN W/O DYE: CPT

## 2022-01-30 PROCEDURE — 85610 PROTHROMBIN TIME: CPT | Performed by: FAMILY MEDICINE

## 2022-01-30 PROCEDURE — 85025 COMPLETE CBC W/AUTO DIFF WBC: CPT | Performed by: FAMILY MEDICINE

## 2022-01-30 PROCEDURE — G1004 CDSM NDSC: HCPCS

## 2022-01-30 RX ADMIN — CEFEPIME HYDROCHLORIDE 2000 MG: 2 INJECTION, SOLUTION INTRAVENOUS at 18:48

## 2022-01-30 RX ADMIN — FAMOTIDINE 40 MG: 20 TABLET, FILM COATED ORAL at 09:20

## 2022-01-30 RX ADMIN — METRONIDAZOLE 500 MG: 500 TABLET ORAL at 06:14

## 2022-01-30 RX ADMIN — SODIUM CHLORIDE SOLN NEBU 3% 4 ML: 3 NEBU SOLN at 07:47

## 2022-01-30 RX ADMIN — Medication 1500 MG: at 13:35

## 2022-01-30 RX ADMIN — ALBUTEROL SULFATE 2.5 MG: 2.5 SOLUTION RESPIRATORY (INHALATION) at 21:21

## 2022-01-30 RX ADMIN — CEFEPIME HYDROCHLORIDE 2000 MG: 2 INJECTION, SOLUTION INTRAVENOUS at 09:21

## 2022-01-30 RX ADMIN — ALBUTEROL SULFATE 2.5 MG: 2.5 SOLUTION RESPIRATORY (INHALATION) at 03:22

## 2022-01-30 RX ADMIN — ALBUTEROL SULFATE 2.5 MG: 2.5 SOLUTION RESPIRATORY (INHALATION) at 13:55

## 2022-01-30 RX ADMIN — SODIUM CHLORIDE SOLN NEBU 3% 4 ML: 3 NEBU SOLN at 13:55

## 2022-01-30 RX ADMIN — SODIUM CHLORIDE SOLN NEBU 3% 4 ML: 3 NEBU SOLN at 03:22

## 2022-01-30 RX ADMIN — FUROSEMIDE 40 MG: 10 INJECTION, SOLUTION INTRAVENOUS at 13:33

## 2022-01-30 RX ADMIN — FUROSEMIDE 40 MG: 10 INJECTION, SOLUTION INTRAVENOUS at 21:38

## 2022-01-30 RX ADMIN — Medication 1 APPLICATION: at 11:09

## 2022-01-30 RX ADMIN — ALBUTEROL SULFATE 2.5 MG: 2.5 SOLUTION RESPIRATORY (INHALATION) at 07:47

## 2022-01-30 RX ADMIN — Medication 1500 MG: at 01:28

## 2022-01-30 RX ADMIN — FLUTICASONE FUROATE AND VILANTEROL TRIFENATATE 1 PUFF: 100; 25 POWDER RESPIRATORY (INHALATION) at 11:09

## 2022-01-30 RX ADMIN — SODIUM CHLORIDE SOLN NEBU 3% 4 ML: 3 NEBU SOLN at 21:21

## 2022-01-30 RX ADMIN — PAROXETINE 20 MG: 20 TABLET, FILM COATED ORAL at 09:20

## 2022-01-30 RX ADMIN — Medication 1 TABLET: at 09:20

## 2022-01-30 RX ADMIN — METRONIDAZOLE 500 MG: 500 TABLET ORAL at 13:35

## 2022-01-30 RX ADMIN — CEFEPIME HYDROCHLORIDE 2000 MG: 2 INJECTION, SOLUTION INTRAVENOUS at 01:28

## 2022-01-30 NOTE — ASSESSMENT & PLAN NOTE
· Xray chest suggestive of pneumonia     · CT chest shows " Scattered bilateral peripheral consolidative opacities, left basilar peribronchial thickening, and new fluid level within left lower lobe pneumatocyst   These findings may suggest multifocal infection "   · Concern for possible aspiration pneumonia/pneumonitis- check video barium swallow with speech   · Procalcitonin x2 negative   · DRIP score 7, High risk MDRO (recent IV Abx, recent hospitalization, wound care, recent MRSA infection, COPD)  · Currently on Cefepime, Vanco, and Flagyl   · Urine strep and legionella negative   · Check sputum cx

## 2022-01-30 NOTE — PROGRESS NOTES
Vancomycin IV Pharmacy-to-Dose Consultation    Jeannine Adler is a 68 y o  male who is currently receiving Vancomycin IV with management by the Pharmacy Consult service  Assessment/Plan:  The patient was reviewed  Renal function is stable and no signs or symptoms of nephrotoxicity and/or infusion reactions were documented in the chart  Based on todays assessment, continue current vancomycin (day # 2) dosing of 1500 mg iv q 12 hours, with a plan for trough to be drawn at 1230 on 2/4/22  We will continue to follow the patients culture results and clinical progress daily      Vaishali Dc, Pharmacist

## 2022-01-30 NOTE — ASSESSMENT & PLAN NOTE
· On presentation 2/2 tachycardia and tachypnea  · Lactate 2 4 - cleared  · Currently afebrile with no leukocytosis     · Procalcitonin x3 negative   · Unclear if infectious source, may be non-infectious secondary to volume overload a/e/b pleural effusion  and bilateral LE edema   · Blood cultures pending  · Wound culture pending  · Continue on Cefepime, Flagyl, Vanco

## 2022-01-30 NOTE — PLAN OF CARE
Problem: Potential for Falls  Goal: Patient will remain free of falls  Description: INTERVENTIONS:  - Educate patient/family on patient safety including physical limitations  - Instruct patient to call for assistance with activity   - Consult OT/PT to assist with strengthening/mobility   - Keep Call bell within reach  - Keep bed low and locked with side rails adjusted as appropriate  - Keep care items and personal belongings within reach  - Initiate and maintain comfort rounds  - Make Fall Risk Sign visible to staff  - Offer Toileting every  Hours, in advance of need  - Initiate/Maintain alarm  - Obtain necessary fall risk management equipment:   - Apply yellow socks and bracelet for high fall risk patients  - Consider moving patient to room near nurses station  Outcome: Progressing     Problem: MOBILITY - ADULT  Goal: Maintain or return to baseline ADL function  Description: INTERVENTIONS:  -  Assess patient's ability to carry out ADLs; assess patient's baseline for ADL function and identify physical deficits which impact ability to perform ADLs (bathing, care of mouth/teeth, toileting, grooming, dressing, etc )  - Assess/evaluate cause of self-care deficits   - Assess range of motion  - Assess patient's mobility; develop plan if impaired  - Assess patient's need for assistive devices and provide as appropriate  - Encourage maximum independence but intervene and supervise when necessary  - Involve family in performance of ADLs  - Assess for home care needs following discharge   - Consider OT consult to assist with ADL evaluation and planning for discharge  - Provide patient education as appropriate  Outcome: Progressing  Goal: Maintains/Returns to pre admission functional level  Description: INTERVENTIONS:  - Perform BMAT or MOVE assessment daily    - Set and communicate daily mobility goal to care team and patient/family/caregiver     - Collaborate with rehabilitation services on mobility goals if consulted  - Perform Range of Motion  times a day  - Reposition patient every  hours    - Dangle patient  times a day  - Stand patient  times a day  - Ambulate patient  times a day  - Out of bed to chair  times a day   - Out of bed for meals  times a day  - Out of bed for toileting  - Record patient progress and toleration of activity level   Outcome: Progressing     Problem: PAIN - ADULT  Goal: Verbalizes/displays adequate comfort level or baseline comfort level  Description: Interventions:  - Encourage patient to monitor pain and request assistance  - Assess pain using appropriate pain scale  - Administer analgesics based on type and severity of pain and evaluate response  - Implement non-pharmacological measures as appropriate and evaluate response  - Consider cultural and social influences on pain and pain management  - Notify physician/advanced practitioner if interventions unsuccessful or patient reports new pain  Outcome: Progressing     Problem: INFECTION - ADULT  Goal: Absence or prevention of progression during hospitalization  Description: INTERVENTIONS:  - Assess and monitor for signs and symptoms of infection  - Monitor lab/diagnostic results  - Monitor all insertion sites, i e  indwelling lines, tubes, and drains  - Monitor endotracheal if appropriate and nasal secretions for changes in amount and color  - [place] appropriate cooling/warming therapies per order  - Administer medications as ordered  - Instruct and encourage patient and family to use good hand hygiene technique  - Identify and instruct in appropriate isolation precautions for identified infection/condition  Outcome: Progressing     Problem: DISCHARGE PLANNING  Goal: Discharge to home or other facility with appropriate resources  Description: INTERVENTIONS:  - Identify barriers to discharge w/patient and caregiver  - Arrange for needed discharge resources and transportation as appropriate  - Identify discharge learning needs (meds, wound care, etc )  - Arrange for interpretive services to assist at discharge as needed  - Refer to Case Management Department for coordinating discharge planning if the patient needs post-hospital services based on physician/advanced practitioner order or complex needs related to functional status, cognitive ability, or social support system  Outcome: Progressing     Problem: Knowledge Deficit  Goal: Patient/family/caregiver demonstrates understanding of disease process, treatment plan, medications, and discharge instructions  Description: Complete learning assessment and assess knowledge base    Interventions:  - Provide teaching at level of understanding  - Provide teaching via preferred learning methods  Outcome: Progressing     Problem: RESPIRATORY - ADULT  Goal: Achieves optimal ventilation and oxygenation  Description: INTERVENTIONS:  - Assess for changes in respiratory status  - Assess for changes in mentation and behavior  - Position to facilitate oxygenation and minimize respiratory effort  - Oxygen administered by appropriate delivery if ordered  - Initiate smoking cessation education as indicated  - Encourage broncho-pulmonary hygiene including cough, deep breathe, Incentive Spirometry  - Assess the need for suctioning and aspirate as needed  - Assess and instruct to report SOB or any respiratory difficulty  - Respiratory Therapy support as indicated  Outcome: Progressing     Problem: SKIN/TISSUE INTEGRITY - ADULT  Goal: Incision(s), wounds(s) or drain site(s) healing without S/S of infection  Description: INTERVENTIONS  - Assess and document dressing, incision, wound bed, drain sites and surrounding tissue  - Provide patient and family education  - Perform skin care/dressing changes every   Outcome: Progressing  Goal: Pressure injury heals and does not worsen  Description: Interventions:  - Implement low air loss mattress or specialty surface (Criteria met)  - Apply silicone foam dressing  - Instruct/assist with weight shifting every  minutes when in chair   - Limit chair time to  hour intervals  - Use special pressure reducing interventions such as  when in chair   - Apply fecal or urinary incontinence containment device   - Perform passive or active ROM every   - Turn and reposition patient & offload bony prominences every  hours   - Utilize friction reducing device or surface for transfers   - Consider consults to  interdisciplinary teams such as   - Use incontinent care products after each incontinent episode such as   - Consider nutrition services referral as needed  Outcome: Progressing

## 2022-01-30 NOTE — ASSESSMENT & PLAN NOTE
Patient is a 68year old male whom presented on evening of 1/28 with a chief complaint of Shortness of breath with increasing home O2 needs as well as R LE drainage  He has a PMH of COPD wearing 2-3 L at baseline, Afib on Coumadin, ANA LAURA, as well as a hospitalization Nov 11-22, 2021 secondary to COVID Pneumonia  Family reports steady decline ever since having COVID, "He isn't himself and keeps getting worse"    Since admission workup has included labs, CXR consistent with B/L pleural effusions, as well as CT Chest revealing Increasing moderate to large right pleural effusion  Collapse of the right lower lobe attributable to combination of compressive atelectasis from effusion, resorptive atelectasis from mucous plugging, and (possibly) pneumonia  Scattered bilateral peripheral consolidative opacities, left basilar peribronchial thickening, and new fluid level within left lower lobe pneumatocyst   These findings may suggest multifocal infection  Chronic, low level mediastinal or hilar lymphadenopathy appears similar to November  Treatment has included intermittent BiPAP use as well as IV diuresis with potential for thoracentesis  Also receiving Cefepime / Flagyl / Vanco  - Leg wound staph aureus, MRSA pending  Throughout the day on 1/30 patient less responsive as well as noted to be increasingly more Hypoxic  ABG obtained revealing 7 37/76/110  Patient requiring BiPAP more continuously    · Continue Telemetry and Hemodynamic monitoring  · Continual SpO2 monitoring and maintain >88% - wean FiO2 as tolerated  · Hypercarbic Respiratory failure, continue BiPAP with trial off later and reassess mental status  · COVID flu RSV negative on admission  · Recent admission in Nov 2021 for COVID-19 pneumonia  · Low suspicion for acute PE given patient is anticoagulated on warfarin with therapeutic INR 2 4     · Family at bedside, extensive conversation during which daughter advised her father would never want  breathing tube or any machines to keep him alive - Code status will be updated to a Level 3

## 2022-01-30 NOTE — ASSESSMENT & PLAN NOTE
· Noted on CXR, CT Chest reveals moderate/large R Pleural effusion  · BNP 4,540 thought to be volume overloaded on exam    · Currently being diuresed receiving Lasix 40mg q8h  · Considered Thoracentesis; however with hypercarbia with patient being more somnolent will hold off   · ECHO from Nov 2021 revealed EF 63%, normal systolic function, diastolic function not assessed

## 2022-01-30 NOTE — ASSESSMENT & PLAN NOTE
· Xray chest and CT scan suggestive of multifocal pneumonia    · Urine Strep / Legionella Negative; MRSA nares Positive  · Leg Culture positive Staph - monitor closely  · Sputum culture ordered; Procalcitonin x2 negative   · DRIP score 7, High risk MDRO (recent IV Abx, recent hospitalization, wound care, recent MRSA  infection, COPD  · 1/31 Procalcitonin remains negative, Cefepime, Vanco, and Flagyl discontinued 1/31, monitor temps as well as WBC count off antibiotics  · ID consulted and are in agreement with monitoring off abx for now  ?  Dysphagia 1 diet 02/03      Transitioned to comfort care while under Critical care service on 2/4

## 2022-01-30 NOTE — ASSESSMENT & PLAN NOTE
· Wears 2-3 L chronically at baseline  · Does not appear to be in acute exacerbation, will hold off on IV steroids   · Continue Breo, albuterol nebs

## 2022-01-30 NOTE — ASSESSMENT & PLAN NOTE
Patient is a 68year old male whom presented on evening of 1/28 with a chief complaint of Shortness of breath with increasing home O2 needs as well as R LE drainage  He has a PMH of COPD wearing 2-3 L at baseline, Afib on Coumadin, ANA LAURA, as well as a hospitalization Nov 11-22, 2021 secondary to COVID Pneumonia  Family reports steady decline ever since having COVID, "He isn't himself and keeps getting worse"     Since admission workup has included labs, CXR consistent with B/L pleural effusions, as well as CT Chest revealing Increasing moderate to large right pleural effusion  Collapse of the right lower lobe attributable to combination of compressive atelectasis from effusion, resorptive atelectasis from mucous plugging, and (possibly) pneumonia  Scattered bilateral peripheral consolidative opacities, left basilar peribronchial thickening, and new fluid level within left lower lobe pneumatocyst   These findings may suggest multifocal infection  Chronic, low level mediastinal or hilar lymphadenopathy appears similar to November       Treatment has included intermittent BiPAP use as well as IV diuresis with potential for thoracentesis  Also receiving Cefepime / Flagyl / Vanco  - Leg wound staph aureus, MRSA pending  Throughout the day on 1/30 patient less responsive as well as noted to be increasingly more Hypoxic  ABG obtained revealing 7 37/76/110  Patient requiring BiPAP more continuously     · trialed off BiPAP 1/31 am with rapid decompensation / hypoxia - Back on BiPAP Rate 20  16/6  · Low suspicion for acute PE given patient is anticoagulated on warfarin with therapeutic INR 2 4    · Given no improvement in respiratory status, further discussion with family ultimately led to decision of family to transition patient to comfort care and all treatments discontinued on 02/04   Patient is currently actively dying at this time

## 2022-01-30 NOTE — RESPIRATORY THERAPY NOTE
Resp Care     Called to Pt room due to desaturation event  Pt was being rolled and cleaned up by PCA/RN in which pt desaturated and needed increased O2 to 12 L  At this time Pt placed on 15 L MF with no change Pt sitting in high fowlers  Placed back on bipap due to SpO2 64%  MANNY Cohen made aware

## 2022-01-30 NOTE — ASSESSMENT & PLAN NOTE
· As evidenced by tachycardia and tachypnea  Lactate 2 4, now cleared  · Currently afebrile with no leukocytosis  · Procalcitonin x3 negative   · Unclear if infectious source, may be non-infectious secondary to volume overload a/e/b pleural effusion and bilateral LE edema   · Blood cultures pending  · Wound culture pending     · Continue broad-spectrum antibiotics as above

## 2022-01-30 NOTE — CASE MANAGEMENT
Case Management Assessment & Discharge Planning Note    Patient name Nolberto Villagran  Location /-12 MRN [id number]  : 1945 Date 2022       Current Admission Date: 2022  Current Admission Diagnosis:Acute on chronic respiratory failure [facility])   Patient Active Problem List    Diagnosis Date Noted    SIRS (systemic inflammatory response syndrome) (HCC)     Pleural effusion     Thrombocytosis 2021    Hypoalbuminemia 2021    Dermatitis associated with moisture 2021    Non-pressure chronic ulcer left lower leg, limited to breakdown skin ([address] ) 2021    Non-pressure chronic ulcer right ankle, limited to breakdown skin ([address] ) 2021    Ambulatory dysfunction 2021    Staphylococcus aureus bacteremia 2021    Pneumonia due to COVID-19 virus 2021    Permanent atrial fibrillation ([address] ) 2021    Elevated troponin level not due myocardial infarction 2021    Sepsis due to COVID-19 [facility]) 2021    Chronic respiratory failure with hypoxia ([address] ) 2020    Decubitus ulcer of left buttock, unstageable ([address] ) 2019    Bilateral carotid artery disease ([address] ) 2019    Diabetes mellitus without complication ([address] )     Arthritis of right knee 2019    Venous stasis dermatitis of both lower extremities 2018    Aspiration pneumonia of left lower lobe ([address] ) 10/04/2018    Pressure injury of skin of sacral region 10/04/2018    Cataract of both eyes 2018    Depression with anxiety 2018    Aspiration into respiratory tract 2018    Age-related cataract of both eyes 2018    Other dysphagia 2018    Contusion of right hand 2018    Multifocal pneumonia 2018    Acute on chronic respiratory failure ([address] ) 2017    Elevated PSA 10/28/2017    Localized osteoarthritis of right knee 2017    COPD, moderate ([address] ) 2015    Gait disturbance 2015  Hypoxia 01/07/2015    Sleep apnea 08/07/2014    Venous insufficiency 08/07/2014    Paroxysmal A-fib ([facility] ) 06/12/2014    Coronary artery disease involving native heart with angina pectoris ([facility] ) 03/15/2014    Lymphedema 03/15/2014    Arthritis 07/03/2012    Benign essential hypertension 07/03/2012    Esophagitis, reflux 07/03/2012    Mixed hyperlipidemia 07/03/2012      LOS (days): 2  Geometric Mean LOS (GMLOS) (days): 4 10  Days to GMLOS:2 4     OBJECTIVE:    Risk of Unplanned Readmission Score: 15         Current admission status: Inpatient  Referral Reason:  (discharge planning home health vs STR)    Preferred Pharmacy:   [id number] [place], [address] [facility] [address]  Phone: [phone] Fax: [phone]    [address] [facility], [address], [address] [facility] , [address]  [address] [facility] , [address] [address]  Phone: [phone] Fax: [phone] [address],   [address], [address]  [address] [place], [address] [address]  Phone: [phone] Fax: [phone] [facility]PIRATORY [place]  [address] [facility]  [address]  [place]  Phone: [phone] Fax: [phone]    Primary Care Provider: Paris Bravo DO    Primary Insurance: MEDICARE  Secondary Insurance:     ASSESSMENT:      CM met with patient/ daughter and son in law  at the bedside,baseline information  was obtained  CM discussed the role of CM in helping the patient develop a discharge plan and assist the patient in carry out their plan  Daughter stated patient is vaccinated moderna x2 for covid    Daughter requesting [facility] on discharge he was to get one delivered post discharge [facility] [place] and it didn't arrive   Also family had a  [facility] and do not want them back  Patient being transferred to ICU this afternoon due to increased bipap settings     Negative covid test 1/28/22   CM Will  Look at options at later date  Patient lives with daughter roberto / first floor living  2 steps into home with rail     Cm to follow care for needs    Active Health Care Agents    There are no active Health Care Agents on file  Advance Directives  Does patient have a [address]?: Yes (daughter   Rachelle Wright)  Does patient have Advance Directives?: Yes  Advance Directives: Living will  Primary Contact: Roberto Jackson              Patient Information  Admitted from[de-identified] Home  Mental Status: Alert  During Assessment patient was accompanied by: Daughter,Other-Comment (son in law)  Assessment information provided by[de-identified] Daughter  Primary Caregiver: Child  Caregiver's Name[de-identified] Patti Wallace Relationship to Patient[de-identified] Family Member  Caregiver's Telephone Number[de-identified] [phone]  Support Systems: Daughter  [place] of Residence: [address] [facility] do you live in?: [address] entry access options   Select all that apply : Stairs  Number of steps to enter home : 2  Do the steps have railings?: Yes  Type of Current Residence: 2 story home  Upon entering residence, is there a bedroom on the main floor (no further steps)?: Yes  Upon entering residence, is there a bathroom on the main floor (no further steps)?: Yes  In the last 12 months, was there a time when you were not able to pay the mortgage or rent on time?: No  In the last 12 months, how many places have you lived?: 1  In the last 12 months, was there a time when you did not have a steady place to sleep or slept in a shelter (including now)?: No  Homeless/housing insecurity resource given?: N/A  Living Arrangements: Lives w/ Daughter  Is patient a ?: No    Activities of Daily Living Prior to Admission  Functional Status: Assistance  Completes ADLs independently?: No  Level of ADL dependence: Assistance  Ambulates independently?: No  Level of ambulatory dependence: Assistance  Does patient use assisted devices?: Yes  Assisted Devices (DME) used: [employer]  Does patient currently own DME?: Yes (daughter stated they were waiting for [facility]  What DME does the patient currently own?: [employer]  Does patient have a history of Outpatient Therapy (PT/OT)?: No  Does patient have a history of HHC?: Yes (daughter stated home care from [facility] she does not want them back again, will check foc closer to discharge hha vs snf pending pt and ot evals)  Does patient currently have [facility]?: No         Patient Information Continued  Income Source: Pension/halfway  Does patient have prescription coverage?: Yes  Within the past 12 months, you worried that your food would run out before you got the money to buy more : Never true  Within the past 12 months, the food you bought just didnt last and you didnt have money to get more : Never true  Food insecurity resource given?: N/A  Does patient receive dialysis treatments?: No  Does patient have a history of substance abuse?: No  Does patient have a history of Mental Health Diagnosis?: No         Means of Transportation  In the past 12 months, has lack of transportation kept you from medical appointments or from getting medications?: No  In the past 12 months, has lack of transportation kept you from meetings, work, or from getting things needed for daily living?: No  Was application for public transport provided?: N/A        DISCHARGE DETAILS:    Discharge planning discussed with[de-identified] patient daughter roberto and son in law  Freedom of Choice: Yes  Comments - Freedom of Choice: roberto and son in law aware of freedom of choice they can decide on home helath vs SNF closer to discharge patient is being transported to ICU this afternoon due to needing higher bipap setting to maintain o2 settings                       Requested [address]         Is the patient interested in [facility] at discharge?: Yes    DME Referral Provided  Referral made for DME?: No (family stated they need a BSC on discharge)

## 2022-01-30 NOTE — NURSING NOTE
PCA Matty Villalobos) was helping other PCA Burtis Duverney and Jerry Mcdaniel change Pt  When rolling Pt we noticed two sores on Pt bottom the right and left buttock, the sores were both open and bloody  I notified KAMRON Calderón about the sores and if it was noted or pictures taken because no leave on were on Pt  RN stated she seen them but not sure if pictures were taken or noted  PCA put leave on, on the Pt and called Archana Pastrana to let her know about Pt      PCA Matty Villalobos)

## 2022-01-30 NOTE — ASSESSMENT & PLAN NOTE
· Continue Telemetry monitoring  · EKG revealed Afib with     · Currently not on rate control medication   · PRN Lopressor for rate control  · Continue warfarin 4 mg

## 2022-01-30 NOTE — PLAN OF CARE
Patients sats dropped and remained in the 60's-70's after a turn and stayed there on 10L midflow  Pt  Put back on BIPAP and sats ana maria to 90%  Pt  Transferred to ICU for further care  Dtr  Here and updated

## 2022-01-30 NOTE — ASSESSMENT & PLAN NOTE
· On admission in November 2021 found to have positive wound culture and 1/2 + BC for Staph aureus  Treated with 4 weeks of IV vancomycin through 12/11/2021  · Hx of chronic wounds, chronic venous insufficiency, lymphedema     · On exam, bilateral lower extremities with edema L>R, weeping tan drainage, multiple open areas  · Unclear if this is in fact active cellulitis, there is a draining posterior right leg wound  · Wound culture pending  · Blood cultures pending, continue Cefepime and Vancomycin given recent history   · Low suspicion for DVT, patient anticoagulated with warfarin   · Consider ID consult   · Consult wound RN, has VNA services for wound care at home

## 2022-01-30 NOTE — CONSULTS
[address] 1945, 68 y o  male MRN: [id number]  Unit/Bed#: -01 Encounter: [id number]  Primary Care Provider: Maggie Le DO   Date and time admitted to hospital: 1/28/2022  9:02 PM    Consults    * Acute on chronic respiratory failure [facility])  Assessment & Plan  Patient is a 68year old male whom presented on evening of 1/28 with a chief complaint of Shortness of breath with increasing home O2 needs as well as R LE drainage  He has a PMH of COPD wearing 2-3 L at baseline, Afib on Coumadin, ANA LAURA, as well as a hospitalization Nov 11-22, 2021 secondary to COVID Pneumonia  Family reports steady decline ever since having COVID, "He isn't himself and keeps getting worse"    Since admission workup has included labs, CXR consistent with B/L pleural effusions, as well as CT Chest revealing Increasing moderate to large right pleural effusion  Collapse of the right lower lobe attributable to combination of compressive atelectasis from effusion, resorptive atelectasis from mucous plugging, and (possibly) pneumonia  Scattered bilateral peripheral consolidative opacities, left basilar peribronchial thickening, and new fluid level within left lower lobe pneumatocyst   These findings may suggest multifocal infection  Chronic, low level mediastinal or hilar lymphadenopathy appears similar to November  Treatment has included intermittent BiPAP use as well as IV diuresis with potential for thoracentesis  Also receiving Cefepime / Flagyl / Vanco  - Leg wound staph aureus, MRSA pending  Throughout the day on 1/30 patient less responsive as well as noted to be increasingly more Hypoxic  ABG obtained revealing 7 37/76/110    Patient requiring BiPAP more continuously    · Continue Telemetry and Hemodynamic monitoring  · Continual SpO2 monitoring and maintain >88% - wean FiO2 as tolerated  · Hypercarbic Respiratory failure, continue BiPAP with trial off later and reassess mental status  · COVID flu RSV negative on admission  · Recent admission in Nov 2021 for COVID-19 pneumonia  · Low suspicion for acute PE given patient is anticoagulated on warfarin with therapeutic INR 2 4  · Family at bedside, extensive conversation during which daughter advised her father would never want  breathing tube or any machines to keep him alive - Code status will be updated to a Level 3    Multifocal pneumonia  Assessment & Plan  · Xray chest suggestive of pneumonia  · CT chest shows " Scattered bilateral peripheral consolidative opacities, left basilar peribronchial  thickening, and new fluid level within left lower lobe pneumatocyst   These findings may suggest  multifocal infection "   · Urine Strep / Legionella Negative  · MRSA Pending  · Sputum culture ordered  · Procalcitonin x2 negative   · DRIP score 7, High risk MDRO (recent IV Abx, recent hospitalization, wound care, recent MRSA  infection, COPD  · Continue Cefepime, Vanco, and Flagyl   · Consider de-escalation of antibiotic therapy pending final culture data while keeping in mind lower  drainage / staph       Pleural effusion  Assessment & Plan  · Noted on CXR, CT Chest reveals moderate/large R Pleural effusion  · BNP 4,540 thought to be volume overloaded on exam    · Currently being diuresed receiving Lasix 40mg q8h  · Considered Thoracentesis; however with hypercarbia with patient being more somnolent will hold off   · ECHO from Nov 2021 revealed EF 92%, normal systolic function, diastolic function not assessed         SIRS (systemic inflammatory response syndrome) ([facility] [address] )  Assessment & Plan  · On presentation 2/2 tachycardia and tachypnea  · Lactate 2 4 - cleared  · Currently afebrile with no leukocytosis     · Procalcitonin x3 negative   · Unclear if infectious source, may be non-infectious secondary to volume overload a/e/b pleural effusion  and bilateral LE edema   · Blood cultures pending  · Wound culture pending  · Continue on Cefepime, Flagyl, Vanco      Paroxysmal A-fib (HCC)  Assessment & Plan  · Continue Telemetry monitoring  · EKG revealed Afib with   · Currently not on rate control medication   · PRN Lopressor for rate control  · Continue warfarin 4 mg      COPD, moderate (HCC)  Assessment & Plan  · Wears 2-3 L chronically at baseline  · Does not appear to be in acute exacerbation, will hold off on IV steroids   · Continue Breo, albuterol nebs       Ambulatory dysfunction  Assessment & Plan  · Per chart review, increased difficulty with ambulation due to chronic leg wounds  · PT/OT      Sleep apnea  Assessment & Plan  · Wears CPAP at HS, continue       Venous stasis dermatitis of both lower extremities  Assessment & Plan  · On admission in November 2021 found to have positive wound culture and 1/2 + BC for Staph aureus  Treated with 4 weeks of IV vancomycin through 12/11/2021  · Hx of chronic wounds, chronic venous insufficiency, lymphedema  · On exam, bilateral lower extremities with edema L>R, weeping tan drainage, multiple open areas  · Unclear if this is in fact active cellulitis, there is a draining posterior right leg wound  · Wound culture pending  · Blood cultures pending, continue Cefepime and Vancomycin given recent history   · Low suspicion for DVT, patient anticoagulated with warfarin   · Consider ID consult   · Consult wound RN, has VNA services for wound care at home        -------------------------------------------------------------------------------------------------------------  Chief Complaint:   None offered currently - continual BiPAP - somnolent     History of Present Illness   HX and PE limited by: BiPAP / somnolent  Tamar Valdez is a 68 y o  male who present Patient is a 68year old male whom presented on evening of 1/28 with a chief complaint of Shortness of breath with increasing home O2 needs as well as R LE drainage   He has a PMH of COPD wearing 2-3 L at baseline, Afib on Coumadin, ANA LAURA, as well as a hospitalization Nov 11-22, 2021 secondary to COVID Pneumonia  Family reports steady decline ever since having COVID, "He isn't himself and keeps getting worse"    Since admission workup has included labs, CXR consistent with B/L pleural effusions, as well as CT Chest revealing Increasing moderate to large right pleural effusion  Collapse of the right lower lobe attributable to combination of compressive atelectasis from effusion, resorptive atelectasis from mucous plugging, and (possibly) pneumonia  Scattered bilateral peripheral consolidative opacities, left basilar peribronchial thickening, and new fluid level within left lower lobe pneumatocyst   These findings may suggest multifocal infection  Chronic, low level mediastinal or hilar lymphadenopathy appears similar to November  Treatment has included intermittent BiPAP use as well as IV diuresis with potential for thoracentesis  Also receiving Cefepime / Flagyl / Vanco  - Leg wound staph aureus, MRSA pending  Throughout the day on 1/30 patient less responsive as well as noted to be increasingly more Hypoxic  ABG obtained revealing 7 37/76/110  Patient requiring BiPAP more continuously prompting Critical Care consult  History obtained from chart review, the patient and family (Daughter and Son-in-law) at bedside  -------------------------------------------------------------------------------------------------------------  Dispo: Transfer to Stepdown Level 1     Code Status: Level 3 - DNAR and DNI  --------------------------------------------------------------------------------------------------------------  Review of Systems   Unable to perform ROS: Mental status change     Physical Exam     General:  69 y/o M in room 333, in bed, somnolent, on BiPAP, he did arouse to painful stimuli and would squeeze hands, however moan and not open his eyes  HEENT: Normocephalic, atraumatic   PERR, Sclera anicteric, conjunctiva pink, oropharynx dry  Neck: supple  Heart: Apically tachycardic, no murmur, or rub appreciated  Lungs: decreased breath sounds bilaterally with crackles noted about the bilateral bases  Abd: soft, no grimace to palpation  Skin: warm and dry  Ext: bilateral lower extremity edema with evidence of chronic wounds that are weeping  --------------------------------------------------------------------------------------------------------------  Vitals:   Vitals:    01/30/22 0500 01/30/22 0614 01/30/22 0741 01/30/22 1512   BP:  99/65 109/63 109/66   BP Location:       Pulse:  102 87 105   Resp:   16 15   Temp:   97 9 °F (36 6 °C) 98 2 °F (36 8 °C)   TempSrc:       SpO2:  92% 92% (!) 85%   Weight: (!) 137 kg (302 lb 0 5 oz)      Height:         Temp  Min: 97 5 °F (36 4 °C)  Max: 98 2 °F (36 8 °C)  IBW (Ideal Body Weight): 77 6 kg  Height: 6' (182 9 cm)  Body mass index is 40 96 kg/m²      Laboratory and Diagnostics:  Results from last 7 days   Lab Units 01/30/22  0500 01/29/22  0544 01/28/22 2122   WBC Thousand/uL 10 00 13 16* 8 91   HEMOGLOBIN g/dL 10 9* 10 3* 10 7*   HEMATOCRIT % 37 8 33 6* 36 4*   PLATELETS Thousands/uL 415* 402* 434*   NEUTROS PCT % 73 80* 70   MONOS PCT % 11 12 13*     Results from last 7 days   Lab Units 01/30/22  0500 01/29/22  0544 01/28/22 2122   SODIUM mmol/L 138 137 138   POTASSIUM mmol/L 3 5 4 0 4 6   CHLORIDE mmol/L 97* 99* 98*   CO2 mmol/L 38* 35* 36*   ANION GAP mmol/L 3* 3* 4   BUN mg/dL 14 16 14   CREATININE mg/dL 0 95 0 95 1 01   CALCIUM mg/dL 8 1* 7 6* 8 1*   GLUCOSE RANDOM mg/dL 99 116 103   ALT U/L 58 60 73   AST U/L 26 24 36   ALK PHOS U/L 74 73 81   ALBUMIN g/dL 2 3* 2 1* 2 4*   TOTAL BILIRUBIN mg/dL 0 74 0 41 0 43          Results from last 7 days   Lab Units 01/30/22  0500 01/28/22 2122 01/24/22  0000   INR  2 14* 2 42* 5 20*   PTT seconds  --  39*  --           Results from last 7 days   Lab Units 01/29/22  0033 01/28/22 2122   LACTIC ACID mmol/L 1 4 2 4*     ABG:  Results from last 7 days   Lab Units 01/30/22  1120   PH ART  7 371   PCO2 ART mm Hg 76 5*   PO2 ART mm Hg 110 5   HCO3 ART mmol/L 43 3*   BASE EXC ART mmol/L 15 2   ABG SOURCE  Radial, Left     VBG:  Results from last 7 days   Lab Units 01/30/22  1120 01/29/22  0208   PH KEN   --  7 384   PCO2 KEN mm Hg  --  65 2*   PO2 KEN mm Hg  --  53 5*   HCO3 KEN mmol/L  --  38 1*   BASE EXC KEN mmol/L  --  10 7   ABG SOURCE  Radial, Left  --      Results from last 7 days   Lab Units 01/30/22  0500 01/29/22  0544 01/28/22 2122   PROCALCITONIN ng/ml 0 18 0 17 0 14       Micro:  Results from last 7 days   Lab Units 01/29/22  1547 01/28/22 2122 01/28/22 2116   BLOOD CULTURE   --  No Growth at 24 hrs  No Growth at 24 hrs  GRAM STAIN RESULT   --  No Polys*  1+ Gram positive cocci in pairs*  --    WOUND CULTURE   --  4+ Growth of Oxidase Positive gram negative ankita*  4+ Growth of Staphylococcus aureus*  --    LEGIONELLA URINARY ANTIGEN  Negative  --   --    STREP PNEUMONIAE ANTIGEN, URINE  Negative  --   --        EKG: Telemetry monitor reviewed noting NSR to Sinus tachycardia without ectopy  Imaging: I have personally reviewed pertinent reports     and I have personally reviewed pertinent films in PACS      Historical Information   Past Medical History:   Diagnosis Date    A-fib [facility])     Ambulates with cane     Anxiety     Arthritis     At risk for falls     Cellulitis     right leg    Chronic bronchitis ([facility] [address] )     RESOLVED: 3/26/15    Chronic obstructive lung disease ([address] )     RESOLVED: 9/7/16    COPD (chronic obstructive pulmonary disease) ([facility])     Depression     Edema     RESOLVED: 9/21/15    GERD (gastroesophageal reflux disease)     Hard of hearing     Hyperlipidemia     Hypertension     Irregular heart beat     Afib    Knee pain, bilateral     Leukocytosis     RESOLVED: 9/21/15    LLL pneumonia     RESOLVED: 6/20/17    Methicillin resistant Staphylococcus aureus infection     RESOLVED: 9/21/15    Pneumonia     RESOLVED: 6/20/17    Skin abnormality     Left pinky toe has a wound due to nail cutting by a provider - being followed -Dr Zorita Claude aware    Sleep apnea     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     Stroke ([facility] [address] )     Venous ulcers of both lower extremities ([facility] [address] )    Dock Bullion as ambulation aid     Weakness of right hand     Wears dentures     upper only    Wears glasses      Past Surgical History:   Procedure Laterality Date    CATARACT EXTRACTION Bilateral     COLONOSCOPY      ESOPHAGOGASTRODUODENOSCOPY      FOOT SURGERY Left     SPLENECTOMY, TOTAL      SURGERY OF THE SPLEEN    TONSILLECTOMY       Social History   Social History     Substance and Sexual Activity   Alcohol Use Never     Social History     Substance and Sexual Activity   Drug Use No     Social History     Tobacco Use   Smoking Status Former Smoker    Quit date: 1/15/1995    Years since quittin 0   Smokeless Tobacco Never Used     Exercise History: None  Family History:   Family History   Problem Relation Age of Onset    Coronary artery disease Mother     Diabetes type II Father         MELLITUS     I have reviewed this patient's family history and commented on sigificant items within the HPI and Discussed with family at bedside      Medications:  Current Facility-Administered Medications   Medication Dose Route Frequency    acetaminophen (TYLENOL) tablet 650 mg  650 mg Oral Q6H PRN    albuterol inhalation solution 2 5 mg  2 5 mg Nebulization Q6H PRN    ammonium lactate (LAC-HYDRIN) 12 % cream 1 application  1 application Topical Daily    benzonatate (TESSALON PERLES) capsule 100 mg  100 mg Oral TID PRN    cefepime (MAXIPIME) IVPB (premix in dextrose) 2,000 mg 50 mL  2,000 mg Intravenous Q8H    And    vancomycin (VANCOCIN) 1500 mg in sodium chloride 0 9% 250 mL IVPB  17 5 mg/kg (Adjusted) Intravenous Q12H    famotidine (PEPCID) tablet 40 mg  40 mg Oral BID    fluticasone-vilanterol (BREO ELLIPTA) 100-25 mcg/inh inhaler 1 puff  1 puff Inhalation Daily    [MAR Hold] furosemide (LASIX) injection 40 mg  40 mg Intravenous Q8H    [MAR Hold] metoprolol (LOPRESSOR) injection 2 5 mg  2 5 mg Intravenous Q6H PRN    [MAR Hold] metroNIDAZOLE (FLAGYL) tablet 500 mg  500 mg Oral Q8H [address]    multivitamin-minerals (CENTRUM ADULTS) tablet 1 tablet  1 tablet Oral Daily    PARoxetine (PAXIL) tablet 20 mg  20 mg Oral Daily    pravastatin (PRAVACHOL) tablet 80 mg  80 mg Oral After Dinner    [MAR Hold] sodium chloride 3 % inhalation solution 4 mL  4 mL Nebulization Q6H    [MAR Hold] warfarin (COUMADIN) tablet 4 mg  4 mg Oral Daily (warfarin)     Home medications:  Prior to Admission Medications   Prescriptions Last Dose Informant Patient Reported? Taking?    Cholecalciferol (VITAMIN D3 PO) Unknown at Unknown time  Yes No   Sig: Take 50 mcg by mouth daily   Elastic Bandages & Supports (A-4 High Compression Mens Hose) MISC   Yes Yes   Sig: Use   Multiple Vitamin (Daily Value Multivitamin) TABS Unknown at Unknown time  Yes No   Sig: TAKE 1 TABLET BY MOUTH ONCE DAILY FOR SUPPLEMENT   PARoxetine (PAXIL) 20 mg tablet Unknown at Unknown time  Yes No   Sig: Take 20 mg by mouth daily   Wound Dressings (Triad Hydrophilic Wound Dressi) PSTE Unknown at Unknown time  Yes No   Sig: Apply 1 application topically 2 (two) times a day Applied to bilateral buttocks topically every day and evening shift for MASD   albuterol (PROAIR HFA) 90 mcg/act inhaler Unknown at Unknown time Spouse/Significant Other No No   Sig: Inhale 2 puffs every 6 (six) hours as needed for wheezing   ammonium lactate (LAC-HYDRIN) 12 % cream Unknown at Unknown time  Yes No   Sig: Apply 1 application topically daily Applied to bilateral lower extremity topically every evening shift for dry skin   famotidine (PEPCID) 40 MG tablet Unknown at Unknown time  Yes No   Sig: Take 40 mg by mouth 2 (two) times a day   fluticasone-vilanterol (BREO ELLIPTA) 100-25 mcg/inh inhaler Unknown at Unknown time  Yes No   Sig: Inhale 1 puff daily Rinse mouth after use  furosemide (LASIX) 40 mg tablet Unknown at Unknown time  Yes No   Sig: Take 40 mg by mouth daily   meloxicam (MOBIC) 7 5 mg tablet Unknown at Unknown time  Yes No   Sig: meloxicam 7 5 mg tablet   pravastatin (PRAVACHOL) 80 mg tablet Unknown at Unknown time  Yes No   Sig: Take 80 mg by mouth daily   triamterene-hydrochlorothiazide (DYAZIDE) 37 5-25 mg per capsule Unknown at Unknown time  Yes No   Sig: Take 1 capsule by mouth daily   warfarin (COUMADIN) 4 mg tablet Unknown at Unknown time  Yes No   Sig: Take 4 mg by mouth daily      Facility-Administered Medications: None     Allergies:  No Known Allergies  ------------------------------------------------------------------------------------------------------------  Advance Directive and Living Will:      Power of :    POLST:    ------------------------------------------------------------------------------------------------------------  Care Time Delivered:   No Critical Care time spent       Elton Vila PA-C        Portions of the record may have been created with voice recognition software  Occasional wrong word or "sound a like" substitutions may have occurred due to the inherent limitations of voice recognition software    Read the chart carefully and recognize, using context, where substitutions have occurred

## 2022-01-30 NOTE — PLAN OF CARE
Problem: PAIN - ADULT  Goal: Verbalizes/displays adequate comfort level or baseline comfort level  Description: Interventions:  - Encourage patient to monitor pain and request assistance  - Assess pain using appropriate pain scale  - Administer analgesics based on type and severity of pain and evaluate response  - Implement non-pharmacological measures as appropriate and evaluate response  - Consider cultural and social influences on pain and pain management  - Notify physician/advanced practitioner if interventions unsuccessful or patient reports new pain  Outcome: Progressing     Problem: INFECTION - ADULT  Goal: Absence or prevention of progression during hospitalization  Description: INTERVENTIONS:  - Assess and monitor for signs and symptoms of infection  - Monitor lab/diagnostic results  - Monitor all insertion sites, i e  indwelling lines, tubes, and drains  - Monitor endotracheal if appropriate and nasal secretions for changes in amount and color  - [place] appropriate cooling/warming therapies per order  - Administer medications as ordered  - Instruct and encourage patient and family to use good hand hygiene technique  - Identify and instruct in appropriate isolation precautions for identified infection/condition  Outcome: Progressing     Problem: DISCHARGE PLANNING  Goal: Discharge to home or other facility with appropriate resources  Description: INTERVENTIONS:  - Identify barriers to discharge w/patient and caregiver  - Arrange for needed discharge resources and transportation as appropriate  - Identify discharge learning needs (meds, wound care, etc )  - Arrange for interpretive services to assist at discharge as needed  - Refer to Case Management Department for coordinating discharge planning if the patient needs post-hospital services based on physician/advanced practitioner order or complex needs related to functional status, cognitive ability, or social support system  Outcome: Progressing

## 2022-01-30 NOTE — ASSESSMENT & PLAN NOTE
· Xray chest suggestive of pneumonia     · CT chest shows " Scattered bilateral peripheral consolidative opacities, left basilar peribronchial  thickening, and new fluid level within left lower lobe pneumatocyst   These findings may suggest  multifocal infection "   · Urine Strep / Legionella Negative  · MRSA Pending  · Sputum culture ordered  · Procalcitonin x2 negative   · DRIP score 7, High risk MDRO (recent IV Abx, recent hospitalization, wound care, recent MRSA  infection, COPD  · Continue Cefepime, Vanco, and Flagyl   · Consider de-escalation of antibiotic therapy pending final culture data while keeping in mind lower  drainage / staph

## 2022-01-30 NOTE — ASSESSMENT & PLAN NOTE
· Presented with shortness of breath, cough, hypoxia (wears 2-3L NC at baseline), weeping right lower extremity wound  · COVID flu RSV negative on admission  Recent admission in Nov 2021 for COVID-19 pneumonia  ·  XR chest shows "Patchy bilateral hazy airspace opacities compatible with pneumonia  Bilateral pleural effusions "  · Unclear etiology: differentials include fluid overload with moderate/large right pleural effusion vs  Aspiration pneumonia  · Low suspicion for acute PE given patient is anticoagulated on warfarin with therapeutic INR 2 4  · CT chest shows:   · Increasing moderate/large right pleural effusion   · Possible multifocal infection   · Mucous plugging within the basal segmental bronchi of the left lower lobe  Mucoid debris also noted in the right bronchus intermedius      · Collapse of right lower lobe attributable to combination of compressive atelectasis from effusion, resorptive atelectasis from mucous plugging, and possibly pneumonia     · Continue broad-spectrum antibiotics- will continue IV Cefepime, Vanco, Flagyl for now   · Plan for right thora with CC tomorrow, patient seems to be responding well to IV diuresis, repeat CXR in the AM   · Currently on 6L NC, wean as able back to baseline 2-3L NC   · Continue CPAP qhs which he wears at home, patient only wore for 1 hour last night, placed for a few hours on BiPAP today, ABG showed normal PH CO2 76 5

## 2022-01-31 ENCOUNTER — APPOINTMENT (INPATIENT)
Dept: RADIOLOGY | Facility: HOSPITAL | Age: 77
DRG: 186 | End: 2022-01-31
Payer: MEDICARE

## 2022-01-31 LAB
ANION GAP SERPL CALCULATED.3IONS-SCNC: 0 MMOL/L (ref 4–13)
APTT PPP: 35 SECONDS (ref 23–37)
ARTERIAL PATENCY WRIST A: YES
BASE EX.OXY STD BLDV CALC-SCNC: 87.4 % (ref 60–80)
BASE EXCESS BLDA CALC-SCNC: 16.2 MMOL/L
BASE EXCESS BLDV CALC-SCNC: 15.9 MMOL/L
BASOPHILS # BLD AUTO: 0.1 THOUSANDS/ΜL (ref 0–0.1)
BASOPHILS # BLD AUTO: 0.1 THOUSANDS/ΜL (ref 0–0.1)
BASOPHILS NFR BLD AUTO: 1 % (ref 0–1)
BASOPHILS NFR BLD AUTO: 1 % (ref 0–1)
BUN SERPL-MCNC: 12 MG/DL (ref 5–25)
CALCIUM SERPL-MCNC: 7.8 MG/DL (ref 8.3–10.1)
CHLORIDE SERPL-SCNC: 97 MMOL/L (ref 100–108)
CO2 SERPL-SCNC: 44 MMOL/L (ref 21–32)
CREAT SERPL-MCNC: 0.86 MG/DL (ref 0.6–1.3)
EOSINOPHIL # BLD AUTO: 0.14 THOUSAND/ΜL (ref 0–0.61)
EOSINOPHIL # BLD AUTO: 0.14 THOUSAND/ΜL (ref 0–0.61)
EOSINOPHIL NFR BLD AUTO: 2 % (ref 0–6)
EOSINOPHIL NFR BLD AUTO: 2 % (ref 0–6)
ERYTHROCYTE [DISTWIDTH] IN BLOOD BY AUTOMATED COUNT: 19.2 % (ref 11.6–15.1)
ERYTHROCYTE [DISTWIDTH] IN BLOOD BY AUTOMATED COUNT: 19.3 % (ref 11.6–15.1)
ERYTHROCYTE [DISTWIDTH] IN BLOOD BY AUTOMATED COUNT: 19.4 % (ref 11.6–15.1)
GFR SERPL CREATININE-BSD FRML MDRD: 84 ML/MIN/1.73SQ M
GLUCOSE SERPL-MCNC: 90 MG/DL (ref 65–140)
HCO3 BLDA-SCNC: 44.3 MMOL/L (ref 22–28)
HCO3 BLDV-SCNC: 44.8 MMOL/L (ref 24–30)
HCT VFR BLD AUTO: 32.9 % (ref 36.5–49.3)
HCT VFR BLD AUTO: 33.2 % (ref 36.5–49.3)
HCT VFR BLD AUTO: 34.9 % (ref 36.5–49.3)
HGB BLD-MCNC: 10.1 G/DL (ref 12–17)
HGB BLD-MCNC: 9.6 G/DL (ref 12–17)
HGB BLD-MCNC: 9.7 G/DL (ref 12–17)
IMM GRANULOCYTES # BLD AUTO: 0.04 THOUSAND/UL (ref 0–0.2)
IMM GRANULOCYTES # BLD AUTO: 0.04 THOUSAND/UL (ref 0–0.2)
IMM GRANULOCYTES NFR BLD AUTO: 0 % (ref 0–2)
IMM GRANULOCYTES NFR BLD AUTO: 1 % (ref 0–2)
INR PPP: 1.92 (ref 0.84–1.19)
INR PPP: 2.05 (ref 0.84–1.19)
LYMPHOCYTES # BLD AUTO: 0.85 THOUSANDS/ΜL (ref 0.6–4.47)
LYMPHOCYTES # BLD AUTO: 0.93 THOUSANDS/ΜL (ref 0.6–4.47)
LYMPHOCYTES NFR BLD AUTO: 11 % (ref 14–44)
LYMPHOCYTES NFR BLD AUTO: 9 % (ref 14–44)
MCH RBC QN AUTO: 26.8 PG (ref 26.8–34.3)
MCH RBC QN AUTO: 26.9 PG (ref 26.8–34.3)
MCH RBC QN AUTO: 27.1 PG (ref 26.8–34.3)
MCHC RBC AUTO-ENTMCNC: 28.9 G/DL (ref 31.4–37.4)
MCHC RBC AUTO-ENTMCNC: 29.2 G/DL (ref 31.4–37.4)
MCHC RBC AUTO-ENTMCNC: 29.2 G/DL (ref 31.4–37.4)
MCV RBC AUTO: 92 FL (ref 82–98)
MCV RBC AUTO: 93 FL (ref 82–98)
MCV RBC AUTO: 93 FL (ref 82–98)
MONOCYTES # BLD AUTO: 1.52 THOUSAND/ΜL (ref 0.17–1.22)
MONOCYTES # BLD AUTO: 1.54 THOUSAND/ΜL (ref 0.17–1.22)
MONOCYTES NFR BLD AUTO: 17 % (ref 4–12)
MONOCYTES NFR BLD AUTO: 18 % (ref 4–12)
NEUTROPHILS # BLD AUTO: 5.97 THOUSANDS/ΜL (ref 1.85–7.62)
NEUTROPHILS # BLD AUTO: 6.5 THOUSANDS/ΜL (ref 1.85–7.62)
NEUTS SEG NFR BLD AUTO: 67 % (ref 43–75)
NEUTS SEG NFR BLD AUTO: 71 % (ref 43–75)
NRBC BLD AUTO-RTO: 0 /100 WBCS
NRBC BLD AUTO-RTO: 0 /100 WBCS
O2 CT BLDA-SCNC: 14.5 ML/DL (ref 16–23)
O2 CT BLDV-SCNC: 13.3 ML/DL
OXYHGB MFR BLDA: 96.7 % (ref 94–97)
PCO2 BLDA: 77.4 MM HG (ref 36–44)
PCO2 BLDV: 84.2 MM HG (ref 42–50)
PH BLDA: 7.38 [PH] (ref 7.35–7.45)
PH BLDV: 7.34 [PH] (ref 7.3–7.4)
PLATELET # BLD AUTO: 392 THOUSANDS/UL (ref 149–390)
PLATELET # BLD AUTO: 394 THOUSANDS/UL (ref 149–390)
PLATELET # BLD AUTO: 409 THOUSANDS/UL (ref 149–390)
PMV BLD AUTO: 10.2 FL (ref 8.9–12.7)
PMV BLD AUTO: 9.5 FL (ref 8.9–12.7)
PMV BLD AUTO: 9.8 FL (ref 8.9–12.7)
PO2 BLDA: 111.8 MM HG (ref 75–129)
PO2 BLDV: 61.7 MM HG (ref 35–45)
POTASSIUM SERPL-SCNC: 3.4 MMOL/L (ref 3.5–5.3)
PROCALCITONIN SERPL-MCNC: 0.17 NG/ML
PROTHROMBIN TIME: 21.5 SECONDS (ref 11.6–14.5)
PROTHROMBIN TIME: 22.7 SECONDS (ref 11.6–14.5)
QRS AXIS: -11 DEGREES
QRSD INTERVAL: 94 MS
QT INTERVAL: 348 MS
QTC INTERVAL: 455 MS
RBC # BLD AUTO: 3.58 MILLION/UL (ref 3.88–5.62)
RBC # BLD AUTO: 3.58 MILLION/UL (ref 3.88–5.62)
RBC # BLD AUTO: 3.76 MILLION/UL (ref 3.88–5.62)
SODIUM SERPL-SCNC: 141 MMOL/L (ref 136–145)
SPECIMEN SOURCE: ABNORMAL
T WAVE AXIS: -30 DEGREES
VENTRICULAR RATE: 103 BPM
WBC # BLD AUTO: 8.72 THOUSAND/UL (ref 4.31–10.16)
WBC # BLD AUTO: 9.15 THOUSAND/UL (ref 4.31–10.16)
WBC # BLD AUTO: 9.63 THOUSAND/UL (ref 4.31–10.16)

## 2022-01-31 PROCEDURE — 85610 PROTHROMBIN TIME: CPT | Performed by: PHYSICIAN ASSISTANT

## 2022-01-31 PROCEDURE — 84145 PROCALCITONIN (PCT): CPT | Performed by: PHYSICIAN ASSISTANT

## 2022-01-31 PROCEDURE — 80048 BASIC METABOLIC PNL TOTAL CA: CPT | Performed by: PHYSICIAN ASSISTANT

## 2022-01-31 PROCEDURE — 93010 ELECTROCARDIOGRAM REPORT: CPT | Performed by: INTERNAL MEDICINE

## 2022-01-31 PROCEDURE — 94760 N-INVAS EAR/PLS OXIMETRY 1: CPT

## 2022-01-31 PROCEDURE — 85027 COMPLETE CBC AUTOMATED: CPT | Performed by: PHYSICIAN ASSISTANT

## 2022-01-31 PROCEDURE — 82805 BLOOD GASES W/O2 SATURATION: CPT | Performed by: PHYSICIAN ASSISTANT

## 2022-01-31 PROCEDURE — 94640 AIRWAY INHALATION TREATMENT: CPT

## 2022-01-31 PROCEDURE — 85025 COMPLETE CBC W/AUTO DIFF WBC: CPT | Performed by: PHYSICIAN ASSISTANT

## 2022-01-31 PROCEDURE — 94003 VENT MGMT INPAT SUBQ DAY: CPT

## 2022-01-31 PROCEDURE — 36600 WITHDRAWAL OF ARTERIAL BLOOD: CPT

## 2022-01-31 PROCEDURE — 71045 X-RAY EXAM CHEST 1 VIEW: CPT

## 2022-01-31 PROCEDURE — 99291 CRITICAL CARE FIRST HOUR: CPT | Performed by: PHYSICIAN ASSISTANT

## 2022-01-31 PROCEDURE — 85730 THROMBOPLASTIN TIME PARTIAL: CPT | Performed by: PHYSICIAN ASSISTANT

## 2022-01-31 RX ORDER — BUDESONIDE 0.5 MG/2ML
0.5 INHALANT ORAL
Status: DISCONTINUED | OUTPATIENT
Start: 2022-01-31 | End: 2022-02-04

## 2022-01-31 RX ORDER — HEPARIN SODIUM 1000 [USP'U]/ML
2000 INJECTION, SOLUTION INTRAVENOUS; SUBCUTANEOUS
Status: DISCONTINUED | OUTPATIENT
Start: 2022-01-31 | End: 2022-02-04

## 2022-01-31 RX ORDER — HEPARIN SODIUM 10000 [USP'U]/100ML
3-20 INJECTION, SOLUTION INTRAVENOUS
Status: DISCONTINUED | OUTPATIENT
Start: 2022-01-31 | End: 2022-02-04

## 2022-01-31 RX ORDER — HEPARIN SODIUM 1000 [USP'U]/ML
4000 INJECTION, SOLUTION INTRAVENOUS; SUBCUTANEOUS
Status: DISCONTINUED | OUTPATIENT
Start: 2022-01-31 | End: 2022-02-04

## 2022-01-31 RX ORDER — POTASSIUM CHLORIDE 29.8 MG/ML
40 INJECTION INTRAVENOUS ONCE
Status: COMPLETED | OUTPATIENT
Start: 2022-01-31 | End: 2022-01-31

## 2022-01-31 RX ADMIN — SODIUM CHLORIDE SOLN NEBU 3% 4 ML: 3 NEBU SOLN at 02:29

## 2022-01-31 RX ADMIN — SODIUM CHLORIDE SOLN NEBU 3% 4 ML: 3 NEBU SOLN at 14:06

## 2022-01-31 RX ADMIN — POTASSIUM CHLORIDE 40 MEQ: 29.8 INJECTION, SOLUTION INTRAVENOUS at 06:04

## 2022-01-31 RX ADMIN — Medication 1 APPLICATION: at 09:58

## 2022-01-31 RX ADMIN — HEPARIN SODIUM 11.1 UNITS/KG/HR: 10000 INJECTION, SOLUTION INTRAVENOUS at 20:51

## 2022-01-31 RX ADMIN — SODIUM CHLORIDE SOLN NEBU 3% 4 ML: 3 NEBU SOLN at 20:37

## 2022-01-31 RX ADMIN — SODIUM CHLORIDE SOLN NEBU 3% 4 ML: 3 NEBU SOLN at 08:07

## 2022-01-31 RX ADMIN — FUROSEMIDE 40 MG: 10 INJECTION, SOLUTION INTRAVENOUS at 20:51

## 2022-01-31 RX ADMIN — FUROSEMIDE 40 MG: 10 INJECTION, SOLUTION INTRAVENOUS at 06:06

## 2022-01-31 RX ADMIN — BUDESONIDE 0.5 MG: 0.5 INHALANT ORAL at 20:37

## 2022-01-31 RX ADMIN — CEFEPIME HYDROCHLORIDE 2000 MG: 2 INJECTION, SOLUTION INTRAVENOUS at 00:37

## 2022-01-31 RX ADMIN — FAMOTIDINE 20 MG: 10 INJECTION INTRAVENOUS at 20:51

## 2022-01-31 RX ADMIN — FUROSEMIDE 40 MG: 10 INJECTION, SOLUTION INTRAVENOUS at 13:20

## 2022-01-31 RX ADMIN — BUDESONIDE 0.5 MG: 0.5 INHALANT ORAL at 09:54

## 2022-01-31 RX ADMIN — Medication 1500 MG: at 01:41

## 2022-01-31 RX ADMIN — ALBUTEROL SULFATE 2.5 MG: 2.5 SOLUTION RESPIRATORY (INHALATION) at 02:29

## 2022-01-31 NOTE — DISCHARGE INSTR - OTHER ORDERS
Skin care plans:  1-Calazime to sacrumTID and PRN  2-Hydraguard to bilateral heel BID and PRN  3-Elevate heels to offload pressure  4-Ehob cushion when out of bed  5-Turn/repoisiton q2h or when medically stable for pressure re-distribution on skin  6-Moisturize skin daily with skin nourishing cream  7-Xeroform Allevyn bordered foam to open area on buttocks daily and prn soil or dislodgement  8-Wash bilateral lower extremities with soap and water, pat dry  Apply adaptic gauze then maxorb Ag to open areas cover with ABd and wrap with jaz change daily and prn soil or dislodgement

## 2022-01-31 NOTE — PHYSICAL THERAPY NOTE
Physical Therapy Cancellation Note       01/31/22 0941   Note Type   Note type Evaluation   Cancel Reasons Medical status           PT orders received  Chart review completed  Spoke with RN who reports Pt is currently on continuous BiPAP and is not appropriate for PT services  Will continue to follow and address as medically appropriate and as schedule allows         Johnny England, PT, DPT

## 2022-01-31 NOTE — OCCUPATIONAL THERAPY NOTE
At [facility], one important tool we use to improve our patient services is our Patient Survey.  Following your visit you may receive our survey in the mail.    Please take the time to complete the survey.    If your visit with us was great, we want to hear about it.    If we can improve, please let us know how.          Occupational Therapy Cancellation Note         Patient Name: Shi Chao  UKOBV'V Date: 1/31/2022  Problem List  Principal Problem:    Acute on chronic respiratory failure ([facility] [address] )  Active Problems:    Paroxysmal A-fib (HCC)    COPD, moderate (HCC)    Sleep apnea    Multifocal pneumonia    Venous stasis dermatitis of both lower extremities    Ambulatory dysfunction    SIRS (systemic inflammatory response syndrome) ([facility])    Pleural effusion          01/31/22 0940   Note Type   Note type Evaluation   Cancel Reasons Medical status       OT orders received  Chart review completed  Spoke with RN who reports Pt is currently on continuous BiPAP and is not appropriate for OT services  Will continue to follow and address as medically appropriate and as schedule allows         ANAI Brenner/KATHLEEN

## 2022-01-31 NOTE — PLAN OF CARE
Problem: Potential for Falls  Goal: Patient will remain free of falls  Description: INTERVENTIONS:  - Educate patient/family on patient safety including physical limitations  - Instruct patient to call for assistance with activity   - Consult OT/PT to assist with strengthening/mobility   - Keep Call bell within reach  - Keep bed low and locked with side rails adjusted as appropriate  - Keep care items and personal belongings within reach  - Initiate and maintain comfort rounds  - Make Fall Risk Sign visible to staff  - Offer Toileting every 2 Hours, in advance of need  - Initiate/Maintain BED alarm  - Obtain necessary fall risk management equipment: bed alarm  - Apply yellow socks and bracelet for high fall risk patients  - Consider moving patient to room near nurses station  Outcome: Progressing     Problem: MOBILITY - ADULT  Goal: Maintain or return to baseline ADL function  Description: INTERVENTIONS:  -  Assess patient's ability to carry out ADLs; assess patient's baseline for ADL function and identify physical deficits which impact ability to perform ADLs (bathing, care of mouth/teeth, toileting, grooming, dressing, etc )  - Assess/evaluate cause of self-care deficits   - Assess range of motion  - Assess patient's mobility; develop plan if impaired  - Assess patient's need for assistive devices and provide as appropriate  - Encourage maximum independence but intervene and supervise when necessary  - Involve family in performance of ADLs  - Assess for home care needs following discharge   - Consider OT consult to assist with ADL evaluation and planning for discharge  - Provide patient education as appropriate  Outcome: Progressing  Goal: Maintains/Returns to pre admission functional level  Description: INTERVENTIONS:  - Perform BMAT or MOVE assessment daily    - Set and communicate daily mobility goal to care team and patient/family/caregiver     - Collaborate with rehabilitation services on mobility goals if consulted  - Perform Range of Motion 3 times a day  - Reposition patient every 2 hours    - Dangle patient 3 times a day  - Stand patient 3 times a day  - Ambulate patient 3 times a day  - Out of bed to [id number] a day   - Out of bed for meals 3 times a day  - Out of bed for toileting  - Record patient progress and toleration of activity level   Outcome: Progressing     Problem: PAIN - ADULT  Goal: Verbalizes/displays adequate comfort level or baseline comfort level  Description: Interventions:  - Encourage patient to monitor pain and request assistance  - Assess pain using appropriate pain scale  - Administer analgesics based on type and severity of pain and evaluate response  - Implement non-pharmacological measures as appropriate and evaluate response  - Consider cultural and social influences on pain and pain management  - Notify physician/advanced practitioner if interventions unsuccessful or patient reports new pain  Outcome: Progressing     Problem: INFECTION - ADULT  Goal: Absence or prevention of progression during hospitalization  Description: INTERVENTIONS:  - Assess and monitor for signs and symptoms of infection  - Monitor lab/diagnostic results  - Monitor all insertion sites, i e  indwelling lines, tubes, and drains  - Monitor endotracheal if appropriate and nasal secretions for changes in amount and color  - Stanton appropriate cooling/warming therapies per order  - Administer medications as ordered  - Instruct and encourage patient and family to use good hand hygiene technique  - Identify and instruct in appropriate isolation precautions for identified infection/condition  Outcome: Progressing     Problem: DISCHARGE PLANNING  Goal: Discharge to home or other facility with appropriate resources  Description: INTERVENTIONS:  - Identify barriers to discharge w/patient and caregiver  - Arrange for needed discharge resources and transportation as appropriate  - Identify discharge learning needs (meds, wound care, etc )  - Arrange for interpretive services to assist at discharge as needed  - Refer to Case Management Department for coordinating discharge planning if the patient needs post-hospital services based on physician/advanced practitioner order or complex needs related to functional status, cognitive ability, or social support system  Outcome: Progressing     Problem: Knowledge Deficit  Goal: Patient/family/caregiver demonstrates understanding of disease process, treatment plan, medications, and discharge instructions  Description: Complete learning assessment and assess knowledge base    Interventions:  - Provide teaching at level of understanding  - Provide teaching via preferred learning methods  Outcome: Progressing

## 2022-01-31 NOTE — QUICK NOTE
Updated patients daughter Leah Gonzalez via phone as well as grand-daughter and her  at bedside  We discussed Hussain's current dependence on BiPAP since transfer to the ICU yesterday  We also discussed attempt to trial off BiPAP this am; however patient hypoxic unable to tolerate being off and was placed back on  In further discussion with both Amie and grand-daughter, they can appreciate that Shashank Sousa has been declining over the past several months  They are also both realistic  At this time they would like to continue current treatments for the next 24-48 hours  If he were to worsen they would transition to comfort care measures  They are also prepared that if he continues to remain bipap dependant they would consider palliative / hospice at that time as well  The update was appreciated by all

## 2022-01-31 NOTE — SPEECH THERAPY NOTE
Speech Language/Pathology  Orders received  Chart reviewed  Pt currently on continuous biPap w/ poor DELMA per RN  Will continue to follow pt on caseload in order to initiate formal ST evaluation pending further medical stability  Recommend NPO until dysphagia re-assessment and DELMA improves       Anu Mishra Quinten 87 CCC-SLP  1/31/2022

## 2022-01-31 NOTE — RESPIRATORY THERAPY NOTE
[facility]  [address] [facility]  [place] [address]  Phone: [phone]  Fax: [phone]                  Michael Salgado   5/3/2017 2:00 PM   Office Visit    Description:  Female : 1943   Provider:  Ken Jung OD   Department:  [facility]           Reason for Visit     Diabetic Eye Exam           Diagnoses this Visit        Comments    Diabetes mellitus type 2 without retinopathy    -  Primary     Nuclear sclerosis, bilateral         Glaucoma screening         Hyperopia with astigmatism and presbyopia, bilateral                To Do List           Future Appointments        Provider Department Dept Phone    2017 1:00 PM Nydia Degroot MD [facility] [phone]      Goals (5 Years of Data)     None      Follow-Up and Disposition     Return in about 1 year (around 5/3/2018).      [facility]     [facility] Nurse Care Line -  Assistance  Unless otherwise directed by your provider, please contact [facility], our nurse care line that is available for  assistance.     Registered nurses in the [facility] provide: appointment scheduling, clinical advisement, health education, and other advisory services.  Call: [phone] (toll free)               Medications           Message regarding Medications     Verify the changes and/or additions to your medication regime listed below are the same as discussed with your clinician today.  If any of these changes or additions are incorrect, please notify your healthcare provider.             Verify that the below list of medications is an accurate representation of the medications you are currently taking.  If none reported, the list may be blank. If incorrect, please contact your healthcare provider. Carry this list with you in case of emergency.           Current Medications     amlodipine (NORVASC) 10 MG tablet TAKE 1 TABLET BY MOUTH PER G-TUBE EVERY DAY.    amlodipine (NORVASC) 10  RT Ventilator Management Note  Amy Morelos 68 y o  male MRN: [id number]  Unit/Bed#: -01 Encounter: [id number]      Daily Screen    No data found in the last 10 encounters  Physical Exam:   Assessment Type: Assess only  General Appearance: Sleeping  Respiratory Pattern: Normal  Chest Assessment: Chest expansion symmetrical  Bilateral Breath Sounds: Diminished      Resp Comments: 68 yr old pt recieved on bipap support, with 12/6 and 60%, sleeping at this time, breathing pattern unlabored, Sp02 -86%, fio2 increased to 70% at this time  MG tablet TAKE 1 TABLET PER G-TUBE EVERY DAY.    amlodipine (NORVASC) 5 MG tablet Take 1 tablet (5 mg total) by mouth once daily.    amoxicillin (AMOXIL) 500 MG capsule     aspirin (ECOTRIN) 81 MG EC tablet Take 81 mg by mouth once daily.    atorvastatin (LIPITOR) 40 MG tablet Take 1 tablet (40 mg total) by mouth once daily.    blood sugar diagnostic (TRUETEST TEST STRIPS) Strp Twice daily blood sugar check.    buPROPion (WELLBUTRIN XL) 150 MG TB24 tablet Take 1 tablet (150 mg total) by mouth once daily.    carvedilol (COREG) 25 MG tablet Take 25 mg by mouth 2 (two) times daily with meals.    diazePAM (VALIUM) 5 MG tablet Take 1 tablet (5 mg total) by mouth every 8 (eight) hours as needed.    diclofenac sodium 1 % Gel Apply topically 2 (two) times daily.    escitalopram oxalate (LEXAPRO) 10 MG tablet Take 1 tablet (10 mg total) by mouth once daily.    estradiol (ESTRACE) 0.01 % (0.1 mg/gram) vaginal cream Place 1 g vaginally twice a week.    furosemide (LASIX) 20 MG tablet TAKE 1/2 TABLET(10 MG) BY MOUTH TWICE DAILY    gabapentin (NEURONTIN) 300 MG capsule Take 1 capsule (300 mg total) by mouth once daily.    hydrALAZINE (APRESOLINE) 25 MG tablet TAKE 1 TABLET(25 MG) BY MOUTH BID    hydrocodone-acetaminophen 10-325mg (NORCO)  mg Tab Take 1 tablet by mouth every 8 (eight) hours as needed.    insulin glargine (LANTUS) 100 unit/mL injection Inject 10 Units into the skin every evening.    insulin lispro (HUMALOG) 100 unit/mL injection Inject 5 Units into the skin 3 (three) times daily before meals.    levalbuterol (XOPENEX) 1.25 mg/3 mL nebulizer solution USE 1 VIAL VIA NEBULIZER EVERY 8 HOURS AS NEEDED FOR WHEEZING OR SHORTNESS OF BREATH    NOVOLOG 100 unit/mL injection     nut.tx.gluc.intol,lac-free,soy (GLUCERNA) Liqd Once a day oral supplement.    omega-3 acid ethyl esters (LOVAZA) 1 gram capsule Take 2 capsules (2 g total) by mouth 2 (two) times daily.    pantoprazole (PROTONIX) 40 MG tablet TAKE 1 TABLET BY  "MOUTH EVERY DAY    pen needle, diabetic 30 gauge x 5/16" Ndle Use 4 disposable needles per day. Inject medication into skin daily    polyethylene glycol (GLYCOLAX) 17 gram PwPk Take 17 g by mouth once daily.    prasugrel (EFFIENT) 10 mg Tab Take 1 tablet (10 mg total) by mouth once daily.    underpads 23 X 36 " Pads Twice daily change as needed for incontinence.           Clinical Reference Information           Your Vitals Were     Last Period                   (LMP Unknown)           Allergies as of 5/3/2017     Daypro [Oxaprozin]    Pcn [Penicillins]    Vibramycin [Doxycycline Calcium]      Immunizations Administered on Date of Encounter - 5/3/2017     None      [facility] Sign-Up     Activating your [facility] account is as easy as 1-2-3!     1) Visit [email or web address], select Sign Up Now, enter this activation code and your date of birth, then select Next.  [email or web address]  Expires: 6/17/2017  2:50 PM      2) Create a username and password to use when you visit [facility] in the future and select a security question in case you lose your password and select Next.    3) Enter your e-mail address and click Sign Up!    Additional Information  If you have questions, please e-mail [email or web address] or call [phone] to talk to our [facility] staff. Remember, [facility] is NOT to be used for urgent needs. For medical emergencies, dial 911.         Language Assistance Services     ATTENTION: Language assistance services are available, free of charge. Please call [phone].      ATENCIÓN: Si habla español, tiene a yen disposición servicios gratuitos de asistencia lingüística. Llame al [phone].     [facility] Ý: N?u b?n nói Ti?ng Vi?t, có các d?ch v? h? tr? ngôn ng? mi?n phí dành cho b?n. G?i s? [phone].         [facility] complies with applicable Federal civil rights laws and does not discriminate on the basis of race, color, national origin, age, disability, or sex.        "

## 2022-01-31 NOTE — PROGRESS NOTES
Vancomycin IV Pharmacy-to-Dose Consultation    Nolberto Villagran is a 68 y o  male who is currently receiving Vancomycin IV with management by the Pharmacy Consult service  Assessment/Plan:  The patient was reviewed  Renal function is stable and no signs or symptoms of nephrotoxicity and/or infusion reactions were documented in the chart  Based on todays assessment, continue current vancomycin (day # 3) dosing of 1500 mg IV q 12 hours, with a plan for trough to be drawn at 1230 on 2/4/22  We will continue to follow the patients culture results and clinical progress daily      Clive Mcclure, Pharmacist

## 2022-01-31 NOTE — ASSESSMENT & PLAN NOTE
· Xray chest suggestive of pneumonia     · CT chest shows " Scattered bilateral peripheral consolidative opacities, left basilar peribronchial  thickening, and new fluid level within left lower lobe pneumatocyst   These findings may suggest multifocal infection "   · Urine Strep / Legionella Negative  · MRSA nares Positive  · Leg Culture positive Staph - monitor closely  · Sputum culture ordered  · Procalcitonin x2 negative   · DRIP score 7, High risk MDRO (recent IV Abx, recent hospitalization, wound care, recent MRSA  infection, COPD  · 1/31 Procalcitonin remains negative, Cefepime, Vanco, and Flagyl discontinued 1/31, monitor temps as well as WBC count off antibiotics  · Seen by SLP- recommend keeping NPO for now

## 2022-01-31 NOTE — ASSESSMENT & PLAN NOTE
Patient is a 68year old male whom presented on evening of 1/28 with a chief complaint of Shortness of breath with increasing home O2 needs as well as R LE drainage  He has a PMH of COPD wearing 2-3 L at baseline, Afib on Coumadin, ANA LAURA, as well as a hospitalization Nov 11-22, 2021 secondary to COVID Pneumonia  Family reports steady decline ever since having COVID, "He isn't himself and keeps getting worse"    Since admission workup has included labs, CXR consistent with B/L pleural effusions, as well as CT Chest revealing Increasing moderate to large right pleural effusion  Collapse of the right lower lobe attributable to combination of compressive atelectasis from effusion, resorptive atelectasis from mucous plugging, and (possibly) pneumonia  Scattered bilateral peripheral consolidative opacities, left basilar peribronchial thickening, and new fluid level within left lower lobe pneumatocyst   These findings may suggest multifocal infection  Chronic, low level mediastinal or hilar lymphadenopathy appears similar to November  Treatment has included intermittent BiPAP use as well as IV diuresis with potential for thoracentesis  Also receiving Cefepime / Flagyl / Vanco  - Leg wound staph aureus, MRSA pending  Throughout the day on 1/30 patient less responsive as well as noted to be increasingly more Hypoxic  ABG obtained revealing 7 37/76/110  Patient requiring BiPAP more continuously    · Continue Telemetry and Hemodynamic monitoring  · Continual SpO2 monitoring and maintain >88% - wean FiO2 as tolerated  · Hypercarbic Respiratory failure, continue BiPAP trailed off BiPAP 1/31 am with rapid decompensation / hypoxia - Back on BiPAP Rate 20  16/6  · COVID flu RSV negative on admission  · Recent admission in Nov 2021 for COVID-19 pneumonia     · Low suspicion for acute PE given patient is anticoagulated on warfarin with therapeutic INR 2 4    · 1/30 Family at bedside, extensive conversation during which daughter advised her father would never want breathing tube or any machines to keep him alive - Code status will be updated to a Level 3  · 1/31 Further family discussions, they are concerned with lack of improvement, would like to give additional 24-48 hours and consider comfort measures if no improvement Yes

## 2022-01-31 NOTE — PROGRESS NOTES
[address]  Progress Note - Joo Del Rioluis enrique 1945, 68 y o  male MRN: [id number]  Unit/Bed#: -01 Encounter: [id number]  Primary Care Provider: Kanwal Cheatham DO   Date and time admitted to hospital: 1/28/2022  9:02 PM    * Acute on chronic respiratory failure [facility])  Assessment & Plan  Patient is a 68year old male whom presented on evening of 1/28 with a chief complaint of Shortness of breath with increasing home O2 needs as well as R LE drainage  He has a PMH of COPD wearing 2-3 L at baseline, Afib on Coumadin, ANA LAURA, as well as a hospitalization Nov 11-22, 2021 secondary to COVID Pneumonia  Family reports steady decline ever since having COVID, "He isn't himself and keeps getting worse"    Since admission workup has included labs, CXR consistent with B/L pleural effusions, as well as CT Chest revealing Increasing moderate to large right pleural effusion  Collapse of the right lower lobe attributable to combination of compressive atelectasis from effusion, resorptive atelectasis from mucous plugging, and (possibly) pneumonia  Scattered bilateral peripheral consolidative opacities, left basilar peribronchial thickening, and new fluid level within left lower lobe pneumatocyst   These findings may suggest multifocal infection  Chronic, low level mediastinal or hilar lymphadenopathy appears similar to November  Treatment has included intermittent BiPAP use as well as IV diuresis with potential for thoracentesis  Also receiving Cefepime / Flagyl / Vanco  - Leg wound staph aureus, MRSA pending  Throughout the day on 1/30 patient less responsive as well as noted to be increasingly more Hypoxic  ABG obtained revealing 7 37/76/110    Patient requiring BiPAP more continuously    · Continue Telemetry and Hemodynamic monitoring  · Continual SpO2 monitoring and maintain >88% - wean FiO2 as tolerated  · Hypercarbic Respiratory failure, continue BiPAP with trial off later and reassess mental status  · COVID flu RSV negative on admission  · Recent admission in Nov 2021 for COVID-19 pneumonia  · Low suspicion for acute PE given patient is anticoagulated on warfarin with therapeutic INR 2 4  · Family at bedside, extensive conversation during which daughter advised her father would never want  breathing tube or any machines to keep him alive - Code status will be updated to a Level 3    Multifocal pneumonia  Assessment & Plan  · Xray chest suggestive of pneumonia  · CT chest shows " Scattered bilateral peripheral consolidative opacities, left basilar peribronchial  thickening, and new fluid level within left lower lobe pneumatocyst   These findings may suggest  multifocal infection "   · Urine Strep / Legionella Negative  · MRSA Pending  · Sputum culture ordered  · Procalcitonin x2 negative   · DRIP score 7, High risk MDRO (recent IV Abx, recent hospitalization, wound care, recent MRSA  infection, COPD  · Continue Cefepime, Vanco, and Flagyl   · Consider de-escalation of antibiotic therapy pending final culture data while keeping in mind lower  drainage / staph       Pleural effusion  Assessment & Plan  · Noted on CXR, CT Chest reveals moderate/large R Pleural effusion  · BNP 4,540 thought to be volume overloaded on exam    · Currently being diuresed receiving Lasix 40mg q8h  · Considered Thoracentesis; however with hypercarbia with patient being more somnolent will hold off   · ECHO from Nov 2021 revealed EF 61%, normal systolic function, diastolic function not assessed         SIRS (systemic inflammatory response syndrome) ([facility] [address] )  Assessment & Plan  · On presentation 2/2 tachycardia and tachypnea  · Lactate 2 4 - cleared  · Currently afebrile with no leukocytosis     · Procalcitonin x3 negative   · Unclear if infectious source, may be non-infectious secondary to volume overload a/e/b pleural effusion  and bilateral LE edema   · Blood cultures pending  · Wound culture MRSA + and gram - rods oxidase +   · Continue on Cefepime, Flagyl, Vanco      Paroxysmal A-fib (HCC)  Assessment & Plan  · Continue Telemetry monitoring  · EKG revealed Afib with   · Currently not on rate control medication   · PRN Lopressor for rate control  · Continue warfarin 4 mg      COPD, moderate (HCC)  Assessment & Plan  · Wears 2-3 L chronically at baseline  · Does not appear to be in acute exacerbation, will hold off on IV steroids   · Continue Breo, albuterol nebs       Ambulatory dysfunction  Assessment & Plan  · Per chart review, increased difficulty with ambulation due to chronic leg wounds  · PT/OT      Sleep apnea  Assessment & Plan  · Wears CPAP at HS, continue       Venous stasis dermatitis of both lower extremities  Assessment & Plan  · On admission in November 2021 found to have positive wound culture and 1/2 + BC for Staph aureus  Treated with 4 weeks of IV vancomycin through 12/11/2021  · Hx of chronic wounds, chronic venous insufficiency, lymphedema     · On exam, bilateral lower extremities with edema L>R, weeping tan drainage, multiple open areas  · Unclear if this is in fact active cellulitis, there is a draining posterior right leg wound  · Wound culture MRSA + and gram neg rods oxidae poastive   · Blood cultures pending, continue Cefepime and Vancomycin given recent history   · Low suspicion for DVT, patient anticoagulated with warfarin   · Consider ID consult   · Consult wound RN, has VNA services for wound care at home        ----------------------------------------------------------------------------------------  HPI/24hr events:   · Adjusted BiPAP during the night repeated an ABG around 11 and pCO2 still was 74 increased EPAP to 14 increased oxygen to 60% repeat VBG at 4:00 a m  showed a pCO2 of 80 but patient is arousable wakes up opens his eyes increased a BiPAP rate to 20 to stimulate patient to breathe more admitted volume now 12-14 recheck ABG around 6      Patient appropriate for transfer out of the ICU today?: No  Disposition: Continue Stepdown Level 1 level of care   Code Status: Level 3 - DNAR and DNI  ---------------------------------------------------------------------------------------  SUBJECTIVE  Has no complains answers one-word questions but falls back to sleep quickly    Review of Systems  Review of systems was unable to be performed secondary to MS  ---------------------------------------------------------------------------------------  OBJECTIVE    Vitals   Vitals:    22 0230 22 0300 22 0400 22 0500   BP:    109/57   BP Location:    Left arm   Pulse:    97   Resp:    (!) 23   Temp:    99 °F (37 2 °C)   TempSrc:    Bladder   SpO2: 92% 98% 93% 95%   Weight:    115 kg (252 lb 6 8 oz)   Height:         Temp (24hrs), Av 3 °F (37 4 °C), Min:97 9 °F (36 6 °C), Max:100 °F (37 8 °C)  Current: Temperature: 99 °F (37 2 °C)          Respiratory:  SpO2: SpO2: 95 %, SpO2 Activity: SpO2 Activity: At Rest, SpO2 Device: O2 Device: BiPAP  Nasal Cannula O2 Flow Rate (L/min): 6 L/min    Invasive/non-invasive ventilation settings   Respiratory  Report   Lab Data (Last 4 hours)    None         O2/Vent Data (Last 4 hours)       023          Non-Invasive Ventilation Mode BiPAP                   Physical Exam  Vitals and nursing note reviewed  Constitutional:       Appearance: He is well-developed  He is obese  He is ill-appearing and toxic-appearing  Interventions: Face mask in place  HENT:      Head: Normocephalic and atraumatic  Eyes:      Pupils: Pupils are equal, round, and reactive to light  Cardiovascular:      Rate and Rhythm: Normal rate  Rhythm irregular  Pulmonary:      Effort: Pulmonary effort is normal       Breath sounds: Decreased air movement present  Examination of the right-middle field reveals decreased breath sounds  Examination of the left-middle field reveals decreased breath sounds   Examination of the right-lower field reveals decreased breath sounds  Examination of the left-lower field reveals decreased breath sounds  Decreased breath sounds and wheezing present  Comments: Patient is on BiPAP  Abdominal:      General: Bowel sounds are normal  There is no distension  Palpations: Abdomen is soft  Tenderness: There is no abdominal tenderness  There is no guarding  Genitourinary:     Penis: Normal     Musculoskeletal:         General: Normal range of motion  Cervical back: Normal range of motion and neck supple  Right lower leg: Edema present  Left lower leg: Edema present  Skin:     General: Skin is warm and dry  Capillary Refill: Capillary refill takes less than 2 seconds  Coloration: Skin is pale  Neurological:      Mental Status: He is easily aroused  He is lethargic and disoriented               Laboratory and Diagnostics:  Results from last 7 days   Lab Units 01/31/22  0408 01/30/22  0500 01/29/22  0544 01/28/22 2122   WBC Thousand/uL 9 15 10 00 13 16* 8 91   HEMOGLOBIN g/dL 9 7* 10 9* 10 3* 10 7*   HEMATOCRIT % 33 2* 37 8 33 6* 36 4*   PLATELETS Thousands/uL 392* 415* 402* 434*   NEUTROS PCT % 71 73 80* 70   MONOS PCT % 17* 11 12 13*     Results from last 7 days   Lab Units 01/31/22  0408 01/30/22  0500 01/29/22  0544 01/28/22 2122   SODIUM mmol/L 141 138 137 138   POTASSIUM mmol/L 3 4* 3 5 4 0 4 6   CHLORIDE mmol/L 97* 97* 99* 98*   CO2 mmol/L 44* 38* 35* 36*   ANION GAP mmol/L 0* 3* 3* 4   BUN mg/dL 12 14 16 14   CREATININE mg/dL 0 86 0 95 0 95 1 01   CALCIUM mg/dL 7 8* 8 1* 7 6* 8 1*   GLUCOSE RANDOM mg/dL 90 99 116 103   ALT U/L  --  58 60 73   AST U/L  --  26 24 36   ALK PHOS U/L  --  74 73 81   ALBUMIN g/dL  --  2 3* 2 1* 2 4*   TOTAL BILIRUBIN mg/dL  --  0 74 0 41 0 43          Results from last 7 days   Lab Units 01/31/22  0408 01/30/22  0500 01/28/22 2122   INR  2 05* 2 14* 2 42*   PTT seconds  --   --  39*          Results from last 7 days   Lab Units 01/29/22  0033 01/28/22 2122   LACTIC ACID mmol/L 1 4 2 4*     ABG:  Results from last 7 days   Lab Units 01/31/22  0007   PH ART  7 376   PCO2 ART mm Hg 77 4*   PO2 ART mm Hg 111 8   HCO3 ART mmol/L 44 3*   BASE EXC ART mmol/L 16 2   ABG SOURCE  Radial, Left     VBG:  Results from last 7 days   Lab Units 01/31/22  0401 01/31/22  0007 01/29/22  0208   PH KEN  7 344  --    < >   PCO2 KEN mm Hg 84 2*  --    < >   PO2 KEN mm Hg 61 7*  --    < >   HCO3 KEN mmol/L 44 8*  --    < >   BASE EXC KEN mmol/L 15 9  --    < >   ABG SOURCE   --  Radial, Left  --     < > = values in this interval not displayed  Results from last 7 days   Lab Units 01/31/22  0408 01/30/22  0500 01/29/22  0544 01/28/22 2122   PROCALCITONIN ng/ml 0 17 0 18 0 17 0 14       Micro  Results from last 7 days   Lab Units 01/29/22  1547 01/29/22  0208 01/28/22 2122 01/28/22  2116   BLOOD CULTURE   --   --  No Growth at 24 hrs  No Growth at 24 hrs  GRAM STAIN RESULT   --   --  No Polys*  1+ Gram positive cocci in pairs*  --    WOUND CULTURE   --   --  4+ Growth of Oxidase Positive gram negative ankita*  4+ Growth of Staphylococcus aureus*  --    MRSA CULTURE ONLY   --  Methicillin Resistant Staphylococcus aureus isolated*  This patient requires contact isolation precautions per [place] law  Contact precautions are not required in [place] for nasal surveillance cultures  --   --    LEGIONELLA URINARY ANTIGEN  Negative  --   --   --    STREP PNEUMONIAE ANTIGEN, URINE  Negative  --   --   --        EKG:   Imaging: I have personally reviewed pertinent reports  Intake and Output  I/O       01/29 0701 01/30 0700 01/30 0701 01/31 0700    P  O  480     I V  (mL/kg)  250 (1 8)    Total Intake(mL/kg) 480 (3 5) 250 (1 8)    Urine (mL/kg/hr) 2017 (0 6) 2425 (0 7)    Total Output 2017 2425    Net -1537 -2179          Unmeasured Urine Occurrence 3 x           Height and Weights   Height: 6' (182 9 cm)  IBW (Ideal Body Weight): 77 6 kg  Body mass index is 34 24 kg/m²    Weight (last 2 days) Date/Time Weight    01/31/22 0500 115 (252 43)    01/30/22 0500 137 (302 03)            Nutrition       Diet Orders   (From admission, onward)             Start     Ordered    01/30/22 1436  Diet Cardiovascular; Sodium 2 GM; Fluid Restriction 1500 ML  Diet effective now        References:    Nutrtion Support Algorithm Enteral vs  Parenteral   Question Answer Comment   Diet Type Cardiovascular    Cardiac Sodium 2 GM    Other Restriction(s): Fluid Restriction 1500 ML    RD to adjust diet per protocol?  Yes        01/30/22 1439                  Active Medications  Scheduled Meds:  Current Facility-Administered Medications   Medication Dose Route Frequency Provider Last Rate    acetaminophen  650 mg Oral Q6H PRN Edvin Ellis PA-C      albuterol  2 5 mg Nebulization Q6H PRN Edvin Ellis PA-C      ammonium lactate  1 application Topical Daily Edvin Ellis PA-C      benzonatate  100 mg Oral TID PRN Edvin Ellis PA-C      cefepime  2,000 mg Intravenous Q8H Edvin Ellis PA-C 2,000 mg (01/31/22 0037)    And    vancomycin  17 5 mg/kg (Adjusted) Intravenous Q12H Edvin Ellis PA-C 0 mg/kg (01/29/22 0415)    famotidine  40 mg Oral BID Edvin Ellis PA-C      fluticasone-vilanterol  1 puff Inhalation Daily Edvin Ellis PA-C      furosemide  40 mg Intravenous [address] Edvin Ellis PA-C      metoprolol  2 5 mg Intravenous Q6H PRN Edvin Ellis PA-C      metroNIDAZOLE  500 mg Oral [facility] Prabhakar Dior      multivitamin-minerals  1 tablet Oral Daily Prabhakar Dior      PARoxetine  20 mg Oral Daily Edvin Ellis PA-C      pravastatin  80 mg Oral After Mandel ForthAGNES      sodium chloride  4 mL Nebulization Q6H Edvin Ellis PA-C      warfarin  4 mg Oral Daily (warfarin) Edvin Ellis PA-C       Continuous Infusions:     PRN Meds:   acetaminophen, 650 mg, Q6H PRN  albuterol, 2 5 mg, Q6H PRN  benzonatate, 100 mg, TID PRN  metoprolol, 2 5 mg, Q6H PRN        Invasive Devices Review  Invasive Devices  Report    Peripheral Intravenous Line            Peripheral IV 01/29/22 Right;Ventral (anterior) Forearm 1 day    Peripheral IV 01/30/22 Right Wrist 1 day          Drain            Urethral Catheter Temperature probe 16 Fr  <1 day                Rationale for remaining devices:   ---------------------------------------------------------------------------------------  Advance Directive and Living Will:      Power of :    POLST:    ---------------------------------------------------------------------------------------  Care Time Delivered:   Upon my evaluation, this patient had a high probability of imminent or life-threatening deterioration due to Acute hypercapnic respiratory failure with severe COPD and history of COVID, which required my direct attention, intervention, and personal management  I have personally provided 45 minutes ([phone]) of critical care time, exclusive of procedures, teaching, family meetings, and any prior time recorded by providers other than myself  Milton Mtz PA-C      Portions of the record may have been created with voice recognition software  Occasional wrong word or "sound a like" substitutions may have occurred due to the inherent limitations of voice recognition software    Read the chart carefully and recognize, using context, where substitutions have occurred

## 2022-01-31 NOTE — WOUND OSTOMY CARE
Consult Note - Wound   Eddi Arauz 68 y o  male MRN: [id number]  Unit/Bed#: -01 Encounter: [id number]        History and Present Illness:Wound Care consult done remotely  TT RN and clear media photos and notes  Pt consulted for open draining wounds of posterior left tibia  Pt is seen by [facility] wound care and Dr Stephanie Portillo DPM  Pt has had wound cultured here awaiting results  Pt currently tranferred to  ICU, noted decline  dependent on BIPAP unable to tolerate off BIPAP  If patient is too worsen per notes, family would transition him to comfort care measures  PMH:Pressure injury of sacral region 2018  pressure injury of left buttocks ,Diabetes Mellitus,Venous insufficiency  acute respiratory failure  Assessment Findings:   1) Bilateral lower extremities reportedly weeping with tan drainage noted posterior left tibia  2)POA bialteral buttocks with stage 3 pressure injury, wound beds circular, edges are pale and intact  Wound bed is 50% white 50% pink  3)Sacrum appears red intact  4) Left and right great toes with dry intact eschar       Wounds:  Wound 11/15/21 Pretibial Right;Lateral (Active)       Wound 11/15/21 Heel Left (Active)       Wound 11/15/21 Sacrum (Active)       Wound 01/29/22 Toe (Comment  which one) Anterior;Right (Active)   Wound Image   01/30/22 1700   Wound Description Necrotic 01/31/22 1145   Drainage Amount None 01/31/22 1145   Treatments Elevated 01/31/22 1145       Wound 01/30/22 Venous Ulcer Pretibial Distal;Left (Active)   Wound Image   01/30/22 1700   Wound Description Beefy red;Drainage;Fragile;Edema 01/31/22 1145   Digna-wound Assessment Erythema 01/31/22 1145   Drainage Amount Small 01/31/22 1145   Drainage Description Serosanguineous 01/31/22 1145   Treatments Elevated 01/31/22 1145   Dressing Open to air 01/31/22 1145   Patient Tolerance Tolerated well 01/31/22 0745       Wound 01/30/22 Venous Ulcer Pretibial Distal;Right (Active)   Wound Image   01/30/22 1700   Wound Description Beefy red;Edema;Fragile;Drainage 01/31/22 1145   Digna-wound Assessment Fragile;Erythema 01/31/22 1145   Drainage Amount Small 01/31/22 1145   Drainage Description Serosanguineous 01/31/22 1145   Treatments Elevated 01/31/22 1145       Wound 01/30/22 Buttocks (Active)   Wound Image   01/30/22 1700   Wound Description Beefy red;Dark edges;Fragile 01/31/22 1145   Pressure Injury Stage 2 01/31/22 0745   Digna-wound Assessment Fragile;Erythema 01/31/22 1145   Wound Site Closure Unapproximated 01/31/22 1145   Drainage Amount Small 01/31/22 1145   Drainage Description Serosanguineous 01/31/22 1145   Treatments Site care 01/31/22 0745   Dressing Foam, Silicon (eg  Allevyn, etc) 01/31/22 1145   Patient Tolerance Tolerated well 01/31/22 0745   Dressing Status Clean;Dry; Intact 01/31/22 1145       Wound 01/30/22 Sacrum (Active)   Wound Image    01/30/22 1700   Wound Description Beefy red;Edema;Fragile 01/31/22 1145   Treatments Site care;Elevated 01/31/22 0745   Dressing Foam, Silicon (eg  Allevyn, etc) 01/31/22 1145   Dressing Status Clean;Dry; Intact 01/31/22 1145       Wound 01/30/22 Toe (Comment  which one) (Active)   Wound Image   01/30/22 1700   Wound Description Dark edges; Necrotic 01/31/22 1145   Treatments Cleansed;Elevated 01/31/22 0745   Dressing Open to air 01/31/22 1145           Skin care plans:  1-Calazime to sacrumTID and PRN  2-Hydraguard to bilateral heel BID and PRN  3-Elevate heels to offload pressure  4-Ehob cushion when out of bed  5-Turn/repoisiton q2h or when medically stable for pressure re-distribution on skin  6-Moisturize skin daily with skin nourishing cream  7-Xeroform Allevyn bordered foam to open area on buttocks daily and prn soil or dislodgement  8-Wash bilateral lower extremities with soap and water, pat dry  Apply adaptic gauze then maxorb Ag to open areas cover with ABd and wrap with jaz change daily and prn soil or dislodgement      Call or tigertext with any questions  Wound Care will continue to follow    Jair Braswell

## 2022-01-31 NOTE — ASSESSMENT & PLAN NOTE
· On presentation 2/2 tachycardia and tachypnea  · Lactate 2 4 - cleared  · Currently afebrile with no leukocytosis     · Procalcitonin x3 negative   · Unclear if infectious source, may be non-infectious secondary to volume overload a/e/b pleural effusion  and bilateral LE edema   · Blood cultures pending  · Wound culture MRSA + and gram - rods oxidase +   · Continue on Cefepime, Flagyl, Vanco

## 2022-01-31 NOTE — ASSESSMENT & PLAN NOTE
· Noted on CXR, CT Chest reveals moderate/large R Pleural effusion  · BNP 4,540 thought to be volume overloaded on exam    · Currently being diuresed receiving Lasix 40mg q8h  · Considered Thoracentesis; however with hypercarbia with patient being more somnolent will hold off   · ECHO from Nov 2021 revealed EF 67%, normal systolic function, diastolic function not assessed

## 2022-01-31 NOTE — ASSESSMENT & PLAN NOTE
· On admission in November 2021 found to have positive wound culture and 1/2 + BC for Staph aureus  Treated with 4 weeks of IV vancomycin through 12/11/2021  · Hx of chronic wounds, chronic venous insufficiency, lymphedema     · On exam, bilateral lower extremities with edema L>R, weeping tan drainage, multiple open areas  · Unclear if this is in fact active cellulitis, there is a draining posterior right leg wound  · Wound culture MRSA + and gram neg rods oxidae poastive   · Blood cultures pending, continue Cefepime and Vancomycin given recent history   · Low suspicion for DVT, patient anticoagulated with warfarin   · Consider ID consult   · Consult wound RN, has VNA services for wound care at home

## 2022-02-01 ENCOUNTER — PATIENT OUTREACH (OUTPATIENT)
Dept: CASE MANAGEMENT | Facility: HOSPITAL | Age: 77
End: 2022-02-01

## 2022-02-01 ENCOUNTER — APPOINTMENT (INPATIENT)
Dept: RADIOLOGY | Facility: HOSPITAL | Age: 77
DRG: 186 | End: 2022-02-01
Payer: MEDICARE

## 2022-02-01 LAB
APTT PPP: 55 SECONDS (ref 23–37)
APTT PPP: 63 SECONDS (ref 23–37)
APTT PPP: 65 SECONDS (ref 23–37)
ARTERIAL PATENCY WRIST A: YES
BACTERIA WND AEROBE CULT: ABNORMAL
BASE EXCESS BLDA CALC-SCNC: 21.4 MMOL/L
BASOPHILS # BLD AUTO: 0.12 THOUSANDS/ΜL (ref 0–0.1)
BASOPHILS NFR BLD AUTO: 1 % (ref 0–1)
BUN SERPL-MCNC: 13 MG/DL (ref 5–25)
BUN SERPL-MCNC: 13 MG/DL (ref 5–25)
CALCIUM SERPL-MCNC: 8.2 MG/DL (ref 8.3–10.1)
CALCIUM SERPL-MCNC: 8.3 MG/DL (ref 8.3–10.1)
CHLORIDE SERPL-SCNC: 95 MMOL/L (ref 100–108)
CHLORIDE SERPL-SCNC: 95 MMOL/L (ref 100–108)
CO2 SERPL-SCNC: >45 MMOL/L (ref 21–32)
CO2 SERPL-SCNC: >45 MMOL/L (ref 21–32)
CREAT SERPL-MCNC: 0.84 MG/DL (ref 0.6–1.3)
CREAT SERPL-MCNC: 0.86 MG/DL (ref 0.6–1.3)
EOSINOPHIL # BLD AUTO: 0.17 THOUSAND/ΜL (ref 0–0.61)
EOSINOPHIL NFR BLD AUTO: 2 % (ref 0–6)
ERYTHROCYTE [DISTWIDTH] IN BLOOD BY AUTOMATED COUNT: 19.3 % (ref 11.6–15.1)
GFR SERPL CREATININE-BSD FRML MDRD: 84 ML/MIN/1.73SQ M
GFR SERPL CREATININE-BSD FRML MDRD: 85 ML/MIN/1.73SQ M
GLUCOSE SERPL-MCNC: 78 MG/DL (ref 65–140)
GLUCOSE SERPL-MCNC: 79 MG/DL (ref 65–140)
GLUCOSE SERPL-MCNC: 79 MG/DL (ref 65–140)
GLUCOSE SERPL-MCNC: 80 MG/DL (ref 65–140)
GLUCOSE SERPL-MCNC: 82 MG/DL (ref 65–140)
GLUCOSE SERPL-MCNC: 83 MG/DL (ref 65–140)
GLUCOSE SERPL-MCNC: 98 MG/DL (ref 65–140)
GRAM STN SPEC: ABNORMAL
GRAM STN SPEC: ABNORMAL
HCO3 BLDA-SCNC: 50.5 MMOL/L (ref 22–28)
HCT VFR BLD AUTO: 36.7 % (ref 36.5–49.3)
HGB BLD-MCNC: 10.6 G/DL (ref 12–17)
IMM GRANULOCYTES # BLD AUTO: 0.03 THOUSAND/UL (ref 0–0.2)
IMM GRANULOCYTES NFR BLD AUTO: 0 % (ref 0–2)
LYMPHOCYTES # BLD AUTO: 1.03 THOUSANDS/ΜL (ref 0.6–4.47)
LYMPHOCYTES NFR BLD AUTO: 11 % (ref 14–44)
MAGNESIUM SERPL-MCNC: 2 MG/DL (ref 1.6–2.6)
MAGNESIUM SERPL-MCNC: 2 MG/DL (ref 1.6–2.6)
MCH RBC QN AUTO: 26.8 PG (ref 26.8–34.3)
MCHC RBC AUTO-ENTMCNC: 28.9 G/DL (ref 31.4–37.4)
MCV RBC AUTO: 93 FL (ref 82–98)
MONOCYTES # BLD AUTO: 1.45 THOUSAND/ΜL (ref 0.17–1.22)
MONOCYTES NFR BLD AUTO: 15 % (ref 4–12)
NEUTROPHILS # BLD AUTO: 6.63 THOUSANDS/ΜL (ref 1.85–7.62)
NEUTS SEG NFR BLD AUTO: 71 % (ref 43–75)
NRBC BLD AUTO-RTO: 0 /100 WBCS
O2 CT BLDA-SCNC: 14.9 ML/DL (ref 16–23)
OTHER FIO2: 40 %
OXYHGB MFR BLDA: 91.6 % (ref 94–97)
PCO2 BLDA: 84.1 MM HG (ref 36–44)
PH BLDA: 7.4 [PH] (ref 7.35–7.45)
PLATELET # BLD AUTO: 403 THOUSANDS/UL (ref 149–390)
PMV BLD AUTO: 9.7 FL (ref 8.9–12.7)
PO2 BLDA: 72.1 MM HG (ref 75–129)
POTASSIUM SERPL-SCNC: 3.4 MMOL/L (ref 3.5–5.3)
POTASSIUM SERPL-SCNC: 3.8 MMOL/L (ref 3.5–5.3)
RBC # BLD AUTO: 3.96 MILLION/UL (ref 3.88–5.62)
SODIUM SERPL-SCNC: 141 MMOL/L (ref 136–145)
SODIUM SERPL-SCNC: 142 MMOL/L (ref 136–145)
SPECIMEN SOURCE: ABNORMAL
WBC # BLD AUTO: 9.43 THOUSAND/UL (ref 4.31–10.16)

## 2022-02-01 PROCEDURE — NC001 PR NO CHARGE: Performed by: INTERNAL MEDICINE

## 2022-02-01 PROCEDURE — 85025 COMPLETE CBC W/AUTO DIFF WBC: CPT | Performed by: PHYSICIAN ASSISTANT

## 2022-02-01 PROCEDURE — G0427 INPT/ED TELECONSULT70: HCPCS | Performed by: INTERNAL MEDICINE

## 2022-02-01 PROCEDURE — 94640 AIRWAY INHALATION TREATMENT: CPT

## 2022-02-01 PROCEDURE — 82805 BLOOD GASES W/O2 SATURATION: CPT | Performed by: NURSE PRACTITIONER

## 2022-02-01 PROCEDURE — 85730 THROMBOPLASTIN TIME PARTIAL: CPT | Performed by: ANESTHESIOLOGY

## 2022-02-01 PROCEDURE — 36600 WITHDRAWAL OF ARTERIAL BLOOD: CPT

## 2022-02-01 PROCEDURE — 71045 X-RAY EXAM CHEST 1 VIEW: CPT

## 2022-02-01 PROCEDURE — 94760 N-INVAS EAR/PLS OXIMETRY 1: CPT

## 2022-02-01 PROCEDURE — 94660 CPAP INITIATION&MGMT: CPT

## 2022-02-01 PROCEDURE — 80048 BASIC METABOLIC PNL TOTAL CA: CPT | Performed by: NURSE PRACTITIONER

## 2022-02-01 PROCEDURE — 80048 BASIC METABOLIC PNL TOTAL CA: CPT | Performed by: PHYSICIAN ASSISTANT

## 2022-02-01 PROCEDURE — 82948 REAGENT STRIP/BLOOD GLUCOSE: CPT

## 2022-02-01 PROCEDURE — 94003 VENT MGMT INPAT SUBQ DAY: CPT

## 2022-02-01 PROCEDURE — 83735 ASSAY OF MAGNESIUM: CPT | Performed by: NURSE PRACTITIONER

## 2022-02-01 PROCEDURE — 99233 SBSQ HOSP IP/OBS HIGH 50: CPT | Performed by: STUDENT IN AN ORGANIZED HEALTH CARE EDUCATION/TRAINING PROGRAM

## 2022-02-01 RX ORDER — ACETAZOLAMIDE 500 MG/5ML
500 INJECTION, POWDER, LYOPHILIZED, FOR SOLUTION INTRAVENOUS ONCE
Status: COMPLETED | OUTPATIENT
Start: 2022-02-01 | End: 2022-02-01

## 2022-02-01 RX ORDER — POTASSIUM CHLORIDE 14.9 MG/ML
20 INJECTION INTRAVENOUS
Status: COMPLETED | OUTPATIENT
Start: 2022-02-01 | End: 2022-02-01

## 2022-02-01 RX ORDER — DEXTROSE, SODIUM CHLORIDE, SODIUM LACTATE, POTASSIUM CHLORIDE, AND CALCIUM CHLORIDE 5; .6; .31; .03; .02 G/100ML; G/100ML; G/100ML; G/100ML; G/100ML
50 INJECTION, SOLUTION INTRAVENOUS CONTINUOUS
Status: DISCONTINUED | OUTPATIENT
Start: 2022-02-01 | End: 2022-02-01

## 2022-02-01 RX ORDER — FENTANYL CITRATE 50 UG/ML
25 INJECTION, SOLUTION INTRAMUSCULAR; INTRAVENOUS ONCE
Status: COMPLETED | OUTPATIENT
Start: 2022-02-01 | End: 2022-02-01

## 2022-02-01 RX ORDER — POTASSIUM CHLORIDE 14.9 MG/ML
20 INJECTION INTRAVENOUS ONCE
Status: COMPLETED | OUTPATIENT
Start: 2022-02-01 | End: 2022-02-01

## 2022-02-01 RX ORDER — FUROSEMIDE 10 MG/ML
40 INJECTION INTRAMUSCULAR; INTRAVENOUS
Status: DISCONTINUED | OUTPATIENT
Start: 2022-02-01 | End: 2022-02-04

## 2022-02-01 RX ADMIN — FUROSEMIDE 40 MG: 10 INJECTION, SOLUTION INTRAVENOUS at 16:17

## 2022-02-01 RX ADMIN — ACETAZOLAMIDE SODIUM 500 MG: 500 INJECTION, POWDER, LYOPHILIZED, FOR SOLUTION INTRAVENOUS at 17:28

## 2022-02-01 RX ADMIN — FUROSEMIDE 40 MG: 10 INJECTION, SOLUTION INTRAVENOUS at 06:16

## 2022-02-01 RX ADMIN — FAMOTIDINE 20 MG: 10 INJECTION INTRAVENOUS at 22:28

## 2022-02-01 RX ADMIN — POTASSIUM CHLORIDE 20 MEQ: 14.9 INJECTION, SOLUTION INTRAVENOUS at 11:13

## 2022-02-01 RX ADMIN — FAMOTIDINE 20 MG: 10 INJECTION INTRAVENOUS at 10:21

## 2022-02-01 RX ADMIN — SODIUM CHLORIDE SOLN NEBU 3% 4 ML: 3 NEBU SOLN at 02:28

## 2022-02-01 RX ADMIN — POTASSIUM CHLORIDE 20 MEQ: 14.9 INJECTION, SOLUTION INTRAVENOUS at 09:02

## 2022-02-01 RX ADMIN — POTASSIUM CHLORIDE 20 MEQ: 14.9 INJECTION, SOLUTION INTRAVENOUS at 07:28

## 2022-02-01 RX ADMIN — HEPARIN SODIUM 2000 UNITS: 1000 INJECTION INTRAVENOUS; SUBCUTANEOUS at 04:14

## 2022-02-01 RX ADMIN — SODIUM CHLORIDE SOLN NEBU 3% 4 ML: 3 NEBU SOLN at 19:58

## 2022-02-01 RX ADMIN — HEPARIN SODIUM 13.1 UNITS/KG/HR: 10000 INJECTION, SOLUTION INTRAVENOUS at 17:28

## 2022-02-01 RX ADMIN — WATER 10 ML: 1 INJECTION INTRAMUSCULAR; INTRAVENOUS; SUBCUTANEOUS at 17:46

## 2022-02-01 RX ADMIN — POTASSIUM CHLORIDE 20 MEQ: 14.9 INJECTION, SOLUTION INTRAVENOUS at 17:28

## 2022-02-01 RX ADMIN — SODIUM CHLORIDE SOLN NEBU 3% 4 ML: 3 NEBU SOLN at 07:49

## 2022-02-01 RX ADMIN — BUDESONIDE 0.5 MG: 0.5 INHALANT ORAL at 19:58

## 2022-02-01 RX ADMIN — FENTANYL CITRATE 25 MCG: 0.05 INJECTION, SOLUTION INTRAMUSCULAR; INTRAVENOUS at 06:33

## 2022-02-01 RX ADMIN — SODIUM CHLORIDE SOLN NEBU 3% 4 ML: 3 NEBU SOLN at 13:45

## 2022-02-01 RX ADMIN — BUDESONIDE 0.5 MG: 0.5 INHALANT ORAL at 07:49

## 2022-02-01 RX ADMIN — Medication 1 APPLICATION: at 08:31

## 2022-02-01 NOTE — RESPIRATORY THERAPY NOTE
RT Ventilator Management Note  Cadence Malcolm 68 y o  male MRN: [id number]  Unit/Bed#: -01 Encounter: [id number]      Daily Screen    No data found in the last 10 encounters  Physical Exam:          Pt received on bipap 16/6 @ 70% (titrated to 45%) for decreased MS  Pt trialed off bipap on previous shift with poor results- pt requiring continuous bipap  Pt able to answer some basic questions but remains lethargic  VSS/SpO2 96%

## 2022-02-01 NOTE — ASSESSMENT & PLAN NOTE
· Noted on CXR, CT Chest reveals moderate/large R Pleural effusion  · BNP 4,540 thought to be volume overloaded on exam    · Currently being diuresed receiving Lasix 40mg IV q8h  · Considered Thoracentesis; however with hypercarbia with patient being more somnolent will hold off   · ECHO from Nov 2021 revealed EF 24%, normal systolic function, diastolic function not assessed

## 2022-02-01 NOTE — ASSESSMENT & PLAN NOTE
· Noted on CXR, CT Chest reveals moderate/large R Pleural effusion  · BNP 4,540 thought to be volume overloaded on exam    · Currently being diuresed receiving Lasix 40mg IV BID  · Considered Thoracentesis; however with hypercarbia with patient being more somnolent will hold off   · ECHO from Nov 2021 revealed EF 55%, normal systolic function, diastolic function not assessed

## 2022-02-01 NOTE — PLAN OF CARE
Problem: Potential for Falls  Goal: Patient will remain free of falls  Description: INTERVENTIONS:  - Educate patient/family on patient safety including physical limitations  - Instruct patient to call for assistance with activity   - Consult OT/PT to assist with strengthening/mobility   - Keep Call bell within reach  - Keep bed low and locked with side rails adjusted as appropriate  - Keep care items and personal belongings within reach  - Initiate and maintain comfort rounds  - Make Fall Risk Sign visible to staff  - Offer Toileting every 2 Hours, in advance of need  - Initiate/Maintain BED alarm  - Obtain necessary fall risk management equipment: bed alarm  - Apply yellow socks and bracelet for high fall risk patients  - Consider moving patient to room near nurses station  Outcome: Progressing     Problem: MOBILITY - ADULT  Goal: Maintain or return to baseline ADL function  Description: INTERVENTIONS:  -  Assess patient's ability to carry out ADLs; assess patient's baseline for ADL function and identify physical deficits which impact ability to perform ADLs (bathing, care of mouth/teeth, toileting, grooming, dressing, etc )  - Assess/evaluate cause of self-care deficits   - Assess range of motion  - Assess patient's mobility; develop plan if impaired  - Assess patient's need for assistive devices and provide as appropriate  - Encourage maximum independence but intervene and supervise when necessary  - Involve family in performance of ADLs  - Assess for home care needs following discharge   - Consider OT consult to assist with ADL evaluation and planning for discharge  - Provide patient education as appropriate  Outcome: Progressing  Goal: Maintains/Returns to pre admission functional level  Description: INTERVENTIONS:  - Perform BMAT or MOVE assessment daily    - Set and communicate daily mobility goal to care team and patient/family/caregiver     - Collaborate with rehabilitation services on mobility goals if consulted  - Perform Range of Motion 3 times a day  - Reposition patient every 2 hours    - Dangle patient 3 times a day  - Stand patient 3 times a day  - Ambulate patient 3 times a day  - Out of bed to [id number] a day   - Out of bed for meals 3 times a day  - Out of bed for toileting  - Record patient progress and toleration of activity level   Outcome: Progressing     Problem: PAIN - ADULT  Goal: Verbalizes/displays adequate comfort level or baseline comfort level  Description: Interventions:  - Encourage patient to monitor pain and request assistance  - Assess pain using appropriate pain scale  - Administer analgesics based on type and severity of pain and evaluate response  - Implement non-pharmacological measures as appropriate and evaluate response  - Consider cultural and social influences on pain and pain management  - Notify physician/advanced practitioner if interventions unsuccessful or patient reports new pain  Outcome: Progressing     Problem: INFECTION - ADULT  Goal: Absence or prevention of progression during hospitalization  Description: INTERVENTIONS:  - Assess and monitor for signs and symptoms of infection  - Monitor lab/diagnostic results  - Monitor all insertion sites, i e  indwelling lines, tubes, and drains  - Monitor endotracheal if appropriate and nasal secretions for changes in amount and color  - [place] appropriate cooling/warming therapies per order  - Administer medications as ordered  - Instruct and encourage patient and family to use good hand hygiene technique  - Identify and instruct in appropriate isolation precautions for identified infection/condition  Outcome: Progressing     Problem: DISCHARGE PLANNING  Goal: Discharge to home or other facility with appropriate resources  Description: INTERVENTIONS:  - Identify barriers to discharge w/patient and caregiver  - Arrange for needed discharge resources and transportation as appropriate  - Identify discharge learning needs (meds, wound care, etc )  - Arrange for interpretive services to assist at discharge as needed  - Refer to Case Management Department for coordinating discharge planning if the patient needs post-hospital services based on physician/advanced practitioner order or complex needs related to functional status, cognitive ability, or social support system  Outcome: Progressing     Problem: Knowledge Deficit  Goal: Patient/family/caregiver demonstrates understanding of disease process, treatment plan, medications, and discharge instructions  Description: Complete learning assessment and assess knowledge base  Interventions:  - Provide teaching at level of understanding  - Provide teaching via preferred learning methods  Outcome: Progressing     Problem: RESPIRATORY - ADULT  Goal: Achieves optimal ventilation and oxygenation  Description: INTERVENTIONS:  - Assess for changes in respiratory status  - Assess for changes in mentation and behavior  - Position to facilitate oxygenation and minimize respiratory effort  - Oxygen administered by appropriate delivery if ordered  - Initiate smoking cessation education as indicated  - Encourage broncho-pulmonary hygiene including cough, deep breathe, Incentive Spirometry  - Assess the need for suctioning and aspirate as needed  - Assess and instruct to report SOB or any respiratory difficulty  - Respiratory Therapy support as indicated  Outcome: Progressing     Problem: Nutrition/Hydration-ADULT  Goal: Nutrient/Hydration intake appropriate for improving, restoring or maintaining nutritional needs  Description: Monitor and assess patient's nutrition/hydration status for malnutrition  Collaborate with interdisciplinary team and initiate plan and interventions as ordered  Monitor patient's weight and dietary intake as ordered or per policy  Utilize nutrition screening tool and intervene as necessary   Determine patient's food preferences and provide high-protein, high-caloric foods as appropriate       INTERVENTIONS:  - Monitor oral intake, urinary output, labs, and treatment plans  - Assess nutrition and hydration status and recommend course of action  - Evaluate amount of meals eaten  - Assist patient with eating if necessary   - Allow adequate time for meals  - Recommend/ encourage appropriate diets, oral nutritional supplements, and vitamin/mineral supplements  - Order, calculate, and assess calorie counts as needed  - Recommend, monitor, and adjust tube feedings and TPN/PPN based on assessed needs  - Assess need for intravenous fluids  - Provide specific nutrition/hydration education as appropriate  - Include patient/family/caregiver in decisions related to nutrition  Outcome: Progressing

## 2022-02-01 NOTE — PLAN OF CARE
Problem: Potential for Falls  Goal: Patient will remain free of falls  Description: INTERVENTIONS:  - Educate patient/family on patient safety including physical limitations  - Instruct patient to call for assistance with activity   - Consult OT/PT to assist with strengthening/mobility   - Keep Call bell within reach  - Keep bed low and locked with side rails adjusted as appropriate  - Keep care items and personal belongings within reach  - Initiate and maintain comfort rounds  - Make Fall Risk Sign visible to staff  - Offer Toileting every 2 Hours, in advance of need  - Initiate/Maintain BED alarm  - Obtain necessary fall risk management equipment: bed alarm  - Apply yellow socks and bracelet for high fall risk patients  - Consider moving patient to room near nurses station  Outcome: Progressing     Problem: MOBILITY - ADULT  Goal: Maintain or return to baseline ADL function  Description: INTERVENTIONS:  -  Assess patient's ability to carry out ADLs; assess patient's baseline for ADL function and identify physical deficits which impact ability to perform ADLs (bathing, care of mouth/teeth, toileting, grooming, dressing, etc )  - Assess/evaluate cause of self-care deficits   - Assess range of motion  - Assess patient's mobility; develop plan if impaired  - Assess patient's need for assistive devices and provide as appropriate  - Encourage maximum independence but intervene and supervise when necessary  - Involve family in performance of ADLs  - Assess for home care needs following discharge   - Consider OT consult to assist with ADL evaluation and planning for discharge  - Provide patient education as appropriate  Outcome: Progressing  Goal: Maintains/Returns to pre admission functional level  Description: INTERVENTIONS:  - Perform BMAT or MOVE assessment daily    - Set and communicate daily mobility goal to care team and patient/family/caregiver     - Collaborate with rehabilitation services on mobility goals if consulted  - Perform Range of Motion 3 times a day  - Reposition patient every 2 hours    - Dangle patient 3 times a day  - Stand patient 3 times a day  - Ambulate patient 3 times a day  - Out of bed to [id number] a day   - Out of bed for meals 3 times a day  - Out of bed for toileting  - Record patient progress and toleration of activity level   Outcome: Progressing     Problem: PAIN - ADULT  Goal: Verbalizes/displays adequate comfort level or baseline comfort level  Description: Interventions:  - Encourage patient to monitor pain and request assistance  - Assess pain using appropriate pain scale  - Administer analgesics based on type and severity of pain and evaluate response  - Implement non-pharmacological measures as appropriate and evaluate response  - Consider cultural and social influences on pain and pain management  - Notify physician/advanced practitioner if interventions unsuccessful or patient reports new pain  Outcome: Progressing     Problem: INFECTION - ADULT  Goal: Absence or prevention of progression during hospitalization  Description: INTERVENTIONS:  - Assess and monitor for signs and symptoms of infection  - Monitor lab/diagnostic results  - Monitor all insertion sites, i e  indwelling lines, tubes, and drains  - Monitor endotracheal if appropriate and nasal secretions for changes in amount and color  - [place] appropriate cooling/warming therapies per order  - Administer medications as ordered  - Instruct and encourage patient and family to use good hand hygiene technique  - Identify and instruct in appropriate isolation precautions for identified infection/condition  Outcome: Progressing     Problem: DISCHARGE PLANNING  Goal: Discharge to home or other facility with appropriate resources  Description: INTERVENTIONS:  - Identify barriers to discharge w/patient and caregiver  - Arrange for needed discharge resources and transportation as appropriate  - Identify discharge learning needs (meds, wound care, etc )  - Arrange for interpretive services to assist at discharge as needed  - Refer to Case Management Department for coordinating discharge planning if the patient needs post-hospital services based on physician/advanced practitioner order or complex needs related to functional status, cognitive ability, or social support system  Outcome: Progressing

## 2022-02-01 NOTE — OCCUPATIONAL THERAPY NOTE
Occupational Therapy Cancellation Note         Patient Name: Michelle Page  ZSATJ'D Date: 2/1/2022  Problem List  Principal Problem:    Acute on chronic respiratory failure ([facility] [address] )  Active Problems:    Paroxysmal A-fib (HCC)    COPD, moderate (HCC)    Sleep apnea    Multifocal pneumonia    Venous stasis dermatitis of both lower extremities    Ambulatory dysfunction    SIRS (systemic inflammatory response syndrome) (HCC)    Pleural effusion          02/01/22 0907   Note Type   Note type Evaluation   Cancel Reasons Medical status       Chart review completed  Attempted to see Pt for OT evaluation this AM, however PT remains on continuous BiPAP  Pt was trialed off BiPAP with rapid decompensation noted and was then returned onto BiPAP  Pt's medical status continues to be poor and Pt is not appropriate for OT services at this time  Will continue to follow and address as medically appropriate and as schedule allows         ANAI Uriostegui/KATHLEEN

## 2022-02-01 NOTE — PHYSICAL THERAPY NOTE
PHYSICAL THERAPY NOTE          Patient Name: Nacho Padgett  CQTSH'O Date: 2/1/2022 02/01/22 4962   Note Type   Note type Evaluation   Cancel Reasons Medical status     Chart reviewed  Spoke with RN, Yousuf Sotelo regarding patient's status  Patient remains on continuous BiPAP  Patient's medical status continues to be poor and at this time patient is not appropriate to participate in PT services  Will continue to follow and see patient as medically appropriate at a later time       Kerrie Donovan, PT,DPT

## 2022-02-01 NOTE — ASSESSMENT & PLAN NOTE
· Continue Telemetry monitoring  · EKG revealed Afib with     · Currently not on rate control medication   · PRN Lopressor for rate control  · Continue warfarin 4 mg- not taking PO meds will change to heparin gtt 1/31

## 2022-02-01 NOTE — TELEMEDICINE
REQUIRED DOCUMENTATION:     1  This service was provided via Telemedicine  2  Provider located at [facility]  3  TeleMed provider: Rosalio Kocher, MD   4  Identify all parties in room with patient during tele consult:  Patient, Wu Holman RN  5  After connecting through MediCard, patient was identified by name and date of birth and assistant checked wristband  Patient was then informed that this was a Telemedicine visit and that the exam was being conducted confidentially over secure lines  Patient acknowledged consent and understanding of privacy and security of the Telemedicine visit, and gave us permission to have the assistant stay in the room in order to assist with the history and to conduct the exam   I informed the patient that I have reviewed their record in Epic and presented the opportunity for them to ask any questions regarding the visit today  The patient agreed to participate  TeleConsultation - Infectious Disease   Edie Bradley 68 y o  male MRN: [id number]  Unit/Bed#: -01 Encounter: [id number]      IMPRESSION & RECOMMENDATIONS:   This is a 51-year-old male with multiple medical problems including O2 dependent COPD, CVA and venous stasis with chronic leg ulcers, admitted on 01/29 with acute hypoxic respiratory failure secondary to pulmonary edema  He also has increased drainage from right leg wound  1  Chronic bilateral leg ulcers, with increased drainage in the right leg ulcer/wound  Patient with extensive venous stasis changes but no evidence of cellulitis clinically  Superficial wound culture with growth of Pseudomonas, MRSA, Enterobacter and beta-hemolytic Streptococcus is likely ulcer/wound colonization  Patient has no fever or leukocytosis    Given presence of colonization with multiple resistant pathogens, I suspect that patient has received multiple courses of antibiotics in the past   At present, without evidence of cellulitis, I would just continue local wound care, without any antibiotic  This way, we will not propagate resistance and avoid potential antibiotic toxicities  Observe off further antibiotic  Local wound care  Serial exams  Monitor temperature/WBC  2  Acute on chronic hypoxic respiratory failure, most likely secondary to pulmonary edema  Patient with some improvement from diuresis  CXR and chest CT without obvious pneumonia  Multiple Procalcitonins have been in the normal range  Overall, doubt pneumonia clinically  Observe off further antibiotics  Diuresis per primary service  3  Moderate right pleural effusion, without loculation  I suspect this is secondary to pulmonary edema  Consider thoracentesis for symptomatic relief, per primary service  4  Venous stasis, with quite poorly controlled edema  This is likely etiology of poor wound healing  Keep legs elevated to control edema  Extensive review of the medical records in epic including review of the notes, radiographs, and laboratory results    Discussed with patient above plan  Discussed the above management plan detail primary service    I spent 70 minutes in evaluation of the patient of which 40 minutes was in counseling/coordination of care    HISTORY OF PRESENT ILLNESS:  Reason for Consult:  Leg wounds/ulcers, with polymicrobial microbial growth in culture  HPI: Daisy Faust is a 68y o  year old male, with multiple medical problems including O2 dependent COPD, CVA, venous stasis with chronic leg ulcers, was brought into ER by family on 01/29 with dyspnea at home and draining right leg wound  On presentation, patient did not have fever but had mild leukocytosis  CXR was more consistent with pulmonary edema  Procalcitonin was in the normal range Patient was admitted and started on vancomycin/cefepime/azithromycin for presumptive pneumonia  He was also started on diuresis      Patient was subsequently transferred to ICU on 01/30 for worsening acute hypoxic respiratory failure  Diuresis was continued  Repeat procalcitonin remains in the normal range  Therefore, all antibiotics were discontinued yesterday  Today, right leg wound culture with polymicrobial growth  For these reasons, we are asked to evaluate the patient  At present, patient has face mask in place  He has mild confusion  He does have dyspnea  He does have mild bilateral leg pains when palpated  No pain at rest     It appears the patient has chronic leg ulcers  He is being followed by [facility] for dressing changes  REVIEW OF SYSTEMS:  A complete 12 point system-based review of systems is negative other than that noted in the HPI  Of note, due to patient's confusion, review of system is not entirely reliable      PAST MEDICAL HISTORY:  Past Medical History:   Diagnosis Date    A-fib [facility])     Ambulates with cane     Anxiety     Arthritis     At risk for falls     Cellulitis     right leg    Chronic bronchitis (HCC)     RESOLVED: 3/26/15    Chronic obstructive lung disease ([address] )     RESOLVED: 9/7/16    [facility]     Depression     Edema     RESOLVED: 9/21/15    GERD (gastroesophageal reflux disease)     Hard of hearing     Hyperlipidemia     Hypertension     Irregular heart beat     Afib    Knee pain, bilateral     Leukocytosis     RESOLVED: 9/21/15    LLL pneumonia     RESOLVED: 6/20/17    Methicillin resistant Staphylococcus aureus infection     RESOLVED: 9/21/15    Pneumonia     RESOLVED: 6/20/17    Skin abnormality     Left pinky toe has a wound due to nail cutting by a provider - being followed -Dr Lizz Miramontes aware    Sleep apnea     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     Stroke ([address] )     Venous ulcers of both lower extremities ([address] )    Derl Bologna as ambulation aid     Weakness of right hand     Wears dentures     upper only    Wears glasses      Past Surgical History:   Procedure Laterality Date    CATARACT EXTRACTION Bilateral     COLONOSCOPY      ESOPHAGOGASTRODUODENOSCOPY      FOOT SURGERY Left     SPLENECTOMY, TOTAL      SURGERY OF THE SPLEEN    TONSILLECTOMY         FAMILY HISTORY:  Non-contributory    SOCIAL HISTORY:  Social History   Social History     Substance and Sexual Activity   Alcohol Use Never     Social History     Substance and Sexual Activity   Drug Use No     Social History     Tobacco Use   Smoking Status Former Smoker    Quit date: 1/15/1995    Years since quittin 0   Smokeless Tobacco Never Used       ALLERGIES:  No Known Allergies    MEDICATIONS:  All current active medications have been reviewed  Patient is currently not on any antibiotic  PHYSICAL EXAM:  Temp:  [97 7 °F (36 5 °C)-99 °F (37 2 °C)] 98 1 °F (36 7 °C)  HR:  [79-91] 91  Resp:  [12-26] 26  BP: ()/(51-61) 127/59  SpO2:  [91 %-98 %] 93 %  Temp (24hrs), Av 2 °F (36 8 °C), Min:97 7 °F (36 5 °C), Max:99 °F (37 2 °C)  Current: Temperature: 98 1 °F (36 7 °C)    Intake/Output Summary (Last 24 hours) at 2022 1359  Last data filed at 2022 1153  Gross per 24 hour   Intake 384 94 ml   Output 4646 ml   Net -4261 06 ml        Physical exam has been primarily done by the patient's nurse and/or the primary service due to limited examination abilities on telemedicine    General Appearance:  Appearing well, nontoxic, and in no distress   Head:  Normocephalic, without obvious abnormality, atraumatic   Eyes:  Conjunctiva pink and sclera anicteric, both eyes   Nose: Nares normal, mucosa normal, no drainage   Throat: Oropharynx moist without lesions   Neck: Supple, symmetrical, no adenopathy   Back:   Symmetric, no curvature, ROM normal, no CVA tenderness   Lungs: Tachypnea    Decreased breath sounds on right, diffuse rhonchi, respirations labored   Chest Wall:  No tenderness or deformity   Heart:  RRR; no murmur, rub or gallop   Abdomen:   Soft, non-tender, non-distended, positive bowel sounds,    Extremities: 1-2+ leg edema  Extensive venous stasis changes  Multiple superficial ulcers, without purulence  Mild tenderness  Skin: No rashes or lesions  No draining wounds noted  Lymph nodes: Cervical, supraclavicular nodes normal   Neurologic: Alert and oriented times 3, extremity strength 5/5 and symmetric       LABS, IMAGING, & OTHER STUDIES:  Lab Results:  I have personally reviewed pertinent labs  Results from last 7 days   Lab Units 02/01/22  0622 01/31/22  2018 01/31/22  0505   WBC Thousand/uL 9 43 9 63 8 72   HEMOGLOBIN g/dL 10 6* 10 1* 9 6*   PLATELETS Thousands/uL 403* 409* 394*     Results from last 7 days   Lab Units 02/01/22  0622 01/31/22  0408 01/30/22  0500 01/29/22  0544 01/29/22  0544 01/28/22 2122 01/28/22 2122   POTASSIUM mmol/L 3 4*   < > 3 5   < > 4 0   < > 4 6   CHLORIDE mmol/L 95*   < > 97*   < > 99*   < > 98*   CO2 mmol/L >45*   < > 38*   < > 35*   < > 36*   BUN mg/dL 13   < > 14   < > 16   < > 14   CREATININE mg/dL 0 86   < > 0 95   < > 0 95   < > 1 01   EGFR ml/min/1 73sq m 84   < > 77   < > 77   < > 71   CALCIUM mg/dL 8 2*   < > 8 1*   < > 7 6*   < > 8 1*   AST U/L  --   --  26  --  24  --  36   ALT U/L  --   --  58   < > 60   < > 73   ALK PHOS U/L  --   --  74   < > 73   < > 81    < > = values in this interval not displayed  Results from last 7 days   Lab Units 01/29/22  1547 01/29/22  0208 01/28/22 2122 01/28/22 2116   BLOOD CULTURE   --   --  No Growth at 48 hrs  No Growth at 48 hrs  GRAM STAIN RESULT   --   --  No Polys*  1+ Gram positive cocci in pairs*  --    WOUND CULTURE   --   --  4+ Growth of Pseudomonas aeruginosa*  4+ Growth of Methicillin Resistant Staphylococcus aureus*  4+ Growth of Enterobacter cloacae complex*  4+ Growth of Streptococcus dysgalactiae*  --    MRSA CULTURE ONLY   --  Methicillin Resistant Staphylococcus aureus isolated*  This patient requires contact isolation precautions per [place] law   Contact precautions are not required in [place] for nasal surveillance cultures  --   --    LEGIONELLA URINARY ANTIGEN  Negative  --   --   --      Results from last 7 days   Lab Units 01/31/22  0408 01/30/22  0500 01/29/22  0544 01/28/22 2122   PROCALCITONIN ng/ml 0 17 0 18 0 17 0 14             Results from last 7 days   Lab Units 01/28/22 2122   D-DIMER QUANTITATIVE ug/ml FEU 1 80*       Imaging Studies:   I have personally reviewed pertinent imaging study reports and images in PACS  CXR reviewed personally  Pulmonary edema  No consolidations  Head CT reviewed personally  No acute changes  Chest CT reviewed personally  Large right pleural effusion  Right-sided atelectasis  No obvious consolidation  Other Studies:   I have personally reviewed pertinent reports

## 2022-02-01 NOTE — SPEECH THERAPY NOTE
Speech Language/Pathology      Chart reviewed  Attempted to see pt for swallow evaluation  Spoke w/ RN  Pt progressed to venti-mask however still decompensating when mask removed for short intervals of time  Pt's medical status poor and not appropriate for ST eval at this time  Will continue to follow pt on caseload in order to initiate formal ST evaluation pending further medical stability      Anu Sandoval Quinten 87 CCC-SLP  2/1/2022

## 2022-02-01 NOTE — PROGRESS NOTES
[address]  Progress Note - Javi Runner 1945, 68 y o  male MRN: [id number]  Unit/Bed#: -01 Encounter: [id number]  Primary Care Provider: Ebony Ridley DO   Date and time admitted to hospital: 1/28/2022  9:02 PM    * Acute on chronic respiratory failure [facility])  Assessment & Plan  Patient is a 68year old male whom presented on evening of 1/28 with a chief complaint of Shortness of breath with increasing home O2 needs as well as R LE drainage  He has a PMH of COPD wearing 2-3 L at baseline, Afib on Coumadin, ANA LAURA, as well as a hospitalization Nov 11-22, 2021 secondary to COVID Pneumonia  Family reports steady decline ever since having COVID, "He isn't himself and keeps getting worse"    Since admission workup has included labs, CXR consistent with B/L pleural effusions, as well as CT Chest revealing Increasing moderate to large right pleural effusion  Collapse of the right lower lobe attributable to combination of compressive atelectasis from effusion, resorptive atelectasis from mucous plugging, and (possibly) pneumonia  Scattered bilateral peripheral consolidative opacities, left basilar peribronchial thickening, and new fluid level within left lower lobe pneumatocyst   These findings may suggest multifocal infection  Chronic, low level mediastinal or hilar lymphadenopathy appears similar to November  Treatment has included intermittent BiPAP use as well as IV diuresis with potential for thoracentesis  Also receiving Cefepime / Flagyl / Vanco  - Leg wound staph aureus, MRSA pending  Throughout the day on 1/30 patient less responsive as well as noted to be increasingly more Hypoxic  ABG obtained revealing 7 37/76/110    Patient requiring BiPAP more continuously    · Continue Telemetry and Hemodynamic monitoring  · Continual SpO2 monitoring and maintain >88% - wean FiO2 as tolerated  · Hypercarbic Respiratory failure, continue BiPAP trailed off BiPAP 1/31 am with rapid decompensation / hypoxia - Back on BiPAP Rate 20  16/6  · COVID flu RSV negative on admission  · Recent admission in Nov 2021 for COVID-19 pneumonia  · Low suspicion for acute PE given patient is anticoagulated on warfarin with therapeutic INR 2 4    · 1/30 Family at bedside, extensive conversation during which daughter advised her father would never want breathing tube or any machines to keep him alive - Code status will be updated to a Level 3  · 1/31 Further family discussions, they are concerned with lack of improvement, would like to give additional 24-48 hours and consider comfort measures if no improvement    Multifocal pneumonia  Assessment & Plan  · Xray chest suggestive of pneumonia     · CT chest shows " Scattered bilateral peripheral consolidative opacities, left basilar peribronchial  thickening, and new fluid level within left lower lobe pneumatocyst   These findings may suggest multifocal infection "   · Urine Strep / Legionella Negative  · MRSA nares Positive  · Leg Culture positive Staph - monitor closely  · Sputum culture ordered  · Procalcitonin x2 negative   · DRIP score 7, High risk MDRO (recent IV Abx, recent hospitalization, wound care, recent MRSA  infection, COPD  · 1/31 Procalcitonin remains negative, Cefepime, Vanco, and Flagyl discontinued 1/31, monitor temps as well as WBC count off antibiotics  · Seen by SLP- recommend keeping NPO for now      Pleural effusion  Assessment & Plan  · Noted on CXR, CT Chest reveals moderate/large R Pleural effusion  · BNP 4,540 thought to be volume overloaded on exam    · Currently being diuresed receiving Lasix 40mg IV q8h  · Considered Thoracentesis; however with hypercarbia with patient being more somnolent will hold off   · ECHO from Nov 2021 revealed EF 81%, normal systolic function, diastolic function not assessed         SIRS (systemic inflammatory response syndrome) ([facility] [address] )  Assessment & Plan  · On presentation 2/2 tachycardia and tachypnea  · Lactate 2 4 - cleared  · Currently afebrile with no leukocytosis  · Procalcitonin x3 negative   · Unclear if infectious source, may be non-infectious secondary to volume overload a/e/b pleural effusion and bilateral LE edema   · Blood cultures NGTD  · Wound culture MRSA + Pseudo   · Discontinued Cefepime, Flagyl, Vanco 1/31- monitor off abx      Paroxysmal A-fib (HCC)  Assessment & Plan  · Continue Telemetry monitoring  · EKG revealed Afib with   · Currently not on rate control medication   · PRN Lopressor for rate control  · Continue warfarin 4 mg- not taking PO meds will change to heparin gtt 1/31      COPD, moderate ([facility])  Assessment & Plan  · Wears 2-3 L chronically at baseline  · Does not appear to be in acute exacerbation, will hold off on IV steroids   · Continue Breo, albuterol nebs       Ambulatory dysfunction  Assessment & Plan  · Per chart review, increased difficulty with ambulation due to chronic leg wounds  · PT/OT      Sleep apnea  Assessment & Plan  · Wears CPAP at HS at home      Venous stasis dermatitis of both lower extremities  Assessment & Plan  · On admission in November 2021 found to have positive wound culture and 1/2 + BC for Staph aureus  Treated with 4 weeks of IV vancomycin through 12/11/2021  · Hx of chronic wounds, chronic venous insufficiency, lymphedema     · On exam, bilateral lower extremities with edema L>R, weeping tan drainage, multiple open areas  · Unclear if this is in fact active cellulitis, there is a draining posterior right leg wound  · Wound culture MRSA + Pseudo   · Blood cultures NGTD, discontinued Cefepime and Vancomycin 1/31   · Low suspicion for DVT, patient anticoagulated with warfarin prehospital  · Consider ID consult   · Consult wound RN, has VNA services for wound care at home        ----------------------------------------------------------------------------------------  HPI/24hr events: Seen by SLP who recommended patient stay NPO given need for continous Bipap and RN reports patient not swallowing  They will re eval today  Placed on heparin gtt due to not being able to take Warfarin given NPO  FiO2 titrated down overnight given patient's h/o COPD on home O2 at home  Patient appropriate for transfer out of the ICU today?: No  Disposition: Continue Stepdown Level 1 level of care   Code Status: Level 3 - DNAR and DNI  ---------------------------------------------------------------------------------------  SUBJECTIVE    Review of Systems   Unable to perform ROS: Mental status change     Review of systems was unable to be performed secondary to AMS  ---------------------------------------------------------------------------------------  OBJECTIVE    Vitals   Vitals:    22 1100 22 1416 22 1537 22   BP: 110/54  98/55    BP Location: Left arm  Left arm    Pulse: 86  90    Resp: (!) 23      Temp: 98 8 °F (37 1 °C)  99 °F (37 2 °C)    TempSrc: Bladder  Bladder    SpO2: 99%  98% 95%   Weight:       Height:  6' (1 829 m)       Temp (24hrs), Av 3 °F (37 4 °C), Min:98 8 °F (37 1 °C), Max:100 °F (37 8 °C)  Current: Temperature: 99 °F (37 2 °C)          Respiratory:  SpO2: SpO2: 95 %  Nasal Cannula O2 Flow Rate (L/min): 6 L/min    Invasive/non-invasive ventilation settings   Respiratory  Report   Lab Data (Last 4 hours)    None         O2/Vent Data (Last 4 hours)      2040          Non-Invasive Ventilation Mode BiPAP                   Physical Exam  Vitals and nursing note reviewed  Constitutional:       General: He is not in acute distress  Appearance: He is ill-appearing  HENT:      Head: Normocephalic  Mouth/Throat:      Mouth: Mucous membranes are moist    Eyes:      Conjunctiva/sclera: Conjunctivae normal       Pupils: Pupils are equal, round, and reactive to light  Cardiovascular:      Rate and Rhythm: Normal rate  Rhythm irregularly irregular     Pulmonary:      Effort: Pulmonary effort is normal  Tachypnea present  No accessory muscle usage or respiratory distress  Breath sounds: No wheezing  Comments: On Bipap  Abdominal:      General: Bowel sounds are normal  There is no distension  Palpations: Abdomen is soft  Tenderness: There is no abdominal tenderness  There is no guarding  Musculoskeletal:      Comments: Bilateral upper and lower extremity edema   Skin:     Comments: Open wound posterior right leg- granulating  Clear drainage  Bilateral legs with venous stasis changes  Neurological:      Mental Status: He is alert  Comments: Moves all extremities  Oriented to person only  Follows simple commands               Laboratory and Diagnostics:  Results from last 7 days   Lab Units 01/31/22 2018 01/31/22  0505 01/31/22  0408 01/30/22  0500 01/29/22  0544 01/28/22 2122   WBC Thousand/uL 9 63 8 72 9 15 10 00 13 16* 8 91   HEMOGLOBIN g/dL 10 1* 9 6* 9 7* 10 9* 10 3* 10 7*   HEMATOCRIT % 34 9* 32 9* 33 2* 37 8 33 6* 36 4*   PLATELETS Thousands/uL 409* 394* 392* 415* 402* 434*   NEUTROS PCT %  --  67 71 73 80* 70   MONOS PCT %  --  18* 17* 11 12 13*     Results from last 7 days   Lab Units 01/31/22 0408 01/30/22  0500 01/29/22  0544 01/28/22 2122   SODIUM mmol/L 141 138 137 138   POTASSIUM mmol/L 3 4* 3 5 4 0 4 6   CHLORIDE mmol/L 97* 97* 99* 98*   CO2 mmol/L 44* 38* 35* 36*   ANION GAP mmol/L 0* 3* 3* 4   BUN mg/dL 12 14 16 14   CREATININE mg/dL 0 86 0 95 0 95 1 01   CALCIUM mg/dL 7 8* 8 1* 7 6* 8 1*   GLUCOSE RANDOM mg/dL 90 99 116 103   ALT U/L  --  58 60 73   AST U/L  --  26 24 36   ALK PHOS U/L  --  74 73 81   ALBUMIN g/dL  --  2 3* 2 1* 2 4*   TOTAL BILIRUBIN mg/dL  --  0 74 0 41 0 43          Results from last 7 days   Lab Units 01/31/22 2018 01/31/22  0408 01/30/22  0500 01/28/22 2122   INR  1 92* 2 05* 2 14* 2 42*   PTT seconds 35  --   --  39*          Results from last 7 days   Lab Units 01/29/22  0033 01/28/22  2122   LACTIC ACID mmol/L 1 4 2 4* ABG:  Results from last 7 days   Lab Units 01/31/22  0007   PH ART  7 376   PCO2 ART mm Hg 77 4*   PO2 ART mm Hg 111 8   HCO3 ART mmol/L 44 3*   BASE EXC ART mmol/L 16 2   ABG SOURCE  Radial, Left     VBG:  Results from last 7 days   Lab Units 01/31/22  0401 01/31/22  0007 01/29/22  0208   PH KEN  7 344  --    < >   PCO2 KEN mm Hg 84 2*  --    < >   PO2 KEN mm Hg 61 7*  --    < >   HCO3 KEN mmol/L 44 8*  --    < >   BASE EXC KEN mmol/L 15 9  --    < >   ABG SOURCE   --  Radial, Left  --     < > = values in this interval not displayed  Results from last 7 days   Lab Units 01/31/22  0408 01/30/22  0500 01/29/22  0544 01/28/22 2122   PROCALCITONIN ng/ml 0 17 0 18 0 17 0 14       Micro  Results from last 7 days   Lab Units 01/29/22  1547 01/29/22  0208 01/28/22 2122 01/28/22 2116   BLOOD CULTURE   --   --  No Growth at 48 hrs  No Growth at 48 hrs  GRAM STAIN RESULT   --   --  No Polys*  1+ Gram positive cocci in pairs*  --    WOUND CULTURE   --   --  4+ Growth of Pseudomonas aeruginosa*  4+ Growth of Methicillin Resistant Staphylococcus aureus*  4+ Growth of Enterobacter cloacae complex*  4+ Growth of Streptococcus dysgalactiae*  --    MRSA CULTURE ONLY   --  Methicillin Resistant Staphylococcus aureus isolated*  This patient requires contact isolation precautions per [place] law  Contact precautions are not required in [place] for nasal surveillance cultures  --   --    LEGIONELLA URINARY ANTIGEN  Negative  --   --   --    STREP PNEUMONIAE ANTIGEN, URINE  Negative  --   --   --        EKG:   Imaging: I have personally reviewed pertinent reports  Intake and Output  I/O       01/30 0701 01/31 0700 01/31 0701 02/01 0700    P  O       I V  (mL/kg) 250 (2 2)     IV Piggyback  98 3    Total Intake(mL/kg) 250 (2 2) 98 3 (0 9)    Urine (mL/kg/hr) 2775 (1) 3475 (2 1)    Total Output 2775 3475    Net -4689 -5434 7                Height and Weights   Height: 6' (182 9 cm)  IBW (Ideal Body Weight): 77 6 kg  Body mass index is 34 24 kg/m²  Weight (last 2 days)     Date/Time Weight    01/31/22 0500 115 (252 43)    01/30/22 0500 137 (302 03)            Nutrition       Diet Orders   (From admission, onward)             Start     Ordered    01/31/22 1942  Diet NPO  Diet effective now        References:    Nutrtion Support Algorithm Enteral vs  Parenteral   Question Answer Comment   Diet Type NPO    RD to adjust diet per protocol?  Yes        01/31/22 1942                  Active Medications  Scheduled Meds:  Current Facility-Administered Medications   Medication Dose Route Frequency Provider Last Rate    acetaminophen  650 mg Oral Q6H PRN Brandie Cover, PA-C      albuterol  2 5 mg Nebulization Q6H PRN Brandie Cover, PA-C      ammonium lactate  1 application Topical Daily Brandie Cover, PA-C      benzonatate  100 mg Oral TID PRN Brandie Cover, PA-C      budesonide  0 5 mg Nebulization Q12H Brandie Cover, PA-C      famotidine  20 mg Intravenous Q12H [address] MANNY Gordon-THIAGO      furosemide  40 mg Intravenous Q8H Brandie Cover, PA-C      heparin (porcine)  3-20 Units/kg/hr (Order-Specific) Intravenous Titrated Merritt Beckford MD 65 7 Units/kg/hr (01/31/22 2051)    heparin (porcine)  2,000 Units Intravenous M8Y PRN Merritt Beckford MD      heparin (porcine)  4,000 Units Intravenous H5I PRN Merritt Beckford MD      [START ON 2/1/2022] insulin lispro  1-5 Units Subcutaneous Q6H [address] Eden Newell, PA-THIAGO      metoprolol  2 5 mg Intravenous Q6H PRN Brandie Cover, PA-C      multivitamin-minerals  1 tablet Oral Daily Brandie Cover, PA-C      PARoxetine  20 mg Oral Daily Brandie Cover, PA-C      pravastatin  80 mg Oral After Gerardine Mu, PA-C      sodium chloride  4 mL Nebulization Q6H Brandie Cover, PA-C       Continuous Infusions:  heparin (porcine), 3-20 Units/kg/hr (Order-Specific), Last Rate: 11 1 Units/kg/hr (01/31/22 2051)      PRN Meds:   acetaminophen, 650 mg, Q6H PRN  albuterol, 2 5 mg, Q6H PRN  benzonatate, 100 mg, TID PRN  heparin (porcine), 2,000 Units, Q1H PRN  heparin (porcine), 4,000 Units, Q1H PRN  metoprolol, 2 5 mg, Q6H PRN        Invasive Devices Review  Invasive Devices  Report    Peripheral Intravenous Line            Peripheral IV 01/29/22 Right;Ventral (anterior) Forearm 2 days    Peripheral IV 01/30/22 Right Wrist 1 day          Drain            Urethral Catheter Temperature probe 16 Fr  1 day                Rationale for remaining devices: accurate I/O's during diuresis  ---------------------------------------------------------------------------------------  Advance Directive and Living Will:      Power of :    POLST:    ---------------------------------------------------------------------------------------  Care Time Delivered:   No Critical Care time spent       Rom Brewer PA-C      Portions of the record may have been created with voice recognition software  Occasional wrong word or "sound a like" substitutions may have occurred due to the inherent limitations of voice recognition software    Read the chart carefully and recognize, using context, where substitutions have occurred

## 2022-02-01 NOTE — ASSESSMENT & PLAN NOTE
Patient is a 68year old male whom presented on evening of 1/28 with a chief complaint of Shortness of breath with increasing home O2 needs as well as R LE drainage  He has a PMH of COPD wearing 2-3 L at baseline, Afib on Coumadin, ANA LAURA, as well as a hospitalization Nov 11-22, 2021 secondary to COVID Pneumonia  Family reports steady decline ever since having COVID, "He isn't himself and keeps getting worse"    Since admission workup has included labs, CXR consistent with B/L pleural effusions, as well as CT Chest revealing Increasing moderate to large right pleural effusion  Collapse of the right lower lobe attributable to combination of compressive atelectasis from effusion, resorptive atelectasis from mucous plugging, and (possibly) pneumonia  Scattered bilateral peripheral consolidative opacities, left basilar peribronchial thickening, and new fluid level within left lower lobe pneumatocyst   These findings may suggest multifocal infection  Chronic, low level mediastinal or hilar lymphadenopathy appears similar to November  Treatment has included intermittent BiPAP use as well as IV diuresis with potential for thoracentesis  Also receiving Cefepime / Flagyl / Vanco  - Leg wound staph aureus, MRSA pending  Throughout the day on 1/30 patient less responsive as well as noted to be increasingly more Hypoxic  ABG obtained revealing 7 37/76/110  Patient requiring BiPAP more continuously    · Continue Telemetry and Hemodynamic monitoring  · Continual SpO2 monitoring and maintain >88% - wean FiO2 as tolerated  · Hypercarbic Respiratory failure, continue BiPAP trialed off BiPAP 1/31 am with rapid decompensation / hypoxia - Back on BiPAP Rate 20  16/6  · COVID flu RSV negative on admission  · Recent admission in Nov 2021 for COVID-19 pneumonia     · Low suspicion for acute PE given patient is anticoagulated on warfarin with therapeutic INR 2 4    · 1/30 Family at bedside, extensive conversation during which daughter advised her father would never want breathing tube or any machines to keep him alive - Code status will be updated to a Level 3  · 1/31 Further family discussions, they are concerned with lack of improvement, would like to give additional 24-48 hours and consider comfort measures if no improvement  · Lasix 40 mg IV BID with 500 mg IV Diamox on 2/1  · 02/01 - weaned to Venti mask 40% O2 with Bipap q HS

## 2022-02-01 NOTE — ASSESSMENT & PLAN NOTE
· On presentation 2/2 tachycardia and tachypnea  · Lactate 2 4 - cleared  · Currently afebrile with no leukocytosis     · Procalcitonin x3 negative   · Unclear if infectious source, may be non-infectious secondary to volume overload a/e/b pleural effusion and bilateral LE edema   · Blood cultures NGTD  · Wound culture MRSA + Pseudo   · Discontinued Cefepime, Flagyl, Vanco 1/31- monitor off abx  · Consult ID- agree with monitoring off abx right now

## 2022-02-01 NOTE — ASSESSMENT & PLAN NOTE
· On presentation 2/2 tachycardia and tachypnea  · Lactate 2 4 - cleared  · Currently afebrile with no leukocytosis     · Procalcitonin x3 negative   · Unclear if infectious source, may be non-infectious secondary to volume overload a/e/b pleural effusion and bilateral LE edema   · Blood cultures NGTD  · Wound culture MRSA + Pseudo   · Discontinued Cefepime, Flagyl, Vanco 1/31- monitor off abx

## 2022-02-01 NOTE — ASSESSMENT & PLAN NOTE
· On admission in November 2021 found to have positive wound culture and 1/2 + BC for Staph aureus  Treated with 4 weeks of IV vancomycin through 12/11/2021  · Hx of chronic wounds, chronic venous insufficiency, lymphedema     · On exam, bilateral lower extremities with edema L>R, weeping tan drainage, multiple open areas  · Unclear if this is in fact active cellulitis, there is a draining posterior right leg wound  · Wound culture MRSA + Pseudo   · Blood cultures NGTD, discontinued Cefepime and Vancomycin 1/31   · Low suspicion for DVT, patient anticoagulated with warfarin prehospital  · Consider ID consult   · Consult wound RN, has VNA services for wound care at home

## 2022-02-02 ENCOUNTER — APPOINTMENT (INPATIENT)
Dept: NON INVASIVE DIAGNOSTICS | Facility: HOSPITAL | Age: 77
DRG: 186 | End: 2022-02-02
Payer: MEDICARE

## 2022-02-02 LAB
ANION GAP SERPL CALCULATED.3IONS-SCNC: -1 MMOL/L (ref 4–13)
ANION GAP SERPL CALCULATED.3IONS-SCNC: 2 MMOL/L (ref 4–13)
AORTIC ROOT: 3.7 CM
APICAL FOUR CHAMBER EJECTION FRACTION: 65 %
APTT PPP: 57 SECONDS (ref 23–37)
APTT PPP: 70 SECONDS (ref 23–37)
APTT PPP: 75 SECONDS (ref 23–37)
BUN SERPL-MCNC: 13 MG/DL (ref 5–25)
BUN SERPL-MCNC: 15 MG/DL (ref 5–25)
CALCIUM SERPL-MCNC: 8.2 MG/DL (ref 8.3–10.1)
CALCIUM SERPL-MCNC: 8.4 MG/DL (ref 8.3–10.1)
CHLORIDE SERPL-SCNC: 97 MMOL/L (ref 100–108)
CHLORIDE SERPL-SCNC: 97 MMOL/L (ref 100–108)
CO2 SERPL-SCNC: 41 MMOL/L (ref 21–32)
CO2 SERPL-SCNC: 43 MMOL/L (ref 21–32)
CREAT SERPL-MCNC: 0.78 MG/DL (ref 0.6–1.3)
CREAT SERPL-MCNC: 0.86 MG/DL (ref 0.6–1.3)
ERYTHROCYTE [DISTWIDTH] IN BLOOD BY AUTOMATED COUNT: 19.9 % (ref 11.6–15.1)
FRACTIONAL SHORTENING: 35 % (ref 28–44)
GFR SERPL CREATININE-BSD FRML MDRD: 84 ML/MIN/1.73SQ M
GFR SERPL CREATININE-BSD FRML MDRD: 87 ML/MIN/1.73SQ M
GLUCOSE SERPL-MCNC: 76 MG/DL (ref 65–140)
GLUCOSE SERPL-MCNC: 77 MG/DL (ref 65–140)
GLUCOSE SERPL-MCNC: 80 MG/DL (ref 65–140)
GLUCOSE SERPL-MCNC: 83 MG/DL (ref 65–140)
GLUCOSE SERPL-MCNC: 83 MG/DL (ref 65–140)
GLUCOSE SERPL-MCNC: 87 MG/DL (ref 65–140)
HCT VFR BLD AUTO: 37.7 % (ref 36.5–49.3)
HGB BLD-MCNC: 11 G/DL (ref 12–17)
INTERVENTRICULAR SEPTUM IN DIASTOLE (PARASTERNAL SHORT AXIS VIEW): 1.2 CM (ref 0.56–1.06)
IVC: 2.3 MM
LEFT ATRIUM SIZE: 3.8 CM
LEFT INTERNAL DIMENSION IN SYSTOLE: 3.3 CM (ref 2.1–4)
LEFT VENTRICULAR INTERNAL DIMENSION IN DIASTOLE: 5.1 CM (ref 7.44–11.09)
LEFT VENTRICULAR POSTERIOR WALL IN END DIASTOLE: 1.2 CM (ref 0.55–1.04)
LEFT VENTRICULAR STROKE VOLUME: 83 ML
MAGNESIUM SERPL-MCNC: 2.1 MG/DL (ref 1.6–2.6)
MAGNESIUM SERPL-MCNC: 2.1 MG/DL (ref 1.6–2.6)
MCH RBC QN AUTO: 27.1 PG (ref 26.8–34.3)
MCHC RBC AUTO-ENTMCNC: 29.2 G/DL (ref 31.4–37.4)
MCV RBC AUTO: 93 FL (ref 82–98)
PLATELET # BLD AUTO: 418 THOUSANDS/UL (ref 149–390)
PMV BLD AUTO: 10 FL (ref 8.9–12.7)
POTASSIUM SERPL-SCNC: 3.4 MMOL/L (ref 3.5–5.3)
POTASSIUM SERPL-SCNC: 3.8 MMOL/L (ref 3.5–5.3)
RA PRESSURE ESTIMATED: 15 MMHG
RBC # BLD AUTO: 4.06 MILLION/UL (ref 3.88–5.62)
RV PSP: 59 MMHG
SL CV LV EF: 60
SL CV PED ECHO LEFT VENTRICLE DIASTOLIC VOLUME (MOD BIPLANE) 2D: 126 ML
SL CV PED ECHO LEFT VENTRICLE SYSTOLIC VOLUME (MOD BIPLANE) 2D: 43 ML
SODIUM SERPL-SCNC: 139 MMOL/L (ref 136–145)
SODIUM SERPL-SCNC: 140 MMOL/L (ref 136–145)
TR MAX PG: 44 MMHG
TR PEAK VELOCITY: 3.3 M/S
TRICUSPID VALVE PEAK REGURGITATION VELOCITY: 3.33 M/S
WBC # BLD AUTO: 7.8 THOUSAND/UL (ref 4.31–10.16)
Z-SCORE OF INTERVENTRICULAR SEPTUM IN END DIASTOLE: 3.09
Z-SCORE OF LEFT VENTRICULAR DIMENSION IN END SYSTOLE: -5.77
Z-SCORE OF LEFT VENTRICULAR POSTERIOR WALL IN END DIASTOLE: 3.21

## 2022-02-02 PROCEDURE — 94640 AIRWAY INHALATION TREATMENT: CPT

## 2022-02-02 PROCEDURE — 85730 THROMBOPLASTIN TIME PARTIAL: CPT | Performed by: ANESTHESIOLOGY

## 2022-02-02 PROCEDURE — 93308 TTE F-UP OR LMTD: CPT

## 2022-02-02 PROCEDURE — 99233 SBSQ HOSP IP/OBS HIGH 50: CPT | Performed by: PHYSICIAN ASSISTANT

## 2022-02-02 PROCEDURE — 82948 REAGENT STRIP/BLOOD GLUCOSE: CPT

## 2022-02-02 PROCEDURE — 94760 N-INVAS EAR/PLS OXIMETRY 1: CPT

## 2022-02-02 PROCEDURE — G0408 INPT/TELE FOLLOW UP 35: HCPCS | Performed by: INTERNAL MEDICINE

## 2022-02-02 PROCEDURE — 85027 COMPLETE CBC AUTOMATED: CPT | Performed by: NURSE PRACTITIONER

## 2022-02-02 PROCEDURE — 94660 CPAP INITIATION&MGMT: CPT

## 2022-02-02 PROCEDURE — 93308 TTE F-UP OR LMTD: CPT | Performed by: INTERNAL MEDICINE

## 2022-02-02 PROCEDURE — 80048 BASIC METABOLIC PNL TOTAL CA: CPT | Performed by: NURSE PRACTITIONER

## 2022-02-02 PROCEDURE — 93325 DOPPLER ECHO COLOR FLOW MAPG: CPT

## 2022-02-02 PROCEDURE — 93325 DOPPLER ECHO COLOR FLOW MAPG: CPT | Performed by: INTERNAL MEDICINE

## 2022-02-02 PROCEDURE — 93321 DOPPLER ECHO F-UP/LMTD STD: CPT | Performed by: INTERNAL MEDICINE

## 2022-02-02 PROCEDURE — 83735 ASSAY OF MAGNESIUM: CPT | Performed by: NURSE PRACTITIONER

## 2022-02-02 PROCEDURE — 92610 EVALUATE SWALLOWING FUNCTION: CPT

## 2022-02-02 RX ORDER — LEVALBUTEROL 1.25 MG/.5ML
1.25 SOLUTION, CONCENTRATE RESPIRATORY (INHALATION)
Status: DISCONTINUED | OUTPATIENT
Start: 2022-02-02 | End: 2022-02-04

## 2022-02-02 RX ORDER — POTASSIUM CHLORIDE 14.9 MG/ML
20 INJECTION INTRAVENOUS
Status: COMPLETED | OUTPATIENT
Start: 2022-02-02 | End: 2022-02-02

## 2022-02-02 RX ORDER — LEVALBUTEROL 1.25 MG/.5ML
SOLUTION, CONCENTRATE RESPIRATORY (INHALATION)
Status: COMPLETED
Start: 2022-02-02 | End: 2022-02-02

## 2022-02-02 RX ORDER — POTASSIUM CHLORIDE 14.9 MG/ML
20 INJECTION INTRAVENOUS ONCE
Status: COMPLETED | OUTPATIENT
Start: 2022-02-02 | End: 2022-02-02

## 2022-02-02 RX ADMIN — Medication 1 APPLICATION: at 08:42

## 2022-02-02 RX ADMIN — FUROSEMIDE 40 MG: 10 INJECTION, SOLUTION INTRAVENOUS at 08:42

## 2022-02-02 RX ADMIN — POTASSIUM CHLORIDE 20 MEQ: 14.9 INJECTION, SOLUTION INTRAVENOUS at 11:27

## 2022-02-02 RX ADMIN — POTASSIUM CHLORIDE 20 MEQ: 14.9 INJECTION, SOLUTION INTRAVENOUS at 15:35

## 2022-02-02 RX ADMIN — POTASSIUM CHLORIDE 20 MEQ: 14.9 INJECTION, SOLUTION INTRAVENOUS at 08:47

## 2022-02-02 RX ADMIN — LEVALBUTEROL HYDROCHLORIDE 1.25 MG: 1.25 SOLUTION, CONCENTRATE RESPIRATORY (INHALATION) at 21:13

## 2022-02-02 RX ADMIN — HEPARIN SODIUM 2000 UNITS: 1000 INJECTION INTRAVENOUS; SUBCUTANEOUS at 06:00

## 2022-02-02 RX ADMIN — SODIUM CHLORIDE SOLN NEBU 3% 4 ML: 3 NEBU SOLN at 01:33

## 2022-02-02 RX ADMIN — SODIUM CHLORIDE SOLN NEBU 3% 4 ML: 3 NEBU SOLN at 07:55

## 2022-02-02 RX ADMIN — ALBUTEROL SULFATE 2.5 MG: 2.5 SOLUTION RESPIRATORY (INHALATION) at 07:54

## 2022-02-02 RX ADMIN — IPRATROPIUM BROMIDE 0.5 MG: 0.5 SOLUTION RESPIRATORY (INHALATION) at 14:30

## 2022-02-02 RX ADMIN — FUROSEMIDE 40 MG: 10 INJECTION, SOLUTION INTRAVENOUS at 15:35

## 2022-02-02 RX ADMIN — HEPARIN SODIUM 15.1 UNITS/KG/HR: 10000 INJECTION, SOLUTION INTRAVENOUS at 14:03

## 2022-02-02 RX ADMIN — FAMOTIDINE 20 MG: 10 INJECTION INTRAVENOUS at 08:42

## 2022-02-02 RX ADMIN — LEVALBUTEROL HYDROCHLORIDE 1.25 MG: 1.25 SOLUTION, CONCENTRATE RESPIRATORY (INHALATION) at 14:29

## 2022-02-02 RX ADMIN — BUDESONIDE 0.5 MG: 0.5 INHALANT ORAL at 21:13

## 2022-02-02 RX ADMIN — POTASSIUM CHLORIDE 20 MEQ: 14.9 INJECTION, SOLUTION INTRAVENOUS at 06:56

## 2022-02-02 RX ADMIN — IPRATROPIUM BROMIDE 0.5 MG: 0.5 SOLUTION RESPIRATORY (INHALATION) at 21:13

## 2022-02-02 RX ADMIN — BUDESONIDE 0.5 MG: 0.5 INHALANT ORAL at 07:54

## 2022-02-02 RX ADMIN — FAMOTIDINE 20 MG: 10 INJECTION INTRAVENOUS at 21:21

## 2022-02-02 RX ADMIN — POTASSIUM CHLORIDE 20 MEQ: 200 INJECTION, SOLUTION INTRAVENOUS at 17:41

## 2022-02-02 NOTE — RESPIRATORY THERAPY NOTE
RT Protocol Note  Andrés Whyte 68 y o  male MRN: [id number]  Unit/Bed#: -01 Encounter: [id number]    Assessment    Principal Problem:    Acute on chronic respiratory failure ([facility] [address] )  Active Problems:    Paroxysmal A-fib (HCC)    COPD, moderate (HCC)    Sleep apnea    Multifocal pneumonia    Venous stasis dermatitis of both lower extremities    Ambulatory dysfunction    SIRS (systemic inflammatory response syndrome) ([facility])    Pleural effusion      Home Pulmonary Medications:    Home Devices/Therapy: BiPAP/CPAP    Past Medical History:   Diagnosis Date    A-fib ([facility] [address] )     Ambulates with cane     Anxiety     Arthritis     At risk for falls     Cellulitis     right leg    [facility] )     RESOLVED: 3/26/15    Chronic obstructive lung disease ([facility] )     RESOLVED: 9/7/16    COPD (chronic obstructive pulmonary disease) [facility]     Depression     Edema     RESOLVED: 9/21/15    GERD (gastroesophageal reflux disease)     Hard of hearing     Hyperlipidemia     Hypertension     Irregular heart beat     Afib    Knee pain, bilateral     Leukocytosis     RESOLVED: 9/21/15    LLL pneumonia     RESOLVED: 6/20/17    Methicillin resistant Staphylococcus aureus infection     RESOLVED: 9/21/15    Pneumonia     RESOLVED: 6/20/17    Skin abnormality     Left pinky toe has a wound due to nail cutting by a provider - being followed -Dr Dc Thomas aware    Sleep apnea     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     Stroke ([facility] )     Venous ulcers of both lower extremities ([facility] [address] )     Walker as ambulation aid     Weakness of right hand     Wears dentures     upper only    Wears glasses      Social History     Socioeconomic History    Marital status:       Spouse name: None    Number of children: None    Years of education: None    Highest education level: None   Occupational History    None   Tobacco Use    Smoking status: Former Smoker     Quit date: 1/15/1995     Years since quittin 0    Smokeless tobacco: Never Used   Vaping Use    Vaping Use: Never used   Substance and Sexual Activity    Alcohol use: Never    Drug use: No    Sexual activity: Not Currently   Other Topics Concern    None   Social History Narrative    None     Social Determinants of Health     Financial Resource Strain: Not on file   Food Insecurity: No Food Insecurity    Worried About Running Out of Food in the Last Year: Never true    Raven of Food in the Last Year: Never true   Transportation Needs: No Transportation Needs    Lack of Transportation (Medical): No    Lack of Transportation (Non-Medical): No   Physical Activity: Not on file   Stress: Not on file   Social Connections: Not on file   Intimate Partner Violence: Not on file   Housing Stability: Low Risk     Unable to Pay for Housing in the Last Year: No    Number of Places Lived in the Last Year: 1    Unstable Housing in the Last Year: No       Subjective         Objective    Physical Exam:   Assessment Type: During-treatment  General Appearance: Sleeping  Respiratory Pattern: Normal  Chest Assessment: Chest expansion symmetrical  Bilateral Breath Sounds: Diminished,Coarse  Cough: None  O2 Device: (P) HFNC 50%/60L    Vitals:  Blood pressure 124/61, pulse 81, temperature 97 5 °F (36 4 °C), resp  rate 18, height 6' (1 829 m), weight 103 kg (227 lb 4 7 oz), SpO2 95 %  Results from last 7 days   Lab Units 22  1515   PH ART  7 396   PCO2 ART mm Hg 84 1*   PO2 ART mm Hg 72 1*   HCO3 ART mmol/L 50 5*   BASE EXC ART mmol/L 21 4   O2 CONTENT ART mL/dL 14 9*   O2 HGB, ARTERIAL % 91 6*   ABG SOURCE  Radial, Right   ERNST TEST  Yes       Imaging and other studies: I have personally reviewed pertinent reports  O2 Device: (P) HFNC 50%/60L     Plan    Respiratory Plan: Home Bronchodilator Patient pathway,Vent/NIV/HFNC        Resp Comments: (P) Pt taken off bipap and placed on HFNC 50%/60LPM and tolerating well

## 2022-02-02 NOTE — ASSESSMENT & PLAN NOTE
· Continue Telemetry monitoring  · EKG revealed Afib with     · Currently not on rate control medication   · PRN Lopressor for rate control  · On warfarin 4 mg as outpatient- not taking PO meds, so changed to heparin gtt 1/31

## 2022-02-02 NOTE — RESPIRATORY THERAPY NOTE
RT Protocol Note  Job Lorin 68 y o  male MRN: [id number]  Unit/Bed#: -01 Encounter: [id number]    Assessment    Principal Problem:    Acute on chronic respiratory failure ([facility] )  Active Problems:    Paroxysmal A-fib (HCC)    COPD, moderate (HCC)    Sleep apnea    Multifocal pneumonia    Venous stasis dermatitis of both lower extremities    Ambulatory dysfunction    SIRS (systemic inflammatory response syndrome) ([facility])    Pleural effusion      Home Pulmonary Medications:    Home Devices/Therapy: BiPAP/CPAP    Past Medical History:   Diagnosis Date    A-fib ([facility] )     Ambulates with cane     Anxiety     Arthritis     At risk for falls     Cellulitis     right leg    Chronic bronchitis ([facility] )     RESOLVED: 3/26/15    Chronic obstructive lung disease ([facility] )     RESOLVED: 9/7/16    COPD (chronic obstructive pulmonary disease) ([facility] )     Depression     Edema     RESOLVED: 9/21/15    GERD (gastroesophageal reflux disease)     Hard of hearing     Hyperlipidemia     Hypertension     Irregular heart beat     Afib    Knee pain, bilateral     Leukocytosis     RESOLVED: 9/21/15    LLL pneumonia     RESOLVED: 6/20/17    Methicillin resistant Staphylococcus aureus infection     RESOLVED: 9/21/15    Pneumonia     RESOLVED: 6/20/17    Skin abnormality     Left pinky toe has a wound due to nail cutting by a provider - being followed -Dr Adalberto Clement aware    Sleep apnea     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     Stroke ([facility] )     Venous ulcers of both lower extremities ([facility] )     Walker as ambulation aid     Weakness of right hand     Wears dentures     upper only    Wears glasses      Social History     Socioeconomic History    Marital status:       Spouse name: None    Number of children: None    Years of education: None    Highest education level: None   Occupational History    None   Tobacco Use    Smoking status: Former Smoker     Quit date: 1/15/1995     Years since quittin 0    Smokeless tobacco: Never Used   Vaping Use    Vaping Use: Never used   Substance and Sexual Activity    Alcohol use: Never    Drug use: No    Sexual activity: Not Currently   Other Topics Concern    None   Social History Narrative    None     Social Determinants of Health     Financial Resource Strain: Not on file   Food Insecurity: No Food Insecurity    Worried About Running Out of Food in the Last Year: Never true    Raven of Food in the Last Year: Never true   Transportation Needs: No Transportation Needs    Lack of Transportation (Medical): No    Lack of Transportation (Non-Medical): No   Physical Activity: Not on file   Stress: Not on file   Social Connections: Not on file   Intimate Partner Violence: Not on file   Housing Stability: Low Risk     Unable to Pay for Housing in the Last Year: No    Number of Places Lived in the Last Year: 1    Unstable Housing in the Last Year: No       Subjective         Objective    Physical Exam:   Assessment Type: During-treatment  General Appearance: Sleeping  Respiratory Pattern: Normal  Chest Assessment: Chest expansion symmetrical  Bilateral Breath Sounds: (P) Diminished,Coarse  Cough: None  O2 Device: (P) bipap 16/6/40%    Vitals:  Blood pressure 115/54, pulse 92, temperature (!) 97 3 °F (36 3 °C), temperature source Bladder, resp  rate 20, height 6' (1 829 m), weight 103 kg (227 lb 4 7 oz), SpO2 (P) 95 %  Results from last 7 days   Lab Units 22  1515   PH ART  7 396   PCO2 ART mm Hg 84 1*   PO2 ART mm Hg 72 1*   HCO3 ART mmol/L 50 5*   BASE EXC ART mmol/L 21 4   O2 CONTENT ART mL/dL 14 9*   O2 HGB, ARTERIAL % 91 6*   ABG SOURCE  Radial, Right   ERNST TEST  Yes       Imaging and other studies: I have personally reviewed pertinent reports  O2 Device: (P) bipap 16/6/40%     Plan    Respiratory Plan: Home Bronchodilator Patient pathway,Vent/NIV/HFNC        Resp Comments: (P) Pt off face tent and on bipap for SpO2 in the 70s

## 2022-02-02 NOTE — ASSESSMENT & PLAN NOTE
· Xray chest suggestive of pneumonia     · CT chest shows " Scattered bilateral peripheral consolidative opacities, left basilar peribronchial  thickening, and new fluid level within left lower lobe pneumatocyst   These findings may suggest multifocal infection "   · Urine Strep / Legionella Negative  · MRSA nares Positive  · Leg Culture positive Staph - monitor closely  · Sputum culture ordered  · Procalcitonin x2 negative   · DRIP score 7, High risk MDRO (recent IV Abx, recent hospitalization, wound care, recent MRSA  infection, COPD  · 1/31 Procalcitonin remains negative, Cefepime, Vanco, and Flagyl discontinued 1/31, monitor temps as well as WBC count off antibiotics  · ID consulted and are in agreement with monitoring off abx for now  · Seen by SLP- recommend keeping NPO for now

## 2022-02-02 NOTE — PROGRESS NOTES
[address]  Progress Note - Joo DelR ioluis enrique 1945, 68 y o  male MRN: [id number]  Unit/Bed#: -01 Encounter: [id number]  Primary Care Provider: Kanwal Cheatham DO   Date and time admitted to hospital: 1/28/2022  9:02 PM    * Acute on chronic respiratory failure [facility])  Assessment & Plan  Patient is a 68year old male whom presented on evening of 1/28 with a chief complaint of Shortness of breath with increasing home O2 needs as well as R LE drainage  He has a PMH of COPD wearing 2-3 L at baseline, Afib on Coumadin, ANA LAURA, as well as a hospitalization Nov 11-22, 2021 secondary to COVID Pneumonia  Family reports steady decline ever since having COVID, "He isn't himself and keeps getting worse"    Since admission workup has included labs, CXR consistent with B/L pleural effusions, as well as CT Chest revealing Increasing moderate to large right pleural effusion  Collapse of the right lower lobe attributable to combination of compressive atelectasis from effusion, resorptive atelectasis from mucous plugging, and (possibly) pneumonia  Scattered bilateral peripheral consolidative opacities, left basilar peribronchial thickening, and new fluid level within left lower lobe pneumatocyst   These findings may suggest multifocal infection  Chronic, low level mediastinal or hilar lymphadenopathy appears similar to November  Treatment has included intermittent BiPAP use as well as IV diuresis with potential for thoracentesis  Also receiving Cefepime / Flagyl / Vanco  - Leg wound staph aureus, MRSA pending  Throughout the day on 1/30 patient less responsive as well as noted to be increasingly more Hypoxic  ABG obtained revealing 7 37/76/110    Patient requiring BiPAP more continuously    · Continue Telemetry and Hemodynamic monitoring  · Continual SpO2 monitoring and maintain >88% - wean FiO2 as tolerated  · Hypercarbic Respiratory failure, continue BiPAP trialed off BiPAP 1/31 am with rapid decompensation / hypoxia - Back on BiPAP Rate 20  16/6  · COVID flu RSV negative on admission  · Recent admission in Nov 2021 for COVID-19 pneumonia  · Low suspicion for acute PE given patient is anticoagulated on warfarin with therapeutic INR 2 4    · 1/30 Family at bedside, extensive conversation during which daughter advised her father would never want breathing tube or any machines to keep him alive - Code status will be updated to a Level 3  · 1/31 Further family discussions, they are concerned with lack of improvement, would like to give additional 24-48 hours and consider comfort measures if no improvement  · Lasix 40 mg IV BID with 500 mg IV Diamox on 2/1  · 02/01 - weaned to Venti mask 40% O2 with Bipap q HS    Multifocal pneumonia  Assessment & Plan  · Xray chest suggestive of pneumonia     · CT chest shows " Scattered bilateral peripheral consolidative opacities, left basilar peribronchial  thickening, and new fluid level within left lower lobe pneumatocyst   These findings may suggest multifocal infection "   · Urine Strep / Legionella Negative  · MRSA nares Positive  · Leg Culture positive Staph - monitor closely  · Sputum culture ordered  · Procalcitonin x2 negative   · DRIP score 7, High risk MDRO (recent IV Abx, recent hospitalization, wound care, recent MRSA  infection, COPD  · 1/31 Procalcitonin remains negative, Cefepime, Vanco, and Flagyl discontinued 1/31, monitor temps as well as WBC count off antibiotics  · ID consulted and are in agreement with monitoring off abx for now  · Seen by SLP- recommend keeping NPO for now      Pleural effusion  Assessment & Plan  · Noted on CXR, CT Chest reveals moderate/large R Pleural effusion  · BNP 4,540 thought to be volume overloaded on exam    · Currently being diuresed receiving Lasix 40mg IV BID  · Considered Thoracentesis; however with hypercarbia with patient being more somnolent will hold off   · ECHO from Nov 2021 revealed EF 24%, normal systolic function, diastolic function not assessed         SIRS (systemic inflammatory response syndrome) ([facility])  Assessment & Plan  · On presentation 2/2 tachycardia and tachypnea  · Lactate 2 4 - cleared  · Currently afebrile with no leukocytosis  · Procalcitonin x3 negative   · Unclear if infectious source, may be non-infectious secondary to volume overload a/e/b pleural effusion and bilateral LE edema   · Blood cultures NGTD  · Wound culture MRSA + Pseudo   · Discontinued Cefepime, Flagyl, Vanco 1/31- monitor off abx  · Consult ID- agree with monitoring off abx right now    Paroxysmal A-fib ([facility])  Assessment & Plan  · Continue Telemetry monitoring  · EKG revealed Afib with   · Currently not on rate control medication   · PRN Lopressor for rate control  · On warfarin 4 mg as outpatient- not taking PO meds, so changed to heparin gtt 1/31      COPD, moderate ([facility])  Assessment & Plan  · Wears 2-3 L chronically at baseline  · Does not appear to be in acute exacerbation, will hold off on IV steroids   · Continue Breo, albuterol nebs       Ambulatory dysfunction  Assessment & Plan  · Per chart review, increased difficulty with ambulation due to chronic leg wounds  · PT/OT      Sleep apnea  Assessment & Plan  · Wears CPAP at HS at home      Venous stasis dermatitis of both lower extremities  Assessment & Plan  · On admission in November 2021 found to have positive wound culture and 1/2 + BC for Staph aureus  Treated with 4 weeks of IV vancomycin through 12/11/2021  · Hx of chronic wounds, chronic venous insufficiency, lymphedema     · On exam, bilateral lower extremities with edema L>R, weeping tan drainage, multiple open areas  · Unclear if this is in fact active cellulitis, there is a draining posterior right leg wound  · Wound culture MRSA + Pseudo   · Blood cultures NGTD, discontinued Cefepime and Vancomycin 1/31   · Low suspicion for DVT, patient anticoagulated with warfarin prehospital  · ID consulted and agree with monitoring off abx for now  · Consult wound RN, has VNA services for wound care at home        ----------------------------------------------------------------------------------------  HPI/24hr events: No acute issues overnight  Was on Venturi mask and placed on Bipap q hs  Patient appropriate for transfer out of the ICU today?: No  Disposition: Continue Stepdown Level 1 level of care   Code Status: Level 3 - DNAR and DNI  ---------------------------------------------------------------------------------------  SUBJECTIVE    Review of Systems   Constitutional: Negative for fever  Respiratory: Negative for cough  Cardiovascular: Negative for chest pain  Gastrointestinal: Negative for abdominal distention, abdominal pain, diarrhea and nausea  Review of systems was reviewed and negative unless stated above in HPI/24-hour events   ---------------------------------------------------------------------------------------  OBJECTIVE    Vitals   Vitals:    22 0600 22 0800 22 1200 22 1522   BP:  104/51 127/59 123/59   BP Location:    Left arm   Pulse:  80 91 93   Resp:  12 (!)  22   Temp:  97 7 °F (36 5 °C) 98 1 °F (36 7 °C) 98 7 °F (37 1 °C)   TempSrc:    Temporal   SpO2:  95% 93% 95%   Weight: 107 kg (235 lb 10 8 oz)      Height:         Temp (24hrs), Av 1 °F (36 7 °C), Min:97 7 °F (36 5 °C), Max:98 7 °F (37 1 °C)  Current: Temperature: 98 7 °F (37 1 °C)          Respiratory:  SpO2: SpO2: 95 %  Nasal Cannula O2 Flow Rate (L/min): 6 L/min    Invasive/non-invasive ventilation settings   Respiratory  Report   Lab Data (Last 4 hours)    None         O2/Vent Data (Last 4 hours)    None                Physical Exam  Vitals and nursing note reviewed  Constitutional:       Appearance: He is ill-appearing  HENT:      Head: Normocephalic        Mouth/Throat:      Mouth: Mucous membranes are moist    Eyes:      Conjunctiva/sclera: Conjunctivae normal       Pupils: Pupils are equal, round, and reactive to light  Cardiovascular:      Rate and Rhythm: Normal rate  Rhythm irregular  Pulmonary:      Comments: Decreased BS  Abdominal:      General: Bowel sounds are normal  There is no distension  Palpations: Abdomen is soft  Tenderness: There is no abdominal tenderness  There is no guarding  Musculoskeletal:      Right lower leg: Edema present  Left lower leg: Edema present  Skin:     General: Skin is warm  Comments: Chronic venous stasis changes to BLE  Open area right posterior leg- clean and granulating   Neurological:      General: No focal deficit present  Mental Status: He is alert        Comments: Oriented to person and knows he is in a hospital             Laboratory and Diagnostics:  Results from last 7 days   Lab Units 02/01/22  0622 01/31/22  2018 01/31/22  0505 01/31/22  0408 01/30/22  0500 01/29/22  0544 01/28/22 2122   WBC Thousand/uL 9 43 9 63 8 72 9 15 10 00 13 16* 8 91   HEMOGLOBIN g/dL 10 6* 10 1* 9 6* 9 7* 10 9* 10 3* 10 7*   HEMATOCRIT % 36 7 34 9* 32 9* 33 2* 37 8 33 6* 36 4*   PLATELETS Thousands/uL 403* 409* 394* 392* 415* 402* 434*   NEUTROS PCT % 71  --  67 71 73 80* 70   MONOS PCT % 15*  --  18* 17* 11 12 13*     Results from last 7 days   Lab Units 02/01/22  1638 02/01/22 0622 01/31/22 0408 01/30/22  0500 01/29/22  0544 01/28/22 2122   SODIUM mmol/L 142 141 141 138 137 138   POTASSIUM mmol/L 3 8 3 4* 3 4* 3 5 4 0 4 6   CHLORIDE mmol/L 95* 95* 97* 97* 99* 98*   CO2 mmol/L >45* >45* 44* 38* 35* 36*   ANION GAP mmol/L  --   --  0* 3* 3* 4   BUN mg/dL 13 13 12 14 16 14   CREATININE mg/dL 0 84 0 86 0 86 0 95 0 95 1 01   CALCIUM mg/dL 8 3 8 2* 7 8* 8 1* 7 6* 8 1*   GLUCOSE RANDOM mg/dL 79 79 90 99 116 103   ALT U/L  --   --   --  58 60 73   AST U/L  --   --   --  26 24 36   ALK PHOS U/L  --   --   --  74 73 81   ALBUMIN g/dL  --   --   --  2 3* 2 1* 2 4*   TOTAL BILIRUBIN mg/dL  --   --   --  0 74 0 41 0 43     Results from last 7 days   Lab Units 02/01/22  1638 02/01/22  0622   MAGNESIUM mg/dL 2 0 2 0      Results from last 7 days   Lab Units 02/01/22  1638 02/01/22  0954 02/01/22  0236 01/31/22  2018 01/31/22  0408 01/30/22  0500 01/28/22 2122   INR   --   --   --  1 92* 2 05* 2 14* 2 42*   PTT seconds 63* 65* 55* 35  --   --  39*          Results from last 7 days   Lab Units 01/29/22  0033 01/28/22 2122   LACTIC ACID mmol/L 1 4 2 4*     ABG:  Results from last 7 days   Lab Units 02/01/22  1515   PH ART  7 396   PCO2 ART mm Hg 84 1*   PO2 ART mm Hg 72 1*   HCO3 ART mmol/L 50 5*   BASE EXC ART mmol/L 21 4   ABG SOURCE  Radial, Right     VBG:  Results from last 7 days   Lab Units 02/01/22  1515 01/31/22  0401 01/31/22  0007   PH KEN   --  7 344  --    PCO2 KEN mm Hg  --  84 2*  --    PO2 KEN mm Hg  --  61 7*  --    HCO3 KEN mmol/L  --  44 8*  --    BASE EXC KEN mmol/L  --  15 9  --    ABG SOURCE  Radial, Right  --    < >    < > = values in this interval not displayed  Results from last 7 days   Lab Units 01/31/22  0408 01/30/22  0500 01/29/22  0544 01/28/22 2122   PROCALCITONIN ng/ml 0 17 0 18 0 17 0 14       Micro  Results from last 7 days   Lab Units 01/29/22  1547 01/29/22  0208 01/28/22 2122 01/28/22 2116   BLOOD CULTURE   --   --  No Growth at 72 hrs  No Growth at 72 hrs  GRAM STAIN RESULT   --   --  No Polys*  1+ Gram positive cocci in pairs*  --    WOUND CULTURE   --   --  4+ Growth of Pseudomonas aeruginosa*  4+ Growth of Methicillin Resistant Staphylococcus aureus*  4+ Growth of Enterobacter cloacae complex*  4+ Growth of Streptococcus dysgalactiae*  --    MRSA CULTURE ONLY   --  Methicillin Resistant Staphylococcus aureus isolated*  This patient requires contact isolation precautions per [place] law  Contact precautions are not required in [facility] for nasal surveillance cultures    --   --    LEGIONELLA URINARY ANTIGEN  Negative  --   --   --    STREP PNEUMONIAE ANTIGEN, URINE  Negative  --   --   --        EKG: Imaging: I have personally reviewed pertinent reports  Intake and Output  I/O       01/31 0701  02/01 0700 02/01 0701  02/02 0700    I V  (mL/kg) 94 9 (0 9) 138 3 (1 3)    IV Piggyback 98 3 287 5    Total Intake(mL/kg) 193 2 (1 8) 425 8 (4)    Urine (mL/kg/hr) 5251 (2) 2250 (1 7)    Total Output 5251 2250    Net -5057 8 -1824 2                Height and Weights   Height: 6' (182 9 cm)  IBW (Ideal Body Weight): 77 6 kg  Body mass index is 31 96 kg/m²  Weight (last 2 days)     Date/Time Weight    02/01/22 0600 107 (235 67)    01/31/22 0500 115 (252 43)    01/30/22 0500 137 (302 03)            Nutrition       Diet Orders   (From admission, onward)             Start     Ordered    01/31/22 1942  Diet NPO  Diet effective now        References:    Nutrtion Support Algorithm Enteral vs  Parenteral   Question Answer Comment   Diet Type NPO    RD to adjust diet per protocol?  Yes        01/31/22 1942                  Active Medications  Scheduled Meds:  Current Facility-Administered Medications   Medication Dose Route Frequency Provider Last Rate    acetaminophen  650 mg Oral Q6H PRN Maximiliano Pitch, PA-C      albuterol  2 5 mg Nebulization Q6H PRN Maximiliano Pitch, PA-C      ammonium lactate  1 application Topical Daily Maximiliano Pitch, PA-C      benzonatate  100 mg Oral TID PRN Maximiliano Pitch, PA-C      budesonide  0 5 mg Nebulization Q12H Maximiliano Pitch, PA-C      famotidine  20 mg Intravenous Q12H [address] Eden Newell PA-C      furosemide  40 mg Intravenous BID (diuretic) DUNIA Wadsworth      heparin (porcine)  3-20 Units/kg/hr (Order-Specific) Intravenous Titrated Risa Daly MD 03 7 Units/kg/hr (02/01/22 1728)    heparin (porcine)  2,000 Units Intravenous H7X PRN Risa Daly MD      heparin (porcine)  4,000 Units Intravenous R8B PRN Risa Daly MD      insulin lispro  1-5 Units Subcutaneous Q6H [address] Eden Newell PA-C      metoprolol  2 5 mg Intravenous Q6H PRN Delfina Noriega PA-C      multivitamin-minerals  1 tablet Oral Daily Delfina Noriega, [place]      PARoxetine  20 mg Oral Daily Delfina Noriega PA-C      potassium chloride  20 mEq Intravenous Once Claudia Tom, CRNP 20 mEq (02/01/22 1728)    pravastatin  80 mg Oral After Missy Wilcox PA-C      sodium chloride  4 mL Nebulization Q6H Delfina Noriega PA-C       Continuous Infusions:  heparin (porcine), 3-20 Units/kg/hr (Order-Specific), Last Rate: 13 1 Units/kg/hr (02/01/22 1728)      PRN Meds:   acetaminophen, 650 mg, Q6H PRN  albuterol, 2 5 mg, Q6H PRN  benzonatate, 100 mg, TID PRN  heparin (porcine), 2,000 Units, Q1H PRN  heparin (porcine), 4,000 Units, Q1H PRN  metoprolol, 2 5 mg, Q6H PRN        Invasive Devices Review  Invasive Devices  Report    Peripheral Intravenous Line            Peripheral IV 01/29/22 Right;Ventral (anterior) Forearm 3 days    Peripheral IV 01/30/22 Right Wrist 2 days          Drain            Urethral Catheter Temperature probe 16 Fr  2 days                Rationale for remaining devices: critical I/O's with active diuresis  ---------------------------------------------------------------------------------------  Advance Directive and Living Will:      Power of :    POLST:    ---------------------------------------------------------------------------------------  Care Time Delivered:   No Critical Care time spent       Tracy Beal PA-C      Portions of the record may have been created with voice recognition software  Occasional wrong word or "sound a like" substitutions may have occurred due to the inherent limitations of voice recognition software    Read the chart carefully and recognize, using context, where substitutions have occurred

## 2022-02-02 NOTE — ASSESSMENT & PLAN NOTE
· Noted on CXR, CT Chest reveals moderate/large R Pleural effusion  · BNP 4,540 thought to be volume overloaded on exam    · Currently being diuresed receiving Lasix 40mg IV BID  · Considered Thoracentesis; however with hypercarbia with patient being more somnolent will hold off   · ECHO from Nov 2021 revealed EF 56%, normal systolic function, diastolic function not assessed    · 02/02 ECHO limited: EF 55%, normal systolic function, abnormal septal motion, systolic flattening of the IVS consistent with RV pressure overload, bi-atrial dilation, PASP 59 mmHg

## 2022-02-02 NOTE — PLAN OF CARE
Problem: SLP ADULT - SWALLOWING, IMPAIRED  Goal: Advance to least restrictive diet without signs or symptoms of aspiration for planned discharge setting  See evaluation for individualized goals  Description: Patient will:    Outcome: Progressing  Note:   Dysphagia LTG  -Patient will demonstrate safe and effective oral intake (without overt s/s significant oral/pharyngeal dysphagia including s/s penetration or aspiration) for the highest appropriate diet level  Short Term Goals:  -Pt will tolerate Dysphagia 1/pureed diet and honey thick nectar thick thin liquid with no significant s/s oral or pharyngeal dysphagia across 1-3 diagnostic session/s     Problem: SLP ADULT - SWALLOWING, IMPAIRED  Goal: Initial SLP swallow eval performed  Outcome: Completed  Note: Swallow eval completed  Recommend to continue NPO at this time w/ ice chips for comfort after good oral care  If meds cannot be given non-oral, recommend crushed in puree

## 2022-02-02 NOTE — ASSESSMENT & PLAN NOTE
· Wears 2-3 L chronically at baseline  · Does not appear to be in acute exacerbation, will hold off on IV steroids   · Continue xopenex/atrovent TID, pulmicort BID, atrovent prn

## 2022-02-02 NOTE — ASSESSMENT & PLAN NOTE
· On admission in November 2021 found to have positive wound culture and 1/2 + BC for Staph aureus  Treated with 4 weeks of IV vancomycin through 12/11/2021  · Hx of chronic wounds, chronic venous insufficiency, lymphedema     · On exam, bilateral lower extremities with edema L>R, weeping tan drainage, multiple open areas  · Unclear if this is in fact active cellulitis, there is a draining posterior right leg wound  · Wound culture MRSA + Pseudo   · Blood cultures NGTD, discontinued Cefepime and Vancomycin 1/31   · Low suspicion for DVT, patient anticoagulated with warfarin prehospital  · ID consulted and agree with monitoring off abx for now  · Consult wound RN, has VNA services for wound care at home

## 2022-02-02 NOTE — RESPIRATORY THERAPY NOTE
RT Ventilator Management Note  [facility] 68 y o  male MRN: [id number]  Unit/Bed#: -01 Encounter: [id number]      Daily Screen    No data found in the last 10 encounters  Physical Exam:          Pt received on 40% Venti mask (due to excessiive mouth breathing)  Pt placed on bipap 16/6 @ 40% for HS

## 2022-02-02 NOTE — SPEECH THERAPY NOTE
Speech Language/Pathology  Speech-Language Pathology Bedside Swallow Evaluation      Patient Name: Bekci Ortiz    ZOHGX'D Date: 2/2/2022     Problem List  Principal Problem:    Acute on chronic respiratory failure ([facility] )  Active Problems:    Paroxysmal A-fib (HCC)    COPD, moderate (HCC)    Sleep apnea    Multifocal pneumonia    Venous stasis dermatitis of both lower extremities    Ambulatory dysfunction    SIRS (systemic inflammatory response syndrome) ([facility])    Pleural effusion      Past Medical History  Past Medical History:   Diagnosis Date    A-fib ([facility] )     Ambulates with cane     Anxiety     Arthritis     At risk for falls     Cellulitis     right leg    Chronic bronchitis ([facility] )     RESOLVED: 3/26/15    Chronic obstructive lung disease ([facility] )     RESOLVED: 9/7/16    COPD (chronic obstructive pulmonary disease) ([facility] )     Depression     Edema     RESOLVED: 9/21/15    GERD (gastroesophageal reflux disease)     Hard of hearing     Hyperlipidemia     Hypertension     Irregular heart beat     Afib    Knee pain, bilateral     Leukocytosis     RESOLVED: 9/21/15    LLL pneumonia     RESOLVED: 6/20/17    Methicillin resistant Staphylococcus aureus infection     RESOLVED: 9/21/15    Pneumonia     RESOLVED: 6/20/17    Skin abnormality     Left pinky toe has a wound due to nail cutting by a provider - being followed -Dr Devorah Cabezas aware    Sleep apnea     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     Stroke ([facility] )     Venous ulcers of both lower extremities ([facility] )     Walker as ambulation aid     Weakness of right hand     Wears dentures     upper only    Wears glasses        Past Surgical History  Past Surgical History:   Procedure Laterality Date    CATARACT EXTRACTION Bilateral     COLONOSCOPY      ESOPHAGOGASTRODUODENOSCOPY      FOOT SURGERY Left     SPLENECTOMY, TOTAL      SURGERY OF THE SPLEEN    TONSILLECTOMY         Summary   Pt presented with mild-moderate oral dysphagia and suspected pharyngeal phase dysphagia  Trials included ice chips, thins via spoon and cup, and puree  Pt difficulty stripping bolus from spoon along w/ rapid AP movement/swallow initiation  Delayed cough x1 following thins via cup  Pt w/ excessive amts of thick secretions on hard and soft palate, removed prior to trials w/ Vidalkauer suction and swabs  Suspect pt will tolerate a diet if he continues to tolerate hi-flow NC  Recommend to continue NPO at this time with ice chips for comfort when awake and upright  SLP to f/u tomorrow for further diet initiation  Risk/s for Aspiration: Moderate     Recommended Diet: NPO except ice chips   Recommended Form of Meds: crushed with puree and non-oral if possible   Aspiration precautions and swallowing strategies: Upright for ice chips  Other Recommendations: Continue frequent oral care        Current Medical Status    * Acute on chronic respiratory failure [facility])  Assessment & Plan  Patient is a 68year old male whom presented on evening of 1/28 with a chief complaint of Shortness of breath with increasing home O2 needs as well as R LE drainage  He has a PMH of COPD wearing 2-3 L at baseline, Afib on Coumadin, ANA LAURA, as well as a hospitalization Nov 11-22, 2021 secondary to COVID Pneumonia  Family reports steady decline ever since having COVID, "He isn't himself and keeps getting worse"     Since admission workup has included labs, CXR consistent with B/L pleural effusions, as well as CT Chest revealing Increasing moderate to large right pleural effusion  Collapse of the right lower lobe attributable to combination of compressive atelectasis from effusion, resorptive atelectasis from mucous plugging, and (possibly) pneumonia  Scattered bilateral peripheral consolidative opacities, left basilar peribronchial thickening, and new fluid level within left lower lobe pneumatocyst   These findings may suggest multifocal infection   Chronic, low level mediastinal or hilar lymphadenopathy appears similar to November  Treatment has included intermittent BiPAP use as well as IV diuresis with potential for thoracentesis  Also receiving Cefepime / Flagyl / Vanco  - Leg wound staph aureus, MRSA pending  Throughout the day on 1/30 patient less responsive as well as noted to be increasingly more Hypoxic  ABG obtained revealing 7 37/76/110  Patient requiring BiPAP more continuously          Current Precautions:  Fall  Contact      Allergies:  No known food allergies    Past medical history:  Please see H&P for details    Special Studies:  CXR 2/1/22-  Bilateral pulmonary opacities, most likely pulmonary edema or, possibly, multifocal pneumonia  Small right pleural effusion, unchanged from 1/31/2022  Swallow Information   Current Risks for Dysphagia & Aspiration: AMS  Current Symptoms/Concerns: change in respiratory status  Current Diet: NPO with ice chips  Baseline Diet: regular diet and thin liquids      Baseline Assessment   Behavior/Cognition: alert  Speech/Language Status: able to participate in basic conversation  Patient Positioning: upright in bed  Pain Status/Interventions/Response to Interventions:  No report of or nonverbal indications of pain  Swallow Mechanism Exam  Facial: symmetrical  Labial: decreased coordination  Lingual: decreased coordination  Velum: unable to visualize  Mandible:  decreased ROM  Dentition: edentulous  Vocal quality: clear but dysarthric   Volitional Cough: weak   Respiratory Status: 50% on HFNC        Consistencies Assessed and Performance   Consistencies Administered: ice chips, thin liquids and puree      Oral Stage: mild-moderate  Pt difficulty stripping bolus from spoon along w/ rapid AP movement/swallow initiation  Anterior loss of thin liquids via spoon and cup  Pharyngeal Stage: suspected  Swallow Mechanics:  Swallowing initiation appeared prompt  Laryngeal rise was palpated and judged to be sluggish but timely    Delayed cough x1 following trials  VSS t/o evaluation    Esophageal Concerns: none reported    Summary and Recommendations (see above)  Treatment Recommended: Yes     Frequency of treatment: 3-5x/week    Patient Stated Goal:    Dysphagia LTG  -Patient will demonstrate safe and effective oral intake (without overt s/s significant oral/pharyngeal dysphagia including s/s penetration or aspiration) for the highest appropriate diet level       Short Term Goals:  -Pt will tolerate Dysphagia 1/pureed diet and honey thick nectar thick thin liquid with no significant s/s oral or pharyngeal dysphagia across 1-3 diagnostic session/s        Speech Therapy Prognosis   Prognosis: fair    Prognosis Considerations: medical status and respiratory status     Don Hogan 92  2/2/2022

## 2022-02-02 NOTE — CASE MANAGEMENT
Case Management Discharge Planning Note    Patient name Gio Dallas  Location /-28 MRN [id number]  : 1945 Date 2022       Current Admission Date: 2022  Current Admission Diagnosis:Acute on chronic respiratory failure [facility])   Patient Active Problem List    Diagnosis Date Noted    SIRS (systemic inflammatory response syndrome) (HCC)     Pleural effusion     Thrombocytosis 2021    Hypoalbuminemia 2021    Dermatitis associated with moisture 2021    Non-pressure chronic ulcer left lower leg, limited to breakdown skin ([address] ) 2021    Non-pressure chronic ulcer right ankle, limited to breakdown skin ([address] ) 2021    Ambulatory dysfunction 2021    Staphylococcus aureus bacteremia 2021    Pneumonia due to COVID-19 virus 2021    Permanent atrial fibrillation ([address] ) 2021    Elevated troponin level not due myocardial infarction 2021    Sepsis due to COVID-19 [facility]) 2021    Chronic respiratory failure with hypoxia ([address] ) 2020    Decubitus ulcer of left buttock, unstageable ([address] ) 2019    Bilateral carotid artery disease ([address] ) 2019    Diabetes mellitus without complication ([address] )     Arthritis of right knee 2019    Venous stasis dermatitis of both lower extremities 2018    Aspiration pneumonia of left lower lobe ([address] ) 10/04/2018    Pressure injury of skin of sacral region 10/04/2018    Cataract of both eyes 2018    Depression with anxiety 2018    Aspiration into respiratory tract 2018    Age-related cataract of both eyes 2018    Other dysphagia 2018    Contusion of right hand 2018    Multifocal pneumonia 2018    Acute on chronic respiratory failure ([address] ) 2017    Elevated PSA 10/28/2017    Localized osteoarthritis of right knee 2017    COPD, moderate ([address] ) 2015    Gait disturbance 2015    Hypoxia 01/07/2015    Sleep apnea 08/07/2014    Venous insufficiency 08/07/2014    [facility]ox[facility]mal [facility]fib ([facility] ) 06/12/2014    Coronary artery disease involving native heart with angina pectoris ([facility] ) 03/15/2014    Lymphedema 03/15/2014    Arthritis 07/03/2012    Benign essential hypertension 07/03/2012    Esophagitis, reflux 07/03/2012    Mixed hyperlipidemia 07/03/2012      LOS (days): 5  Geometric Mean LOS (GMLOS) (days): 4 10  Days to GMLOS:-0 6     OBJECTIVE:  Risk of Unplanned Readmission Score: 16         Current admission status: Inpatient   Preferred Pharmacy:   [facility] [address], [address] [place] [address] [address]  [facility] [address]  Phone: [phone] Fax: [phone]    [address] [facility], [address], [address] [facility] , [address]  [address] [facility] , [address] [place] [address]  Phone: [phone] Fax: [phone]    [facility], [address], [address]  [address] [place], [address] [address]  Phone: [phone] Fax: [phone] [facility]  [address] [facility]   [address]  [address]  Phone: [phone] Fax: [phone]    Primary Care Provider: Brianna Salomon DO    Primary Insurance: MEDICARE  Secondary Insurance:     DISCHARGE DETAILS:           Current LOS 5 days  ARF-on 60 L HFNC  Multifocal pneumonia- ID consult  Discharge TBD  CM to follow

## 2022-02-02 NOTE — ASSESSMENT & PLAN NOTE
Patient is a 68year old male whom presented on evening of 1/28 with a chief complaint of Shortness of breath with increasing home O2 needs as well as R LE drainage  He has a PMH of COPD wearing 2-3 L at baseline, Afib on Coumadin, ANA LAURA, as well as a hospitalization Nov 11-22, 2021 secondary to COVID Pneumonia  Family reports steady decline ever since having COVID, "He isn't himself and keeps getting worse"    Since admission workup has included labs, CXR consistent with B/L pleural effusions, as well as CT Chest revealing Increasing moderate to large right pleural effusion  Collapse of the right lower lobe attributable to combination of compressive atelectasis from effusion, resorptive atelectasis from mucous plugging, and (possibly) pneumonia  Scattered bilateral peripheral consolidative opacities, left basilar peribronchial thickening, and new fluid level within left lower lobe pneumatocyst   These findings may suggest multifocal infection  Chronic, low level mediastinal or hilar lymphadenopathy appears similar to November  Treatment has included intermittent BiPAP use as well as IV diuresis with potential for thoracentesis  Also receiving Cefepime / Flagyl / Vanco  - Leg wound staph aureus, MRSA pending  Throughout the day on 1/30 patient less responsive as well as noted to be increasingly more Hypoxic  ABG obtained revealing 7 37/76/110  Patient requiring BiPAP more continuously    · Continual SpO2 monitoring and maintain >88% - wean FiO2 as tolerated  · Hypercarbic Respiratory failure, continue BiPAP trialed off BiPAP 1/31 am with rapid decompensation / hypoxia - Back on BiPAP Rate 20  16/6  · COVID flu RSV negative on admission  · Recent admission in Nov 2021 for COVID-19 pneumonia     · Low suspicion for acute PE given patient is anticoagulated on warfarin with therapeutic INR 2 4    · 1/30 Family at bedside, extensive conversation during which daughter advised her father would never want breathing tube or any machines to keep him alive - Code status will be updated to a Level 3  · 1/31 Further family discussions, they are concerned with lack of improvement, would like to give additional 24-48 hours and consider comfort measures if no improvement  · Lasix 40 mg IV BID, cont for goal net negative 2L  · 500 mg IV Diamox on 2/1 · 02/01 - weaned to Venti mask 40% O2 with Bipap q HS · 02/02 - weaned to face tent 40% however desaturated to 67% at which time he returned to BiPAP  Suctioned for large amount of thick sputum in back of airway  HFNC started 50% 60L

## 2022-02-02 NOTE — TELEMEDICINE
REQUIRED DOCUMENTATION:     1  This service was provided via Telemedicine  2  Provider located at [facility]  3  TeleMed provider: Rohith Najera MD   4  Identify all parties in room with patient during tele consult:  Patient, Koko Gillespie RN  5  After connecting through [employer], patient was identified by name and date of birth and assistant checked wristband  Patient was then informed that this was a Telemedicine visit and that the exam was being conducted confidentially over secure lines  Patient acknowledged consent and understanding of privacy and security of the Telemedicine visit, and gave us permission to have the assistant stay in the room in order to assist with the history and to conduct the exam   I informed the patient that I have reviewed their record in Epic and presented the opportunity for them to ask any questions regarding the visit today  The patient agreed to participate  TeleConsultation - Infectious Disease   Cadence Malcolm 68 y o  male MRN: [id number]  Unit/Bed#: -01 Encounter: [id number]      IMPRESSION & RECOMMENDATIONS:   1  Chronic bilateral leg ulcers, with increased drainage in the right leg ulcer/wound  Patient with extensive venous stasis changes but no evidence of cellulitis clinically  Superficial wound culture with growth of Pseudomonas, MRSA, Enterobacter and beta-hemolytic Streptococcus is likely ulcer/wound colonization  Patient has no fever or leukocytosis  Given presence of colonization with multiple resistant pathogens, I suspect that patient has received multiple courses of antibiotics in the past   At present, without evidence of cellulitis, I would just continue local wound care, without any antibiotic  This way, we will not propagate resistance and avoid potential antibiotic toxicities  Leg exam is stable off antibiotics for the last 2 days  Observe off further antibiotic  Local wound care  Serial exams    Monitor temperature/WBC      2  Acute on chronic hypoxic respiratory failure, most likely secondary to pulmonary edema  Patient with some improvement from diuresis  CXR and chest CT without obvious pneumonia  Multiple Procalcitonins have been in the normal range  Strep pneumonia and Legionella antigen negative  Overall, doubt pneumonia clinically  Respiratory status stable off antibiotics for the last 2 days  Observe off further antibiotics  Diuresis per primary service      3  Moderate right pleural effusion, without loculation  I suspect this is secondary to pulmonary edema  Consider thoracentesis for symptomatic relief, per primary service      4  Venous stasis, with poorly controlled edema  This is likely etiology of poor wound healing  Keep legs elevated to control edema      Discussed with patient above plan      I spent 30 minutes in evaluation of the patient of which 15 minutes was in counseling/coordination of care  Antibiotics:  None     Subjective:  Patient with stable dyspnea  Minimal cough  Right leg pain mild and stable  Temperature stays down  No chills  No diarrhea       Objective:  Vitals:  Temp:  [97 3 °F (36 3 °C)-98 7 °F (37 1 °C)] 98 4 °F (36 9 °C)  HR:  [81-93] 93  Resp:  [18-26] 23  BP: (115-145)/(54-86) 145/86  SpO2:  [93 %-98 %] 94 %  Temp (24hrs), Av °F (36 7 °C), Min:97 3 °F (36 3 °C), Max:98 7 °F (37 1 °C)  Current: Temperature: 98 4 °F (36 9 °C)    Physical Exam:    Physical exam has been primarily done by the patient's nurse and/or the primary service due to limited examination abilities on telemedicine     General: Awake, alert, cooperative, no distress  Neck:  Supple  No mass  No lymphadenopathy  Lungs: Decreased breath sounds, mild diffuse rhonchi, no rales, no wheezing, respirations somewhat labored  Heart:  Regular rate and rhythm, S1 and S2 normal, no murmur     Abdomen: Soft, nondistended, non-tender, bowel sounds active all four quadrants, no masses, no organomegaly  Extremities: Stable leg edema  Stable moderate chronic venous stasis changes  Stable superficial ulcer, without purulence  Stable mild tenderness in right leg  Skin:  No rash  Neuro: Moves all extremities  Invasive Devices  Report    Peripheral Intravenous Line            Peripheral IV 01/29/22 Right;Ventral (anterior) Forearm 3 days    Peripheral IV 01/30/22 Right Wrist 3 days    Peripheral IV 02/02/22 Distal;Left;Upper;Ventral (anterior) Antecubital <1 day          Drain            Urethral Catheter Temperature probe 16 Fr  2 days                Labs studies:   I have personally reviewed pertinent labs  Results from last 7 days   Lab Units 02/02/22  1412 02/02/22  0440 02/01/22  1638 01/31/22  0408 01/30/22  0500 01/29/22  0544 01/29/22  0544 01/28/22 2122 01/28/22 2122   POTASSIUM mmol/L 3 8 3 4* 3 8   < > 3 5   < > 4 0   < > 4 6   CHLORIDE mmol/L 97* 97* 95*   < > 97*   < > 99*   < > 98*   CO2 mmol/L 43* 41* >45*   < > 38*   < > 35*   < > 36*   BUN mg/dL 15 13 13   < > 14   < > 16   < > 14   CREATININE mg/dL 0 86 0 78 0 84   < > 0 95   < > 0 95   < > 1 01   EGFR ml/min/1 73sq m 84 87 85   < > 77   < > 77   < > 71   CALCIUM mg/dL 8 2* 8 4 8 3   < > 8 1*   < > 7 6*   < > 8 1*   AST U/L  --   --   --   --  26  --  24  --  36   ALT U/L  --   --   --   --  58  --  60  --  73   ALK PHOS U/L  --   --   --   --  74  --  73  --  81    < > = values in this interval not displayed  Results from last 7 days   Lab Units 02/02/22 0440 02/01/22 0622 01/31/22 2018   WBC Thousand/uL 7 80 9 43 9 63   HEMOGLOBIN g/dL 11 0* 10 6* 10 1*   PLATELETS Thousands/uL 418* 403* 409*     Results from last 7 days   Lab Units 01/29/22  1547 01/29/22  0208 01/28/22 2122 01/28/22 2116   BLOOD CULTURE   --   --  No Growth After 4 Days  No Growth After 4 Days     GRAM STAIN RESULT   --   --  No Polys*  1+ Gram positive cocci in pairs*  --    WOUND CULTURE   --   --  4+ Growth of Pseudomonas aeruginosa*  4+ Growth of Methicillin Resistant Staphylococcus aureus*  4+ Growth of Enterobacter cloacae complex*  4+ Growth of Streptococcus dysgalactiae*  --    MRSA CULTURE ONLY   --  Methicillin Resistant Staphylococcus aureus isolated*  This patient requires contact isolation precautions per [place] law  Contact precautions are not required in [place] for nasal surveillance cultures  --   --    LEGIONELLA URINARY ANTIGEN  Negative  --   --   --        Imaging Studies:   I have personally reviewed pertinent imaging study reports and images in PACS  EKG, Pathology, and Other Studies:   I have personally reviewed pertinent reports

## 2022-02-02 NOTE — ASSESSMENT & PLAN NOTE
· Xray chest suggestive of pneumonia     · CT chest shows " Scattered bilateral peripheral consolidative opacities, left basilar peribronchial  thickening, and new fluid level within left lower lobe pneumatocyst   These findings may suggest multifocal infection "   · Urine Strep / Legionella Negative  · MRSA nares Positive  · Leg Culture positive Staph - monitor closely  · Sputum culture ordered  · Procalcitonin x2 negative   · DRIP score 7, High risk MDRO (recent IV Abx, recent hospitalization, wound care, recent MRSA  infection, COPD  · 1/31 Procalcitonin remains negative, Cefepime, Vanco, and Flagyl discontinued 1/31, monitor temps as well as WBC count off antibiotics  · ID consulted and are in agreement with monitoring off abx for now  · Seen by SLP- NPO except Ice chips, will continue to re-evaluate

## 2022-02-02 NOTE — PLAN OF CARE
Problem: Potential for Falls  Goal: Patient will remain free of falls  Description: INTERVENTIONS:  - Educate patient/family on patient safety including physical limitations  - Instruct patient to call for assistance with activity   - Consult OT/PT to assist with strengthening/mobility   - Keep Call bell within reach  - Keep bed low and locked with side rails adjusted as appropriate  - Keep care items and personal belongings within reach  - Initiate and maintain comfort rounds  - Make Fall Risk Sign visible to staff  - Offer Toileting every 2 Hours, in advance of need  - Initiate/Maintain BED alarm  - Obtain necessary fall risk management equipment: bed alarm  - Apply yellow socks and bracelet for high fall risk patients  - Consider moving patient to room near nurses station  Outcome: Progressing     Problem: MOBILITY - ADULT  Goal: Maintain or return to baseline ADL function  Description: INTERVENTIONS:  -  Assess patient's ability to carry out ADLs; assess patient's baseline for ADL function and identify physical deficits which impact ability to perform ADLs (bathing, care of mouth/teeth, toileting, grooming, dressing, etc )  - Assess/evaluate cause of self-care deficits   - Assess range of motion  - Assess patient's mobility; develop plan if impaired  - Assess patient's need for assistive devices and provide as appropriate  - Encourage maximum independence but intervene and supervise when necessary  - Involve family in performance of ADLs  - Assess for home care needs following discharge   - Consider OT consult to assist with ADL evaluation and planning for discharge  - Provide patient education as appropriate  Outcome: Progressing  Goal: Maintains/Returns to pre admission functional level  Description: INTERVENTIONS:  - Perform BMAT or MOVE assessment daily    - Set and communicate daily mobility goal to care team and patient/family/caregiver     - Collaborate with rehabilitation services on mobility goals if consulted  - Perform Range of Motion 3 times a day  - Reposition patient every 2 hours    - Dangle patient 3 times a day  - Stand patient 3 times a day  - Ambulate patient 3 times a day  - Out of bed to [id number] a day   - Out of bed for meals 3 times a day  - Out of bed for toileting  - Record patient progress and toleration of activity level   Outcome: Progressing     Problem: PAIN - ADULT  Goal: Verbalizes/displays adequate comfort level or baseline comfort level  Description: Interventions:  - Encourage patient to monitor pain and request assistance  - Assess pain using appropriate pain scale  - Administer analgesics based on type and severity of pain and evaluate response  - Implement non-pharmacological measures as appropriate and evaluate response  - Consider cultural and social influences on pain and pain management  - Notify physician/advanced practitioner if interventions unsuccessful or patient reports new pain  Outcome: Progressing     Problem: INFECTION - ADULT  Goal: Absence or prevention of progression during hospitalization  Description: INTERVENTIONS:  - Assess and monitor for signs and symptoms of infection  - Monitor lab/diagnostic results  - Monitor all insertion sites, i e  indwelling lines, tubes, and drains  - Monitor endotracheal if appropriate and nasal secretions for changes in amount and color  - [place] appropriate cooling/warming therapies per order  - Administer medications as ordered  - Instruct and encourage patient and family to use good hand hygiene technique  - Identify and instruct in appropriate isolation precautions for identified infection/condition  Outcome: Progressing     Problem: DISCHARGE PLANNING  Goal: Discharge to home or other facility with appropriate resources  Description: INTERVENTIONS:  - Identify barriers to discharge w/patient and caregiver  - Arrange for needed discharge resources and transportation as appropriate  - Identify discharge learning needs (meds, wound care, etc )  - Arrange for interpretive services to assist at discharge as needed  - Refer to Case Management Department for coordinating discharge planning if the patient needs post-hospital services based on physician/advanced practitioner order or complex needs related to functional status, cognitive ability, or social support system  Outcome: Progressing     Problem: Knowledge Deficit  Goal: Patient/family/caregiver demonstrates understanding of disease process, treatment plan, medications, and discharge instructions  Description: Complete learning assessment and assess knowledge base  Interventions:  - Provide teaching at level of understanding  - Provide teaching via preferred learning methods  Outcome: Progressing     Problem: RESPIRATORY - ADULT  Goal: Achieves optimal ventilation and oxygenation  Description: INTERVENTIONS:  - Assess for changes in respiratory status  - Assess for changes in mentation and behavior  - Position to facilitate oxygenation and minimize respiratory effort  - Oxygen administered by appropriate delivery if ordered  - Initiate smoking cessation education as indicated  - Encourage broncho-pulmonary hygiene including cough, deep breathe, Incentive Spirometry  - Assess the need for suctioning and aspirate as needed  - Assess and instruct to report SOB or any respiratory difficulty  - Respiratory Therapy support as indicated  Outcome: Progressing     Problem: Nutrition/Hydration-ADULT  Goal: Nutrient/Hydration intake appropriate for improving, restoring or maintaining nutritional needs  Description: Monitor and assess patient's nutrition/hydration status for malnutrition  Collaborate with interdisciplinary team and initiate plan and interventions as ordered  Monitor patient's weight and dietary intake as ordered or per policy  Utilize nutrition screening tool and intervene as necessary   Determine patient's food preferences and provide high-protein, high-caloric foods as appropriate       INTERVENTIONS:  - Monitor oral intake, urinary output, labs, and treatment plans  - Assess nutrition and hydration status and recommend course of action  - Evaluate amount of meals eaten  - Assist patient with eating if necessary   - Allow adequate time for meals  - Recommend/ encourage appropriate diets, oral nutritional supplements, and vitamin/mineral supplements  - Order, calculate, and assess calorie counts as needed  - Recommend, monitor, and adjust tube feedings and TPN/PPN based on assessed needs  - Assess need for intravenous fluids  - Provide specific nutrition/hydration education as appropriate  - Include patient/family/caregiver in decisions related to nutrition  Outcome: Progressing

## 2022-02-03 ENCOUNTER — PATIENT OUTREACH (OUTPATIENT)
Dept: FAMILY MEDICINE CLINIC | Facility: CLINIC | Age: 77
End: 2022-02-03

## 2022-02-03 LAB
ANION GAP SERPL CALCULATED.3IONS-SCNC: 2 MMOL/L (ref 4–13)
ANION GAP SERPL CALCULATED.3IONS-SCNC: 2 MMOL/L (ref 4–13)
APTT PPP: 65 SECONDS (ref 23–37)
BACTERIA BLD CULT: NORMAL
BACTERIA BLD CULT: NORMAL
BUN SERPL-MCNC: 16 MG/DL (ref 5–25)
BUN SERPL-MCNC: 19 MG/DL (ref 5–25)
CALCIUM SERPL-MCNC: 8.5 MG/DL (ref 8.3–10.1)
CALCIUM SERPL-MCNC: 8.6 MG/DL (ref 8.3–10.1)
CHLORIDE SERPL-SCNC: 96 MMOL/L (ref 100–108)
CHLORIDE SERPL-SCNC: 98 MMOL/L (ref 100–108)
CO2 SERPL-SCNC: 38 MMOL/L (ref 21–32)
CO2 SERPL-SCNC: 40 MMOL/L (ref 21–32)
CREAT SERPL-MCNC: 0.83 MG/DL (ref 0.6–1.3)
CREAT SERPL-MCNC: 0.98 MG/DL (ref 0.6–1.3)
ERYTHROCYTE [DISTWIDTH] IN BLOOD BY AUTOMATED COUNT: 19.6 % (ref 11.6–15.1)
GFR SERPL CREATININE-BSD FRML MDRD: 74 ML/MIN/1.73SQ M
GFR SERPL CREATININE-BSD FRML MDRD: 85 ML/MIN/1.73SQ M
GLUCOSE SERPL-MCNC: 118 MG/DL (ref 65–140)
GLUCOSE SERPL-MCNC: 132 MG/DL (ref 65–140)
GLUCOSE SERPL-MCNC: 140 MG/DL (ref 65–140)
GLUCOSE SERPL-MCNC: 205 MG/DL (ref 65–140)
GLUCOSE SERPL-MCNC: 86 MG/DL (ref 65–140)
GLUCOSE SERPL-MCNC: 89 MG/DL (ref 65–140)
HCT VFR BLD AUTO: 39.7 % (ref 36.5–49.3)
HGB BLD-MCNC: 11.7 G/DL (ref 12–17)
MAGNESIUM SERPL-MCNC: 2.1 MG/DL (ref 1.6–2.6)
MCH RBC QN AUTO: 26.8 PG (ref 26.8–34.3)
MCHC RBC AUTO-ENTMCNC: 29.5 G/DL (ref 31.4–37.4)
MCV RBC AUTO: 91 FL (ref 82–98)
PLATELET # BLD AUTO: 438 THOUSANDS/UL (ref 149–390)
PMV BLD AUTO: 9.8 FL (ref 8.9–12.7)
POTASSIUM SERPL-SCNC: 3.8 MMOL/L (ref 3.5–5.3)
POTASSIUM SERPL-SCNC: 4.1 MMOL/L (ref 3.5–5.3)
RBC # BLD AUTO: 4.37 MILLION/UL (ref 3.88–5.62)
SODIUM SERPL-SCNC: 138 MMOL/L (ref 136–145)
SODIUM SERPL-SCNC: 138 MMOL/L (ref 136–145)
WBC # BLD AUTO: 9.08 THOUSAND/UL (ref 4.31–10.16)

## 2022-02-03 PROCEDURE — 97530 THERAPEUTIC ACTIVITIES: CPT

## 2022-02-03 PROCEDURE — G0408 INPT/TELE FOLLOW UP 35: HCPCS | Performed by: INTERNAL MEDICINE

## 2022-02-03 PROCEDURE — 80048 BASIC METABOLIC PNL TOTAL CA: CPT | Performed by: NURSE PRACTITIONER

## 2022-02-03 PROCEDURE — 94760 N-INVAS EAR/PLS OXIMETRY 1: CPT

## 2022-02-03 PROCEDURE — 82948 REAGENT STRIP/BLOOD GLUCOSE: CPT

## 2022-02-03 PROCEDURE — 83735 ASSAY OF MAGNESIUM: CPT | Performed by: NURSE PRACTITIONER

## 2022-02-03 PROCEDURE — 97167 OT EVAL HIGH COMPLEX 60 MIN: CPT

## 2022-02-03 PROCEDURE — 85730 THROMBOPLASTIN TIME PARTIAL: CPT | Performed by: ANESTHESIOLOGY

## 2022-02-03 PROCEDURE — 99233 SBSQ HOSP IP/OBS HIGH 50: CPT | Performed by: STUDENT IN AN ORGANIZED HEALTH CARE EDUCATION/TRAINING PROGRAM

## 2022-02-03 PROCEDURE — 97163 PT EVAL HIGH COMPLEX 45 MIN: CPT

## 2022-02-03 PROCEDURE — 94660 CPAP INITIATION&MGMT: CPT

## 2022-02-03 PROCEDURE — 80048 BASIC METABOLIC PNL TOTAL CA: CPT | Performed by: PHYSICIAN ASSISTANT

## 2022-02-03 PROCEDURE — 85027 COMPLETE CBC AUTOMATED: CPT | Performed by: NURSE PRACTITIONER

## 2022-02-03 PROCEDURE — 94640 AIRWAY INHALATION TREATMENT: CPT

## 2022-02-03 PROCEDURE — 92526 ORAL FUNCTION THERAPY: CPT

## 2022-02-03 RX ADMIN — PAROXETINE 20 MG: 20 TABLET, FILM COATED ORAL at 08:12

## 2022-02-03 RX ADMIN — FAMOTIDINE 20 MG: 10 INJECTION INTRAVENOUS at 08:12

## 2022-02-03 RX ADMIN — FAMOTIDINE 20 MG: 10 INJECTION INTRAVENOUS at 20:14

## 2022-02-03 RX ADMIN — BUDESONIDE 0.5 MG: 0.5 INHALANT ORAL at 07:56

## 2022-02-03 RX ADMIN — BUDESONIDE 0.5 MG: 0.5 INHALANT ORAL at 19:42

## 2022-02-03 RX ADMIN — IPRATROPIUM BROMIDE 0.5 MG: 0.5 SOLUTION RESPIRATORY (INHALATION) at 19:42

## 2022-02-03 RX ADMIN — IPRATROPIUM BROMIDE 0.5 MG: 0.5 SOLUTION RESPIRATORY (INHALATION) at 07:56

## 2022-02-03 RX ADMIN — Medication 1 TABLET: at 08:12

## 2022-02-03 RX ADMIN — LEVALBUTEROL HYDROCHLORIDE 1.25 MG: 1.25 SOLUTION, CONCENTRATE RESPIRATORY (INHALATION) at 19:42

## 2022-02-03 RX ADMIN — FUROSEMIDE 40 MG: 10 INJECTION, SOLUTION INTRAVENOUS at 08:12

## 2022-02-03 RX ADMIN — PRAVASTATIN SODIUM 80 MG: 80 TABLET ORAL at 17:40

## 2022-02-03 RX ADMIN — LEVALBUTEROL HYDROCHLORIDE 1.25 MG: 1.25 SOLUTION, CONCENTRATE RESPIRATORY (INHALATION) at 13:29

## 2022-02-03 RX ADMIN — IPRATROPIUM BROMIDE 0.5 MG: 0.5 SOLUTION RESPIRATORY (INHALATION) at 13:29

## 2022-02-03 RX ADMIN — Medication 1 APPLICATION: at 08:12

## 2022-02-03 RX ADMIN — INSULIN LISPRO 1 UNITS: 100 INJECTION, SOLUTION INTRAVENOUS; SUBCUTANEOUS at 21:54

## 2022-02-03 RX ADMIN — LEVALBUTEROL HYDROCHLORIDE 1.25 MG: 1.25 SOLUTION, CONCENTRATE RESPIRATORY (INHALATION) at 07:56

## 2022-02-03 RX ADMIN — FUROSEMIDE 40 MG: 10 INJECTION, SOLUTION INTRAVENOUS at 15:54

## 2022-02-03 RX ADMIN — HEPARIN SODIUM 15.1 UNITS/KG/HR: 10000 INJECTION, SOLUTION INTRAVENOUS at 09:37

## 2022-02-03 NOTE — SPEECH THERAPY NOTE
Speech Language/Pathology    Speech/Language Pathology Progress Note    Patient Name: Reji Roldan  GDTWU'G Date: 2/3/2022     Problem List  Principal Problem:    Acute on chronic respiratory failure ([facility] [address] )  Active Problems:    Paroxysmal A-fib (HCC)    COPD, moderate (HCC)    Sleep apnea    Multifocal pneumonia    Venous stasis dermatitis of both lower extremities    Ambulatory dysfunction    SIRS (systemic inflammatory response syndrome) (HCC)    Pleural effusion       Past Medical History  Past Medical History:   Diagnosis Date    A-fib ([facility] [address] )     Ambulates with cane     Anxiety     Arthritis     At risk for falls     Cellulitis     right leg    Chronic bronchitis ([facility] [address] )     RESOLVED: 3/26/15    Chronic obstructive lung disease ([facility] [address] )     RESOLVED: 9/7/16    COPD (chronic obstructive pulmonary disease) ([facility] [address] )     Depression     Edema     RESOLVED: 9/21/15    GERD (gastroesophageal reflux disease)     Hard of hearing     Hyperlipidemia     Hypertension     Irregular heart beat     Afib    Knee pain, bilateral     Leukocytosis     RESOLVED: 9/21/15    LLL pneumonia     RESOLVED: 6/20/17    Methicillin resistant Staphylococcus aureus infection     RESOLVED: 9/21/15    Pneumonia     RESOLVED: 6/20/17    Skin abnormality     Left pinky toe has a wound due to nail cutting by a provider - being followed -Dr Lucille Malcolm aware    Sleep apnea     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     Stroke ([facility] [address] )     Venous ulcers of both lower extremities ([facility] [address] )     Walker as ambulation aid     Weakness of right hand     Wears dentures     upper only    Wears glasses         Past Surgical History  Past Surgical History:   Procedure Laterality Date    CATARACT EXTRACTION Bilateral     COLONOSCOPY      ESOPHAGOGASTRODUODENOSCOPY      FOOT SURGERY Left     SPLENECTOMY, TOTAL      SURGERY OF THE SPLEEN    TONSILLECTOMY           Subjective:  Pt reported he was hungry      Objective:  Pt currently on 50% HFLC, SPO2 92-96% session  Assessment:  Oral care completed prior to PO trials, condition of mouth much improved w/ no secretions noted on hard palate  PO trials completed-  -Ice chips x2- adequate oral management and prompt swallow  -Thins via spoon x3- intermittent anterior loss d/t difficulty coordinating exhalation and bolus retrieval from spoon  No overt s/s aspiration   -Thins via straw x8- cues required for decreased bolus size and single sips  Cough x1  -purees- adequate oral management and clearance, no overt s/s aspiration  Plan/Recommendations:  Recommend to initiate a puree diet with thin liquids  Meds whole in puree  Full assistance with meals, frequent oral care, and verbal cues for decreased rate and single sips  SLP will continue to follow for diet tolerance and upgrade trials       Anu Lino 87 CCC-SLP  2/3/2022

## 2022-02-03 NOTE — OCCUPATIONAL THERAPY NOTE
Occupational Therapy Evaluation     Evaluation: 2718-6523  Treatment: 6212-9152    Patient Name: Lisandra Rasmussen  EAQCN'C Date: 2/3/2022  Problem List  Principal Problem:    Acute on chronic respiratory failure ([facility] [address] )  Active Problems:    Paroxysmal A-fib (HCC)    COPD, moderate (HCC)    Sleep apnea    Multifocal pneumonia    Venous stasis dermatitis of both lower extremities    Ambulatory dysfunction    SIRS (systemic inflammatory response syndrome) ([facility])    Pleural effusion    Past Medical History  Past Medical History:   Diagnosis Date    A-fib ([facility] [address] )     Ambulates with cane     Anxiety     Arthritis     At risk for falls     Cellulitis     right leg    Chronic bronchitis ([facility] [address] )     RESOLVED: 3/26/15    Chronic obstructive lung disease ([facility] [address] )     RESOLVED: 9/7/16    COPD (chronic obstructive pulmonary disease) ([facility] [address] )     Depression     Edema     RESOLVED: 9/21/15    GERD (gastroesophageal reflux disease)     Hard of hearing     Hyperlipidemia     Hypertension     Irregular heart beat     Afib    Knee pain, bilateral     Leukocytosis     RESOLVED: 9/21/15    LLL pneumonia     RESOLVED: 6/20/17    Methicillin resistant Staphylococcus aureus infection     RESOLVED: 9/21/15    Pneumonia     RESOLVED: 6/20/17    Skin abnormality     Left pinky toe has a wound due to nail cutting by a provider - being followed -Dr Rehan Jimenez aware    Sleep apnea     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     Stroke ([facility] [address] )     Venous ulcers of both lower extremities ([facility] [address] )    Rommie Baars as ambulation aid     Weakness of right hand     Wears dentures     upper only    Wears glasses      Past Surgical History  Past Surgical History:   Procedure Laterality Date    CATARACT EXTRACTION Bilateral     COLONOSCOPY      ESOPHAGOGASTRODUODENOSCOPY      FOOT SURGERY Left     SPLENECTOMY, TOTAL      SURGERY OF THE SPLEEN    TONSILLECTOMY             02/03/22 1030   Note Type   Note type Evaluation Restrictions/Precautions   Other Precautions Chair Alarm; Bed Alarm;Cognitive;Contact/isolation;Multiple lines;Telemetry;O2;Fall Risk  (HFNC  crandall cath  )   Pain Assessment   Pain Assessment Tool 0-10   Pain Score   ("a little bit")   Pain Location/Orientation Location: Leg   Home Living   Type of Home House  (2 SHABANA)   Home Layout Two level;Performs ADLs on one level; Able to live on main level with bedroom/bathroom   Bathroom Shower/Tub Tub/shower unit   Bathroom Toilet Raised   Home Equipment Walker;Cane   Additional Comments Pt unable to provide PLOF or home set-up at this time  Information obtained from previous OT evaluation completed on 11/14/2021  Pt reports living in a 2 story home with 2 SHABANA with his daughter and grandchild  Pt reports ambulating with RW at baseline  Pt has a full 1st floor set-up with full bath available"   Prior Function   Level of [place] Independent with ADLs and functional mobility   Lives With Daughter  (and grandson)   Receives Help From Family   ADL Assistance Independent   IADLs Needs assistance   ADL   Where Assessed Edge of bed   Grooming Assistance 5  Supervision/Setup   Grooming Deficit Setup;Verbal cueing;Supervision/safety; Increased time to complete   UB Dressing Assistance 4  Minimal Assistance   UB Dressing Deficit Steadying;Verbal cueing;Supervision/safety; Increased time to complete; Thread RUE; Thread LUE;Pull over head;Pull around back   LB Dressing Assistance 2  Maximal Assistance   LB Dressing Deficit Steadying; Requires assistive device for steadying;Verbal cueing;Supervision/safety; Increased time to complete; Don/doff R sock; Don/doff L sock; Thread RLE into pants; Thread LLE into pants;Pull up over hips   Additional Comments Pt completing ADL tasks while seated at EOB  UB Dressing @ Min A for thoroughness and safety  grooming tasks @ S with increased tme and vc'ing for technique d/t multiple lines   LB Dressing @ Max A for donning socks/pants around feet and CM around waist while standing  unable to assist with Cm around waist d/t requiring BUE supported at all times   Bed Mobility   Supine to Sit   (Mod x's 1 + Min x's 1)   Additional items Increased time required;Verbal cues;LE management  (trunk management)   Additional Comments HOB elevated to 30*  Pt reuqiring trunk managemetn and LE management for shifting hips  Transfers   Sit to Stand 2  Maximal assistance   Additional items Assist x 2; Increased time required;Verbal cues   Stand to Sit 2  Maximal assistance   Additional items Assist x 1; Increased time required;Verbal cues   Stand pivot Unable to assess   Additional Comments Pt completing functional transfers with use of RW for UB support  Pt completing STS from EOB>RW @ Max x's 2  pt then requiring Max x's 2 to achieve full stand and to maintain balnace  Pt noted to have B knee buckling and difficulty progressing BLE  Pt then returned to sitting for safty concerns  please refer to treatment note for performance during pivot with use of "quick move"   Balance   Static Sitting Fair   Dynamic Sitting Fair -   Static Standing Poor -   Activity Tolerance   Activity Tolerance Patient limited by fatigue   Medical Staff Made Aware Spoke with [facility]   Nurse Made Aware Spoke with [facility] Assessment   RUE Assessment WFL   LUE Assessment   LUE Assessment WFL   Hand Function   Gross Motor Coordination Functional   Fine Motor Coordination Functional   Sensation   Light Touch No apparent deficits   Cognition   Overall Cognitive Status Impaired   Arousal/Participation Responsive; Alert; Cooperative   Attention Attends with cues to redirect   Orientation Level Oriented to person   Memory Unable to assess   Following Commands Follows one step commands with increased time or repetition   Assessment   Limitation Decreased ADL status; Decreased UE strength;Decreased Safe judgement during ADL;Decreased cognition;Decreased endurance;Decreased self-care trans;Decreased high-level ADLs   Prognosis Good   Assessment Pt is a 68 y o  male, admitted to [facility]/28/2022 d/t experiencing increased SOB and RLE drainage  Dx: acute on chronic respiratory failure  Pt with PMHx impacting their performance during ADL tasks, including: a-fib, anxiety, arthritis, cellulitis, COPD, depression, edema, GERD, hyperlipidemia, HTN, B knee pain, pneumonia, venous ulcers of BLE, speech and language deficit d/t old CVA  Ravi Haynes Prior to admission to the hospital Pt was performing ADLs without physical assistance  IADLs with physical assistance  Functional transfers/ambulation without physical assistance  Cognitive status was PTA was intact  OT order placed to assess Pt's ADLs, cognitive status, and performance during functional tasks in order to maximize safety and independence while making most appropriate d/c recommendations  Pt's clinical presentation is currently unstable/unpredictable given new onset deficits that effect Pt's occupational performance and ability to safely return to PLOF including decrease activity tolerance, decrease standing balance, decrease sitting balance, decrease performance during ADL tasks, decrease cognition, decrease safety awareness , decrease UB MS, increased pain, decrease generalized strength, decrease activity engagement, decrease performance during functional transfers, high fall risk and limited insight to deficits combined with medical complications of pain impacting overall mobility status, change in mental status, abnormal H&H, abnormal CBC, low SpO2 values, new onset O2 use, abnormal CO2 values, edema/swelling, abnormal D-dimer, elevated BNP, increased RR, multiple readmissions, fear/retreat and need for input for mobility technique/safety  Pt on HFNC at time of evaluation and satting >94% throughout session  Pt previously on continuous BiPAP with high O2 needs and abnormal C02 levels    Personal factors affecting Pt at time of initial evaluation include: step(s) to enter environment, past experience, inability to perform current job functions, inability to perform IADLs, inability to perform ADLs, new need for AD, inability to ambulate household distances, inability to navigate community distances, limited insight into impairments, decreased initiation and engagement, difficulty communicating and questionable non-compliance  Pt will benefit from continued skilled OT services to address deficits as defined above and to maximize level independence/participation during ADLs and functional tasks to facilitate return toward PLOF and improved quality of life  Fr   Plan   Treatment Interventions ADL retraining;Functional transfer training;UE strengthening/ROM; Endurance training;Cognitive reorientation;Patient/family training;Equipment evaluation/education; Neuromuscular reeducation; Compensatory technique education;Continued evaluation; Energy conservation; Activityengagement   Goal Expiration Date 02/17/22   OT Treatment Day 1   OT Frequency 3-5x/wk   Additional Treatment Session   Start Time 1050   End Time 1104   Treatment Assessment Pt seen for OT treatment session #1 this date  Pt alert and agreeable to participate at thist evaristo  Pt completing STS from EOB with use of "quick move"  Pt able to pull from bar and hae B knees blocked  pt able to achieve full stand @ Mod x's 2  Pt then seated on quick move pads and transferred to recliner chair  Pt then completing STS from quick move @ Mod x's 1 and able to slowly lower self down into recliner chair  Pt tolerating well with O2 sats remaining in mid-high 90's throughout session  Pt seated OOB at end of session with call bell in reach, chair alarm intact and all needs meta t this time  Pt's RN aware      Additional Treatment Day 1   Recommendation   OT Discharge Recommendation Post acute rehabilitation services   AM-PAC Daily Activity Inpatient   Lower Body Dressing 2   Bathing 2   Toileting 2   Upper Body Dressing 3   Grooming 3   Eating 3 Daily Activity Raw Score 15   Daily Activity Standardized Score (Calc for Raw Score >=11) 34 69   AM-PAC Applied Cognition Inpatient   Following a Speech/Presentation 2   Understanding Ordinary Conversation 3   Taking Medications 2   Remembering Where Things Are Placed or Put Away 2   Remembering List of 4-5 Errands 2   Taking Care of Complicated Tasks 2   Applied Cognition Raw Score 13   Applied Cognition Standardized Score 30 46       The patient's raw score on the AM-PAC Daily Activity inpatient short form is 15, standardized score is 34 69, less than 39 4  Patients at this level are likely to benefit from DC to post-acute rehabilitation services  Please refer to the recommendation of the Occupational Therapist for safe DC planning  Pt goals to be met by 2/17/2022    1  Pt will demonstrate ability to complete grooming/hygiene tasks @ Mod I after set-up  2  Pt will demonstrate ability to complete supine<>sit @ S in order to increase safety and independence during ADL tasks  3  Pt will demonstrate ability to complete UB ADLs including washing/dressing @ S in order to increase performance and participation during meaningful tasks  4  Pt will demonstrate ability to complete LB dressing @ Min A in order to increase safety and independence during meaningful tasks  5  Pt will demonstrate ability to shannon/doff socks/shoes while sitting EOB @ Min A in order to increase safety and independence during meaningful tasks  6  Pt will demonstrate ability to complete toileting tasks including CM and pericare @ Min A in order to increase safety and independence during meaningful tasks  7  Pt will demonstrate ability to complete EOB, chair, toilet/commode transfers @ Min A in order to increase performance and participation during functional tasks  8  Pt will demonstrate ability to stand for 2-3 minutes while maintaining F+ balance with use of RW for UB support PRN    9  Pt will demonstrate ability to tolerate 30-35 minute OT session with no vc'ing for deep breathing or use of energy conservation techniques in order to increase activity tolerance during functional tasks  10  Pt will demonstrate Good carryover of use of energy conservation/compensatory strategies during ADLs and functional tasks in order to increase safety and reduce risk for falls  11  Pt will demonstrate Good attention and participation in continued evaluation of functional ambulation house hold distances in order to assist with safe d/c planning  12  Pt will attend to continued cognitive assessments 100% of the time in order to provide most appropriate d/c recommendations  13  Pt will follow 100% simple 2-step commands and be A&O x4 consistently with environmental cues to increase participation in functional activities  14  Pt will identify 3 areas of interest/hobbies and 1 intervention on how to incorporate into daily life in order to increase interaction with environment and peers as well as increase participation in meaningful tasks  15  Pt will demonstrate 100% carryover of BUE HEP in order to increase BUE MS and increase performance during functional tasks upon d/c home      Fouzia Nation OTR/L

## 2022-02-03 NOTE — PHYSICAL THERAPY NOTE
PHYSICAL THERAPY EVALUATION  NAME:  Idamae Spatz  DATE: 02/03/22    AGE:   68 y o  Mrn:   [id number]  ADMIT DX:  Cellulitis [N74 07]  CHF (congestive heart failure) (formerly [facility]) [I50 9]  Pneumonia [J18 9]  Visit for wound check [Z51 89]    Past Medical History:   Diagnosis Date    A-fib ([facility] )     Ambulates with cane     Anxiety     Arthritis     At risk for falls     Cellulitis     right leg    Chronic bronchitis (formerly [facility])     RESOLVED: 3/26/15    Chronic obstructive lung disease ([facility] )     RESOLVED: 9/7/16    COPD (chronic obstructive pulmonary disease) (formerly [facility])     Depression     Edema     RESOLVED: 9/21/15    GERD (gastroesophageal reflux disease)     Hard of hearing     Hyperlipidemia     Hypertension     Irregular heart beat     Afib    Knee pain, bilateral     Leukocytosis     RESOLVED: 9/21/15    LLL pneumonia     RESOLVED: 6/20/17    Methicillin resistant Staphylococcus aureus infection     RESOLVED: 9/21/15    Pneumonia     RESOLVED: 6/20/17    Skin abnormality     Left pinky toe has a wound due to nail cutting by a provider - being followed -Dr Arabella Quispe aware    Sleep apnea     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     Stroke ([facility] )     Venous ulcers of both lower extremities ([facility] )    Ryland Veliz as ambulation aid     Weakness of right hand     Wears dentures     upper only    Wears glasses      Length Of Stay: 6  Performed at least 2 patient identifiers during session: Name and Birthday  PHYSICAL THERAPY EVALUATION :      02/03/22 1031   PT Last Visit   PT Visit Date 02/03/22   Note Type   Note type Evaluation   Pain Assessment   Pain Assessment Tool 0-10   Pain Score   (" alittle bit")   Pain Location/Orientation Orientation: Bilateral;Location: Leg   Restrictions/Precautions   Other Precautions Chair Alarm; Bed Alarm;Contact/isolation;Multiple lines;Telemetry; Fall Risk;O2;Pain;Cognitive  (HFNC 45% FiO2 60 lpm  raz)   Home Living   Type of Home House  (2 SHABANA)   Home Layout Two level;Performs ADLs on one level; Able to live on main level with bedroom/bathroom   Bathroom Shower/Tub Tub/shower unit   Bathroom Toilet Raised   Home Equipment Walker;Cane  (reports using RW)   Additional Comments Pt unable to provide PLOF or home set-up at this time  Information obtained from previous evaluation completed on 11/14/2021  Pt reports living in a 2 story home with 2 SHABANA with his daughter and grandchild  Pt reports ambulating with RW at baseline  Pt has a full 1st floor set-up with full bath available"   Prior Function   Level of [place] Independent with ADLs and functional mobility   Lives With Daughter  (and grandson)   Receives Help From Family   ADL Assistance Independent   IADLs Needs assistance   Comments Pt was independent wiht RW PTA  General   Additional Pertinent History several open areas B LEs and sacrum   Cognition   Orientation Level Oriented to person;Disoriented to place; Disoriented to time;Disoriented to situation  (reported 2017)   Following Commands Follows one step commands with increased time or repetition   Subjective   Subjective "My last name is Susie"   RLE Assessment   RLE Assessment WFL  (AROM WFL, strength 2+/5)   LLE Assessment   LLE Assessment WFL  (AROM WFL, strength 2+/5)   Coordination   Rapid Alternating Movements Intact   Light Touch   RLE Light Touch Grossly intact   LLE Light Touch Grossly intact   Bed Mobility   Supine to Sit   (modAx1 and minAx1)   Additional items Assist x 2; Increased time required;Verbal cues;LE management  (trunk management)   Additional Comments HOB elevated 30 degrees  required modAx1 and minAx1 to complete bed mobility with mod verbal cues for technique and sequence  upon sitting upright, pt with inc lateral lean to left requiring verbal cues for upright posture  Transfers   Sit to Stand 2  Maximal assistance   Additional items Assist x 2; Increased time required;Verbal cues   Stand to Sit 2  Maximal assistance   Additional items Assist x 1; Increased time required;Verbal cues   Stand pivot Unable to assess   Additional Comments use of RW  sit<>stand with RW with maxAx2  manual cues for ant wt shift and verbal cues for technique and hand placement  in standing, pt with B knee buckling, unable to safely complete spt with RW  returned to sitting with maxAx1 for controlled descent  Ambulation/Elevation   Distance pt unable due to safety concerns with B knee buckling  however pt did attempt to complete wt shifting right and left, cleared foot off floor, but knee buckling requiring return to sitting  Balance   Static Sitting Fair   Dynamic Sitting Fair -   Static Standing Poor -   Endurance Deficit   Endurance Deficit Yes   Endurance Deficit Description SpO2 at rest on HFNC at 45% FiO2 and 60 lpm 95%, decreased as low as 94% and increased to 97% at times with mobility  HR at rest low 100s and with activity to 120s  quick onset fatigue   Activity Tolerance   Activity Tolerance Patient limited by fatigue;Patient limited by pain   Medical Staff Made Aware OT, Huey Landrum   Nurse Made Aware RNLizzette   Assessment   Prognosis Good   Problem List Decreased strength;Decreased endurance; Impaired balance;Decreased mobility; Decreased cognition; Impaired judgement;Decreased safety awareness; Obesity;Pain;Decreased skin integrity   Barriers to Discharge Decreased caregiver support; Inaccessible home environment   Barriers to Discharge Comments requires assistance to complete all mobility   Goals   Patient Goals none stated   STG Expiration Date 02/17/22   PT Treatment Day 1   Plan   Treatment/Interventions Functional transfer training;LE strengthening/ROM; Elevations; Therapeutic exercise; Endurance training;Patient/family training;Equipment eval/education; Bed mobility;Gait training; Compensatory technique education;Spoke to nursing;Spoke to case management;OT;Cognitive reorientation   PT Frequency 3-5x/wk   Recommendation   PT Discharge Recommendation Post acute rehabilitation services   Equipment Recommended   (TBD by rehab)   Additional Comments pt would benefit from acute inpatient rehab when medially stable   AM-PAC Basic Mobility Inpatient   Turning in Bed Without Bedrails 2   Lying on Back to Sitting on Edge of Flat Bed 1   Moving Bed to Chair 1   Standing Up From Chair 1   Walk in Room 1   Climb 3-5 Stairs 1   Basic Mobility Inpatient Raw Score 7   Turning Head Towards Sound 4   Follow Simple Instructions 3   Low Function Basic Mobility Raw Score 14   Low Function Basic Mobility Standardized Score 22 01   Highest Level Of Mobility   -HL Goal 2: Bed activities/Dependent transfer   JH-HLM Highest Level of Mobility 3: Sit at edge of bed   JH-HLM Goal Achieved Yes   Additional Treatment Session   Start Time 1050   End Time 1105   Treatment Assessment Pt is motivated to participate and sit OOB  He was able to achieve standing with use of quick move with decreased assistance  He requires verbal cues for proper completion of LE TE and manual cues for optimal completion  He is limited by decreased strength, balance, endurance  he will continue to beenfit from PT services to maximize LOF  Equipment Use use quick move  required modAx2 to achieve standing with quick move with manual cues for technique  pt transferred to recliner chair via quick move  modAx2 to stand from pads of quick move and maxAx1 for controlled descent to chair  SpO2 stable throughout on HFNC  pt with increased fatigue  completed 1x10 AROM ankle DF/PF, AAROM LAQ and hip flexion  increased time to complete  End of Consult   Patient Position at End of Consult Bed/Chair alarm activated; All needs within reach  (reclined in recliner)     (Please find full objective findings from PT assessment regarding body systems outlined above)  Assessment: Pt is a 68 y o  male seen for PT evaluation s/p admission to [address] [place] [address] on 1/28/2022 with Acute on chronic respiratory failure ([facility] )  Order placed for PT services  Upon evaluation: Pt is presenting with impaired functional mobility due to pain, decreased strength, decreased endurance, impaired balance, impaired cognition, decreased safety awareness, impaired judgment, fall risk and impaired skin integrity requiring moderate and minimal of 2 assistance for bed mobility, maximal of 2 assistance for transfers and unable to spt or ambulate with RW with B knees buckling in static standing and wt shifting  Pt's clinical presentation is currently unpredictable given the functional mobility deficits above, especially weakness, decreased skin integrity, decreased endurance, gait deviations, pain, decreased activity tolerance, decreased functional mobility tolerance, decreased safety awareness, impaired judgement and decreased cognition, coupled with fall risks as indicated by AM-PAC 6-Clicks: 6/11 as well as impaired balance, polypharmacy, impaired judgement, decreased safety awareness and decreased cognition and combined with medical complications of pain impacting overall mobility status, abnormal CBC, low SpO2 values requiring increased O2 use from BiPAP now to HFNC, abnormal CO2 values, multiple readmissions, fear/retreat, need for input for mobility technique/safety and Bilateral pulmonary opacities, most likely pulmonary edema or, possibly, multifocal pneumonia  Small right pleural effusion on chest xray, pt had moderate/large R pleural effusion, CT head showing sequela of remote left MCA and PCA territory infarctions with h/o CVA, abnormal BNP, Afib  Pt's PMHx and comorbidities that may affect physical performance and progress include: COPD, A fib, CVA and anxiety, arthritis, HTN, venous stasis ulcers B LEs   Personal factors affecting pt at time of IE include: inaccessible home environment, step(s) to enter environment, limited home support, inability to perform IADLs, inability to perform ADLs and inability to navigate level surfaces without external assistance  Pt will benefit from continued skilled PT services to address deficits as defined above and to maximize level of functional mobility to facilitate return toward PLOF and improved QOL  From PT/mobility standpoint, recommendation at time of d/c would be Short term rehab pending progress in order to reduce fall risk and maximize pt's functional independence and consistency with mobility in order to facilitate return to PLOF  Recommend trial with walker next 1-2 sessions and ther ex next 1-2 sessions  The patient's AM-PAC Basic Mobility Inpatient Short Form Raw Score is 7  A Raw score of less than or equal to 16 suggests the patient may benefit from discharge to post-acute rehabilitation services  Please also refer to the recommendation of the Physical Therapist for safe discharge planning  Goals: Pt will: Perform bed mobility tasks with consistent min A of 1 to reposition in bed and prepare for transfers  Pt will perform transfers with consistent modAx1 to decrease burden of care, decrease risk for falls and improve activity tolerance and prepare for ambulation  Pt will ambulate with RW for >/= 10' with  consistent modAx1  to decrease burden of care, decrease risk for falls, improve activity tolerance and improve gait quality and to access home environment  Trial stairs as appropriate  Pt will increase B LE strength >/= 1/2 MMT grade to facilitate functional mobility        Yandy Wu, PT,DPT

## 2022-02-03 NOTE — PLAN OF CARE
Problem: PHYSICAL THERAPY ADULT  Goal: Performs mobility at highest level of function for planned discharge setting  See evaluation for individualized goals  Description: Treatment/Interventions: Functional transfer training,LE strengthening/ROM,Elevations,Therapeutic exercise,Endurance training,Patient/family training,Equipment eval/education,Bed mobility,Gait training,Compensatory technique education,Spoke to nursing,Spoke to case management,OT,Cognitive reorientation  Equipment Recommended:  (TBD by rehab)       See flowsheet documentation for full assessment, interventions and recommendations  Note: Prognosis: Good  Problem List: Decreased strength,Decreased endurance,Impaired balance,Decreased mobility,Decreased cognition,Impaired judgement,Decreased safety awareness,Obesity,Pain,Decreased skin integrity  Assessment: Pt is a 68 y o  male seen for PT evaluation s/p admission to [address] [place] [address] on 1/28/2022 with [facility]  Order placed for PT services  Upon evaluation: Pt is presenting with impaired functional mobility due to pain, decreased strength, decreased endurance, impaired balance, impaired cognition, decreased safety awareness, impaired judgment, fall risk and impaired skin integrity requiring moderate and minimal of 2 assistance for bed mobility, maximal of 2 assistance for transfers and unable to spt or ambulate with RW with B knees buckling in static standing and wt shifting   Pt's clinical presentation is currently unpredictable given the functional mobility deficits above, especially weakness, decreased skin integrity, decreased endurance, gait deviations, pain, decreased activity tolerance, decreased functional mobility tolerance, decreased safety awareness, impaired judgement and decreased cognition, coupled with fall risks as indicated by AM-PAC 6-Clicks: 1/99 as well as impaired balance, polypharmacy, impaired judgement, decreased safety awareness and decreased cognition and combined with medical complications of pain impacting overall mobility status, abnormal CBC, low SpO2 values requiring increased O2 use from BiPAP now to HFNC, abnormal CO2 values, multiple readmissions, fear/retreat, need for input for mobility technique/safety and Bilateral pulmonary opacities, most likely pulmonary edema or, possibly, multifocal pneumonia  Small right pleural effusion on chest xray, pt had moderate/large R pleural effusion, CT head showing sequela of remote left MCA and PCA territory infarctions with h/o CVA, abnormal BNP, Afib  Pt's PMHx and comorbidities that may affect physical performance and progress include: COPD, A fib, CVA and anxiety, arthritis, HTN, venous stasis ulcers B LEs  Personal factors affecting pt at time of IE include: inaccessible home environment, step(s) to enter environment, limited home support, inability to perform IADLs, inability to perform ADLs and inability to navigate level surfaces without external assistance  Pt will benefit from continued skilled PT services to address deficits as defined above and to maximize level of functional mobility to facilitate return toward PLOF and improved QOL  From PT/mobility standpoint, recommendation at time of d/c would be Short term rehab pending progress in order to reduce fall risk and maximize pt's functional independence and consistency with mob  Barriers to Discharge: Decreased caregiver support,Inaccessible home environment  Barriers to Discharge Comments: requires assistance to complete all mobility     PT Discharge Recommendation: Post acute rehabilitation services          See flowsheet documentation for full assessment

## 2022-02-03 NOTE — PLAN OF CARE
Problem: OCCUPATIONAL THERAPY ADULT  Goal: Performs self-care activities at highest level of function for planned discharge setting  See evaluation for individualized goals  Description: Treatment Interventions: ADL retraining,Functional transfer training,UE strengthening/ROM,Endurance training,Cognitive reorientation,Patient/family training,Equipment evaluation/education,Neuromuscular reeducation,Compensatory technique education,Continued evaluation,Energy conservation,Activityengagement          See flowsheet documentation for full assessment, interventions and recommendations  Note: Limitation: Decreased ADL status,Decreased UE strength,Decreased Safe judgement during ADL,Decreased cognition,Decreased endurance,Decreased self-care trans,Decreased high-level ADLs  Prognosis: Good  Assessment: Pt is a 68 y o  male, admitted to [address] [place] [address] 1/28/2022 d/t experiencing increased SOB and RLE drainage  Dx: acute on chronic respiratory failure  Pt with PMHx impacting their performance during ADL tasks, including: a-fib, anxiety, arthritis, cellulitis, COPD, depression, edema, GERD, hyperlipidemia, HTN, B knee pain, pneumonia, venous ulcers of BLE, speech and language deficit d/t old CVA  Gio Heckler Prior to admission to the hospital Pt was performing ADLs without physical assistance  IADLs with physical assistance  Functional transfers/ambulation without physical assistance  Cognitive status was PTA was intact  OT order placed to assess Pt's ADLs, cognitive status, and performance during functional tasks in order to maximize safety and independence while making most appropriate d/c recommendations   Pt's clinical presentation is currently unstable/unpredictable given new onset deficits that effect Pt's occupational performance and ability to safely return to [facility] including decrease activity tolerance, decrease standing balance, decrease sitting balance, decrease performance during ADL tasks, decrease cognition, decrease safety awareness , decrease UB MS, increased pain, decrease generalized strength, decrease activity engagement, decrease performance during functional transfers, high fall risk and limited insight to deficits combined with medical complications of pain impacting overall mobility status, change in mental status, abnormal H&H, abnormal CBC, low SpO2 values, new onset O2 use, abnormal CO2 values, edema/swelling, abnormal D-dimer, elevated BNP, increased RR, multiple readmissions, fear/retreat and need for input for mobility technique/safety  Pt on HFNC at time of evaluation and satting >94% throughout session  Pt previously on continuous BiPAP with high O2 needs and abnormal C02 levels  Personal factors affecting Pt at time of initial evaluation include: step(s) to enter environment, past experience, inability to perform current job functions, inability to perform IADLs, inability to perform ADLs, new need for AD, inability to ambulate household distances, inability to navigate community distances, limited insight into impairments, decreased initiation and engagement, difficulty communicating and questionable non-compliance  Pt will benefit from continued skilled OT services to address deficits as defined above and to maximize level independence/participation during ADLs and functional tasks to facilitate return toward PLOF and improved quality of life   Fr     OT Discharge Recommendation: Post acute rehabilitation services

## 2022-02-03 NOTE — PLAN OF CARE
Problem: Potential for Falls  Goal: Patient will remain free of falls  Description: INTERVENTIONS:  - Educate patient/family on patient safety including physical limitations  - Instruct patient to call for assistance with activity   - Consult OT/PT to assist with strengthening/mobility   - Keep Call bell within reach  - Keep bed low and locked with side rails adjusted as appropriate  - Keep care items and personal belongings within reach  - Initiate and maintain comfort rounds  - Make Fall Risk Sign visible to staff  - Offer Toileting every 2 Hours, in advance of need  - Initiate/Maintain BED alarm  - Obtain necessary fall risk management equipment: bed alarm  - Apply yellow socks and bracelet for high fall risk patients  - Consider moving patient to room near nurses station  Outcome: Progressing     Problem: MOBILITY - ADULT  Goal: Maintain or return to baseline ADL function  Description: INTERVENTIONS:  -  Assess patient's ability to carry out ADLs; assess patient's baseline for ADL function and identify physical deficits which impact ability to perform ADLs (bathing, care of mouth/teeth, toileting, grooming, dressing, etc )  - Assess/evaluate cause of self-care deficits   - Assess range of motion  - Assess patient's mobility; develop plan if impaired  - Assess patient's need for assistive devices and provide as appropriate  - Encourage maximum independence but intervene and supervise when necessary  - Involve family in performance of ADLs  - Assess for home care needs following discharge   - Consider OT consult to assist with ADL evaluation and planning for discharge  - Provide patient education as appropriate  Outcome: Progressing  Goal: Maintains/Returns to pre admission functional level  Description: INTERVENTIONS:  - Perform BMAT or MOVE assessment daily    - Set and communicate daily mobility goal to care team and patient/family/caregiver     - Collaborate with rehabilitation services on mobility goals if consulted  - Perform Range of Motion 3 times a day  - Reposition patient every 2 hours    - Dangle patient 3 times a day  - Stand patient 3 times a day  - Ambulate patient 3 times a day  - Out of bed to [id number] a day   - Out of bed for meals 3 times a day  - Out of bed for toileting  - Record patient progress and toleration of activity level   Outcome: Progressing     Problem: PAIN - ADULT  Goal: Verbalizes/displays adequate comfort level or baseline comfort level  Description: Interventions:  - Encourage patient to monitor pain and request assistance  - Assess pain using appropriate pain scale  - Administer analgesics based on type and severity of pain and evaluate response  - Implement non-pharmacological measures as appropriate and evaluate response  - Consider cultural and social influences on pain and pain management  - Notify physician/advanced practitioner if interventions unsuccessful or patient reports new pain  Outcome: Progressing     Problem: INFECTION - ADULT  Goal: Absence or prevention of progression during hospitalization  Description: INTERVENTIONS:  - Assess and monitor for signs and symptoms of infection  - Monitor lab/diagnostic results  - Monitor all insertion sites, i e  indwelling lines, tubes, and drains  - Monitor endotracheal if appropriate and nasal secretions for changes in amount and color  - Bandy appropriate cooling/warming therapies per order  - Administer medications as ordered  - Instruct and encourage patient and family to use good hand hygiene technique  - Identify and instruct in appropriate isolation precautions for identified infection/condition  Outcome: Progressing     Problem: DISCHARGE PLANNING  Goal: Discharge to home or other facility with appropriate resources  Description: INTERVENTIONS:  - Identify barriers to discharge w/patient and caregiver  - Arrange for needed discharge resources and transportation as appropriate  - Identify discharge learning needs (meds, wound care, etc )  - Arrange for interpretive services to assist at discharge as needed  - Refer to Case Management Department for coordinating discharge planning if the patient needs post-hospital services based on physician/advanced practitioner order or complex needs related to functional status, cognitive ability, or social support system  Outcome: Progressing     Problem: Knowledge Deficit  Goal: Patient/family/caregiver demonstrates understanding of disease process, treatment plan, medications, and discharge instructions  Description: Complete learning assessment and assess knowledge base    Interventions:  - Provide teaching at level of understanding  - Provide teaching via preferred learning methods  Outcome: Progressing     Problem: RESPIRATORY - ADULT  Goal: Achieves optimal ventilation and oxygenation  Description: INTERVENTIONS:  - Assess for changes in respiratory status  - Assess for changes in mentation and behavior  - Position to facilitate oxygenation and minimize respiratory effort  - Oxygen administered by appropriate delivery if ordered  - Initiate smoking cessation education as indicated  - Encourage broncho-pulmonary hygiene including cough, deep breathe, Incentive Spirometry  - Assess the need for suctioning and aspirate as needed  - Assess and instruct to report SOB or any respiratory difficulty  - Respiratory Therapy support as indicated  Outcome: Progressing     Problem: SKIN/TISSUE INTEGRITY - ADULT  Goal: Incision(s), wounds(s) or drain site(s) healing without S/S of infection  Description: INTERVENTIONS  - Assess and document dressing, incision, wound bed, drain sites and surrounding tissue  - Provide patient and family education  - Perform skin care/dressing changes every day  Outcome: Progressing  Goal: Pressure injury heals and does not worsen  Description: Interventions:  - Implement low air loss mattress or specialty surface (Criteria met)  - Apply silicone foam dressing  - Instruct/assist with weight shifting every 15 minutes when in chair   - Limit chair time to 2 hour intervals  - Use special pressure reducing interventions such as waffle cushion when in chair   - Apply fecal or urinary incontinence containment device   - Perform passive or active ROM every 2 hours  - Turn and reposition patient & offload bony prominences every 2 hours   - Utilize friction reducing device or surface for transfers   - Consider consults to  interdisciplinary teams such as wound nurse  - Use incontinent care products after each incontinent episode such as brief  - Consider nutrition services referral as needed  Outcome: Progressing     Problem: Nutrition/Hydration-ADULT  Goal: Nutrient/Hydration intake appropriate for improving, restoring or maintaining nutritional needs  Description: Monitor and assess patient's nutrition/hydration status for malnutrition  Collaborate with interdisciplinary team and initiate plan and interventions as ordered  Monitor patient's weight and dietary intake as ordered or per policy  Utilize nutrition screening tool and intervene as necessary  Determine patient's food preferences and provide high-protein, high-caloric foods as appropriate       INTERVENTIONS:  - Monitor oral intake, urinary output, labs, and treatment plans  - Assess nutrition and hydration status and recommend course of action  - Evaluate amount of meals eaten  - Assist patient with eating if necessary   - Allow adequate time for meals  - Recommend/ encourage appropriate diets, oral nutritional supplements, and vitamin/mineral supplements  - Order, calculate, and assess calorie counts as needed  - Recommend, monitor, and adjust tube feedings and TPN/PPN based on assessed needs  - Assess need for intravenous fluids  - Provide specific nutrition/hydration education as appropriate  - Include patient/family/caregiver in decisions related to nutrition  Outcome: Progressing

## 2022-02-03 NOTE — PLAN OF CARE
Problem: SLP ADULT - SWALLOWING, IMPAIRED  Goal: Advance to least restrictive diet without signs or symptoms of aspiration for planned discharge setting  See evaluation for individualized goals  Description: Patient will:    1  Tolerate Dysphagia, Level 1-Puree with no S/S of pharyngeal dysphagia across meals   2  Demonstrate effective oral transfer and oral clearance with minimal cues  3  Use small bites/sips with minimal cues  Outcome: Progressing  Note: Recommend to initiate a puree and thin liquid diet  Meds whole in puree as tolerated  Full assistance, oral care and verbal cues for decreased rate/bolus size

## 2022-02-03 NOTE — CASE MANAGEMENT
Case Management Progress Note    Patient name Patti Coombs  Location /-47 MRN [id number]  : 1945 Date 2/3/2022       LOS (days): 6  Geometric Mean LOS (GMLOS) (days): 4 10  Days to GMLOS:-1 7        OBJECTIVE:        Current admission status: Inpatient  Preferred Pharmacy:   [facility] [address], [address]  [facility] [address]  Phone: [phone] Fax: [phone] [address] [facility] [place], [facility] , [address]  [address] [facility],[address]   Phone: [phone] Fax: [phone] [address]  [address], [address] [address] [facility],   [address] [address]  Phone: [phone] Fax: [address] [facility] [place]  [address] [place] [address]  [address]  [place]  Phone: [phone] Fax: [phone]    Primary Care Provider: Randy Hammonds DO    Primary Insurance: MEDICARE  Secondary Insurance:     PROGRESS NOTE:  CM placed call to Pt's dgt to discuss dcp options  Dgt reports the MD had discussed possible hospice, dgt reports that she would want to go to facility  Dgt requesting to speak w/ CM, as well as MD when she comes into visit tomorrow  CM will f/u

## 2022-02-03 NOTE — PROGRESS NOTES
MICHEL MATOS received ADT notification that patient was admitted inpatient to [address] [place] [address] on 1/28/22 and is still currently admitted  MICHEL MATOS will continue to follow and provide assistance as needed

## 2022-02-03 NOTE — QUICK NOTE
Contacted patients daughter Carmen Romo to provide update  She advised she will be in a little later and she would like to speak at that time

## 2022-02-03 NOTE — PROGRESS NOTES
Pt now off BiPAP, ST recommending pureed/thin liquids per 2/3 note, reflected in current diet order  Pt reports consuming 100% of lunch today, has an appetite  Wound care note reviewed  Will order Qasim BID and ensure compact daily to optimize intake/promote wound healing  Dysphagia diet per ST recommendations, no further diet restrictions at this time

## 2022-02-03 NOTE — TELEMEDICINE
REQUIRED DOCUMENTATION:     1  This service was provided via Telemedicine  2  Provider located at [facility]  3  TeleMed provider: Pastor Jackie MD   4  Identify all parties in room with patient during tele consult:  Patient, Gopal Verma RN  5  After connecting through [employer], patient was identified by name and date of birth and assistant checked wristband  Patient was then informed that this was a Telemedicine visit and that the exam was being conducted confidentially over secure lines  Patient acknowledged consent and understanding of privacy and security of the Telemedicine visit, and gave us permission to have the assistant stay in the room in order to assist with the history and to conduct the exam   I informed the patient that I have reviewed their record in Epic and presented the opportunity for them to ask any questions regarding the visit today  The patient agreed to participate  TeleConsultation - Infectious Disease   Vandana Arechiga 68 y o  male MRN: [id number]  Unit/Bed#: -01 Encounter: [id number]      IMPRESSION & RECOMMENDATIONS:   1  Chronic bilateral leg ulcers, with increased drainage in the right leg ulcer/wound   Patient with extensive venous stasis changes but no evidence of cellulitis clinically   Superficial wound culture with growth of Pseudomonas, MRSA, Enterobacter and beta-hemolytic Streptococcus is likely ulcer/wound colonization   Patient has no fever or leukocytosis   Given presence of colonization with multiple resistant pathogens, I suspect that patient has received multiple courses of antibiotics in the past   At present, without evidence of cellulitis, I would just continue local wound care, without any antibiotic   This way, we will not propagate resistance and avoid potential antibiotic toxicities  Leg exam is stable off antibiotics  Edema is improved with elevation  Tenderness is also improved  Continue to observe off antibiotic    Local wound care  Serial exams  Monitor temperature/WBC      2  Acute on chronic hypoxic respiratory failure, most likely secondary to pulmonary edema   Patient with some improvement from diuresis   CXR and chest CT without obvious pneumonia   Multiple Procalcitonins have been in the normal range  Strep pneumonia and Legionella antigen negative    Overall, doubt pneumonia clinically  patient remains on high-flow O2 support  Observe off further antibiotics  Diuresis per primary service      3  Moderate right pleural effusion, without loculation   I suspect this is secondary to pulmonary edema  Consider thoracentesis for symptomatic relief, per primary service      4  Venous stasis, with poorly controlled edema   This is likely etiology of poor wound healing  Keep legs elevated to control edema      Discussed with patient regarding above plan      I spent 30 minutes in evaluation of the patient of which 15 minutes was in counseling/coordination of care     Antibiotics:  None    Subjective:  Patient with stable dyspnea  Minimal cough  Right leg pain quite improved today  Temperature stays down  No chills  No diarrhea  Objective:  Vitals:  Temp:  [97 7 °F (36 5 °C)-99 1 °F (37 3 °C)] 99 1 °F (37 3 °C)  HR:  [] 106  Resp:  [18-27] 27  BP: (114-145)/(61-86) 136/65  SpO2:  [93 %-98 %] 95 %  Temp (24hrs), Av 5 °F (36 9 °C), Min:97 7 °F (36 5 °C), Max:99 1 °F (37 3 °C)  Current: Temperature: 99 1 °F (37 3 °C)    Physical Exam:    Physical exam has been primarily done by the patient's nurse and/or the primary service due to limited examination abilities on telemedicine  General: Awake, alert, cooperative, no distress  Neck:  Supple  No mass  No lymphadenopathy  Lungs: Expansion symmetric, rhonchorous, no rales, no wheezing, respirations mildly labored  Heart:  Regular rate and rhythm, S1 and S2 normal, no murmur     Abdomen: Soft, nondistended, non-tender, bowel sounds active all four quadrants, no masses, no organomegaly  Extremities: Stable leg edema  Stable chronic right leg ulcers with extensive venous stasis changes  No purulence  Minimal tenderness today  Skin:  No rash  Neuro: Moves all extremities  Invasive Devices  Report    Peripheral Intravenous Line            Peripheral IV 01/29/22 Right;Ventral (anterior) Forearm 4 days    Peripheral IV 01/30/22 Right Wrist 4 days    Peripheral IV 02/02/22 Distal;Left;Upper;Ventral (anterior) Antecubital 1 day          Drain            Urethral Catheter Temperature probe 16 Fr  3 days                Labs studies:   I have personally reviewed pertinent labs  Results from last 7 days   Lab Units 02/03/22  0533 02/02/22  1412 02/02/22  0440 01/31/22  0408 01/30/22  0500 01/29/22  0544 01/29/22  0544 01/28/22 2122 01/28/22 2122   POTASSIUM mmol/L 3 8 3 8 3 4*   < > 3 5   < > 4 0   < > 4 6   CHLORIDE mmol/L 98* 97* 97*   < > 97*   < > 99*   < > 98*   CO2 mmol/L 38* 43* 41*   < > 38*   < > 35*   < > 36*   BUN mg/dL 16 15 13   < > 14   < > 16   < > 14   CREATININE mg/dL 0 83 0 86 0 78   < > 0 95   < > 0 95   < > 1 01   EGFR ml/min/1 73sq m 85 84 87   < > 77   < > 77   < > 71   CALCIUM mg/dL 8 5 8 2* 8 4   < > 8 1*   < > 7 6*   < > 8 1*   AST U/L  --   --   --   --  26  --  24  --  36   ALT U/L  --   --   --   --  58  --  60  --  73   ALK PHOS U/L  --   --   --   --  74  --  73  --  81    < > = values in this interval not displayed  Results from last 7 days   Lab Units 02/03/22  0533 02/02/22 0440 02/01/22  0622   WBC Thousand/uL 9 08 7 80 9 43   HEMOGLOBIN g/dL 11 7* 11 0* 10 6*   PLATELETS Thousands/uL 438* 418* 403*     Results from last 7 days   Lab Units 01/29/22  1547 01/29/22  0208 01/28/22 2122 01/28/22 2116   BLOOD CULTURE   --   --  No Growth After 4 Days  No Growth After 4 Days     GRAM STAIN RESULT   --   --  No Polys*  1+ Gram positive cocci in pairs*  --    WOUND CULTURE   --   --  4+ Growth of Pseudomonas aeruginosa*  4+ Growth of Methicillin Resistant Staphylococcus aureus*  4+ Growth of Enterobacter cloacae complex*  4+ Growth of Streptococcus dysgalactiae*  --    MRSA CULTURE ONLY   --  Methicillin Resistant Staphylococcus aureus isolated*  This patient requires contact isolation precautions per [place] law  Contact precautions are not required in [place] for nasal surveillance cultures  --   --    LEGIONELLA URINARY ANTIGEN  Negative  --   --   --        Imaging Studies:   I have personally reviewed pertinent imaging study reports and images in PACS  EKG, Pathology, and Other Studies:   I have personally reviewed pertinent reports

## 2022-02-03 NOTE — PLAN OF CARE
Problem: PHYSICAL THERAPY ADULT  Goal: Performs mobility at highest level of function for planned discharge setting  See evaluation for individualized goals  Description: Treatment/Interventions: Functional transfer training,LE strengthening/ROM,Elevations,Therapeutic exercise,Endurance training,Patient/family training,Equipment eval/education,Bed mobility,Gait training,Compensatory technique education,Spoke to nursing,Spoke to case management,OT,Cognitive reorientation  Equipment Recommended:  (TBD by rehab)       See flowsheet documentation for full assessment, interventions and recommendations  9/9/0440 [id number] by Gery Councilman, PT  Note: Prognosis: Good  Problem List: Decreased strength,Decreased endurance,Impaired balance,Decreased mobility,Decreased cognition,Impaired judgement,Decreased safety awareness,Obesity,Pain,Decreased skin integrity  Pt is motivated to participate and sit OOB  He was able to achieve standing with use of quick move with decreased assistance  He requires verbal cues for proper completion of LE TE and manual cues for optimal completion  He is limited by decreased strength, balance, endurance  he will continue to beenfit from PT services to maximize LOF  Barriers to Discharge: Decreased caregiver support,Inaccessible home environment  Barriers to Discharge Comments: requires assistance to complete all mobility     PT Discharge Recommendation: Post acute rehabilitation services          See flowsheet documentation for full assessment

## 2022-02-04 LAB
APTT PPP: 43 SECONDS (ref 23–37)
GLUCOSE SERPL-MCNC: 116 MG/DL (ref 65–140)

## 2022-02-04 PROCEDURE — 85730 THROMBOPLASTIN TIME PARTIAL: CPT | Performed by: PHYSICIAN ASSISTANT

## 2022-02-04 PROCEDURE — 82948 REAGENT STRIP/BLOOD GLUCOSE: CPT

## 2022-02-04 PROCEDURE — 94640 AIRWAY INHALATION TREATMENT: CPT

## 2022-02-04 PROCEDURE — 94760 N-INVAS EAR/PLS OXIMETRY 1: CPT

## 2022-02-04 PROCEDURE — 99233 SBSQ HOSP IP/OBS HIGH 50: CPT | Performed by: PHYSICIAN ASSISTANT

## 2022-02-04 RX ORDER — LORAZEPAM 2 MG/ML
0.5 INJECTION INTRAMUSCULAR EVERY 4 HOURS PRN
Status: DISCONTINUED | OUTPATIENT
Start: 2022-02-04 | End: 2022-02-05

## 2022-02-04 RX ADMIN — MORPHINE SULFATE 2 MG: 2 INJECTION, SOLUTION INTRAMUSCULAR; INTRAVENOUS at 10:59

## 2022-02-04 RX ADMIN — PAROXETINE 20 MG: 20 TABLET, FILM COATED ORAL at 09:20

## 2022-02-04 RX ADMIN — FUROSEMIDE 40 MG: 10 INJECTION, SOLUTION INTRAVENOUS at 09:00

## 2022-02-04 RX ADMIN — Medication 1 TABLET: at 09:20

## 2022-02-04 RX ADMIN — BUDESONIDE 0.5 MG: 0.5 INHALANT ORAL at 08:39

## 2022-02-04 RX ADMIN — HEPARIN SODIUM 2000 UNITS: 1000 INJECTION INTRAVENOUS; SUBCUTANEOUS at 05:14

## 2022-02-04 RX ADMIN — HEPARIN SODIUM 17.1 UNITS/KG/HR: 10000 INJECTION, SOLUTION INTRAVENOUS at 05:17

## 2022-02-04 RX ADMIN — FAMOTIDINE 20 MG: 10 INJECTION INTRAVENOUS at 09:21

## 2022-02-04 RX ADMIN — Medication 1 APPLICATION: at 09:21

## 2022-02-04 RX ADMIN — LEVALBUTEROL HYDROCHLORIDE 1.25 MG: 1.25 SOLUTION, CONCENTRATE RESPIRATORY (INHALATION) at 08:39

## 2022-02-04 RX ADMIN — MORPHINE SULFATE 2 MG: 2 INJECTION, SOLUTION INTRAMUSCULAR; INTRAVENOUS at 22:13

## 2022-02-04 RX ADMIN — IPRATROPIUM BROMIDE 0.5 MG: 0.5 SOLUTION RESPIRATORY (INHALATION) at 08:39

## 2022-02-04 NOTE — SPEECH THERAPY NOTE
Speech Language/Pathology    Chart reviewed  Attempted to see pt for dysphagia treatment during breakfast  Pt on BiPap and extremely lethargic  Pt unable to maintain alertness  SLP will f/u later this date      Anu Leiva 87 CCC-SLP  2/4/2022

## 2022-02-04 NOTE — ASSESSMENT & PLAN NOTE
· Noted on CXR, CT Chest reveals moderate/large R Pleural effusion  · BNP 4,540 thought to be volume overloaded on exam    · Currently being diuresed receiving Lasix 40mg IV BID  · Considered Thoracentesis; however with hypercarbia with patient being more somnolent will hold off   · ECHO from Nov 2021 revealed EF 13%, normal systolic function, diastolic function not assessed    · 02/02 ECHO limited: EF 43%, normal systolic function, abnormal septal motion, systolic flattening of the IVS consistent with RV pressure overload, bi-atrial dilation, PASP 59 mmHg

## 2022-02-04 NOTE — QUICK NOTE
Family updated this am after patient with multiple episodes of apnea with SpO2 into 40's, with associated cyanosis  Daughter Patrick Abarca arrived at bedside approximately 1 hour later  Decision was made to transition to comfort care only  Morphine / Ativan ordered p r n, remove from HFNC, placing on NC 2-3L which is his normal home O2 supplementation  Daughter advised unsure how long he may linger  Case Management aware

## 2022-02-04 NOTE — PLAN OF CARE
Problem: Potential for Falls  Goal: Patient will remain free of falls  Description: INTERVENTIONS:  - Educate patient/family on patient safety including physical limitations  - Instruct patient to call for assistance with activity   - Consult OT/PT to assist with strengthening/mobility   - Keep Call bell within reach  - Keep bed low and locked with side rails adjusted as appropriate  - Keep care items and personal belongings within reach  - Initiate and maintain comfort rounds  - Make Fall Risk Sign visible to staff  - Offer Toileting every 2 Hours, in advance of need  - Initiate/Maintain BED alarm  - Obtain necessary fall risk management equipment: bed alarm  - Apply yellow socks and bracelet for high fall risk patients  - Consider moving patient to room near nurses station  Outcome: Progressing     Problem: MOBILITY - ADULT  Goal: Maintain or return to baseline ADL function  Description: INTERVENTIONS:  -  Assess patient's ability to carry out ADLs; assess patient's baseline for ADL function and identify physical deficits which impact ability to perform ADLs (bathing, care of mouth/teeth, toileting, grooming, dressing, etc )  - Assess/evaluate cause of self-care deficits   - Assess range of motion  - Assess patient's mobility; develop plan if impaired  - Assess patient's need for assistive devices and provide as appropriate  - Encourage maximum independence but intervene and supervise when necessary  - Involve family in performance of ADLs  - Assess for home care needs following discharge   - Consider OT consult to assist with ADL evaluation and planning for discharge  - Provide patient education as appropriate  Outcome: Progressing  Goal: Maintains/Returns to pre admission functional level  Description: INTERVENTIONS:  - Perform BMAT or MOVE assessment daily    - Set and communicate daily mobility goal to care team and patient/family/caregiver     - Collaborate with rehabilitation services on mobility goals if consulted  - Perform Range of Motion 3 times a day  - Reposition patient every 2 hours    - Dangle patient 3 times a day  - Stand patient 3 times a day  - Ambulate patient 3 times a day  - Out of bed to [id number] a day   - Out of bed for meals 3 times a day  - Out of bed for toileting  - Record patient progress and toleration of activity level   Outcome: Progressing     Problem: PAIN - ADULT  Goal: Verbalizes/displays adequate comfort level or baseline comfort level  Description: Interventions:  - Encourage patient to monitor pain and request assistance  - Assess pain using appropriate pain scale  - Administer analgesics based on type and severity of pain and evaluate response  - Implement non-pharmacological measures as appropriate and evaluate response  - Consider cultural and social influences on pain and pain management  - Notify physician/advanced practitioner if interventions unsuccessful or patient reports new pain  Outcome: Progressing     Problem: INFECTION - ADULT  Goal: Absence or prevention of progression during hospitalization  Description: INTERVENTIONS:  - Assess and monitor for signs and symptoms of infection  - Monitor lab/diagnostic results  - Monitor all insertion sites, i e  indwelling lines, tubes, and drains  - Monitor endotracheal if appropriate and nasal secretions for changes in amount and color  - [place] appropriate cooling/warming therapies per order  - Administer medications as ordered  - Instruct and encourage patient and family to use good hand hygiene technique  - Identify and instruct in appropriate isolation precautions for identified infection/condition  Outcome: Progressing     Problem: DISCHARGE PLANNING  Goal: Discharge to home or other facility with appropriate resources  Description: INTERVENTIONS:  - Identify barriers to discharge w/patient and caregiver  - Arrange for needed discharge resources and transportation as appropriate  - Identify discharge learning needs (meds, wound care, etc )  - Arrange for interpretive services to assist at discharge as needed  - Refer to Case Management Department for coordinating discharge planning if the patient needs post-hospital services based on physician/advanced practitioner order or complex needs related to functional status, cognitive ability, or social support system  Outcome: Progressing     Problem: Knowledge Deficit  Goal: Patient/family/caregiver demonstrates understanding of disease process, treatment plan, medications, and discharge instructions  Description: Complete learning assessment and assess knowledge base    Interventions:  - Provide teaching at level of understanding  - Provide teaching via preferred learning methods  Outcome: Progressing     Problem: RESPIRATORY - ADULT  Goal: Achieves optimal ventilation and oxygenation  Description: INTERVENTIONS:  - Assess for changes in respiratory status  - Assess for changes in mentation and behavior  - Position to facilitate oxygenation and minimize respiratory effort  - Oxygen administered by appropriate delivery if ordered  - Initiate smoking cessation education as indicated  - Encourage broncho-pulmonary hygiene including cough, deep breathe, Incentive Spirometry  - Assess the need for suctioning and aspirate as needed  - Assess and instruct to report SOB or any respiratory difficulty  - Respiratory Therapy support as indicated  Outcome: Progressing     Problem: SKIN/TISSUE INTEGRITY - ADULT  Goal: Incision(s), wounds(s) or drain site(s) healing without S/S of infection  Description: INTERVENTIONS  - Assess and document dressing, incision, wound bed, drain sites and surrounding tissue  - Provide patient and family education  - Perform skin care/dressing changes every day  Outcome: Progressing  Goal: Pressure injury heals and does not worsen  Description: Interventions:  - Implement low air loss mattress or specialty surface (Criteria met)  - Apply silicone foam dressing  - Instruct/assist with weight shifting every 15 minutes when in chair   - Limit chair time to 2 hour intervals  - Use special pressure reducing interventions such as waffle cushion when in chair   - Apply fecal or urinary incontinence containment device   - Perform passive or active ROM every 2 hours  - Turn and reposition patient & offload bony prominences every 2 hours   - Utilize friction reducing device or surface for transfers   - Consider consults to  interdisciplinary teams such as wound nurse  - Use incontinent care products after each incontinent episode such as brief  - Consider nutrition services referral as needed  Outcome: Progressing     Problem: Nutrition/Hydration-ADULT  Goal: Nutrient/Hydration intake appropriate for improving, restoring or maintaining nutritional needs  Description: Monitor and assess patient's nutrition/hydration status for malnutrition  Collaborate with interdisciplinary team and initiate plan and interventions as ordered  Monitor patient's weight and dietary intake as ordered or per policy  Utilize nutrition screening tool and intervene as necessary  Determine patient's food preferences and provide high-protein, high-caloric foods as appropriate       INTERVENTIONS:  - Monitor oral intake, urinary output, labs, and treatment plans  - Assess nutrition and hydration status and recommend course of action  - Evaluate amount of meals eaten  - Assist patient with eating if necessary   - Allow adequate time for meals  - Recommend/ encourage appropriate diets, oral nutritional supplements, and vitamin/mineral supplements  - Order, calculate, and assess calorie counts as needed  - Recommend, monitor, and adjust tube feedings and TPN/PPN based on assessed needs  - Assess need for intravenous fluids  - Provide specific nutrition/hydration education as appropriate  - Include patient/family/caregiver in decisions related to nutrition  Outcome: Progressing

## 2022-02-04 NOTE — RESPIRATORY THERAPY NOTE
RT Protocol Note  Joo Luis 68 y o  male MRN: [id number]  Unit/Bed#: -01 Encounter: [id number]    Assessment    Principal Problem:    Acute on chronic respiratory failure ([facility] [address] )  Active Problems:    Paroxysmal A-fib (HCC)    COPD, moderate (HCC)    Sleep apnea    Multifocal pneumonia    Venous stasis dermatitis of both lower extremities    Ambulatory dysfunction    SIRS (systemic inflammatory response syndrome) ([facility])    Pleural effusion      Home Pulmonary Medications:    Home Devices/Therapy: BiPAP/CPAP    Past Medical History:   Diagnosis Date    A-fib ([facility] )     Ambulates with cane     Anxiety     Arthritis     At risk for falls     Cellulitis     right leg    Chronic bronchitis ([facility] )     RESOLVED: 3/26/15    Chronic obstructive lung disease ([facility] )     RESOLVED: 9/7/16    COPD (chronic obstructive pulmonary disease) ([place] [address] )     Depression     Edema     RESOLVED: 9/21/15    GERD (gastroesophageal reflux disease)     Hard of hearing     Hyperlipidemia     Hypertension     Irregular heart beat     Afib    Knee pain, bilateral     Leukocytosis     RESOLVED: 9/21/15    LLL pneumonia     RESOLVED: 6/20/17    Methicillin resistant Staphylococcus aureus infection     RESOLVED: 9/21/15    Pneumonia     RESOLVED: 6/20/17    Skin abnormality     Left pinky toe has a wound due to nail cutting by a provider - being followed -Dr Lauri Ynig aware    Sleep apnea     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     Stroke ([facility] )     Venous ulcers of both lower extremities ([facility] )     Walker as ambulation aid     Weakness of right hand     Wears dentures     upper only    Wears glasses      Social History     Socioeconomic History    Marital status:       Spouse name: None    Number of children: None    Years of education: None    Highest education level: None   Occupational History    None   Tobacco Use    Smoking status: Former Smoker     Quit date: 1/15/1995     Years since quittin 0    Smokeless tobacco: Never Used   Vaping Use    Vaping Use: Never used   Substance and Sexual Activity    Alcohol use: Never    Drug use: No    Sexual activity: Not Currently   Other Topics Concern    None   Social History Narrative    None     Social Determinants of Health     Financial Resource Strain: Not on file   Food Insecurity: No Food Insecurity    Worried About Running Out of Food in the Last Year: Never true    [place] of Food in the Last Year: Never true   Transportation Needs: No Transportation Needs    Lack of Transportation (Medical): No    Lack of Transportation (Non-Medical): No   Physical Activity: Not on file   Stress: Not on file   Social Connections: Not on file   Intimate Partner Violence: Not on file   Housing Stability: Low Risk     Unable to Pay for Housing in the Last Year: No    Number of Places Lived in the Last Year: 1    Unstable Housing in the Last Year: No       Subjective    Subjective Data: hous of sleep    Objective    Physical Exam:   Assessment Type: During-treatment  General Appearance: Drowsy  Respiratory Pattern: Shallow  Chest Assessment: Chest expansion symmetrical  Bilateral Breath Sounds: Diminished,Coarse  O2 Device: (P) 2LPM NC    Vitals:  Blood pressure 129/79, pulse 101, temperature 98 2 °F (36 8 °C), resp  rate (!) 23, height 6' (1 829 m), weight 101 kg (223 lb 12 3 oz), SpO2 97 %  Results from last 7 days   Lab Units 22  1515   PH ART  7 396   PCO2 ART mm Hg 84 1*   PO2 ART mm Hg 72 1*   HCO3 ART mmol/L 50 5*   BASE EXC ART mmol/L 21 4   O2 CONTENT ART mL/dL 14 9*   O2 HGB, ARTERIAL % 91 6*   ABG SOURCE  Radial, Right   ERNST TEST  Yes       Imaging and other studies: I have personally reviewed pertinent reports  O2 Device: (P) 2LPM NC     Plan    Respiratory Plan: Home Bronchodilator Patient pathway,Vent/NIV/HFNC        Resp Comments: (P) Pt taken off HFNC and placed on 2LPM NC for comfort care  Family at bedside

## 2022-02-04 NOTE — ASSESSMENT & PLAN NOTE
Patient is a 68year old male whom presented on evening of 1/28 with a chief complaint of Shortness of breath with increasing home O2 needs as well as R LE drainage  He has a PMH of COPD wearing 2-3 L at baseline, Afib on Coumadin, ANA LAURA, as well as a hospitalization Nov 11-22, 2021 secondary to COVID Pneumonia  Family reports steady decline ever since having COVID, "He isn't himself and keeps getting worse"    Since admission workup has included labs, CXR consistent with B/L pleural effusions, as well as CT Chest revealing Increasing moderate to large right pleural effusion  Collapse of the right lower lobe attributable to combination of compressive atelectasis from effusion, resorptive atelectasis from mucous plugging, and (possibly) pneumonia  Scattered bilateral peripheral consolidative opacities, left basilar peribronchial thickening, and new fluid level within left lower lobe pneumatocyst   These findings may suggest multifocal infection  Chronic, low level mediastinal or hilar lymphadenopathy appears similar to November  Treatment has included intermittent BiPAP use as well as IV diuresis with potential for thoracentesis  Also receiving Cefepime / Flagyl / Vanco  - Leg wound staph aureus, MRSA pending  Throughout the day on 1/30 patient less responsive as well as noted to be increasingly more Hypoxic  ABG obtained revealing 7 37/76/110  Patient requiring BiPAP more continuously    · Continual SpO2 monitoring and maintain >88% - wean FiO2 as tolerated  · Hypercarbic Respiratory failure, continue BiPAP trialed off BiPAP 1/31 am with rapid decompensation / hypoxia - Back on BiPAP Rate 20  16/6  · COVID flu RSV negative on admission  · Recent admission in Nov 2021 for COVID-19 pneumonia     · Low suspicion for acute PE given patient is anticoagulated on warfarin with therapeutic INR 2 4    · 1/30 Family at bedside, extensive conversation during which daughter advised her father would never want breathing tube or any machines to keep him alive - Code status will be updated to a Level 3  · 1/31 Further family discussions, they are concerned with lack of improvement, would like to give additional 24-48 hours and consider comfort measures if no improvement  · Lasix 40 mg IV BID, cont for goal net negative 2L  · 500 mg IV Diamox on 2/1 · 02/01 - weaned to Venti mask 40% O2 with Bipap q HS · 02/02 - weaned to face tent 40% however desaturated to 67% at which time he returned to BiPAP  Suctioned for large amount of thick sputum in back of airway  HFNC started 45% 60L

## 2022-02-04 NOTE — RESPIRATORY THERAPY NOTE
RT Ventilator Management Note  Haley Serna 68 y o  male MRN: [id number]  Unit/Bed#: -01 Encounter: [id number]      Daily Screen    No data found in the last 10 encounters  Physical Exam:   Subjective Data: hous of sleep      Resp Comments: Patient placed on hours of sleep BiPAP at 2206  Visable skin break down on bridge of nose  Protective barrior placed over breakdown to protect from mask    Patient tolerated BiPAP well, sleeping through the night without incident

## 2022-02-04 NOTE — PROGRESS NOTES
[address]  Progress Note - Eddi Nicholse 1945, 68 y o  male MRN: [id number]  Unit/Bed#: -01 Encounter: [id number]  Primary Care Provider: Fabian Spring DO   Date and time admitted to hospital: 1/28/2022  9:02 PM    * Acute on chronic respiratory failure [facility])  Assessment & Plan  Patient is a 68year old male whom presented on evening of 1/28 with a chief complaint of Shortness of breath with increasing home O2 needs as well as R LE drainage  He has a PMH of COPD wearing 2-3 L at baseline, Afib on Coumadin, ANA LAURA, as well as a hospitalization Nov 11-22, 2021 secondary to COVID Pneumonia  Family reports steady decline ever since having COVID, "He isn't himself and keeps getting worse"    Since admission workup has included labs, CXR consistent with B/L pleural effusions, as well as CT Chest revealing Increasing moderate to large right pleural effusion  Collapse of the right lower lobe attributable to combination of compressive atelectasis from effusion, resorptive atelectasis from mucous plugging, and (possibly) pneumonia  Scattered bilateral peripheral consolidative opacities, left basilar peribronchial thickening, and new fluid level within left lower lobe pneumatocyst   These findings may suggest multifocal infection  Chronic, low level mediastinal or hilar lymphadenopathy appears similar to November  Treatment has included intermittent BiPAP use as well as IV diuresis with potential for thoracentesis  Also receiving Cefepime / Flagyl / Vanco  - Leg wound staph aureus, MRSA pending  Throughout the day on 1/30 patient less responsive as well as noted to be increasingly more Hypoxic  ABG obtained revealing 7 37/76/110    Patient requiring BiPAP more continuously    · Continual SpO2 monitoring and maintain >88% - wean FiO2 as tolerated  · Hypercarbic Respiratory failure, continue BiPAP trialed off BiPAP 1/31 am with rapid decompensation / hypoxia - Back on BiPAP Rate 20 16/6  · COVID flu RSV negative on admission  · Recent admission in Nov 2021 for COVID-19 pneumonia  · Low suspicion for acute PE given patient is anticoagulated on warfarin with therapeutic INR 2 4    · 1/30 Family at bedside, extensive conversation during which daughter advised her father would never want breathing tube or any machines to keep him alive - Code status will be updated to a Level 3  · 1/31 Further family discussions, they are concerned with lack of improvement, would like to give additional 24-48 hours and consider comfort measures if no improvement  · Lasix 40 mg IV BID, cont for goal net negative 2L  · 500 mg IV Diamox on 2/1  · 02/01 - weaned to Venti mask 40% O2 with Bipap q HS  · 02/02 - weaned to face tent 40% however desaturated to 67% at which time he returned to BiPAP  Suctioned for large amount of thick sputum in back of airway  HFNC started 45% 60L  Multifocal pneumonia  Assessment & Plan  · Xray chest suggestive of pneumonia  · CT chest shows " Scattered bilateral peripheral consolidative opacities, left basilar peribronchial  thickening, and new fluid level within left lower lobe pneumatocyst   These findings may suggest multifocal infection "   · Urine Strep / Legionella Negative  · MRSA nares Positive  · Leg Culture positive Staph - monitor closely  · Sputum culture ordered  · Procalcitonin x2 negative   · DRIP score 7, High risk MDRO (recent IV Abx, recent hospitalization, wound care, recent MRSA  infection, COPD  · 1/31 Procalcitonin remains negative, Cefepime, Vanco, and Flagyl discontinued 1/31, monitor temps as well as WBC count off antibiotics  · ID consulted and are in agreement with monitoring off abx for now  · Seen by SLP- will continue to re-evaluate  · Dysphagia 1 diet 02/03      Pleural effusion  Assessment & Plan  · Noted on CXR, CT Chest reveals moderate/large R Pleural effusion  · BNP 4,540 thought to be volume overloaded on exam    · Currently being diuresed receiving Lasix 40mg IV BID  · Considered Thoracentesis; however with hypercarbia with patient being more somnolent will hold off   · ECHO from Nov 2021 revealed EF 97%, normal systolic function, diastolic function not assessed    · 02/02 ECHO limited: EF 86%, normal systolic function, abnormal septal motion, systolic flattening of the IVS consistent with RV pressure overload, bi-atrial dilation, PASP 59 mmHg      SIRS (systemic inflammatory response syndrome) ([facility])  Assessment & Plan  · On presentation 2/2 tachycardia and tachypnea  · Lactate 2 4 - cleared  · Currently afebrile with no leukocytosis  · Procalcitonin x3 negative   · Unclear if infectious source, may be non-infectious secondary to volume overload a/e/b pleural effusion and bilateral LE edema   · Blood cultures NGTD  · Wound culture MRSA + Pseudo   · Discontinued Cefepime, Flagyl, Vanco 1/31- monitor off abx  · Consult ID- agree with monitoring off abx right now    Paroxysmal A-fib ([facility])  Assessment & Plan  · Continue Telemetry monitoring  · EKG revealed Afib with   · Currently not on rate control medication   · PRN Lopressor for rate control  · On warfarin 4 mg as outpatient- not taking PO meds, so changed to heparin gtt 1/31      COPD, moderate ([facility])  Assessment & Plan  · Wears 2-3 L chronically at baseline  · Does not appear to be in acute exacerbation, will hold off on IV steroids   · Continue xopenex/atrovent TID, pulmicort BID, atrovent prn      Ambulatory dysfunction  Assessment & Plan  · Per chart review, increased difficulty with ambulation due to chronic leg wounds  · PT/OT      Sleep apnea  Assessment & Plan  · Wears CPAP at HS at home      Venous stasis dermatitis of both lower extremities  Assessment & Plan  · On admission in November 2021 found to have positive wound culture and 1/2 + BC for Staph aureus  Treated with 4 weeks of IV vancomycin through 12/11/2021    · Hx of chronic wounds, chronic venous insufficiency, lymphedema  · On exam, bilateral lower extremities with edema L>R, weeping tan drainage, multiple open areas  · Unclear if this is in fact active cellulitis, there is a draining posterior right leg wound  · Wound culture MRSA + Pseudo   · Blood cultures NGTD, discontinued Cefepime and Vancomycin    · Low suspicion for DVT, patient anticoagulated with warfarin prehospital  · ID consulted and agree with monitoring off abx for now  · Consult wound RN, has VNA services for wound care at home      ----------------------------------------------------------------------------------------  HPI/24hr events: There were no significant events overnight  Oxygen continues to be weaned  No on HFNC 45%, 60L during the day and BiPAP at night (14 with 40% FiO2)  He is on a lab holiday today  Goals of care discussions to continue with family  He denies chest pain, abdominal pain, SOB and headache       Patient appropriate for transfer out of the ICU today?: No  Disposition: Continue Stepdown Level 1 level of care   Code Status: Level 3 - DNAR and DNI  ---------------------------------------------------------------------------------------    Review of Systems  Review of systems was reviewed and negative unless stated above in HPI/24-hour events   ---------------------------------------------------------------------------------------  OBJECTIVE    Vitals   Vitals:    22 0000 22 0100 22 0200 22 0312   BP: 126/75      BP Location: Right arm      Pulse: (!) 111 104 98    Resp: (!) 26 (!) 30 21    Temp: 99 3 °F (37 4 °C) 99 °F (37 2 °C) 98 8 °F (37 1 °C)    TempSrc: Bladder      SpO2: 98% 96% 97% 95%   Weight:       Height:         Temp (24hrs), Av 3 °F (37 4 °C), Min:98 2 °F (36 8 °C), Max:99 7 °F (37 6 °C)  Current: Temperature: 98 8 °F (37 1 °C)          Respiratory:  SpO2: SpO2: 95 %  Nasal Cannula O2 Flow Rate (L/min): 60 L/min    Invasive/non-invasive ventilation settings   Respiratory  Report   Lab Data (Last 4 hours)    None         O2/Vent Data (Last 4 hours)      02/04 0312          Non-Invasive Ventilation Mode BiPAP                   Physical Exam  Constitutional:       General: He is not in acute distress  Appearance: He is not ill-appearing or diaphoretic  Interventions: Face mask in place  HENT:      Head: Normocephalic and atraumatic  Right Ear: External ear normal       Left Ear: External ear normal       Nose: Nose normal       Mouth/Throat:      Pharynx: Oropharynx is clear  Eyes:      General:         Right eye: No discharge  Left eye: No discharge  Extraocular Movements: Extraocular movements intact  Conjunctiva/sclera: Conjunctivae normal       Pupils: Pupils are equal, round, and reactive to light  Cardiovascular:      Rate and Rhythm: Normal rate and regular rhythm  Pulses: Normal pulses  Heart sounds: No friction rub  No gallop  Pulmonary:      Effort: Pulmonary effort is normal  No respiratory distress  Breath sounds: No stridor  Rhonchi present  No wheezing or rales  Chest:      Chest wall: No tenderness  Abdominal:      General: Bowel sounds are normal  There is no distension  Palpations: Abdomen is soft  There is no mass  Tenderness: There is no abdominal tenderness  There is no guarding or rebound  Musculoskeletal:         General: No deformity  Normal range of motion  Cervical back: Normal range of motion and neck supple  No rigidity or tenderness  Right lower leg: Edema present  Left lower leg: Edema present  Skin:     General: Skin is warm and dry  Coloration: Skin is not jaundiced or pale  Comments: Lesion on posterior right lower leg  No signs of infection  Neurological:      General: No focal deficit present  Mental Status: He is alert  Sensory: No sensory deficit  Comments: Oriented x2 to person and place  Pleasant      Psychiatric:         Behavior: Behavior is [facility]  Laboratory and Diagnostics:  Results from last 7 days   Lab Units 02/03/22  0533 02/02/22  0440 02/01/22  0622 01/31/22  2018 01/31/22  0505 01/31/22  0408 01/30/22  0500 01/29/22  0544 01/29/22  0544 01/28/22 2122 01/28/22 2122   WBC Thousand/uL 9 08 7 80 9 43 9 63 8 72 9 15 10 00   < > 13 16*   < > 8 91   HEMOGLOBIN g/dL 11 7* 11 0* 10 6* 10 1* 9 6* 9 7* 10 9*   < > 10 3*   < > 10 7*   HEMATOCRIT % 39 7 37 7 36 7 34 9* 32 9* 33 2* 37 8   < > 33 6*   < > 36 4*   PLATELETS Thousands/uL 438* 418* 403* 409* 394* 392* 415*   < > 402*   < > 434*   NEUTROS PCT %  --   --  71  --  67 71 73  --  80*  --  70   MONOS PCT %  --   --  15*  --  18* 17* 11  --  12  --  13*    < > = values in this interval not displayed       Results from last 7 days   Lab Units 02/03/22  1730 02/03/22  0533 02/02/22  1412 02/02/22  0440 02/01/22  1638 02/01/22  0622 01/31/22  0408 01/30/22  0500 01/30/22  0500 01/29/22  0544 01/29/22  0544 01/28/22 2122 01/28/22 2122   SODIUM mmol/L 138 138 139 140 142 141 141   < > 138   < > 137   < > 138   POTASSIUM mmol/L 4 1 3 8 3 8 3 4* 3 8 3 4* 3 4*   < > 3 5   < > 4 0   < > 4 6   CHLORIDE mmol/L 96* 98* 97* 97* 95* 95* 97*   < > 97*   < > 99*   < > 98*   CO2 mmol/L 40* 38* 43* 41* >45* >45* 44*   < > 38*   < > 35*   < > 36*   ANION GAP mmol/L 2* 2* -1* 2*  --   --  0*  --  3*  --  3*   < > 4   BUN mg/dL 19 16 15 13 13 13 12   < > 14   < > 16   < > 14   CREATININE mg/dL 0 98 0 83 0 86 0 78 0 84 0 86 0 86   < > 0 95   < > 0 95   < > 1 01   CALCIUM mg/dL 8 6 8 5 8 2* 8 4 8 3 8 2* 7 8*   < > 8 1*   < > 7 6*   < > 8 1*   GLUCOSE RANDOM mg/dL 132 86 87 76 79 79 90   < > 99   < > 116   < > 103   ALT U/L  --   --   --   --   --   --   --   --  58  --  60  --  73   AST U/L  --   --   --   --   --   --   --   --  26  --  24  --  36   ALK PHOS U/L  --   --   --   --   --   --   --   --  74  --  73  --  81   ALBUMIN g/dL  --   --   --   --   --   --   --   --  2 3*  --  2 1*  --  2 4* TOTAL BILIRUBIN mg/dL  --   --   --   --   --   --   --   --  0 74  --  0 41  --  0 43    < > = values in this interval not displayed  Results from last 7 days   Lab Units 02/03/22  0533 02/02/22  1412 02/02/22  0440 02/01/22  1638 02/01/22  0622   MAGNESIUM mg/dL 2 1 2 1 2 1 2 0 2 0      Results from last 7 days   Lab Units 02/03/22  0533 02/02/22  1831 02/02/22  1252 02/02/22  0440 02/01/22  1638 02/01/22  0954 02/01/22  0236 01/31/22 2018 01/31/22 2018 01/31/22 0408 01/30/22  0500 01/28/22 2122 01/28/22 2122   INR   --   --   --   --   --   --   --   --  1 92* 2 05* 2 14*  --  2 42*   PTT seconds 65* 70* 75* 57* 63* 65* 55*   < > 35  --   --    < > 39*    < > = values in this interval not displayed  Results from last 7 days   Lab Units 01/29/22 0033 01/28/22 2122   LACTIC ACID mmol/L 1 4 2 4*     ABG:  Results from last 7 days   Lab Units 02/01/22  1515   PH ART  7 396   PCO2 ART mm Hg 84 1*   PO2 ART mm Hg 72 1*   HCO3 ART mmol/L 50 5*   BASE EXC ART mmol/L 21 4   ABG SOURCE  Radial, Right     VBG:  Results from last 7 days   Lab Units 02/01/22  1515 01/31/22  0401 01/31/22  0007   PH KEN   --  7 344  --    PCO2 KEN mm Hg  --  84 2*  --    PO2 KEN mm Hg  --  61 7*  --    HCO3 KEN mmol/L  --  44 8*  --    BASE EXC KEN mmol/L  --  15 9  --    ABG SOURCE  Radial, Right  --    < >    < > = values in this interval not displayed  Results from last 7 days   Lab Units 01/31/22  0408 01/30/22  0500 01/29/22  0544 01/28/22 2122   PROCALCITONIN ng/ml 0 17 0 18 0 17 0 14       Micro  Results from last 7 days   Lab Units 01/29/22  1547 01/29/22  0208 01/28/22 2122 01/28/22 2116   BLOOD CULTURE   --   --  No Growth After 5 Days  No Growth After 5 Days     GRAM STAIN RESULT   --   --  No Polys*  1+ Gram positive cocci in pairs*  --    WOUND CULTURE   --   --  4+ Growth of Pseudomonas aeruginosa*  4+ Growth of Methicillin Resistant Staphylococcus aureus*  4+ Growth of Enterobacter cloacae complex* 4+ Growth of Streptococcus dysgalactiae*  --    MRSA CULTURE ONLY   --  Methicillin Resistant Staphylococcus aureus isolated*  This patient requires contact isolation precautions per [place] law  Contact precautions are not required in [place] for nasal surveillance cultures  --   --    LEGIONELLA URINARY ANTIGEN  Negative  --   --   --    STREP PNEUMONIAE ANTIGEN, URINE  Negative  --   --   --      Imaging: I have personally reviewed pertinent reports  Intake and Output  I/O       02/02 0701 02/03 0700 02/03 0701 02/04 0700    P  O   900    I V  (mL/kg) 343 (3 3) 251 8 (2 4)    IV Piggyback 400     Total Intake(mL/kg) 743 (7 1) 1151 8 (11)    Urine (mL/kg/hr) 3825 (1 5) 2325 (0 9)    Stool  0    Total Output 3825 2325    Net -3082 -1173 2          Unmeasured Stool Occurrence  3 x          Height and Weights   Height: 6' (182 9 cm)  IBW (Ideal Body Weight): 77 6 kg  Body mass index is 31 39 kg/m²  Weight (last 2 days)     Date/Time Weight    02/03/22 0500 105 (231 48)    02/02/22 1400 103 (227)    02/02/22 0600 103 (227 29)            Nutrition       Diet Orders   (From admission, onward)             Start     Ordered    02/03/22 1337  Dietary nutrition supplements  Once        Question Answer Comment   Select Supplement: Qasim-Arthur    Frequency Breakfast, Dinner        02/03/22 1336    02/03/22 1337  Dietary nutrition supplements  Once        Question Answer Comment   Select Supplement: Ensure Compact-Chocolate    Frequency Lunch        02/03/22 1336    02/03/22 0958  Diet Dysphagia/Modified Consistency; Dysphagia 1-Pureed; Thin Liquid  Diet effective now        References:    Nutrtion Support Algorithm Enteral vs  Parenteral   Question Answer Comment   Diet Type Dysphagia/Modified Consistency    Dysphagia/Modified Consistency Dysphagia 1-Pureed    Liquid Modifier Thin Liquid    RD to adjust diet per protocol?  Yes        02/03/22 0958                  Active Medications  Scheduled Meds:  Current Facility-Administered Medications   Medication Dose Route Frequency Provider Last Rate    acetaminophen  650 mg Oral Q6H PRN Florette Rocío, PA-C      albuterol  2 5 mg Nebulization Q6H PRN Florette Rocío, PA-C      ammonium lactate  1 application Topical Daily Florette Rocío, PA-C      benzonatate  100 mg Oral TID PRN Florette Rocío, PA-C      budesonide  0 5 mg Nebulization Q12H Florette Rocío, PA-C      famotidine  20 mg Intravenous Q12H [address] Eedn Newell PA-C      furosemide  40 mg Intravenous BID (diuretic) DUNIA Wadsworth      heparin (porcine)  3-20 Units/kg/hr (Order-Specific) Intravenous Titrated Demi Roy MD 78 4 Units/kg/hr (02/04/22 0001)    heparin (porcine)  2,000 Units Intravenous J8M PRN Demi Roy MD      heparin (porcine)  4,000 Units Intravenous Y2B PRN Demi Roy MD      insulin lispro  1-5 Units Subcutaneous 4x Daily (AC & HS) Aide Steward Jr, PA-C      ipratropium  0 5 mg Nebulization TID DUNIA Myles      levalbuterol  1 25 mg Nebulization TID DUNIA Wadsworth      metoprolol  2 5 mg Intravenous Q6H PRN Florette Rocío, PA-C      multivitamin-minerals  1 tablet Oral Daily Florette Rocío, PA-C      PARoxetine  20 mg Oral Daily Florette Rocío, PA-C      pravastatin  80 mg Oral After Leeleeluis enrique Sparrow PA-C       Continuous Infusions:  heparin (porcine), 3-20 Units/kg/hr (Order-Specific), Last Rate: 15 1 Units/kg/hr (02/04/22 0001)      PRN Meds:   acetaminophen, 650 mg, Q6H PRN  albuterol, 2 5 mg, Q6H PRN  benzonatate, 100 mg, TID PRN  heparin (porcine), 2,000 Units, Q1H PRN  heparin (porcine), 4,000 Units, Q1H PRN  metoprolol, 2 5 mg, Q6H PRN        Invasive Devices Review  Invasive Devices  Report    Peripheral Intravenous Line            Peripheral IV 02/02/22 Distal;Left;Upper;Ventral (anterior) Antecubital 1 day    Peripheral IV 02/03/22 Left Wrist <1 day          Drain Urethral Catheter Temperature probe 16 Fr  4 days                Rationale for remaining devices: Consider d/c when aggressive diuresis complete   ---------------------------------------------------------------------------------------  Advance Directive and Living Will:      Power of :    POLST:    ---------------------------------------------------------------------------------------  Care Time Delivered:   No Critical Care time spent       [employer], PA-C      Portions of the record may have been created with voice recognition software  Occasional wrong word or "sound a like" substitutions may have occurred due to the inherent limitations of voice recognition software    Read the chart carefully and recognize, using context, where substitutions have occurred

## 2022-02-04 NOTE — CASE MANAGEMENT
Case Management Progress Note    Patient name Gio Dallas  Location /-91 MRN [id number]  : 1945 Date 2022       LOS (days): 7  Geometric Mean LOS (GMLOS) (days): 4 10  Days to GMLOS:-2 7        OBJECTIVE:        Current admission status: Inpatient  Preferred Pharmacy:   [id number] [address], [address] [facility] [address]  Phone: [phone] Fax: [phone]    [address] [facility],[address], [facility] , [address]  [address] [facility],[address] [address]  Phone: [phone] Fax: [phone] [address], [address]  [address], [address]  [address] [place], [place]  [address] [address]  Phone: [phone] Fax: 03 [facility] [place]  [address] [facility]  [address]  [place]  Phone: [phone] Fax: [phone]    Primary Care Provider: Yovanny Serna DO    Primary Insurance: MEDICARE  Secondary Insurance:     PROGRESS NOTE:    Met with providers and patients step daughter and spouse  GOC discussed and patient was placed on Comfort Care  CM will continue to follow at this time  The conversation did include further discussion may be needed as it is difficult to tell how long patient will hold on  Possible Hospice at a facility vs [facility] can be considered

## 2022-02-04 NOTE — WOUND OSTOMY CARE
Progress Note - Wound   Ashleigh Henry 68 y o  male MRN: [id number]  Unit/Bed#: -01 Encounter: [id number]      Pt was scheduled for weekly wound assessment today  He is declining, status changed to comfort care, his family at bedside, updated by Rn    Wound 11/15/21 Pretibial Right;Lateral (Active)   Wound Description Beefy red;Bleeding 02/04/22 0815   Digna-wound Assessment Fragile;Pink 02/04/22 0815   Drainage Amount Small 02/04/22 0815   Drainage Description Serosanguineous 02/04/22 0815   Non-staged Wound Description Partial thickness 02/04/22 0815       Wound 11/15/21 Heel Left (Active)       Wound 11/15/21 Sacrum (Active)       Wound 01/29/22 Toe (Comment  which one) Anterior;Right (Active)   Wound Image   01/30/22 1700   Wound Description SLICK 02/04/22 0815   Digna-wound Assessment Dry; Intact 02/04/22 0815   Drainage Amount SLICK 02/04/22 0815   Treatments Elevated 02/04/22 0815   Dressing Gauze 02/04/22 0815       Wound 01/30/22 Venous Ulcer Pretibial Distal;Left (Active)   Wound Image   01/30/22 1700   Wound Description Beefy red;Drainage;Fragile;Edema 02/04/22 0815   Digna-wound Assessment Erythema 02/04/22 0815   Drainage Amount Small 02/04/22 0815   Drainage Description Serosanguineous 02/04/22 0815   Treatments Site care;Elevated 02/04/22 0815   Dressing Dry dressing;Calcium Alginate with Silver;Non adherent 02/04/22 0815   Dressing Changed Changed 02/04/22 0430   Patient Tolerance Tolerated well 02/04/22 0430   Dressing Status Intact 02/04/22 0815       Wound 01/30/22 Venous Ulcer Pretibial Distal;Right (Active)   Wound Image   01/30/22 1700   Wound Description SLICK 02/04/22 0815   Digna-wound Assessment SLICK 02/04/22 0815   Drainage Amount SLICK 02/04/22 0815   Drainage Description Serosanguineous 02/03/22 0400   Treatments Elevated 02/04/22 0815   Dressing Dry dressing;Calcium Alginate with Silver;Non adherent 02/04/22 0815   Dressing Changed Changed 02/02/22 2120   Patient Tolerance Tolerated well 02/03/22 0800   Dressing Status Clean; Intact;Dry 02/04/22 0815       Wound 01/30/22 Buttocks (Active)   Wound Image   01/30/22 1700   Wound Description Beefy red;Dark edges;Fragile 02/04/22 0815   Pressure Injury Stage 2 02/04/22 0815   Digna-wound Assessment Fragile;Erythema 02/04/22 0815   Wound Site Closure SLICK 02/01/22 1200   Drainage Amount Scant 02/04/22 0815   Drainage Description Clear 02/04/22 0815   Treatments Cleansed 02/04/22 0815   Dressing Foam, Silicon (eg  Allevyn, etc) 02/04/22 0815   Patient Tolerance Tolerated well 02/04/22 0815   Dressing Status Clean;Dry; Intact 02/04/22 0815       Wound 01/30/22 Sacrum (Active)   Wound Image    01/30/22 1700   Wound Description Beefy red;Edema;Fragile 02/04/22 0815   Digna-wound Assessment Erythema 02/04/22 0815   Treatments Cleansed;Site care 02/04/22 0815   Dressing Foam, Silicon (eg  Allevyn, etc) 02/04/22 0401   Dressing Status Clean;Dry; Intact 02/04/22 0815       Wound 01/30/22 Toe (Comment  which one) (Active)   Wound Image   01/30/22 1700   Wound Description Dark edges; Necrotic 02/04/22 0815   Digna-wound Assessment Clean;Dry; Intact 02/04/22 0815   Drainage Amount None 02/04/22 0815   Treatments Site care;Elevated 02/04/22 0815   Dressing Open to air 02/04/22 0815   Wound Care will sign off  Call or Tigertext with any questions      Denise Susi

## 2022-02-04 NOTE — RESPIRATORY THERAPY NOTE
RT Protocol Note  Nolberto Villagran 68 y o  male MRN: [id number]  Unit/Bed#: -01 Encounter: [id number]    Assessment    Principal Problem:    Acute on chronic respiratory failure ([facility] )  Active Problems:    Paroxysmal A-fib ([facility])    COPD, moderate (HCC)    Sleep apnea    Multifocal pneumonia    Venous stasis dermatitis of both lower extremities    Ambulatory dysfunction    SIRS (systemic inflammatory response syndrome) ([facility])    Pleural effusion      Home Pulmonary Medications:    Home Devices/Therapy: BiPAP/CPAP    Past Medical History:   Diagnosis Date    A-fib ([facility] )     Ambulates with cane     Anxiety     Arthritis     At risk for falls     Cellulitis     right leg    [facility] bronchitis ([facility] )     RESOLVED: 3/26/15    Chronic obstructive lung disease ([facility] )     RESOLVED: 9/7/16    COPD (chronic obstructive pulmonary disease) ([facility] )     Depression     Edema     RESOLVED: 9/21/15    GERD (gastroesophageal reflux disease)     Hard of hearing     Hyperlipidemia     Hypertension     Irregular heart beat     Afib    Knee pain, bilateral     Leukocytosis     RESOLVED: 9/21/15    LLL pneumonia     RESOLVED: 6/20/17    Methicillin resistant Staphylococcus aureus infection     RESOLVED: 9/21/15    Pneumonia     RESOLVED: 6/20/17    Skin abnormality     Left pinky toe has a wound due to nail cutting by a provider - being followed -Dr Mohamud negrete aware    Sleep apnea     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 )     Venous ulcers of both lower extremities ([facility] )     Walker as ambulation aid     Weakness of right hand     Wears dentures     upper only    Wears glasses      Social History     Socioeconomic History    Marital status:       Spouse name: None    Number of children: None    Years of education: None    Highest education level: None   Occupational History    None   Tobacco Use    Smoking status: Former Smoker     Quit date: 1/15/1995     Years since quittin 0    Smokeless tobacco: Never Used   Vaping Use    Vaping Use: Never used   Substance and Sexual Activity    Alcohol use: Never    Drug use: No    Sexual activity: Not Currently   Other Topics Concern    None   Social History Narrative    None     Social Determinants of Health     Financial Resource Strain: Not on file   Food Insecurity: No Food Insecurity    Worried About Running Out of Food in the Last Year: Never true    Raven of Food in the Last Year: Never true   Transportation Needs: No Transportation Needs    Lack of Transportation (Medical): No    Lack of Transportation (Non-Medical): No   Physical Activity: Not on file   Stress: Not on file   Social Connections: Not on file   Intimate Partner Violence: Not on file   Housing Stability: Low Risk     Unable to Pay for Housing in the Last Year: No    Number of Places Lived in the Last Year: 1    Unstable Housing in the Last Year: No       Subjective    Subjective Data: hous of sleep    Objective    Physical Exam:   Assessment Type: During-treatment  General Appearance: Drowsy  Respiratory Pattern: Shallow  Chest Assessment: Chest expansion symmetrical  Bilateral Breath Sounds: Diminished,Coarse  O2 Device: HFNC 45%/50L    Vitals:  Blood pressure 129/79, pulse 90, temperature 98 4 °F (36 9 °C), temperature source Bladder, resp  rate 22, height 6' (1 829 m), weight 101 kg (223 lb 12 3 oz), SpO2 98 %  Results from last 7 days   Lab Units 22  1515   PH ART  7 396   PCO2 ART mm Hg 84 1*   PO2 ART mm Hg 72 1*   HCO3 ART mmol/L 50 5*   BASE EXC ART mmol/L 21 4   O2 CONTENT ART mL/dL 14 9*   O2 HGB, ARTERIAL % 91 6*   ABG SOURCE  Radial, Right   ERNST TEST  Yes       Imaging and other studies: I have personally reviewed pertinent reports  O2 Device: HFNC 45%/50L     Plan    Respiratory Plan: Home Bronchodilator Patient pathway,Vent/NIV/HFNC        Resp Comments: Pt taken off bipap HS and placed on HFNC and tolerating well

## 2022-02-04 NOTE — RESPIRATORY THERAPY NOTE
RT Protocol Note  Delenorah Rasmussen 68 y o  male MRN: [id number]  Unit/Bed#: -01 Encounter: [id number]    Assessment    Principal Problem:    Acute on chronic respiratory failure ([facility] )  Active Problems:    Paroxysmal A-fib (HCC)    COPD, moderate (HCC)    Sleep apnea    Multifocal pneumonia    Venous stasis dermatitis of both lower extremities    Ambulatory dysfunction    SIRS (systemic inflammatory response syndrome) ([facility])    Pleural effusion      Home Pulmonary Medications:    Home Devices/Therapy: BiPAP/CPAP    Past Medical History:   Diagnosis Date    A-fib ([facility] )     Ambulates with cane     Anxiety     Arthritis     At risk for falls     Cellulitis     right leg    Chronic bronchitis ([facility] )     RESOLVED: 3/26/15    Chronic obstructive lung disease ([facility] )     RESOLVED: 9/7/16    COPD (chronic obstructive pulmonary disease) ([place] [address] )     Depression     Edema     RESOLVED: 9/21/15    GERD (gastroesophageal reflux disease)     Hard of hearing     Hyperlipidemia     Hypertension     Irregular heart beat     Afib    Knee pain, bilateral     Leukocytosis     RESOLVED: 9/21/15    LLL pneumonia     RESOLVED: 6/20/17    Methicillin resistant Staphylococcus aureus infection     RESOLVED: 9/21/15    Pneumonia     RESOLVED: 6/20/17    Skin abnormality     Left pinky toe has a wound due to nail cutting by a provider - being followed -Dr Rehan Jimenez aware    Sleep apnea     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     Stroke ([place] [address] )     Venous ulcers of both lower extremities ([facility] )     Walker as ambulation aid     Weakness of right hand     Wears dentures     upper only    Wears glasses      Social History     Socioeconomic History    Marital status:       Spouse name: None    Number of children: None    Years of education: None    Highest education level: None   Occupational History    None   Tobacco Use    Smoking status: Former Smoker     Quit date: 1/15/1995     Years since quittin 0    Smokeless tobacco: Never Used   Vaping Use    Vaping Use: Never used   Substance and Sexual Activity    Alcohol use: Never    Drug use: No    Sexual activity: Not Currently   Other Topics Concern    None   Social History Narrative    None     Social Determinants of Health     Financial Resource Strain: Not on file   Food Insecurity: No Food Insecurity    Worried About Running Out of Food in the Last Year: Never true    Raven of Food in the Last Year: Never true   Transportation Needs: No Transportation Needs    Lack of Transportation (Medical): No    Lack of Transportation (Non-Medical): No   Physical Activity: Not on file   Stress: Not on file   Social Connections: Not on file   Intimate Partner Violence: Not on file   Housing Stability: Low Risk     Unable to Pay for Housing in the Last Year: No    Number of Places Lived in the Last Year: 1    Unstable Housing in the Last Year: No       Subjective    Subjective Data: hous of sleep    Objective    Physical Exam:   Assessment Type: During-treatment  General Appearance: Drowsy  Respiratory Pattern: Shallow  Chest Assessment: Chest expansion symmetrical  Bilateral Breath Sounds: Diminished,Coarse  O2 Device: HFNC 45%/50L    Vitals:  Blood pressure 129/79, pulse 90, temperature 98 4 °F (36 9 °C), temperature source Bladder, resp  rate 22, height 6' (1 829 m), weight 101 kg (223 lb 12 3 oz), SpO2 98 %  Results from last 7 days   Lab Units 22  1515   PH ART  7 396   PCO2 ART mm Hg 84 1*   PO2 ART mm Hg 72 1*   HCO3 ART mmol/L 50 5*   BASE EXC ART mmol/L 21 4   O2 CONTENT ART mL/dL 14 9*   O2 HGB, ARTERIAL % 91 6*   ABG SOURCE  Radial, Right   ERNST TEST  Yes       Imaging and other studies: I have personally reviewed pertinent reports  O2 Device: HFNC 45%/50L     Plan    Respiratory Plan: Home Bronchodilator Patient pathway,Vent/NIV/HFNC        Resp Comments: (P) Pt having several episodes of apnea with SpO2 60%   MD aware, family coming in for plan of transitioning to comfort care

## 2022-02-04 NOTE — ASSESSMENT & PLAN NOTE
· Xray chest suggestive of pneumonia  · CT chest shows " Scattered bilateral peripheral consolidative opacities, left basilar peribronchial  thickening, and new fluid level within left lower lobe pneumatocyst   These findings may suggest multifocal infection "   · Urine Strep / Legionella Negative  · MRSA nares Positive  · Leg Culture positive Staph - monitor closely  · Sputum culture ordered  · Procalcitonin x2 negative   · DRIP score 7, High risk MDRO (recent IV Abx, recent hospitalization, wound care, recent MRSA  infection, COPD  · 1/31 Procalcitonin remains negative, Cefepime, Vanco, and Flagyl discontinued 1/31, monitor temps as well as WBC count off antibiotics  · ID consulted and are in agreement with monitoring off abx for now  · Seen by SLP- will continue to re-evaluate  · Dysphagia 1 diet 02/03

## 2022-02-05 PROCEDURE — 99233 SBSQ HOSP IP/OBS HIGH 50: CPT | Performed by: PHYSICIAN ASSISTANT

## 2022-02-05 RX ORDER — LORAZEPAM 2 MG/ML
0.5 INJECTION INTRAMUSCULAR
Status: DISCONTINUED | OUTPATIENT
Start: 2022-02-05 | End: 2022-02-08 | Stop reason: HOSPADM

## 2022-02-05 RX ADMIN — MORPHINE SULFATE 2 MG: 2 INJECTION, SOLUTION INTRAMUSCULAR; INTRAVENOUS at 17:57

## 2022-02-05 NOTE — PROGRESS NOTES
[address]  Progress Note - Javi Runner 1945, 68 y o  male MRN: [id number]  Unit/Bed#: -01 Encounter: [id number]  Primary Care Provider: Ebony Ridley DO   Date and time admitted to hospital: 1/28/2022  9:02 PM    * Acute on chronic respiratory failure [facility])  Assessment & Plan  Patient is a 68year old male whom presented on evening of 1/28 with a chief complaint of Shortness of breath with increasing home O2 needs as well as R LE drainage  He has a PMH of COPD wearing 2-3 L at baseline, Afib on Coumadin, ANA LAURA, as well as a hospitalization Nov 11-22, 2021 secondary to COVID Pneumonia  Family reports steady decline ever since having COVID, "He isn't himself and keeps getting worse"    Since admission workup has included labs, CXR consistent with B/L pleural effusions, as well as CT Chest revealing Increasing moderate to large right pleural effusion  Collapse of the right lower lobe attributable to combination of compressive atelectasis from effusion, resorptive atelectasis from mucous plugging, and (possibly) pneumonia  Scattered bilateral peripheral consolidative opacities, left basilar peribronchial thickening, and new fluid level within left lower lobe pneumatocyst   These findings may suggest multifocal infection  Chronic, low level mediastinal or hilar lymphadenopathy appears similar to November  Treatment has included intermittent BiPAP use as well as IV diuresis with potential for thoracentesis  Also receiving Cefepime / Flagyl / Vanco  - Leg wound staph aureus, MRSA pending  Throughout the day on 1/30 patient less responsive as well as noted to be increasingly more Hypoxic  ABG obtained revealing 7 37/76/110    Patient requiring BiPAP more continuously    · Continual SpO2 monitoring and maintain >88% - wean FiO2 as tolerated  · Hypercarbic Respiratory failure, continue BiPAP trialed off BiPAP 1/31 am with rapid decompensation / hypoxia - Back on BiPAP Rate 20 16/6  · COVID flu RSV negative on admission  · Recent admission in Nov 2021 for COVID-19 pneumonia  · Low suspicion for acute PE given patient is anticoagulated on warfarin with therapeutic INR 2 4    · 1/30 Family at bedside, extensive conversation during which daughter advised her father would never want breathing tube or any machines to keep him alive - Code status will be updated to a Level 3  · 1/31 Further family discussions, they are concerned with lack of improvement, would like to give additional 24-48 hours and consider comfort measures if no improvement  · Lasix 40 mg IV BID, cont for goal net negative 2L  · 500 mg IV Diamox on 2/1  · 02/01 - weaned to Venti mask 40% O2 with Bipap q HS  · 02/02 - weaned to face tent 40% however desaturated to 67% at which time he returned to BiPAP  Suctioned for large amount of thick sputum in back of airway  HFNC started 45% 60L  All treatments discontinued on 02/04 for comfort care  Multifocal pneumonia  Assessment & Plan  · Xray chest suggestive of pneumonia  · CT chest shows " Scattered bilateral peripheral consolidative opacities, left basilar peribronchial  thickening, and new fluid level within left lower lobe pneumatocyst   These findings may suggest multifocal infection "   · Urine Strep / Legionella Negative  · MRSA nares Positive  · Leg Culture positive Staph - monitor closely  · Sputum culture ordered  · Procalcitonin x2 negative   · DRIP score 7, High risk MDRO (recent IV Abx, recent hospitalization, wound care, recent MRSA  infection, COPD  · 1/31 Procalcitonin remains negative, Cefepime, Vanco, and Flagyl discontinued 1/31, monitor temps as well as WBC count off antibiotics  · ID consulted and are in agreement with monitoring off abx for now  · Seen by SLP- will continue to re-evaluate  · Dysphagia 1 diet 02/03  All treatments discontinued on 02/04 for comfort care       Pleural effusion  Assessment & Plan  · Noted on CXR, CT Chest reveals moderate/large R Pleural effusion  · BNP 4,540 thought to be volume overloaded on exam    · Currently being diuresed receiving Lasix 40mg IV BID  · Considered Thoracentesis; however with hypercarbia with patient being more somnolent will hold off   · ECHO from Nov 2021 revealed EF 27%, normal systolic function, diastolic function not assessed    · 02/02 ECHO limited: EF 75%, normal systolic function, abnormal septal motion, systolic flattening of the IVS consistent with RV pressure overload, bi-atrial dilation, PASP 59 mmHg    All treatments discontinued on 02/04 for comfort care  SIRS (systemic inflammatory response syndrome) (HCC)  Assessment & Plan  · On presentation 2/2 tachycardia and tachypnea  · Lactate 2 4 - cleared  · Currently afebrile with no leukocytosis  · Procalcitonin x3 negative   · Unclear if infectious source, may be non-infectious secondary to volume overload a/e/b pleural effusion and bilateral LE edema   · Blood cultures NGTD  · Wound culture MRSA + Pseudo   · Discontinued Cefepime, Flagyl, Vanco 1/31- monitor off abx  · Consult ID- agree with monitoring off abx right now    All treatments discontinued on 02/04 for comfort care  Paroxysmal A-fib (HCC)  Assessment & Plan  · Continue Telemetry monitoring  · EKG revealed Afib with   · Currently not on rate control medication   · PRN Lopressor for rate control  · On warfarin 4 mg as outpatient- not taking PO meds, so changed to heparin gtt 1/31  All treatments discontinued on 02/04 for comfort care  COPD, moderate ([address] )  Assessment & Plan  · Wears 2-3 L chronically at baseline  · Does not appear to be in acute exacerbation, will hold off on IV steroids   · Continue xopenex/atrovent TID, pulmicort BID, atrovent prn    All treatments discontinued on 02/04 for comfort care       Ambulatory dysfunction  Assessment & Plan  · Per chart review, increased difficulty with ambulation due to chronic leg wounds  · PT/OT    All treatments discontinued on  for comfort care  Sleep apnea  Assessment & Plan  · Wears CPAP at HS at home    All treatments discontinued on  for comfort care  Venous stasis dermatitis of both lower extremities  Assessment & Plan  · On admission in 2021 found to have positive wound culture and 1 + BC for Staph aureus  Treated with 4 weeks of IV vancomycin through 2021  · Hx of chronic wounds, chronic venous insufficiency, lymphedema  · On exam, bilateral lower extremities with edema L>R, weeping tan drainage, multiple open areas  · Unclear if this is in fact active cellulitis, there is a draining posterior right leg wound  · Wound culture MRSA + Pseudo   · Blood cultures NGTD, discontinued Cefepime and Vancomycin    · Low suspicion for DVT, patient anticoagulated with warfarin prehospital  · ID consulted and agree with monitoring off abx for now  · Consult wound RN, has VNA services for wound care at home    All treatments discontinued on  for comfort care        ----------------------------------------------------------------------------------------  HPI/24hr events: Patient decompensated on   Family meeting was held and decision was made for comfort care  Patient appears comfortable       Disposition: continue med surg  Code Status: Level 4 - Comfort Care  ---------------------------------------------------------------------------------------  OBJECTIVE    Vitals   Vitals:    22 1900 22 2100 22 0003   BP:  122/63  120/83   BP Location:  Right arm  Right arm   Pulse: (!) 112 (!) 118 (!) 117 (!) 117   Resp: (!) 27 (!) 39 (!) 31 20   Temp:   99 5 °F (37 5 °C) 100 4 °F (38 °C)   TempSrc:    Bladder   SpO2: (!) 86% (!) 78% (!) 82% 91%   Weight:       Height:         Temp (24hrs), Av 8 °F (37 1 °C), Min:98 1 °F (36 7 °C), Max:100 4 °F (38 °C)  Current: Temperature: 100 4 °F (38 °C)          Respiratory:  SpO2: SpO2: 91 %  Nasal Cannula O2 Flow Rate (L/min): 2 L/min    Invasive/non-invasive ventilation settings   Respiratory  Report   Lab Data (Last 4 hours)    None         O2/Vent Data (Last 4 hours)    None                Physical Exam  Constitutional:       General: He is not in acute distress  Appearance: He is ill-appearing  Cardiovascular:      Rate and Rhythm: Normal rate and regular rhythm  Pulses: Normal pulses  Pulmonary:      Effort: Pulmonary effort is normal  No respiratory distress  Laboratory and Diagnostics:  Results from last 7 days   Lab Units 02/03/22  0533 02/02/22  0440 02/01/22  0622 01/31/22  2018 01/31/22  0505 01/31/22  0408 01/30/22  0500 01/29/22  0544 01/29/22  0544   WBC Thousand/uL 9 08 7 80 9 43 9 63 8 72 9 15 10 00   < > 13 16*   HEMOGLOBIN g/dL 11 7* 11 0* 10 6* 10 1* 9 6* 9 7* 10 9*   < > 10 3*   HEMATOCRIT % 39 7 37 7 36 7 34 9* 32 9* 33 2* 37 8   < > 33 6*   PLATELETS Thousands/uL 438* 418* 403* 409* 394* 392* 415*   < > 402*   NEUTROS PCT %  --   --  71  --  67 71 73  --  80*   MONOS PCT %  --   --  15*  --  18* 17* 11  --  12    < > = values in this interval not displayed       Results from last 7 days   Lab Units 02/03/22  1730 02/03/22  0533 02/02/22  1412 02/02/22  0440 02/01/22  1638 02/01/22  0622 01/31/22  0408 01/30/22  0500 01/30/22  0500 01/29/22  0544 01/29/22  0544   SODIUM mmol/L 138 138 139 140 142 141 141   < > 138   < > 137   POTASSIUM mmol/L 4 1 3 8 3 8 3 4* 3 8 3 4* 3 4*   < > 3 5   < > 4 0   CHLORIDE mmol/L 96* 98* 97* 97* 95* 95* 97*   < > 97*   < > 99*   CO2 mmol/L 40* 38* 43* 41* >45* >45* 44*   < > 38*   < > 35*   ANION GAP mmol/L 2* 2* -1* 2*  --   --  0*  --  3*  --  3*   BUN mg/dL 19 16 15 13 13 13 12   < > 14   < > 16   CREATININE mg/dL 0 98 0 83 0 86 0 78 0 84 0 86 0 86   < > 0 95   < > 0 95   CALCIUM mg/dL 8 6 8 5 8 2* 8 4 8 3 8 2* 7 8*   < > 8 1*   < > 7 6*   GLUCOSE RANDOM mg/dL 132 86 87 76 79 79 90   < > 99   < > 116   ALT U/L  --   --   --   --   -- --   --   --  58  --  60   AST U/L  --   --   --   --   --   --   --   --  26  --  24   ALK PHOS U/L  --   --   --   --   --   --   --   --  74  --  73   ALBUMIN g/dL  --   --   --   --   --   --   --   --  2 3*  --  2 1*   TOTAL BILIRUBIN mg/dL  --   --   --   --   --   --   --   --  0 74  --  0 41    < > = values in this interval not displayed  Results from last 7 days   Lab Units 02/03/22  0533 02/02/22  1412 02/02/22  0440 02/01/22  1638 02/01/22  0622   MAGNESIUM mg/dL 2 1 2 1 2 1 2 0 2 0      Results from last 7 days   Lab Units 02/04/22  0429 02/03/22  0533 02/02/22  1831 02/02/22  1252 02/02/22  0440 02/01/22  1638 02/01/22  0954 02/01/22  0236 01/31/22 2018 01/31/22  0408 01/30/22  0500   INR   --   --   --   --   --   --   --   --  1 92* 2 05* 2 14*   PTT seconds 43* 65* 70* 75* 57* 63* 65*   < > 35  --   --     < > = values in this interval not displayed  ABG:  Results from last 7 days   Lab Units 02/01/22  1515   PH ART  7 396   PCO2 ART mm Hg 84 1*   PO2 ART mm Hg 72 1*   HCO3 ART mmol/L 50 5*   BASE EXC ART mmol/L 21 4   ABG SOURCE  Radial, Right     VBG:  Results from last 7 days   Lab Units 02/01/22  1515 01/31/22  0401 01/31/22  0007   PH KEN   --  7 344  --    PCO2 KEN mm Hg  --  84 2*  --    PO2 KEN mm Hg  --  61 7*  --    HCO3 KEN mmol/L  --  44 8*  --    BASE EXC KEN mmol/L  --  15 9  --    ABG SOURCE  Radial, Right  --    < >    < > = values in this interval not displayed  Results from last 7 days   Lab Units 01/31/22  0408 01/30/22  0500 01/29/22  0544   PROCALCITONIN ng/ml 0 17 0 18 0 17       Micro  Results from last 7 days   Lab Units 01/29/22  1547   LEGIONELLA URINARY ANTIGEN  Negative   STREP PNEUMONIAE ANTIGEN, URINE  Negative       Intake and Output  I/O       02/03 0701  02/04 0700 02/04 0701 02/05 0700    P  O  900 1162    I V  (mL/kg) 334 8 (3 3) 76 7 (0 8)    Total Intake(mL/kg) 1234 8 (12 2) 1238 7 (12 3)    Urine (mL/kg/hr) 2525 (1) 980 (0 4)    Stool 0 Total Output 2525 980    Net -1290 3 +258 7          Unmeasured Urine Occurrence 1 x     Unmeasured Stool Occurrence 4 x           Height and Weights   Height: 6' (182 9 cm)  IBW (Ideal Body Weight): 77 6 kg  Body mass index is 30 35 kg/m²  Weight (last 2 days)     Date/Time Weight    02/04/22 0500 101 (223 77)    02/03/22 0500 105 (231 48)            Nutrition       Diet Orders   (From admission, onward)             Start     Ordered    02/03/22 1337  Dietary nutrition supplements  Once        Question Answer Comment   Select Supplement: Qas[place]    Frequency Breakfast, Dinner        02/03/22 1336    02/03/22 1337  Dietary nutrition supplements  Once        Question Answer Comment   Select Supplement: Ensure Compact-Chocolate    Frequency Lunch        02/03/22 1336    02/03/22 0958  Diet Dysphagia/Modified Consistency; Dysphagia 1-Pureed; Thin Liquid  Diet effective now        References:    Nutrtion Support Algorithm Enteral vs  Parenteral   Question Answer Comment   Diet Type Dysphagia/Modified Consistency    Dysphagia/Modified Consistency Dysphagia 1-Pureed    Liquid Modifier Thin Liquid    RD to adjust diet per protocol?  Yes        02/03/22 0958                  Active Medications  Scheduled Meds:  Current Facility-Administered Medications   Medication Dose Route Frequency Provider Last Rate    LORazepam  0 5 mg Intravenous Q4H PRN Joe Mcneil PA-C      morphine injection  2 mg Intravenous Q1H PRN Joe Mcneil PA-C       Continuous Infusions:     PRN Meds:   LORazepam, 0 5 mg, Q4H PRN  morphine injection, 2 mg, Q1H PRN        Invasive Devices Review  Invasive Devices  Report    Peripheral Intravenous Line            Peripheral IV 02/02/22 Distal;Left;Upper;Ventral (anterior) Antecubital 2 days    Peripheral IV 02/03/22 Left Wrist 1 day          Drain            Urethral Catheter Temperature probe 16 Fr  5 days ---------------------------------------------------------------------------------------  Advance Directive and Living Will:      Power of :    POLST:    ---------------------------------------------------------------------------------------  Care Time Delivered:   No Critical Care time spent       [employer], PA-C      Portions of the record may have been created with voice recognition software  Occasional wrong word or "sound a like" substitutions may have occurred due to the inherent limitations of voice recognition software    Read the chart carefully and recognize, using context, where substitutions have occurred

## 2022-02-05 NOTE — ASSESSMENT & PLAN NOTE
· Wears 2-3 L chronically at baseline  · Does not appear to be in acute exacerbation, will hold off on IV steroids   · Continue xopenex/atrovent TID, pulmicort BID, atrovent prn    All treatments discontinued on 02/04 for comfort care

## 2022-02-05 NOTE — ASSESSMENT & PLAN NOTE
· Noted on CXR, CT Chest reveals moderate/large R Pleural effusion  · BNP 4,540 thought to be volume overloaded on exam    · Currently being diuresed receiving Lasix 40mg IV BID  · Considered Thoracentesis; however with hypercarbia with patient being more somnolent will hold off   · ECHO from Nov 2021 revealed EF 99%, normal systolic function, diastolic function not assessed    · 02/02 ECHO limited: EF 02%, normal systolic function, abnormal septal motion, systolic flattening of the IVS consistent with RV pressure overload, bi-atrial dilation, PASP 59 mmHg    All treatments discontinued on 02/04 for comfort care

## 2022-02-05 NOTE — CASE MANAGEMENT
Case Management Progress Note    Patient name Daisy Faust  Location /-43 MRN [id number]  : 1945 Date 2022       LOS (days): 8  Geometric Mean LOS (GMLOS) (days): 4 10  Days to GMLOS:-3 5        OBJECTIVE:        Current admission status: Inpatient  Preferred Pharmacy:   [facility] Jacqueline Carlson, [address]  [facility] [address]  Phone: [phone] Fax: [phone] [address] [facility],[address], [address] [facility] , [address]  [address] [facility],[address] [address]  Phone: [phone] Fax: [phone] [address], [address] [place], [address]  [address] [address]  Phone: [phone] Fax: [phone]    [facility]  [address] [facility]  [address]  [place]  Phone: [phone] Fax: [phone]    Primary Care Provider: Magy Crowley DO    Primary Insurance: MEDICARE  Secondary Insurance:     PROGRESS NOTE:    CM discussed hospice care with pts daughter, Gabriel Holloway is agreeable to hospice care for the pt  Amie requesting referral be placed to [facility]  As per request, referral placed in [address] as per [facility] liaison, there are currently 2 open beds at hospice house, however there are three pts ahead of pt for those beds, sts she will still move forward with review of chart

## 2022-02-05 NOTE — ASSESSMENT & PLAN NOTE
· On presentation 2/2 tachycardia and tachypnea  · Lactate 2 4 - cleared  · Currently afebrile with no leukocytosis  · Procalcitonin x3 negative   · Unclear if infectious source, may be non-infectious secondary to volume overload a/e/b pleural effusion and bilateral LE edema   · Blood cultures NGTD  · Wound culture MRSA + Pseudo   · Discontinued Cefepime, Flagyl, Vanco 1/31- monitor off abx  · Consult ID- agree with monitoring off abx right now    All treatments discontinued on 02/04 for comfort care

## 2022-02-05 NOTE — PLAN OF CARE
Problem: Potential for Falls  Goal: Patient will remain free of falls  Description: INTERVENTIONS:  - Educate patient/family on patient safety including physical limitations  - Instruct patient to call for assistance with activity   - Consult OT/PT to assist with strengthening/mobility   - Keep Call bell within reach  - Keep bed low and locked with side rails adjusted as appropriate  - Keep care items and personal belongings within reach  - Initiate and maintain comfort rounds  - Make Fall Risk Sign visible to staff  - Offer Toileting every 2 Hours, in advance of need  - Initiate/Maintain BED alarm  - Obtain necessary fall risk management equipment: bed alarm  - Apply yellow socks and bracelet for high fall risk patients  - Consider moving patient to room near nurses station  Outcome: Progressing     Problem: MOBILITY - ADULT  Goal: Maintain or return to baseline ADL function  Description: INTERVENTIONS:  -  Assess patient's ability to carry out ADLs; assess patient's baseline for ADL function and identify physical deficits which impact ability to perform ADLs (bathing, care of mouth/teeth, toileting, grooming, dressing, etc )  - Assess/evaluate cause of self-care deficits   - Assess range of motion  - Assess patient's mobility; develop plan if impaired  - Assess patient's need for assistive devices and provide as appropriate  - Encourage maximum independence but intervene and supervise when necessary  - Involve family in performance of ADLs  - Assess for home care needs following discharge   - Consider OT consult to assist with ADL evaluation and planning for discharge  - Provide patient education as appropriate  Outcome: Progressing  Goal: Maintains/Returns to pre admission functional level  Description: INTERVENTIONS:  - Perform BMAT or MOVE assessment daily    - Set and communicate daily mobility goal to care team and patient/family/caregiver     - Collaborate with rehabilitation services on mobility goals if consulted  - Perform Range of Motion 3 times a day  - Reposition patient every 2 hours  - Dangle patient 3 times a day  - Stand patient 3 times a day  - Ambulate patient 3 times a day  - Out of bed to [id number] a day   - Out of bed for meals 3 times a day  - Out of bed for toileting  - Record patient progress and toleration of activity level   Outcome: Progressing     Problem: PAIN - ADULT  Goal: Verbalizes/displays adequate comfort level or baseline comfort level  Description: Interventions:  - Encourage patient to monitor pain and request assistance  - Assess pain using appropriate pain scale  - Administer analgesics based on type and severity of pain and evaluate response  - Implement non-pharmacological measures as appropriate and evaluate response  - Consider cultural and social influences on pain and pain management  - Notify physician/advanced practitioner if interventions unsuccessful or patient reports new pain  Outcome: Progressing     Problem: DISCHARGE PLANNING  Goal: Discharge to home or other facility with appropriate resources  Description: INTERVENTIONS:  - Identify barriers to discharge w/patient and caregiver  - Arrange for needed discharge resources and transportation as appropriate  - Identify discharge learning needs (meds, wound care, etc )  - Arrange for interpretive services to assist at discharge as needed  - Refer to Case Management Department for coordinating discharge planning if the patient needs post-hospital services based on physician/advanced practitioner order or complex needs related to functional status, cognitive ability, or social support system  Outcome: Progressing     Problem: Knowledge Deficit  Goal: Patient/family/caregiver demonstrates understanding of disease process, treatment plan, medications, and discharge instructions  Description: Complete learning assessment and assess knowledge base    Interventions:  - Provide teaching at level of understanding  - Provide teaching via preferred learning methods  Outcome: Progressing     Problem: SKIN/TISSUE INTEGRITY - ADULT  Goal: Pressure injury heals and does not worsen  Description: Interventions:  - Implement low air loss mattress or specialty surface (Criteria met)  - Apply silicone foam dressing  - Instruct/assist with weight shifting every 15 minutes when in chair   - Limit chair time to 2 hour intervals  - Use special pressure reducing interventions such as waffle cushion when in chair   - Apply fecal or urinary incontinence containment device   - Perform passive or active ROM every 2 hours  - Turn and reposition patient & offload bony prominences every 2 hours   - Utilize friction reducing device or surface for transfers   - Consider consults to  interdisciplinary teams such as wound nurse  - Use incontinent care products after each incontinent episode such as brief  - Consider nutrition services referral as needed  Outcome: Progressing

## 2022-02-05 NOTE — ASSESSMENT & PLAN NOTE
· On admission in November 2021 found to have positive wound culture and 1/2 + BC for Staph aureus  Treated with 4 weeks of IV vancomycin through 12/11/2021  · Hx of chronic wounds, chronic venous insufficiency, lymphedema  · On exam, bilateral lower extremities with edema L>R, weeping tan drainage, multiple open areas  · Unclear if this is in fact active cellulitis, there is a draining posterior right leg wound  · Wound culture MRSA + Pseudo   · Blood cultures NGTD, discontinued Cefepime and Vancomycin 1/31   · Low suspicion for DVT, patient anticoagulated with warfarin prehospital  · ID consulted and agree with monitoring off abx for now  · Consult wound RN, has VNA services for wound care at home    All treatments discontinued on 02/04 for comfort care

## 2022-02-05 NOTE — ASSESSMENT & PLAN NOTE
Patient is a 68year old male whom presented on evening of 1/28 with a chief complaint of Shortness of breath with increasing home O2 needs as well as R LE drainage  He has a PMH of COPD wearing 2-3 L at baseline, Afib on Coumadin, ANA LAURA, as well as a hospitalization Nov 11-22, 2021 secondary to COVID Pneumonia  Family reports steady decline ever since having COVID, "He isn't himself and keeps getting worse"    Since admission workup has included labs, CXR consistent with B/L pleural effusions, as well as CT Chest revealing Increasing moderate to large right pleural effusion  Collapse of the right lower lobe attributable to combination of compressive atelectasis from effusion, resorptive atelectasis from mucous plugging, and (possibly) pneumonia  Scattered bilateral peripheral consolidative opacities, left basilar peribronchial thickening, and new fluid level within left lower lobe pneumatocyst   These findings may suggest multifocal infection  Chronic, low level mediastinal or hilar lymphadenopathy appears similar to November  Treatment has included intermittent BiPAP use as well as IV diuresis with potential for thoracentesis  Also receiving Cefepime / Flagyl / Vanco  - Leg wound staph aureus, MRSA pending  Throughout the day on 1/30 patient less responsive as well as noted to be increasingly more Hypoxic  ABG obtained revealing 7 37/76/110  Patient requiring BiPAP more continuously    · Continual SpO2 monitoring and maintain >88% - wean FiO2 as tolerated  · Hypercarbic Respiratory failure, continue BiPAP trialed off BiPAP 1/31 am with rapid decompensation / hypoxia - Back on BiPAP Rate 20  16/6  · COVID flu RSV negative on admission  · Recent admission in Nov 2021 for COVID-19 pneumonia     · Low suspicion for acute PE given patient is anticoagulated on warfarin with therapeutic INR 2 4    · 1/30 Family at bedside, extensive conversation during which daughter advised her father would never want breathing tube or any machines to keep him alive - Code status will be updated to a Level 3  · 1/31 Further family discussions, they are concerned with lack of improvement, would like to give additional 24-48 hours and consider comfort measures if no improvement  · Lasix 40 mg IV BID, cont for goal net negative 2L  · 500 mg IV Diamox on 2/1  · 02/01 - weaned to Venti mask 40% O2 with Bipap q HS  · 02/02 - weaned to face tent 40% however desaturated to 67% at which time he returned to BiPAP  Suctioned for large amount of thick sputum in back of airway  HFNC started 45% 60L  All treatments discontinued on 02/04 for comfort care

## 2022-02-05 NOTE — ASSESSMENT & PLAN NOTE
· Per chart review, increased difficulty with ambulation due to chronic leg wounds  · PT/OT    All treatments discontinued on 02/04 for comfort care

## 2022-02-05 NOTE — ASSESSMENT & PLAN NOTE
· Xray chest suggestive of pneumonia  · CT chest shows " Scattered bilateral peripheral consolidative opacities, left basilar peribronchial  thickening, and new fluid level within left lower lobe pneumatocyst   These findings may suggest multifocal infection "   · Urine Strep / Legionella Negative  · MRSA nares Positive  · Leg Culture positive Staph - monitor closely  · Sputum culture ordered  · Procalcitonin x2 negative   · DRIP score 7, High risk MDRO (recent IV Abx, recent hospitalization, wound care, recent MRSA  infection, COPD  · 1/31 Procalcitonin remains negative, Cefepime, Vanco, and Flagyl discontinued 1/31, monitor temps as well as WBC count off antibiotics  · ID consulted and are in agreement with monitoring off abx for now  · Seen by SLP- will continue to re-evaluate  · Dysphagia 1 diet 02/03  All treatments discontinued on 02/04 for comfort care

## 2022-02-06 PROCEDURE — 99232 SBSQ HOSP IP/OBS MODERATE 35: CPT | Performed by: STUDENT IN AN ORGANIZED HEALTH CARE EDUCATION/TRAINING PROGRAM

## 2022-02-06 RX ADMIN — MORPHINE SULFATE 2 MG: 2 INJECTION, SOLUTION INTRAMUSCULAR; INTRAVENOUS at 01:18

## 2022-02-06 NOTE — ASSESSMENT & PLAN NOTE
· Was on Telemetry monitoring  · And lopressor and coumadin  · However, on 2/4, due to lack of improvement in respiratory status, patient transitioned to comfort care and is actively dying at this time

## 2022-02-06 NOTE — ASSESSMENT & PLAN NOTE
· On admission in November 2021 found to have positive wound culture and 1/2 + BC for Staph aureus  Treated with 4 weeks of IV vancomycin through 12/11/2021  · Hx of chronic wounds, chronic venous insufficiency, lymphedema     · On exam, bilateral lower extremities with edema L>R, weeping tan drainage, multiple open areas  · Unclear if this is in fact active cellulitis, there is a draining posterior right leg wound  · Wound culture MRSA + Pseudo   · Blood cultures NGTD, discontinued Cefepime and Vancomycin 1/31   · Low suspicion for DVT, patient anticoagulated with warfarin prehospital  · ID consulted and agree with monitoring off abx for now  · Consult wound RN, has VNA services for wound care at home    Transitioned to comfort care while under Critical care service

## 2022-02-06 NOTE — ASSESSMENT & PLAN NOTE
· Noted on CXR, CT Chest reveals moderate/large R Pleural effusion  · BNP 4,540 thought to be volume overloaded on exam    · Currently being diuresed receiving Lasix 40mg IV BID  · Considered Thoracentesis; however with hypercarbia with patient being more somnolent will hold off   · ECHO from Nov 2021 revealed EF 10%, normal systolic function, diastolic function not assessed    · 02/02 ECHO limited: EF 72%, normal systolic function, abnormal septal motion, systolic flattening of the IVS consistent with RV pressure overload, bi-atrial dilation, PASP 59 mmHg     All treatments discontinued on 02/04 for comfort care

## 2022-02-06 NOTE — HOSPICE NOTE
Hospice referral received and pt is approved for routine/ home hospice  Reviewed hospice medicare benefit and levels of care with daughter Stacia Boston  Per nursing staff pt is denying pain and not taking any meds for symptom  Management and there are no bed currently available at the [facility]  Daughter Essie Cervantes offered home hospice and will need to takj to her  and will get back to liaison    liaison will continue to follow until dc

## 2022-02-06 NOTE — PROGRESS NOTES
[address]  Progress Note - Felipa Cantu 1945, 68 y o  male MRN: [id number]  Unit/Bed#: -01 Encounter: [id number]  Primary Care Provider: Ruddy Fabry, DO   Date and time admitted to hospital: 1/28/2022  9:02 PM    Pleural effusion  Assessment & Plan  · Noted on CXR, CT Chest reveals moderate/large R Pleural effusion  · BNP 4,540 thought to be volume overloaded on exam    · Currently being diuresed receiving Lasix 40mg IV BID  · Considered Thoracentesis; however with hypercarbia with patient being more somnolent will hold off   · ECHO from Nov 2021 revealed EF 51%, normal systolic function, diastolic function not assessed    · 02/02 ECHO limited: EF 43%, normal systolic function, abnormal septal motion, systolic flattening of the IVS consistent with RV pressure overload, bi-atrial dilation, PASP 59 mmHg     All treatments discontinued on 02/04 for comfort care  SIRS (systemic inflammatory response syndrome) (HCC)  Assessment & Plan  · On presentation 2/2 tachycardia and tachypnea  · Lactate 2 4 - cleared  · Currently afebrile with no leukocytosis  · Procalcitonin x3 negative   · Unclear if infectious source, may be non-infectious secondary to volume overload a/e/b pleural effusion and bilateral LE edema   · Blood cultures NGTD  · Wound culture MRSA + Pseudo   · Discontinued Cefepime, Flagyl, Vanco 1/31- monitor off abx  · Consult ID- agree with monitoring off abx right now     All treatments discontinued on 02/04 for comfort care  Ambulatory dysfunction  Assessment & Plan  · Per chart review, increased difficulty with ambulation due to chronic leg wounds  · PT/OT     All treatments discontinued on 02/04 for comfort care  Venous stasis dermatitis of both lower extremities  Assessment & Plan  · On admission in November 2021 found to have positive wound culture and 1/2 + BC for Staph aureus  Treated with 4 weeks of IV vancomycin through 12/11/2021    · Hx of chronic wounds, chronic venous insufficiency, lymphedema  · On exam, bilateral lower extremities with edema L>R, weeping tan drainage, multiple open areas  · Unclear if this is in fact active cellulitis, there is a draining posterior right leg wound  · Wound culture MRSA + Pseudo   · Blood cultures NGTD, discontinued Cefepime and Vancomycin 1/31   · Low suspicion for DVT, patient anticoagulated with warfarin prehospital  · ID consulted and agree with monitoring off abx for now  · Consult wound RN, has VNA services for wound care at home    Transitioned to comfort care while under Critical care service    Multifocal pneumonia  Assessment & Plan  · Xray chest and CT scan suggestive of multifocal pneumonia    · Urine Strep / Legionella Negative; MRSA nares Positive  · Leg Culture positive Staph - monitor closely  · Sputum culture ordered; Procalcitonin x2 negative   · DRIP score 7, High risk MDRO (recent IV Abx, recent hospitalization, wound care, recent MRSA  infection, COPD  · 1/31 Procalcitonin remains negative, Cefepime, Vanco, and Flagyl discontinued 1/31, monitor temps as well as WBC count off antibiotics  · ID consulted and are in agreement with monitoring off abx for now  ? Dysphagia 1 diet 02/03      Transitioned to comfort care while under Critical care service on 2/4    Sleep apnea  Assessment & Plan  · Wears CPAP at HS, continue     Transitioned to comfort care while under Critical care service    COPD, moderate ([address] )  Assessment & Plan  · Wears 2-3 L chronically at baseline  · Does not appear to be in acute exacerbation, will hold off on IV steroids   · Continue xopenex/atrovent TID, pulmicort BID, atrovent prn    Transitioned to comfort care while under Critical care service due to lack of improvement in respiratory status   Patient actively dying at this time      Paroxysmal A-fib [facility])  Assessment & Plan  · Was on Telemetry monitoring  · And lopressor and coumadin  · However, on 2/4, due to lack of improvement in respiratory status, patient transitioned to comfort care and is actively dying at this time    * Acute on chronic respiratory failure [facility])  Assessment & Plan  Patient is a 68year old male whom presented on evening of 1/28 with a chief complaint of Shortness of breath with increasing home O2 needs as well as R LE drainage  He has a PMH of COPD wearing 2-3 L at baseline, Afib on Coumadin, ANA LAURA, as well as a hospitalization Nov 11-22, 2021 secondary to COVID Pneumonia  Family reports steady decline ever since having COVID, "He isn't himself and keeps getting worse"     Since admission workup has included labs, CXR consistent with B/L pleural effusions, as well as CT Chest revealing Increasing moderate to large right pleural effusion  Collapse of the right lower lobe attributable to combination of compressive atelectasis from effusion, resorptive atelectasis from mucous plugging, and (possibly) pneumonia  Scattered bilateral peripheral consolidative opacities, left basilar peribronchial thickening, and new fluid level within left lower lobe pneumatocyst   These findings may suggest multifocal infection  Chronic, low level mediastinal or hilar lymphadenopathy appears similar to November       Treatment has included intermittent BiPAP use as well as IV diuresis with potential for thoracentesis  Also receiving Cefepime / Flagyl / Vanco  - Leg wound staph aureus, MRSA pending  Throughout the day on 1/30 patient less responsive as well as noted to be increasingly more Hypoxic  ABG obtained revealing 7 37/76/110    Patient requiring BiPAP more continuously     · trialed off BiPAP 1/31 am with rapid decompensation / hypoxia - Back on BiPAP Rate 20  16/6  · Low suspicion for acute PE given patient is anticoagulated on warfarin with therapeutic INR 2 4    · Given no improvement in respiratory status, further discussion with family ultimately led to decision of family to transition patient to comfort care and all treatments discontinued on   Patient is currently actively dying at this time          VTE Pharmacologic Prophylaxis: VTE Score: 9 COMFORT CARE    Patient Centered Rounds: I performed bedside rounds with nursing staff today  Discussions with Specialists or Other Care Team Provider: critical care    Education and Discussions with Family / Patient: Updated  (daughter) via phone  Time Spent for Care: 30 minutes  More than 50% of total time spent on counseling and coordination of care as described above  Current Length of Stay: 9 day(s)  Current Patient Status: Inpatient   Certification Statement: The patient will continue to require additional inpatient hospital stay due to actively dying  Discharge Plan: Anticipate discharge in 24-48 hrs to actively dying    Code Status: Level 4 - Comfort Care    Subjective:   Patient seen and examined at bedside  No complaints at this time  Endorses feeling comfortable  Refusing to wear his oxygen and states he is comfortable that way  Objective:     Vitals:   Temp (24hrs), Av 9 °F (37 7 °C), Min:99 5 °F (37 5 °C), Max:100 2 °F (37 9 °C)    Temp:  [99 5 °F (37 5 °C)-100 2 °F (37 9 °C)] 100 2 °F (37 9 °C)  HR:  [100-123] 100  Resp:  [26-34] 32  BP: ()/(50-68) 108/68  SpO2:  [57 %-78 %] 75 %  Body mass index is 30 35 kg/m²  Input and Output Summary (last 24 hours): Intake/Output Summary (Last 24 hours) at 2022 1734  Last data filed at 2022 1401  Gross per 24 hour   Intake 300 ml   Output 625 ml   Net -325 ml       Physical Exam:   Physical Exam  Vitals and nursing note reviewed  Constitutional:       Appearance: He is well-developed  HENT:      Head: Normocephalic and atraumatic  Eyes:      Conjunctiva/sclera: Conjunctivae normal    Cardiovascular:      Rate and Rhythm: Normal rate and regular rhythm  Heart sounds: No murmur heard  Pulmonary:      Effort: Pulmonary effort is normal  No respiratory distress  Abdominal:      Palpations: Abdomen is soft  Tenderness: There is no abdominal tenderness  Musculoskeletal:      Cervical back: Neck supple  Skin:     General: Skin is warm and dry  Neurological:      Mental Status: He is alert  He is disoriented  Additional Data:     Labs:  Results from last 7 days   Lab Units 02/03/22  0533 02/02/22  0440 02/01/22  0622   WBC Thousand/uL 9 08   < > 9 43   HEMOGLOBIN g/dL 11 7*   < > 10 6*   HEMATOCRIT % 39 7   < > 36 7   PLATELETS Thousands/uL 438*   < > 403*   NEUTROS PCT %  --   --  71   LYMPHS PCT %  --   --  11*   MONOS PCT %  --   --  15*   EOS PCT %  --   --  2    < > = values in this interval not displayed  Results from last 7 days   Lab Units 02/03/22  1730   SODIUM mmol/L 138   POTASSIUM mmol/L 4 1   CHLORIDE mmol/L 96*   CO2 mmol/L 40*   BUN mg/dL 19   CREATININE mg/dL 0 98   ANION GAP mmol/L 2*   CALCIUM mg/dL 8 6   GLUCOSE RANDOM mg/dL 132     Results from last 7 days   Lab Units 01/31/22  2018   INR  1 92*     Results from last 7 days   Lab Units 02/04/22  0727 02/03/22  2009 02/03/22  1554 02/03/22  1244 02/03/22  0534 02/02/22  2339 02/02/22  1743 02/02/22  1225 02/02/22  0016 02/01/22  1739 02/01/22  1206 02/01/22  0952   POC GLUCOSE mg/dl 116 205* 140 118 89 80 83 83 77 80 82 83         Results from last 7 days   Lab Units 01/31/22  0408   PROCALCITONIN ng/ml 0 17       Lines/Drains:  Invasive Devices  Report    Peripheral Intravenous Line            Peripheral IV 02/03/22 Left Wrist 3 days          Drain            Urethral Catheter Temperature probe 16 Fr  7 days              Urinary Catheter:  Goal for removal: for comfort               Imaging: No pertinent imaging reviewed      Recent Cultures (last 7 days):         Last 24 Hours Medication List:   Current Facility-Administered Medications   Medication Dose Route Frequency Provider Last Rate    LORazepam  0 5 mg Intravenous Q15 Min PRN Juarez Tripp PA-C      morphine injection  2 mg Intravenous Q15 Min PRN Heriberto Emery PA-C          Today, Patient Was Seen By: Reta Short MD    **Please Note: This note may have been constructed using a voice recognition system  **

## 2022-02-06 NOTE — ASSESSMENT & PLAN NOTE
· Wears 2-3 L chronically at baseline  · Does not appear to be in acute exacerbation, will hold off on IV steroids   · Continue xopenex/atrovent TID, pulmicort BID, atrovent prn    Transitioned to comfort care while under Critical care service due to lack of improvement in respiratory status   Patient actively dying at this time

## 2022-02-07 ENCOUNTER — PATIENT OUTREACH (OUTPATIENT)
Dept: CASE MANAGEMENT | Facility: HOSPITAL | Age: 77
End: 2022-02-07

## 2022-02-07 PROBLEM — Z51.5 HOSPICE CARE PATIENT: Status: ACTIVE | Noted: 2022-02-07

## 2022-02-07 PROCEDURE — NC001 PR NO CHARGE: Performed by: NURSE PRACTITIONER

## 2022-02-07 PROCEDURE — 99239 HOSP IP/OBS DSCHRG MGMT >30: CPT | Performed by: NURSE PRACTITIONER

## 2022-02-07 NOTE — CASE MANAGEMENT
Case Management Discharge Planning Note    Patient name Joo Luis  Location /-78 MRN [id number]  : 1945 Date 2022       Current Admission Date: 2022  Current Admission Diagnosis:Comfort measures only status   Patient Active Problem List    Diagnosis Date Noted    Comfort measures only status 2022    SIRS (systemic inflammatory response syndrome) (HCC)     Pleural effusion     Thrombocytosis 2021    Hypoalbuminemia 2021    Dermatitis associated with moisture 2021    Non-pressure chronic ulcer left lower leg, limited to breakdown skin ([address] ) 2021    Non-pressure chronic ulcer right ankle, limited to breakdown skin ([address] ) 2021    Ambulatory dysfunction 2021    Staphylococcus aureus bacteremia 2021    Pneumonia due to COVID-19 virus 2021    Permanent atrial fibrillation ([address] ) 2021    Elevated troponin level not due myocardial infarction 2021    Sepsis due to COVID-19 [facility]) 2021    Chronic respiratory failure with hypoxia ([address] ) 2020    Decubitus ulcer of left buttock, unstageable ([address] ) 2019    Bilateral carotid artery disease ([address] ) 2019    Diabetes mellitus without complication ([address] )     Arthritis of right knee 2019    Venous stasis dermatitis of both lower extremities 2018    Aspiration pneumonia of left lower lobe ([address] ) 10/04/2018    Pressure injury of skin of sacral region 10/04/2018    Cataract of both eyes 2018    Depression with anxiety 2018    Aspiration into respiratory tract 2018    Age-related cataract of both eyes 2018    Other dysphagia 2018    Contusion of right hand 2018    Multifocal pneumonia 2018    Acute on chronic respiratory failure ([address] ) 2017    Elevated PSA 10/28/2017    Localized osteoarthritis of right knee 2017    COPD, moderate ([address] ) 2015    Gait disturbance 01/19/2015    Hypoxia 01/07/2015    Sleep apnea 08/07/2014    Venous insufficiency 08/07/2014    Paroxysmal A-fib [facility] ) 06/12/2014    Coronary artery disease involving native heart with angina pectoris ([facility] ) 03/15/2014    Lymphedema 03/15/2014    Arthritis 07/03/2012    Benign essential hypertension 07/03/2012    Esophagitis, reflux 07/03/2012    Mixed hyperlipidemia 07/03/2012      LOS (days): 10  Geometric Mean LOS (GMLOS) (days): 4 10  Days to GMLOS:-5 5     OBJECTIVE:  Risk of Unplanned Readmission Score: 10         Current admission status: Inpatient   Preferred Pharmacy:   [facility] [address], [address] [place] [address] [address] [facility] [address] [address]  Phone: [phone] Fax: [phone]    [address] [facility], [address], [address] [facility] , [address]  [address] [facility] , [address] [place] [address] [address] [address]  Phone: [phone] Fax: [phone] [address] [facility] [address]  [address] [place], [address] [address]  Phone: [phone] Fax: [facility] [place]  [address] [facility]  [address]  [place]  Phone: [phone] Fax: [phone]    Primary Care Provider: Maggie Le DO    Primary Insurance: MEDICARE  Secondary Insurance:     DISCHARGE DETAILS:    Discharge planning discussed with[de-identified] Daughter Karly Agudelo of Choice: Yes  Comments - Freedom of Choice: Discussed Hospice_ pt is not appropriate for Inpatient Hospice House per Liaison  Options at this time remains home with services vs a SNF- It is Amie's choice for Floflako Buddle to go to a SNF- she was made aware of the OOP expense for room and board when patient is evaluated for hospice care a the facility    CM contacted family/caregiver?: Yes             Contacts  Patient Contacts: Amie  Relationship to Patient[de-identified] Family  Contact Method: Phone  Reason/Outcome: Discharge Planning              Other Referral/Resources/Interventions Provided:  Interventions: SNF  Referral Comments: [place] referrals made in the [address] per Amie's request         Treatment Team Recommendation: SNF  Discharge Destination Plan[de-identified] SNF  Transport at Discharge : Janette Malhotra           [address] can accept today  CM left a message with daughter Skyler Parekh to return my phone call to see if she is in agreement with accepting this bed  CM called Enzo to discuss and he is gong to reach out and speak with his wife-   Await determination  26 Did speak did speak to daughter Skyler Parekh- reviewed both [facility] and [facility] is looking at him- at this time it is her Choice to accept [facility] and Rehab-- Noting bed is available today  Pt will be dc on Comfort care and decision for Hospice will be made- Amie is aware room and board is an OOP expense and the facility will work on Medical Assistant with her  CM communicated with [facility] that daughter will accept the bed, however before setting up transport- CM wanted to hear from [facility] and [address] [place] [address]  CM was unable to reach [facility] and spoke with Michael Womack at [facility]- Case now needs to be reviewed by Director of Nursing at [address]---- thus now dc is on hold for today-  CM notified MD/ Nursing and called daughter Skyler Parekh at work  [phone]

## 2022-02-07 NOTE — PROGRESS NOTES
[address]  Progress Note - Vandana Arechiga 1945, 68 y o  male MRN: [id number]  Unit/Bed#: -01 Encounter: [id number]  Primary Care Provider: Nneka Ramírez DO   Date and time admitted to hospital: 1/28/2022  9:02 PM    * Comfort measures only status  Assessment & Plan  Background: Presented to the ED initially with SOB, cough, hypoxia however given rapid decline was transferred to the critical care service requiring BiPAP however given consistent need for BiPAP lack of progress and poor prognosis it was deemed appropriate to transition to hospice/comfort care level for on 02/04 by critical care service in conjunction with family  · Known past medical history of paroxysmal atrial fibrillation, COPD, sleep apnea, chronic respiratory failure  · All treatments discontinued  · Comfort measures only  · Hospice liaison consulted via Case Management  · Does not meet criteria for inpatient hospice  · Reaching out to SNF as family unable to do home hospice        VTE Pharmacologic Prophylaxis: VTE Score: 9 Level for comfort care    Patient Centered Rounds: I performed bedside rounds with nursing staff today  Discussions with Specialists or Other Care Team Provider:  Case Management and nursing staff    Education and Discussions with Family / Patient: Updated  (daughter) via phone  Time Spent for Care: 30 minutes  More than 50% of total time spent on counseling and coordination of care as described above      Current Length of Stay: 10 day(s)  Current Patient Status: Inpatient   Certification Statement: The patient will continue to require additional inpatient hospital stay due to Placement for hospice care  Discharge Plan: Anticipate discharge in 24-48 hrs to Rehab facility with hospice    Code Status: Level 4 - Comfort Care    Subjective:   Patient does not appear to be in any acute distress and when asked if he were in any pain and how he was feeling he stated "I feel fine"      Objective:     Vitals:   No data recorded  HR:  [100-102] 102  Resp:  [32] 32  BP: (108)/(68) 108/68  SpO2:  [75 %-81 %] 81 %  Body mass index is 30 35 kg/m²  Input and Output Summary (last 24 hours): Intake/Output Summary (Last 24 hours) at 2/7/2022 1356  Last data filed at 2/7/2022 1301  Gross per 24 hour   Intake 1080 ml   Output 1075 ml   Net 5 ml       Physical Exam:   Physical Exam  Constitutional:       General: He is sleeping  Cardiovascular:      Pulses: Normal pulses  Radial pulses are 2+ on the right side and 2+ on the left side  Pulmonary:      Breath sounds: Rhonchi present  Abdominal:      General: Bowel sounds are normal  There is no distension  Skin:     General: Skin is warm and dry  Capillary Refill: Capillary refill takes 2 to 3 seconds  Findings: Wound present  Neurological:      Mental Status: He is easily aroused  He is disoriented  Psychiatric:         Behavior: Behavior is cooperative  Additional Data:     Labs:  Results from last 7 days   Lab Units 02/03/22  0533 02/02/22  0440 02/01/22  0622   WBC Thousand/uL 9 08   < > 9 43   HEMOGLOBIN g/dL 11 7*   < > 10 6*   HEMATOCRIT % 39 7   < > 36 7   PLATELETS Thousands/uL 438*   < > 403*   NEUTROS PCT %  --   --  71   LYMPHS PCT %  --   --  11*   MONOS PCT %  --   --  15*   EOS PCT %  --   --  2    < > = values in this interval not displayed       Results from last 7 days   Lab Units 02/03/22  1730   SODIUM mmol/L 138   POTASSIUM mmol/L 4 1   CHLORIDE mmol/L 96*   CO2 mmol/L 40*   BUN mg/dL 19   CREATININE mg/dL 0 98   ANION GAP mmol/L 2*   CALCIUM mg/dL 8 6   GLUCOSE RANDOM mg/dL 132     Results from last 7 days   Lab Units 01/31/22  2018   INR  1 92*     Results from last 7 days   Lab Units 02/04/22  0727 02/03/22  2009 02/03/22  1554 02/03/22  1244 02/03/22  0534 02/02/22  2339 02/02/22  1743 02/02/22  1225 02/02/22  0016 02/01/22  1739 02/01/22  1206 02/01/22  0952   POC GLUCOSE mg/dl 116 205* 140 118 89 80 83 83 77 80 82 83               Lines/Drains:  Invasive Devices  Report    Peripheral Intravenous Line            Peripheral IV 02/03/22 Left Wrist 4 days          Drain            Urethral Catheter Temperature probe 16 Fr  7 days              Urinary Catheter:  Goal for removal: N/A- Discharging with [place]         Imaging: Reviewed radiology reports from this admission including: chest xray, chest CT scan and CT head    Recent Cultures (last 7 days):         Last 24 Hours Medication List:   Current Facility-Administered Medications   Medication Dose Route Frequency Provider Last Rate    LORazepam  0 5 mg Intravenous Q15 Min PRN Eden Newell PA-C      morphine injection  2 mg Intravenous Q15 Min PRN Maritza Warner PA-C          Today, Patient Was Seen By: DUNIA Pendleton    **Please Note: This note may have been constructed using a voice recognition system  **

## 2022-02-07 NOTE — ASSESSMENT & PLAN NOTE
Background: Presented to the ED initially with SOB, cough, hypoxia however given rapid decline was transferred to the critical care service requiring BiPAP however given consistent need for BiPAP lack of progress and poor prognosis it was deemed appropriate to transition to hospice/comfort care level for on 02/04 by critical care service in conjunction with family    · Known past medical history of paroxysmal atrial fibrillation, COPD, sleep apnea, chronic respiratory failure  · All treatments discontinued  · Comfort measures only  · Hospice liaison consulted via Case Management  · Does not meet criteria for inpatient hospice  · Reaching out to SNF as family unable to do home hospice

## 2022-02-07 NOTE — ASSESSMENT & PLAN NOTE
Background: Presented to the ED initially with SOB, cough, hypoxia however given rapid decline was transferred to the critical care service requiring BiPAP however given consistent need for BiPAP lack of progress and poor prognosis it was deemed appropriate to transition to hospice/comfort care level for on 02/04 by critical care service in conjunction with family    · Known past medical history of paroxysmal atrial fibrillation, COPD, sleep apnea, chronic respiratory failure  · All treatments discontinued  · Comfort measures only  · Hospice liaison consulted via Case Management  · Did not meet criteria for inpatient hospice  · Placement obtained at [address] [place] [address] 151 rehab on hospice

## 2022-02-07 NOTE — PLAN OF CARE
Problem: Potential for Falls  Goal: Patient will remain free of falls  Description: INTERVENTIONS:  - Educate patient/family on patient safety including physical limitations  - Instruct patient to call for assistance with activity   - Consult OT/PT to assist with strengthening/mobility   - Keep Call bell within reach  - Keep bed low and locked with side rails adjusted as appropriate  - Keep care items and personal belongings within reach  - Initiate and maintain comfort rounds  - Make Fall Risk Sign visible to staff  - Offer Toileting every 2 Hours, in advance of need  - Initiate/Maintain BED alarm  - Obtain necessary fall risk management equipment: bed alarm  - Apply yellow socks and bracelet for high fall risk patients  - Consider moving patient to room near nurses station  Outcome: Progressing     Problem: MOBILITY - ADULT  Goal: Maintain or return to baseline ADL function  Description: INTERVENTIONS:  -  Assess patient's ability to carry out ADLs; assess patient's baseline for ADL function and identify physical deficits which impact ability to perform ADLs (bathing, care of mouth/teeth, toileting, grooming, dressing, etc )  - Assess/evaluate cause of self-care deficits   - Assess range of motion  - Assess patient's mobility; develop plan if impaired  - Assess patient's need for assistive devices and provide as appropriate  - Encourage maximum independence but intervene and supervise when necessary  - Involve family in performance of ADLs  - Assess for home care needs following discharge   - Consider OT consult to assist with ADL evaluation and planning for discharge  - Provide patient education as appropriate  Outcome: Not Progressing  Goal: Maintains/Returns to pre admission functional level  Description: INTERVENTIONS:  - Perform BMAT or MOVE assessment daily    - Set and communicate daily mobility goal to care team and patient/family/caregiver     - Collaborate with rehabilitation services on mobility goals if consulted  - Perform Range of Motion 3 times a day  - Reposition patient every 2 hours  - Dangle patient 3 times a day  - Stand patient 3 times a day  - Ambulate patient 3 times a day  - Out of bed to [id number] a day   - Out of bed for meals 3 times a day  - Out of bed for toileting  - Record patient progress and toleration of activity level   Outcome: Not Progressing     Problem: PAIN - ADULT  Goal: Verbalizes/displays adequate comfort level or baseline comfort level  Description: Interventions:  - Encourage patient to monitor pain and request assistance  - Assess pain using appropriate pain scale  - Administer analgesics based on type and severity of pain and evaluate response  - Implement non-pharmacological measures as appropriate and evaluate response  - Consider cultural and social influences on pain and pain management  - Notify physician/advanced practitioner if interventions unsuccessful or patient reports new pain  Outcome: Progressing     Problem: DISCHARGE PLANNING  Goal: Discharge to home or other facility with appropriate resources  Description: INTERVENTIONS:  - Identify barriers to discharge w/patient and caregiver  - Arrange for needed discharge resources and transportation as appropriate  - Identify discharge learning needs (meds, wound care, etc )  - Arrange for interpretive services to assist at discharge as needed  - Refer to Case Management Department for coordinating discharge planning if the patient needs post-hospital services based on physician/advanced practitioner order or complex needs related to functional status, cognitive ability, or social support system  Outcome: Progressing     Problem: Knowledge Deficit  Goal: Patient/family/caregiver demonstrates understanding of disease process, treatment plan, medications, and discharge instructions  Description: Complete learning assessment and assess knowledge base    Interventions:  - Provide teaching at level of understanding  - Provide teaching via preferred learning methods  Outcome: Progressing     Problem: SKIN/TISSUE INTEGRITY - ADULT  Goal: Pressure injury heals and does not worsen  Description: Interventions:  - Implement low air loss mattress or specialty surface (Criteria met)  - Apply silicone foam dressing  - Instruct/assist with weight shifting every 15 minutes when in chair   - Limit chair time to 2 hour intervals  - Use special pressure reducing interventions such as waffle cushion when in chair   - Apply fecal or urinary incontinence containment device   - Perform passive or active ROM every 2 hours  - Turn and reposition patient & offload bony prominences every 2 hours   - Utilize friction reducing device or surface for transfers   - Consider consults to  interdisciplinary teams such as wound nurse  - Use incontinent care products after each incontinent episode such as brief  - Consider nutrition services referral as needed  Outcome: Progressing

## 2022-02-07 NOTE — PLAN OF CARE
Problem: Potential for Falls  Goal: Patient will remain free of falls  Description: INTERVENTIONS:  - Educate patient/family on patient safety including physical limitations  - Instruct patient to call for assistance with activity   - Consult OT/PT to assist with strengthening/mobility   - Keep Call bell within reach  - Keep bed low and locked with side rails adjusted as appropriate  - Keep care items and personal belongings within reach  - Initiate and maintain comfort rounds  - Make Fall Risk Sign visible to staff  - Offer Toileting every 2 Hours, in advance of need  - Initiate/Maintain BED alarm  - Obtain necessary fall risk management equipment: bed alarm  - Apply yellow socks and bracelet for high fall risk patients  - Consider moving patient to room near nurses station  Outcome: Progressing

## 2022-02-07 NOTE — DISCHARGE SUMMARY
[address]  Discharge- Patti Coombs 1945, 68 y o  male MRN: [id number]  Unit/Bed#: -01 Encounter: [id number]  Primary Care Provider: Randy Hammonds DO   Date and time admitted to hospital: 1/28/2022  9:02 PM    * Comfort measures only status  Assessment & Plan  Background: Presented to the ED initially with SOB, cough, hypoxia however given rapid decline was transferred to the critical care service requiring BiPAP however given consistent need for BiPAP lack of progress and poor prognosis it was deemed appropriate to transition to hospice/comfort care level for on 02/04 by critical care service in conjunction with family  · Known past medical history of paroxysmal atrial fibrillation, COPD, sleep apnea, chronic respiratory failure  · All treatments discontinued  · Comfort measures only  · Hospice liaison consulted via Case Management  · Did not meet criteria for inpatient hospice  · Placement obtained at [place] [facility] on hospice       Medical Problems             Resolved Problems  Date Reviewed: 2/7/2022    None              Discharging Physician / Practitioner: DUNIA Packer  PCP: Randy Hammonds DO  Admission Date:   Admission Orders (From admission, onward)     Ordered        01/28/22 2257  Inpatient Admission  Once                      Discharge Date: 02/07/22    Consultations During Hospital Stay:  · Critical care team   · Case Management   · Respiratory therapy   · Infectious disease     Procedures Performed:   XR chest portable Result Date: 1/31/2022  Narrative: CHEST INDICATION:   evaluate pleural effusion s/p diuresis  COMPARISON:  Chest radiograph from 1/28/2022 and chest CT from 1/29/2022  EXAM PERFORMED/VIEWS:  XR CHEST PORTABLE FINDINGS: Cardiomediastinal silhouette appears unremarkable  Persistent pulmonary edema with moderate right effusion and right base atelectasis  Benign calcification in the right base  No pneumothorax   Osseous structures appear within normal limits for patient age  Impression: Persistent pulmonary edema with moderate right effusion and right base atelectasis  Workstation performed: [id number]   XR chest 1 view portable Result Date: 1/28/2022  Narrative: CHEST INDICATION:   shortness of breath  COMPARISON:  None EXAM PERFORMED/VIEWS:  XR CHEST PORTABLE FINDINGS: Heart shadow is obscured by adjacent opacity  Patchy bilateral hazy airspace opacities  Bilateral pleural effusions  Osseous structures appear within normal limits for patient age  Impression: Patchy bilateral hazy airspace opacities compatible with pneumonia  Bilateral pleural effusions Workstation performed: [id number]   CT head wo contrast Result Date: 1/30/2022  Narrative: CT BRAIN - WITHOUT CONTRAST INDICATION: Neurological deficit, evaluate for stroke  COMPARISON:  Head CT from November 15, 2021 TECHNIQUE:  CT examination of the brain was performed  In addition to axial images, sagittal and coronal 2D reformatted images were created and submitted for interpretation  Radiation dose length product (DLP) for this visit:  901 mGy-cm   This examination, like all CT scans performed in the [facility], was performed utilizing techniques to minimize radiation dose exposure, including the use of iterative reconstruction and automated exposure control  IMAGE QUALITY:  Diagnostic  FINDINGS: PARENCHYMA: Gliosis and encephalomalacia in the left cerebral hemisphere consistent with remote left MCA and PCA territory infarctions  Mild periventricular cerebral chronic microangiopathic disease  VENTRICLES AND EXTRA-AXIAL SPACES:  Normal for the patient's age  VISUALIZED ORBITS AND PARANASAL SINUSES:  Right orbital floor reconstruction  Mild inflammatory mucosal disease within the right posterior ethmoid air cell  Rightward nasal septal deviation  CALVARIUM AND EXTRACRANIAL SOFT TISSUES:  Normal  Impression: No acute intracranial abnormality    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  Mild cerebral chronic microangiopathic disease  Workstation performed: [id number]   CT chest without contrast Result Date: 1/29/2022  Narrative: CT CHEST WITHOUT IV CONTRAST INDICATION:   Hypoxia and infiltrate  COMPARISON:  Chest radiograph dated 1/20/2022 and 11/16/2021  Chest CTA dated 11/15/2021  TECHNIQUE: CT examination of the chest was performed without intravenous contrast   Axial, sagittal, and coronal 2D reformatted images were created from the source data and submitted for interpretation  Radiation dose length product (DLP) for this visit:  976 mGy-cm   This examination, like all CT scans performed in the [facility], was performed utilizing techniques to minimize radiation dose exposure, including the use of iterative reconstruction and automated exposure control  FINDINGS: LUNGS:  Background moderate emphysema  Chronic consolidation of the lateral segment of the right lower lobe with admixed hyperdense material   Progressive collapse of the right lower lobe  Improved aeration of the left lower lobe when compared to November, though with persistent peribronchial consolidation, airway thickening, and diffuse groundglass opacities  New focal subpleural nodular consolidation in the right upper lobe (3/44)  Bandlike atelectasis or scarring in the anterior left upper lobe (3/60)  4 6 cm x 4 6 cm chronic pneumatocyst or bleb in a left periaortic location in the left lower lobe (3/92)  New fluid level within the cyst may suggest acute superinfection  Mucous plugging within the basal segmental bronchi of the left lower lobe (3/78)  Mucoid debris also noted in the right bronchus intermedius (3/65)  No endobronchial masses  PLEURA:  Moderate to large right pleural effusion, increased from November  Extensive pleural calcifications in the left hemithorax  Smooth pleural thickening without focal masses  HEART/GREAT VESSELS: Biatrial enlargement    Scattered coronary calcifications  No pericardial effusion  No thoracic aortic aneurysm  MEDIASTINUM AND ESTEVAN:  Enlarged mediastinal lymph node measuring 17 mm in the short axis adjacent to left main pulmonary artery (2/32)  There are also prominent nodes in the right prevascular space, AP window, pretracheal space, and probably both estevan (though obscured by lack of contrast)  These appear similar to prior  CHEST WALL AND LOWER NECK:   Heterogeneous low density appearance of liver suggesting steatosis  VISUALIZED STRUCTURES IN THE UPPER ABDOMEN: 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  Fatty atrophy of the pancreas  Bilateral renal cysts  No acute findings in the visualized upper abdomen  OSSEOUS STRUCTURES: Mild anterior wedging of the T6-T9 vertebral bodies is chronic and unchanged  No acute fracture or destructive osseous lesion  Impression: 1  Increasing moderate to large right pleural effusion  2   Collapse of the right lower lobe attributable to combination of compressive atelectasis from effusion, resorptive atelectasis from mucous plugging, and (possibly) pneumonia  3   Scattered bilateral peripheral consolidative opacities, left basilar peribronchial thickening, and new fluid level within left lower lobe pneumatocyst   These findings may suggest multifocal infection  4   Chronic, low level mediastinal or hilar lymphadenopathy appears similar to November  In the absence of evidence for lymphoproliferative disorder or malignancy, recommend chest CT follow-up in 6 months  Note: The study was marked in EPIC for significant notification  Imaging follow-up reminder notification was scheduled in the electronic medical record  Workstation performed: [id number]   XR chest portable ICU Result Date: 2/1/2022  Narrative: CHEST INDICATION:   pleural effusion  COMPARISON:  January 31, 2022 EXAM PERFORMED/VIEWS:  XR CHEST PORTABLE ICU FINDINGS: Cardiomediastinal silhouette appears unremarkable  There are patchy opacities in both lungs, little changed since 1/31/2022  A small right pleural effusion is present, grossly unchanged in size  There is no evidence of pneumothorax  Osseous structures appear within normal limits for patient age  Impression: Bilateral pulmonary opacities, most likely pulmonary edema or, possibly, multifocal pneumonia  Small right pleural effusion, unchanged from 1/31/2022  Workstation performed: [id number]   Echo follow up/limited w/ contrast if indicated Result Date: 2/2/2022  · Narrative:   Left Ventricle: Left ventricular cavity size is normal  Wall thickness is mildly increased  The left ventricular ejection fraction is 60%  Systolic function is normal  Abnormal septal motion noted  Otherwise normal wall motion    IVS: There is systolic flattening of the interventricular septum consistent with right ventricle pressure overload    Right Ventricle: Right ventricular cavity size is mildly dilated  Systolic function is normal    Left Atrium: The atrium is dilated    Right Atrium: The atrium is dilated    Atrial Septum: There is lipomatous hypertrophy of the interatrial septum  Calcification of the interatrial septum is also noted    Mitral Valve: There is annular calcification  There is mild regurgitation    Tricuspid Valve: There is mild regurgitation  The right ventricular systolic pressure is at least moderately elevated  The estimated right ventricular systolic pressure is 17 02 mmHg (a complete envelope was not seen, so this could be over/under estimated)    Aorta: The aortic root is 3 70 cm    Prior TTE study available for comparison  Prior study date: 11/15/2021  Changes noted when compared to prior study  Changes include: Estimated PASP on current study in at least moderately elevated   EF remains normal      Significant Findings / Test Results:   · Multifocal pneumonia   · Elevated BNP   · Atrial fibrillation with RVR   · Pleural effusion   · MRSA culture positive   · Wound culture positive     Incidental Findings:   · None     Test Results Pending at Discharge (will require follow up): · None      Outpatient Tests Requested:  · None     Complications:  None     Reason for Admission: shortness of breath, cough, hypoxia    Hospital Course:   Rina Baumgarten is a 68 y o  male patient with past medical history of atrial fibrillation, COPD, sleep apnea, and chronic respiratory failure who originally presented to the hospital on 1/28/2022 due to shortness of breath, cough, and noted hypoxia  Initially admitted to the internal medicine service however given rapid decline subsequently admitted to the critical care service given continuous BiPAP requirement  Found to have multifocal pneumonia as well as pleural effusions  He was seen in conjunction with Infectious Disease however given multiple failures off of BiPAP and poor prognosis was made comfort measures only with family in agreement and critical care team on 02/04  Case Management did work diligently on placement in was able to find rehab placement with hospice/comfort care on 02/07  He did not meet inpatient hospice criteria and family unable to do home hospice  Please see above list of diagnoses and related plan for additional information  Condition at Discharge: terminal    Discharge Day Visit / Exam:   * Please refer to separate progress note for these details *    Discussion with Family: Updated  (Amie) via phone  POLST form was also filled out with her over the phone  Discharge instructions/Information to patient and family:   See after visit summary for information provided to patient and family  Provisions for Follow-Up Care:  See after visit summary for information related to follow-up care and any pertinent home health orders         Disposition:   Ass[facility] Facility at [facility] with hospice    Discharge Statement:  I spent 45 minutes discharging the patient  This time was spent on the day of discharge  I had direct contact with the patient on the day of discharge  Greater than 50% of the total time was spent examining patient, answering all patient questions, arranging and discussing plan of care with patient as well as directly providing post-discharge instructions  Additional time then spent on discharge activities  Discharge Medications:  See after visit summary for reconciled discharge medications provided to patient and/or family        **Please Note: This note may have been constructed using a voice recognition system**

## 2022-02-08 VITALS
BODY MASS INDEX: 30.31 KG/M2 | SYSTOLIC BLOOD PRESSURE: 100 MMHG | OXYGEN SATURATION: 62 % | DIASTOLIC BLOOD PRESSURE: 62 MMHG | HEIGHT: 72 IN | HEART RATE: 112 BPM | TEMPERATURE: 98.6 F | WEIGHT: 223.77 LBS | RESPIRATION RATE: 23 BRPM

## 2022-02-08 PROCEDURE — NC001 PR NO CHARGE: Performed by: FAMILY MEDICINE

## 2022-02-08 PROCEDURE — 99239 HOSP IP/OBS DSCHRG MGMT >30: CPT | Performed by: FAMILY MEDICINE

## 2022-02-08 NOTE — DISCHARGE SUMMARY
[address]  Discharge- Isma George 1945, 68 y o  male MRN: [id number]  Unit/Bed#: -01 Encounter: [id number]  Primary Care Provider: Brianna Salomon DO   Date and time admitted to hospital: 1/28/2022  9:02 PM    * Comfort measures only status  Assessment & Plan  Background: Presented to the ED initially with SOB, cough, hypoxia however given rapid decline was transferred to the critical care service requiring BiPAP however given consistent need for BiPAP lack of progress and poor prognosis it was deemed appropriate to transition to hospice/comfort care level for on 02/04 by critical care service in conjunction with family  · Known past medical history of paroxysmal atrial fibrillation, COPD, sleep apnea, chronic respiratory failure  · All treatments discontinued  · Comfort measures only  · Hospice liaison consulted via Case Management  · Did not meet criteria for inpatient hospice  · Placement obtained at [address] [place] [address] rehab on hospice           Consultations During Hospital Stay:  · Critical care team   · Case Management   · Respiratory therapy   · Infectious disease      Procedures Performed:   · XR chest portable Result Date: 1/31/2022  · Narrative: CHEST INDICATION:   evaluate pleural effusion s/p diuresis  COMPARISON:  Chest radiograph from 1/28/2022 and chest CT from 1/29/2022  EXAM PERFORMED/VIEWS:  XR CHEST PORTABLE FINDINGS: Cardiomediastinal silhouette appears unremarkable  Persistent pulmonary edema with moderate right effusion and right base atelectasis  Benign calcification in the right base  No pneumothorax  Osseous structures appear within normal limits for patient age  Impression: Persistent pulmonary edema with moderate right effusion and right base atelectasis  Workstation performed: [id number]   · XR chest 1 view portable Result Date: 1/28/2022  · Narrative: CHEST INDICATION:   shortness of breath   COMPARISON:  None EXAM PERFORMED/VIEWS:  XR CHEST PORTABLE FINDINGS: Heart shadow is obscured by adjacent opacity  Patchy bilateral hazy airspace opacities  Bilateral pleural effusions  Osseous structures appear within normal limits for patient age  Impression: Patchy bilateral hazy airspace opacities compatible with pneumonia  Bilateral pleural effusions Workstation performed: [id number]   · CT head wo contrast Result Date: 1/30/2022  · Narrative: CT BRAIN - WITHOUT CONTRAST INDICATION: Neurological deficit, evaluate for stroke  COMPARISON:  Head CT from November 15, 2021 TECHNIQUE:  CT examination of the brain was performed  In addition to axial images, sagittal and coronal 2D reformatted images were created and submitted for interpretation  Radiation dose length product (DLP) for this visit:  901 mGy-cm   This examination, like all CT scans performed in the [facility], was performed utilizing techniques to minimize radiation dose exposure, including the use of iterative reconstruction and automated exposure control  IMAGE QUALITY:  Diagnostic  FINDINGS: PARENCHYMA: Gliosis and encephalomalacia in the left cerebral hemisphere consistent with remote left MCA and PCA territory infarctions  Mild periventricular cerebral chronic microangiopathic disease  VENTRICLES AND EXTRA-AXIAL SPACES:  Normal for the patient's age  VISUALIZED ORBITS AND PARANASAL SINUSES:  Right orbital floor reconstruction  Mild inflammatory mucosal disease within the right posterior ethmoid air cell  Rightward nasal septal deviation  CALVARIUM AND EXTRACRANIAL SOFT TISSUES:  Normal  Impression: No acute intracranial abnormality  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  Mild cerebral chronic microangiopathic disease  Workstation performed: [id number]   · CT chest without contrast Result Date: 1/29/2022  · Narrative: CT CHEST WITHOUT IV CONTRAST INDICATION:   Hypoxia and infiltrate  COMPARISON:  Chest radiograph dated 1/20/2022 and 11/16/2021    Chest CTA dated 11/15/2021  TECHNIQUE: CT examination of the chest was performed without intravenous contrast   Axial, sagittal, and coronal 2D reformatted images were created from the source data and submitted for interpretation  Radiation dose length product (DLP) for this visit:  976 mGy-cm   This examination, like all CT scans performed in the [facility], was performed utilizing techniques to minimize radiation dose exposure, including the use of iterative reconstruction and automated exposure control  FINDINGS: LUNGS:  Background moderate emphysema  Chronic consolidation of the lateral segment of the right lower lobe with admixed hyperdense material   Progressive collapse of the right lower lobe  Improved aeration of the left lower lobe when compared to November, though with persistent peribronchial consolidation, airway thickening, and diffuse groundglass opacities  New focal subpleural nodular consolidation in the right upper lobe (3/44)  Bandlike atelectasis or scarring in the anterior left upper lobe (3/60)  4 6 cm x 4 6 cm chronic pneumatocyst or bleb in a left periaortic location in the left lower lobe (3/92)  New fluid level within the cyst may suggest acute superinfection  Mucous plugging within the basal segmental bronchi of the left lower lobe (3/78)  Mucoid debris also noted in the right bronchus intermedius (3/65)  No endobronchial masses  PLEURA:  Moderate to large right pleural effusion, increased from November  Extensive pleural calcifications in the left hemithorax  Smooth pleural thickening without focal masses  HEART/GREAT VESSELS: Biatrial enlargement  Scattered coronary calcifications  No pericardial effusion  No thoracic aortic aneurysm  MEDIASTINUM AND ESTEVAN:  Enlarged mediastinal lymph node measuring 17 mm in the short axis adjacent to left main pulmonary artery (2/32)    There are also prominent nodes in the right prevascular space, AP window, pretracheal space, and probably both barbara (though obscured by lack of contrast)  These appear similar to prior  CHEST WALL AND LOWER NECK:   Heterogeneous low density appearance of liver suggesting steatosis  VISUALIZED STRUCTURES IN THE UPPER ABDOMEN: 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  Fatty atrophy of the pancreas  Bilateral renal cysts  No acute findings in the visualized upper abdomen  OSSEOUS STRUCTURES: Mild anterior wedging of the T6-T9 vertebral bodies is chronic and unchanged  No acute fracture or destructive osseous lesion  Impression: 1  Increasing moderate to large right pleural effusion  2   Collapse of the right lower lobe attributable to combination of compressive atelectasis from effusion, resorptive atelectasis from mucous plugging, and (possibly) pneumonia  3   Scattered bilateral peripheral consolidative opacities, left basilar peribronchial thickening, and new fluid level within left lower lobe pneumatocyst   These findings may suggest multifocal infection  4   Chronic, low level mediastinal or hilar lymphadenopathy appears similar to November  In the absence of evidence for lymphoproliferative disorder or malignancy, recommend chest CT follow-up in 6 months  Note: The study was marked in EPIC for significant notification  Imaging follow-up reminder notification was scheduled in the electronic medical record  Workstation performed: [id number]   · XR chest portable ICU Result Date: 2/1/2022  · Narrative: CHEST INDICATION:   pleural effusion  COMPARISON:  January 31, 2022 EXAM PERFORMED/VIEWS:  XR CHEST PORTABLE ICU FINDINGS: Cardiomediastinal silhouette appears unremarkable  There are patchy opacities in both lungs, little changed since 1/31/2022  A small right pleural effusion is present, grossly unchanged in size  There is no evidence of pneumothorax  Osseous structures appear within normal limits for patient age   Impression: Bilateral pulmonary opacities, most likely pulmonary edema or, possibly, multifocal pneumonia  Small right pleural effusion, unchanged from 1/31/2022  Workstation performed: [id number]   · Echo follow up/limited w/ contrast if indicated Result Date: 2/2/2022  · Narrative:   Left Ventricle: Left ventricular cavity size is normal  Wall thickness is mildly increased  The left ventricular ejection fraction is 60%  Systolic function is normal  Abnormal septal motion noted  Otherwise normal wall motion    IVS: There is systolic flattening of the interventricular septum consistent with right ventricle pressure overload    Right Ventricle: Right ventricular cavity size is mildly dilated  Systolic function is normal    Left Atrium: The atrium is dilated    Right Atrium: The atrium is dilated    Atrial Septum: There is lipomatous hypertrophy of the interatrial septum  Calcification of the interatrial septum is also noted    Mitral Valve: There is annular calcification  There is mild regurgitation    Tricuspid Valve: There is mild regurgitation  The right ventricular systolic pressure is at least moderately elevated  The estimated right ventricular systolic pressure is 54 61 mmHg (a complete envelope was not seen, so this could be over/under estimated)    Aorta: The aortic root is 3 70 cm    Prior TTE study available for comparison  Prior study date: 11/15/2021  Changes noted when compared to prior study  Changes include: Estimated PASP on current study in at least moderately elevated  EF remains normal       Significant Findings / Test Results:   · Multifocal pneumonia   · Elevated BNP   · Atrial fibrillation with RVR   · Pleural effusion   · MRSA culture positive   · Wound culture positive      Incidental Findings:   · None      Test Results Pending at Discharge (will require follow up): · None      Outpatient Tests Requested:  · None      Complications:  None      Reason for Admission: shortness of breath, cough, hypoxia     Hospital Course:   Nacho Padgett is a 68 y o  male patient with past medical history of atrial fibrillation, COPD, sleep apnea, and chronic respiratory failure who originally presented to the hospital on 1/28/2022 due to shortness of breath, cough, and noted hypoxia  Initially admitted to the internal medicine service however given rapid decline subsequently admitted to the critical care service given continuous BiPAP requirement  Found to have multifocal pneumonia as well as pleural effusions  He was seen in conjunction with Infectious Disease however given multiple failures off of BiPAP and poor prognosis was made comfort measures only with family in agreement and critical care team on 02/04      Case Management did work diligently on placement in was able to find rehab placement with hospice/comfort care on 02/07  He did not meet inpatient hospice criteria and family unable to do home hospice      Please see above list of diagnoses and related plan for additional information       Condition at Discharge: terminal     Discharge Day Visit / Exam:   * Please refer to separate progress note for these details *     Discussion with Family: Updated  (Roberto) via phone  POLST form was also filled out with her over the phone  roberto contacted by previous provider 2/7 at that time discharge had to be held till 2/8     Discharge instructions/Information to patient and family:   See after visit summary for information provided to patient and family        Provisions for Follow-Up Care:  See after visit summary for information related to follow-up care and any pertinent home health orders  Disposition:   Assisted Living Facility at [facility] with hospice     Discharge Statement:  I spent 45 minutes discharging the patient  This time was spent on the day of discharge  I had direct contact with the patient on the day of discharge   Greater than 50% of the total time was spent examining patient, answering all patient questions, arranging and discussing plan of care with patient as well as directly providing post-discharge instructions    Additional time then spent on discharge activities      Discharge Medications:  See after visit summary for reconciled discharge medications provided to patient and/or family        **Please Note: This note may have been constructed using a voice recognition system**      Electronically signed by Celestina Freitas at 2/7/2022  2:40 PM   Electronically signed by Aurora Sparrow MD at 2/7/2022  3:34 PM

## 2022-02-08 NOTE — CASE MANAGEMENT
Case Management Progress Note    Patient name Ashleigh Henry  Location /-71 MRN [id number]  : 1945 Date 2022       LOS (days): 11  Geometric Mean LOS (GMLOS) (days): 4 10  Days to GMLOS:-6 4        OBJECTIVE:        Current admission status: Inpatient  Preferred Pharmacy:   [facility] [address], [address] [facility] [address]  Phone: [phone] Fax: [phone]    [facility],[address], [address] [facility] , [address]  [address] [facility],[address] 3 [address]oors [address] [address]  Phone: [phone] Fax: [phone] [address]  [address], [address]  [facility], [address] [address]  Phone: [phone] Fax: 73 [facility] [place]  [address] [facility]  [address]  [place]  Phone: [phone] Fax: [phone]    Primary Care Provider: Radha Sommers DO    Primary Insurance: MEDICARE  Secondary Insurance:     PROGRESS NOTE:  CM spoke / [facility] this am, can accept Pt  Per facility they do not need a repeat covid test as Pt was Covid+ within the past 90d, and is considered "Covid recovered " Facility is reaching to the family, CM will work on transport  CM placed calls to Pt's dgt, and [facility], left  re: dcp for today  CM received call back this am from the dgt, discussed d/c to [address] [place] [address] and Rehab, did have MD call to update dgt  CM initially received time of 1400, the changed to 1500  Nsg, and facility updated

## 2022-02-08 NOTE — ASSESSMENT & PLAN NOTE
Background: Presented to the ED initially with SOB, cough, hypoxia however given rapid decline was transferred to the critical care service requiring BiPAP however given consistent need for BiPAP lack of progress and poor prognosis it was deemed appropriate to transition to hospice/comfort care level for on 02/04 by critical care service in conjunction with family    · Known past medical history of paroxysmal atrial fibrillation, COPD, sleep apnea, chronic respiratory failure  · All treatments discontinued  · Comfort measures only  · Hospice liaison consulted via Case Management  · Did not meet criteria for inpatient hospice  · Placement obtained at [address] [place] [address] [address] rehab on hospice

## 2022-02-08 NOTE — QUICK NOTE
Left VM for daughter with update that dad will be discharged to SNF on comfort care- stable for transport

## 2022-02-08 NOTE — NURSING NOTE
Report called to [facility]  All questions answered       Ambika Brannon RN  3:28 PM  02/08/22

## 2022-02-08 NOTE — ASSESSMENT & PLAN NOTE
Background: Presented to the ED initially with SOB, cough, hypoxia however given rapid decline was transferred to the critical care service requiring BiPAP however given consistent need for BiPAP lack of progress and poor prognosis it was deemed appropriate to transition to hospice/comfort care level for on 02/04 by critical care service in conjunction with family    · Known past medical history of paroxysmal atrial fibrillation, COPD, sleep apnea, chronic respiratory failure  · All treatments discontinued  · Comfort measures only  · Hospice liaison consulted via Case Management  · Did not meet criteria for inpatient hospice  · Placement obtained at [place] rehab on hospice

## 2022-02-08 NOTE — PROGRESS NOTES
[address]  Progress Note - Jeannine Adler 1945, 68 y o  male MRN: [id number]  Unit/Bed#: -01 Encounter: [id number]  Primary Care Provider: Tracy Barreto DO   Date and time admitted to hospital: 2022  9:02 PM    * Comfort measures only status  Assessment & Plan  Background: Presented to the ED initially with SOB, cough, hypoxia however given rapid decline was transferred to the critical care service requiring BiPAP however given consistent need for BiPAP lack of progress and poor prognosis it was deemed appropriate to transition to hospice/comfort care level for on  by critical care service in conjunction with family  · Known past medical history of paroxysmal atrial fibrillation, COPD, sleep apnea, chronic respiratory failure  · All treatments discontinued  · Comfort measures only  · Hospice liaison consulted via Case Management  · Did not meet criteria for inpatient hospice  · Placement obtained at [facility] on hospice         VTE Pharmacologic Prophylaxis: VTE Score: 9 High Risk (Score >/= 5) - Pharmacological DVT Prophylaxis Contraindicated  Sequential Compression Devices Ordered  Patient Centered Rounds: I performed bedside rounds with nursing staff today  Discussions with Specialists or Other Care Team Provider: cm    Education and Discussions with Family / Patient: patient    Time Spent for Care: 30 minutes  More than 50% of total time spent on counseling and coordination of care as described above  Current Length of Stay: 11 day(s)  Current Patient Status: Inpatient   Certification Statement: The patient will continue to require additional inpatient hospital stay due to comfort care awaiting placement  Discharge Plan: Anticipate discharge later today or tomorrow to rehab facility      Code Status: Level 4 - Comfort Care    Subjective:   Seen and examined - comfortable doing fine he says    Objective:     Vitals:   Temp (24hrs), Av 6 °F (37 °C), Min:98 6 °F (37 °C), Max:98 6 °F (37 °C)    Temp:  [98 6 °F (37 °C)] 98 6 °F (37 °C)  HR:  [112] 112  Resp:  [23] 23  BP: (100)/(62) 100/62  SpO2:  [62 %-92 %] 62 %  Body mass index is 30 35 kg/m²  Input and Output Summary (last 24 hours): Intake/Output Summary (Last 24 hours) at 2/8/2022 1058  Last data filed at 2/8/2022 0830  Gross per 24 hour   Intake 1200 ml   Output 980 ml   Net 220 ml       Physical Exam:   Physical Exam  Vitals and nursing note reviewed  Constitutional:       Appearance: He is well-developed  HENT:      Head: Normocephalic and atraumatic  Eyes:      Conjunctiva/sclera: Conjunctivae normal    Cardiovascular:      Rate and Rhythm: Normal rate and regular rhythm  Heart sounds: No murmur heard  Pulmonary:      Effort: Pulmonary effort is normal  No respiratory distress  Breath sounds: Normal breath sounds  Abdominal:      General: There is no distension  Palpations: Abdomen is soft  Tenderness: There is no abdominal tenderness  Musculoskeletal:         General: No swelling  Cervical back: Neck supple  Comments: Venous stasis   Skin:     General: Skin is warm and dry  Neurological:      Mental Status: He is alert        Comments: Alert and awake knows his name         Additional Data:     Labs:  Results from last 7 days   Lab Units 02/03/22  0533   WBC Thousand/uL 9 08   HEMOGLOBIN g/dL 11 7*   HEMATOCRIT % 39 7   PLATELETS Thousands/uL 438*     Results from last 7 days   Lab Units 02/03/22  1730   SODIUM mmol/L 138   POTASSIUM mmol/L 4 1   CHLORIDE mmol/L 96*   CO2 mmol/L 40*   BUN mg/dL 19   CREATININE mg/dL 0 98   ANION GAP mmol/L 2*   CALCIUM mg/dL 8 6   GLUCOSE RANDOM mg/dL 132         Results from last 7 days   Lab Units 02/04/22  0727 02/03/22  2009 02/03/22  1554 02/03/22  1244 02/03/22  0534 02/02/22  2339 02/02/22  1743 02/02/22  1225 02/02/22  0016 02/01/22  1739 02/01/22  1206   POC GLUCOSE mg/dl 116 205* 140 118 89 80 83 83 77 80 82               Lines/Drains:  Invasive Devices  Report    Peripheral Intravenous Line            Peripheral IV 02/03/22 Left Wrist 5 days          Drain            Urethral Catheter Temperature probe 16 Fr  8 days              Urinary Catheter:  Goal for removal: N/A- Discharging with [place]               Imaging: Reviewed radiology reports from this admission including: chest CT scan    Recent Cultures (last 7 days):         Last 24 Hours Medication List:   Current Facility-Administered Medications   Medication Dose Route Frequency Provider Last Rate    LORazepam  0 5 mg Intravenous Q15 Min PRN Eden Newell PA-C      morphine injection  2 mg Intravenous Q15 Min PRN Kelsy Mckinney PA-C          Today, Patient Was Seen By: Wes Garcia MD    **Please Note: This note may have been constructed using a voice recognition system  **

## 2022-02-08 NOTE — CASE MANAGEMENT
Case Management Progress Note    Patient name Ashleigh Henry  Location /-11 MRN [id number]  : 1945 Date 2022       LOS (days): 11  Geometric Mean LOS (GMLOS) (days): 4 10  Days to GMLOS:-6 3        OBJECTIVE:        Current admission status: Inpatient  Preferred Pharmacy:   [facility] [address], [address] [address]  Phone: [phone] Fax: [phone]    [address] [facility], [address], [address] [place] [address]000 [facility] , [address]  [address] [facility],2 [address] 3 [address]oors [address] [address]  Phone: [phone] Fax: [phone] [address]  [address], [address]  [facility], [address] [address]  Phone: [phone] Fax: 25 [facility] [place] [place]  [address] [place] [address]  [address]  [address]  Phone: [phone] Fax: [phone]    Primary Care Provider: Radha Sommers DO    Primary Insurance: MEDICARE  Secondary Insurance:     PROGRESS NOTE:  CM placed call [facility] to determine availability, await return call   Pt may need Covid test

## 2022-02-09 ENCOUNTER — PATIENT OUTREACH (OUTPATIENT)
Dept: CASE MANAGEMENT | Facility: HOSPITAL | Age: 77
End: 2022-02-09

## 2022-02-09 ENCOUNTER — PATIENT OUTREACH (OUTPATIENT)
Dept: FAMILY MEDICINE CLINIC | Facility: CLINIC | Age: 77
End: 2022-02-09

## 2022-02-09 NOTE — PROGRESS NOTES
Outpatient Care Management Note:    Chart reviewed after hospital discharge  Per notes, patient was discharged to [facility]  RN care manager called Hussain's daughter, Nirmal Malik  She states that the plan is for Kulwinder Horne to remain long term at [facility]  Amie states she is not able to manage his needs  CM informed Amie that I will close his referral at this time, but will remain available if she has any questions  Amie will keep my contact information  [employer] update sent to social work and community health worker

## 2022-02-25 ENCOUNTER — PATIENT OUTREACH (OUTPATIENT)
Dept: FAMILY MEDICINE CLINIC | Facility: CLINIC | Age: 77
End: 2022-02-25

## 2022-02-25 NOTE — PROGRESS NOTES
MICHEL MATOS called patients daughter, Jacqueline Rosales ([phone]) to confirm that patient will be staying long term form LTC at [facility]  MICHEL MATOS will close  Please re consult MICHEL MATOS as needed

## 2022-03-01 DIAGNOSIS — J44.9 CHRONIC OBSTRUCTIVE PULMONARY DISEASE, UNSPECIFIED COPD TYPE (HCC): Primary | ICD-10-CM

## 2022-03-01 RX ORDER — ALBUTEROL SULFATE 90 UG/1
AEROSOL, METERED RESPIRATORY (INHALATION)
Qty: 8.5 G | Refills: 0 | Status: SHIPPED | OUTPATIENT
Start: 2022-03-01 | End: 2022-03-24

## 2022-03-24 DIAGNOSIS — J44.9 CHRONIC OBSTRUCTIVE PULMONARY DISEASE, UNSPECIFIED COPD TYPE (HCC): ICD-10-CM

## 2022-03-24 RX ORDER — ALBUTEROL SULFATE 90 UG/1
AEROSOL, METERED RESPIRATORY (INHALATION)
Qty: 8.5 G | Refills: 10 | Status: SHIPPED | OUTPATIENT
Start: 2022-03-24

## 2022-03-25 ENCOUNTER — TELEPHONE (OUTPATIENT)
Dept: FAMILY MEDICINE CLINIC | Facility: CLINIC | Age: 77
End: 2022-03-25

## 2022-03-25 NOTE — TELEPHONE ENCOUNTER
When confirming pt's appt for Monday pt's daughter said father is in a nursing home and currently experiencing congestive heart failure  He is not expected to last the weekend

## 2022-04-04 ENCOUNTER — TELEPHONE (OUTPATIENT)
Dept: FAMILY MEDICINE CLINIC | Facility: CLINIC | Age: 77
End: 2022-04-04

## 2022-05-21 NOTE — PROGRESS NOTES
[address]  Progress Note - Daisy Faust 1945, 68 y o  male MRN: [id number]  Unit/Bed#: -01 Encounter: [id number]  Primary Care Provider: Magy Crowley DO   Date and time admitted to hospital: 1/28/2022  9:02 PM    * Acute on chronic respiratory failure ([address] )  Assessment & Plan  · Presented with shortness of breath, cough, hypoxia (wears 2-3L NC at baseline), weeping right lower extremity wound  · COVID flu RSV negative on admission  Recent admission in Nov 2021 for COVID-19 pneumonia  ·  XR chest shows "Patchy bilateral hazy airspace opacities compatible with pneumonia  Bilateral pleural effusions "  · Unclear etiology: differentials include fluid overload with moderate/large right pleural effusion vs  Aspiration pneumonia  · Low suspicion for acute PE given patient is anticoagulated on warfarin with therapeutic INR 2 4  · CT chest shows:   · Increasing moderate/large right pleural effusion   · Possible multifocal infection   · Mucous plugging within the basal segmental bronchi of the left lower lobe  Mucoid debris also noted in the right bronchus intermedius  · Collapse of right lower lobe attributable to combination of compressive atelectasis from effusion, resorptive atelectasis from mucous plugging, and possibly pneumonia     · Continue broad-spectrum antibiotics- will continue IV Cefepime, Vanco, Flagyl for now   · Plan for right thora with CC tomorrow, patient seems to be responding well to IV diuresis, repeat CXR in the AM   · Currently on 6L NC, wean as able back to baseline 2-3L NC   · Continue CPAP qhs which he wears at home, patient only wore for 1 hour last night, placed for a few hours on BiPAP today, ABG showed normal PH CO2 76 5     Multifocal pneumonia  Assessment & Plan  · Xray chest suggestive of pneumonia     · CT chest shows " Scattered bilateral peripheral consolidative opacities, left basilar peribronchial thickening, and new fluid level within left lower lobe pneumatocyst   These findings may suggest multifocal infection "   · Concern for possible aspiration pneumonia/pneumonitis- check video barium swallow with speech   · Procalcitonin x2 negative   · DRIP score 7, High risk MDRO (recent IV Abx, recent hospitalization, wound care, recent MRSA infection, COPD)  · Currently on Cefepime, Vanco, and Flagyl   · Urine strep and legionella negative   · Check sputum cx    Pleural effusion  Assessment & Plan  · Noted on chest xray  · CT chest shows mod/large right pleural effusion   · BNP 4,540  Patient appears volume overloaded on exam     · Continue IV lasix 40mg q8h which patient seems to be responding well to   · ECHO in Nov 2021 shows EF 16%, normal systolic function, diastolic function not assessed   · Plan for right thora w/diagnostic studies with CC tomorrow, pending repeat CXR in the AM       Venous stasis dermatitis of both lower extremities  Assessment & Plan  · On admission in November 2021 found to have positive wound culture and 1/2 + BC for Staph aureus  Treated with 4 weeks of IV vancomycin through 12/11/2021  · Hx of chronic wounds, chronic venous insufficiency, lymphedema  · On exam, bilateral lower extremities with edema L>R, weeping tan drainage, multiple open areas  · Unclear if this is in fact active cellulitis, there is a draining posterior right leg wound  · Wound culture pending  · Blood cultures pending, continue Cefepime and Vancomycin given recent history   · Low suspicion for DVT, patient anticoagulated with warfarin   · Consider ID consult   · Consult wound RN, has VNA services for wound care at home    SIRS (systemic inflammatory response syndrome) ([facility] [address] )  Assessment & Plan  · As evidenced by tachycardia and tachypnea  Lactate 2 4, now cleared  · Currently afebrile with no leukocytosis     · Procalcitonin x3 negative   · Unclear if infectious source, may be non-infectious secondary to volume overload a/e/b pleural effusion and bilateral LE edema   · Blood cultures pending  · Wound culture pending  · Continue broad-spectrum antibiotics as above      Ambulatory dysfunction  Assessment & Plan  · Patient lives with daughter, current VNA services   · Per chart review, increased difficulty with ambulation due to chronic leg wounds  · Consult PT/OT/CM    Sleep apnea  Assessment & Plan  · Wears CPAP at HS, continue     COPD, moderate ([facility])  Assessment & Plan  · Wears 2-3 L chronically at baseline  · Does not appear to be in acute exacerbation, will hold off on IV steroids   · Continue Breo, albuterol nebs       Paroxysmal A-fib (HCC)  Assessment & Plan  · EKG shows Afib with   · Currently not on rate control medication   · PRN Lopressor for rate control  · Continue warfarin 4 mg  · Daily INR- currently therapeutic           VTE Pharmacologic Prophylaxis:   High Risk (Score >/= 5) - Pharmacological DVT Prophylaxis Ordered: warfarin (Coumadin)  Sequential Compression Devices Ordered  Patient Centered Rounds: I performed bedside rounds with nursing staff today  Discussions with Specialists or Other Care Team Provider: CM,RN, CC    Education and Discussions with Family / Patient: Updated  (daughter) via phone  Time Spent for Care: 30 minutes  More than 50% of total time spent on counseling and coordination of care as described above  Current Length of Stay: 2 day(s)  Current Patient Status: Inpatient   Certification Statement: The patient will continue to require additional inpatient hospital stay due to acute hypoxic respiratory failure, iv abx   Discharge Plan: Anticipate discharge in 48-72 hrs to pending pt/ot eval     Code Status: Level 1 - Full Code    Subjective:   Patient was resting in bed, oriented to self and place only, not time  This is his baseline  Patient did seem slow to respond more so than usual  Spoke with daughter who states patient has been doing this over the past few weeks  Discussed with RT, patient supposed to be on cpap last night and took it off after barely 1 hour  Patient noted to be more lethargic today than usual      Objective:     Vitals:   Temp (24hrs), Av 1 °F (36 7 °C), Min:97 9 °F (36 6 °C), Max:98 2 °F (36 8 °C)    Temp:  [97 9 °F (36 6 °C)-98 2 °F (36 8 °C)] 97 9 °F (36 6 °C)  HR:  [] 87  Resp:  [16-18] 16  BP: ()/(63-94) 109/63  SpO2:  [89 %-99 %] 92 %  Body mass index is 40 96 kg/m²  Input and Output Summary (last 24 hours): Intake/Output Summary (Last 24 hours) at 2022 1440  Last data filed at 2022 2307  Gross per 24 hour   Intake 480 ml   Output 1617 ml   Net -1137 ml       Physical Exam:   Physical Exam  Constitutional:       General: He is not in acute distress  Appearance: He is obese  HENT:      Mouth/Throat:      Mouth: Mucous membranes are moist    Eyes:      General: No scleral icterus  Cardiovascular:      Rate and Rhythm: Normal rate and regular rhythm  Heart sounds: Normal heart sounds  Pulmonary:      Comments: Decreased breath sounds bilateral lung bases  Abdominal:      General: Abdomen is flat  Bowel sounds are normal       Palpations: Abdomen is soft  Musculoskeletal:      Right lower leg: Edema present  Left lower leg: Edema present  Comments: 2+ bilateral lower extremity pitting edema   Skin:     Comments: Chronic venous stasis dermatitis bilateral LE, draining wound right posterior leg with yellow/brown drainage   Neurological:      General: No focal deficit present  Mental Status: He is alert  Mental status is at baseline        Comments: Sluggish response           Additional Data:     Labs:  Results from last 7 days   Lab Units 22  0500   WBC Thousand/uL 10 00   HEMOGLOBIN g/dL 10 9*   HEMATOCRIT % 37 8   PLATELETS Thousands/uL 415*   NEUTROS PCT % 73   LYMPHS PCT % 12*   MONOS PCT % 11   EOS PCT % 3     Results from last 7 days   Lab Units 22  0500   SODIUM mmol/L 138 POTASSIUM mmol/L 3 5   CHLORIDE mmol/L 97*   CO2 mmol/L 38*   BUN mg/dL 14   CREATININE mg/dL 0 95   ANION GAP mmol/L 3*   CALCIUM mg/dL 8 1*   ALBUMIN g/dL 2 3*   TOTAL BILIRUBIN mg/dL 0 74   ALK PHOS U/L 74   ALT U/L 58   AST U/L 26   GLUCOSE RANDOM mg/dL 99     Results from last 7 days   Lab Units 01/30/22  0500   INR  2 14*             Results from last 7 days   Lab Units 01/30/22  0500 01/29/22  0544 01/29/22  0033 01/28/22 2122   LACTIC ACID mmol/L  --   --  1 4 2 4*   PROCALCITONIN ng/ml 0 18 0 17  --  0 14       Lines/Drains:  Invasive Devices  Report    Peripheral Intravenous Line            Peripheral IV 01/29/22 Right;Ventral (anterior) Forearm <1 day                      Imaging: Reviewed radiology reports from this admission including: chest CT scan    Recent Cultures (last 7 days):   Results from last 7 days   Lab Units 01/29/22  1547 01/28/22 2122 01/28/22 2116   BLOOD CULTURE   --  Received in Microbiology Lab  Culture in Progress  Received in Microbiology Lab  Culture in Progress     GRAM STAIN RESULT   --  No Polys*  1+ Gram positive cocci in pairs*  --    WOUND CULTURE   --  4+ Growth of Oxidase Positive gram negative ankita*  4+ Growth of Staphylococcus aureus*  --    LEGIONELLA URINARY ANTIGEN  Negative  --   --        Last 24 Hours Medication List:   Current Facility-Administered Medications   Medication Dose Route Frequency Provider Last Rate    acetaminophen  650 mg Oral Q6H PRN Lauryn Mini, CRNP      albuterol  2 5 mg Nebulization Q6H PRN Lauryn Mini, CRNP      ammonium lactate  1 application Topical Daily Lauryn Mini, CRNP      benzonatate  100 mg Oral TID PRN Lauryn Mini, CRNP      cefepime  2,000 mg Intravenous Q8H Lauryn Mini, CRNP 2,000 mg (01/30/22 3465)    And    vancomycin  17 5 mg/kg (Adjusted) Intravenous Q12H Lauryn Mini, CRNP 0 mg/kg (01/29/22 1924)    famotidine  40 mg Oral BID Lauryn Mini, CRNP      fluticasone-vilanterol  1 puff Inhalation Daily Lauryn Mini, [address]  furosemide  40 mg Intravenous Q8H Joby Roach MD      metoprolol  2 5 mg Intravenous Q6H PRN DUNIA Aldrich      metroNIDAZOLE  500 mg Oral [facility] Joby Roach MD      multivitamin-minerals  1 tablet Oral Daily Meera Bryan, [address]      PARoxetine  20 mg Oral Daily Meera Bryan, [address]      pravastatin  80 mg Oral After DUNIA Abdalla      sodium chloride  4 mL Nebulization Q6H Joby Roach MD      warfarin  4 mg Oral Daily (warfarin) DUNIA Aldrich          Today, Patient Was Seen By: Lexus Moya PA-C    **Please Note: This note may have been constructed using a voice recognition system  ** Spine appears normal, range of motion is not limited, no muscle or joint tenderness

## 2025-04-14 NOTE — TELEPHONE ENCOUNTER
I left a message for the patient to return my call  [[email or web address]] : 78 year old male with a past medical history of Afib x 2-3 years (on Xarelto), HTN, HLD, BPH, GERD, vertigo, some memory loss (A &O x 3), chronic diastolic heart failure with severe aortic stenosis and ascending aortic aneurysm now status post t AVR (27 mm bio valve), ascending, hemiarch replacement, encompass cryomaze, and COLLIN occlusion on 2/13/24, postop course complicated by CVA, presents for one year follow up visit with repeat diagnostic imaging.  TTE 4/3/2025 1. Left ventricular cavity is normal in size. Left ventricular wall thickness is mildly increased. Left ventricular systolic function is normal with an ejection fraction of 60 %. There are no regional wall motion abnormalities seen. 2. Moderate left ventricular hypertrophy. 3. Normal right ventricular cavity size, with normal wall thickness, and normal right ventricular systolic function. 4. Left atrium is severely dilated. 5. Mild mitral valve leaflet calcification. 6. A bioprosthetic valve replacement valve replacement is present in the aortic position The prosthetic valve has normal leaflet excursion and is well seated. No prosthetic aortic stenosis. 7. Patient is S/P thoracic aorta repair. 8. No evidence of aortic regurgitation.  CTA chest 04/09/25 Interval ascending aortic and aortic valve replacement without postoperative complication.